# Patient Record
Sex: MALE | Race: WHITE | NOT HISPANIC OR LATINO | Employment: UNEMPLOYED | URBAN - METROPOLITAN AREA
[De-identification: names, ages, dates, MRNs, and addresses within clinical notes are randomized per-mention and may not be internally consistent; named-entity substitution may affect disease eponyms.]

---

## 2017-05-30 ENCOUNTER — HOSPITAL ENCOUNTER (INPATIENT)
Facility: HOSPITAL | Age: 47
LOS: 6 days | Discharge: HOME | DRG: 424 | End: 2017-06-05
Attending: EMERGENCY MEDICINE | Admitting: INTERNAL MEDICINE
Payer: MEDICAID

## 2017-05-30 DIAGNOSIS — E11.51 DM (DIABETES MELLITUS), TYPE 2 WITH PERIPHERAL VASCULAR COMPLICATIONS: ICD-10-CM

## 2017-05-30 DIAGNOSIS — B18.2 CHRONIC VIRAL HEPATITIS C: ICD-10-CM

## 2017-05-30 DIAGNOSIS — E87.70 FLUID OVERLOAD: ICD-10-CM

## 2017-05-30 DIAGNOSIS — L97.909 ATHEROSCLEROSIS OF ARTERY OF EXTREMITY WITH ULCERATION: ICD-10-CM

## 2017-05-30 DIAGNOSIS — E87.1 HYPONATREMIA: ICD-10-CM

## 2017-05-30 DIAGNOSIS — E87.5 HYPERKALEMIA: ICD-10-CM

## 2017-05-30 DIAGNOSIS — K72.90 HEPATIC FAILURE, UNSPECIFIED WITHOUT COMA: ICD-10-CM

## 2017-05-30 DIAGNOSIS — R00.1 BRADYCARDIA: ICD-10-CM

## 2017-05-30 DIAGNOSIS — R10.9 ABDOMINAL PAIN: ICD-10-CM

## 2017-05-30 DIAGNOSIS — E83.42 HYPOMAGNESEMIA: ICD-10-CM

## 2017-05-30 DIAGNOSIS — R18.8 ASCITES OF LIVER: ICD-10-CM

## 2017-05-30 DIAGNOSIS — K76.82 HEPATIC ENCEPHALOPATHY: ICD-10-CM

## 2017-05-30 DIAGNOSIS — L97.421 DIABETIC ULCER OF LEFT MIDFOOT ASSOCIATED WITH TYPE 2 DIABETES MELLITUS, LIMITED TO BREAKDOWN OF SKIN: ICD-10-CM

## 2017-05-30 DIAGNOSIS — R18.8 OTHER ASCITES: ICD-10-CM

## 2017-05-30 DIAGNOSIS — K72.10 CHRONIC HEPATIC FAILURE WITHOUT COMA: ICD-10-CM

## 2017-05-30 DIAGNOSIS — R74.8 ELEVATED LIPASE: ICD-10-CM

## 2017-05-30 DIAGNOSIS — K72.10 CHRONIC LIVER FAILURE WITHOUT HEPATIC COMA: Primary | ICD-10-CM

## 2017-05-30 DIAGNOSIS — E11.621 TYPE 2 DIABETES MELLITUS WITH FOOT ULCER: ICD-10-CM

## 2017-05-30 DIAGNOSIS — L02.612 ABSCESS OF LEFT FOOT: ICD-10-CM

## 2017-05-30 DIAGNOSIS — M86.271 SUBACUTE OSTEOMYELITIS OF RIGHT FOOT: Chronic | ICD-10-CM

## 2017-05-30 DIAGNOSIS — E11.51 TYPE 2 DIABETES MELLITUS WITH DIABETIC PERIPHERAL ANGIOPATHY WITHOUT GANGRENE: ICD-10-CM

## 2017-05-30 DIAGNOSIS — L97.512 NON-PRESSURE CHRONIC ULCER OF OTHER PART OF RIGHT FOOT WITH FAT LAYER EXPOSED: ICD-10-CM

## 2017-05-30 DIAGNOSIS — R10.84 GENERALIZED ABDOMINAL PAIN: Chronic | ICD-10-CM

## 2017-05-30 DIAGNOSIS — E11.621 DIABETIC ULCER OF LEFT MIDFOOT ASSOCIATED WITH TYPE 2 DIABETES MELLITUS, LIMITED TO BREAKDOWN OF SKIN: ICD-10-CM

## 2017-05-30 DIAGNOSIS — R74.8 ABNORMAL LEVELS OF OTHER SERUM ENZYMES: ICD-10-CM

## 2017-05-30 DIAGNOSIS — I70.299 ATHEROSCLEROSIS OF ARTERY OF EXTREMITY WITH ULCERATION: ICD-10-CM

## 2017-05-30 DIAGNOSIS — R10.84 GENERALIZED ABDOMINAL PAIN: ICD-10-CM

## 2017-05-30 DIAGNOSIS — E88.09 OTHER DISORDERS OF PLASMA-PROTEIN METABOLISM, NOT ELSEWHERE CLASSIFIED: ICD-10-CM

## 2017-05-30 DIAGNOSIS — E88.09 HYPOALBUMINEMIA: ICD-10-CM

## 2017-05-30 DIAGNOSIS — M86.071 ACUTE HEMATOGENOUS OSTEOMYELITIS OF RIGHT FOOT: ICD-10-CM

## 2017-05-30 DIAGNOSIS — E11.621 DIABETIC ULCER OF TOE OF RIGHT FOOT ASSOCIATED WITH TYPE 2 DIABETES MELLITUS, WITH FAT LAYER EXPOSED: ICD-10-CM

## 2017-05-30 DIAGNOSIS — R06.02 SHORTNESS OF BREATH: ICD-10-CM

## 2017-05-30 DIAGNOSIS — L97.512 DIABETIC ULCER OF TOE OF RIGHT FOOT ASSOCIATED WITH TYPE 2 DIABETES MELLITUS, WITH FAT LAYER EXPOSED: ICD-10-CM

## 2017-05-30 DIAGNOSIS — E87.1 HYPO-OSMOLALITY AND HYPONATREMIA: ICD-10-CM

## 2017-05-30 DIAGNOSIS — R09.89 DECREASED PEDAL PULSES: ICD-10-CM

## 2017-05-30 DIAGNOSIS — K65.2 SBP (SPONTANEOUS BACTERIAL PERITONITIS): ICD-10-CM

## 2017-05-30 DIAGNOSIS — B18.2 CHRONIC HEPATITIS C WITHOUT HEPATIC COMA: ICD-10-CM

## 2017-05-30 LAB
ALBUMIN SERPL BCP-MCNC: 1.8 G/DL
ALP SERPL-CCNC: 144 U/L
ALT SERPL W/O P-5'-P-CCNC: 14 U/L
AMMONIA PLAS-SCNC: 52 UMOL/L
ANION GAP SERPL CALC-SCNC: 10 MMOL/L
AST SERPL-CCNC: 42 U/L
BACTERIA #/AREA URNS AUTO: ABNORMAL /HPF
BASOPHILS # BLD AUTO: 0.02 K/UL
BASOPHILS NFR BLD: 0.3 %
BILIRUB SERPL-MCNC: 1.2 MG/DL
BILIRUB UR QL STRIP: NEGATIVE
BUN SERPL-MCNC: 21 MG/DL
CALCIUM SERPL-MCNC: 7.6 MG/DL
CHLORIDE SERPL-SCNC: 102 MMOL/L
CLARITY UR REFRACT.AUTO: CLEAR
CO2 SERPL-SCNC: 18 MMOL/L
COLOR UR AUTO: YELLOW
CREAT SERPL-MCNC: 1.4 MG/DL
DIFFERENTIAL METHOD: ABNORMAL
EOSINOPHIL # BLD AUTO: 0.1 K/UL
EOSINOPHIL NFR BLD: 1.3 %
ERYTHROCYTE [DISTWIDTH] IN BLOOD BY AUTOMATED COUNT: 17.7 %
EST. GFR  (AFRICAN AMERICAN): >60 ML/MIN/1.73 M^2
EST. GFR  (NON AFRICAN AMERICAN): >60 ML/MIN/1.73 M^2
ETHANOL SERPL-MCNC: <10 MG/DL
GLUCOSE SERPL-MCNC: 434 MG/DL
GLUCOSE UR QL STRIP: ABNORMAL
HCT VFR BLD AUTO: 28.5 %
HGB BLD-MCNC: 9.4 G/DL
HGB UR QL STRIP: ABNORMAL
HYALINE CASTS UR QL AUTO: 1 /LPF
INR PPP: 1.1
KETONES UR QL STRIP: NEGATIVE
LACTATE SERPL-SCNC: 2 MMOL/L
LEUKOCYTE ESTERASE UR QL STRIP: NEGATIVE
LIPASE SERPL-CCNC: 151 U/L
LYMPHOCYTES # BLD AUTO: 0.5 K/UL
LYMPHOCYTES NFR BLD: 6.5 %
MAGNESIUM SERPL-MCNC: 1.2 MG/DL
MCH RBC QN AUTO: 27.6 PG
MCHC RBC AUTO-ENTMCNC: 33 %
MCV RBC AUTO: 84 FL
MICROSCOPIC COMMENT: ABNORMAL
MONOCYTES # BLD AUTO: 0.2 K/UL
MONOCYTES NFR BLD: 3.5 %
NEUTROPHILS # BLD AUTO: 6.1 K/UL
NEUTROPHILS NFR BLD: 88.1 %
NITRITE UR QL STRIP: NEGATIVE
PH UR STRIP: 6 [PH] (ref 5–8)
PLATELET # BLD AUTO: 60 K/UL
PMV BLD AUTO: 10 FL
POCT GLUCOSE: 184 MG/DL (ref 70–110)
POCT GLUCOSE: 261 MG/DL (ref 70–110)
POCT GLUCOSE: 401 MG/DL (ref 70–110)
POTASSIUM SERPL-SCNC: 5.6 MMOL/L
PROT SERPL-MCNC: 6.9 G/DL
PROT UR QL STRIP: ABNORMAL
PROTHROMBIN TIME: 11.8 SEC
RBC # BLD AUTO: 3.41 M/UL
RBC #/AREA URNS AUTO: 4 /HPF (ref 0–4)
SODIUM SERPL-SCNC: 130 MMOL/L
SP GR UR STRIP: 1.02 (ref 1–1.03)
SQUAMOUS #/AREA URNS AUTO: 0 /HPF
URN SPEC COLLECT METH UR: ABNORMAL
UROBILINOGEN UR STRIP-ACNC: NEGATIVE EU/DL
WBC # BLD AUTO: 6.94 K/UL
WBC #/AREA URNS AUTO: 6 /HPF (ref 0–5)
YEAST UR QL AUTO: ABNORMAL

## 2017-05-30 PROCEDURE — 87522 HEPATITIS C REVRS TRNSCRPJ: CPT

## 2017-05-30 PROCEDURE — 96375 TX/PRO/DX INJ NEW DRUG ADDON: CPT

## 2017-05-30 PROCEDURE — 63600175 PHARM REV CODE 636 W HCPCS: Performed by: EMERGENCY MEDICINE

## 2017-05-30 PROCEDURE — 93005 ELECTROCARDIOGRAM TRACING: CPT

## 2017-05-30 PROCEDURE — 83690 ASSAY OF LIPASE: CPT

## 2017-05-30 PROCEDURE — 99285 EMERGENCY DEPT VISIT HI MDM: CPT | Mod: 25,,, | Performed by: EMERGENCY MEDICINE

## 2017-05-30 PROCEDURE — 87340 HEPATITIS B SURFACE AG IA: CPT

## 2017-05-30 PROCEDURE — 85610 PROTHROMBIN TIME: CPT

## 2017-05-30 PROCEDURE — 63600175 PHARM REV CODE 636 W HCPCS: Performed by: STUDENT IN AN ORGANIZED HEALTH CARE EDUCATION/TRAINING PROGRAM

## 2017-05-30 PROCEDURE — 83735 ASSAY OF MAGNESIUM: CPT

## 2017-05-30 PROCEDURE — 86704 HEP B CORE ANTIBODY TOTAL: CPT

## 2017-05-30 PROCEDURE — 85025 COMPLETE CBC W/AUTO DIFF WBC: CPT

## 2017-05-30 PROCEDURE — 86803 HEPATITIS C AB TEST: CPT

## 2017-05-30 PROCEDURE — 83605 ASSAY OF LACTIC ACID: CPT

## 2017-05-30 PROCEDURE — 86705 HEP B CORE ANTIBODY IGM: CPT

## 2017-05-30 PROCEDURE — 80320 DRUG SCREEN QUANTALCOHOLS: CPT

## 2017-05-30 PROCEDURE — 96367 TX/PROPH/DG ADDL SEQ IV INF: CPT

## 2017-05-30 PROCEDURE — 93010 ELECTROCARDIOGRAM REPORT: CPT | Mod: ,,, | Performed by: INTERNAL MEDICINE

## 2017-05-30 PROCEDURE — 49083 ABD PARACENTESIS W/IMAGING: CPT | Mod: 52,,, | Performed by: EMERGENCY MEDICINE

## 2017-05-30 PROCEDURE — 80053 COMPREHEN METABOLIC PANEL: CPT

## 2017-05-30 PROCEDURE — 81001 URINALYSIS AUTO W/SCOPE: CPT

## 2017-05-30 PROCEDURE — 96365 THER/PROPH/DIAG IV INF INIT: CPT

## 2017-05-30 PROCEDURE — 20600001 HC STEP DOWN PRIVATE ROOM

## 2017-05-30 PROCEDURE — 99285 EMERGENCY DEPT VISIT HI MDM: CPT | Mod: 25

## 2017-05-30 PROCEDURE — 80321 ALCOHOLS BIOMARKERS 1OR 2: CPT

## 2017-05-30 PROCEDURE — 82962 GLUCOSE BLOOD TEST: CPT

## 2017-05-30 PROCEDURE — 82140 ASSAY OF AMMONIA: CPT

## 2017-05-30 PROCEDURE — 25000003 PHARM REV CODE 250: Performed by: EMERGENCY MEDICINE

## 2017-05-30 PROCEDURE — 96366 THER/PROPH/DIAG IV INF ADDON: CPT

## 2017-05-30 RX ORDER — MORPHINE SULFATE 2 MG/ML
2 INJECTION, SOLUTION INTRAMUSCULAR; INTRAVENOUS EVERY 4 HOURS PRN
Status: DISCONTINUED | OUTPATIENT
Start: 2017-05-30 | End: 2017-06-03

## 2017-05-30 RX ORDER — HYDROMORPHONE HYDROCHLORIDE 1 MG/ML
0.5 INJECTION, SOLUTION INTRAMUSCULAR; INTRAVENOUS; SUBCUTANEOUS
Status: COMPLETED | OUTPATIENT
Start: 2017-05-30 | End: 2017-05-30

## 2017-05-30 RX ORDER — MAGNESIUM SULFATE HEPTAHYDRATE 40 MG/ML
2 INJECTION, SOLUTION INTRAVENOUS EVERY 6 HOURS
Status: COMPLETED | OUTPATIENT
Start: 2017-05-31 | End: 2017-05-31

## 2017-05-30 RX ORDER — LACTULOSE 10 G/15ML
20 SOLUTION ORAL 3 TIMES DAILY
Status: DISCONTINUED | OUTPATIENT
Start: 2017-05-30 | End: 2017-06-03

## 2017-05-30 RX ORDER — LIDOCAINE HYDROCHLORIDE 10 MG/ML
INJECTION INFILTRATION; PERINEURAL
Status: DISPENSED
Start: 2017-05-30 | End: 2017-05-31

## 2017-05-30 RX ORDER — ALBUMIN HUMAN 250 G/1000ML
150 SOLUTION INTRAVENOUS ONCE
Status: DISCONTINUED | OUTPATIENT
Start: 2017-05-30 | End: 2017-05-30

## 2017-05-30 RX ORDER — LIDOCAINE HYDROCHLORIDE 10 MG/ML
10 INJECTION INFILTRATION; PERINEURAL
Status: DISCONTINUED | OUTPATIENT
Start: 2017-05-30 | End: 2017-06-05 | Stop reason: HOSPADM

## 2017-05-30 RX ORDER — MAGNESIUM SULFATE HEPTAHYDRATE 40 MG/ML
2 INJECTION, SOLUTION INTRAVENOUS
Status: COMPLETED | OUTPATIENT
Start: 2017-05-30 | End: 2017-05-30

## 2017-05-30 RX ORDER — ONDANSETRON 8 MG/1
8 TABLET, ORALLY DISINTEGRATING ORAL EVERY 8 HOURS PRN
Status: DISCONTINUED | OUTPATIENT
Start: 2017-05-30 | End: 2017-06-05 | Stop reason: HOSPADM

## 2017-05-30 RX ORDER — INSULIN ASPART 100 [IU]/ML
0-5 INJECTION, SOLUTION INTRAVENOUS; SUBCUTANEOUS EVERY 6 HOURS PRN
Status: DISCONTINUED | OUTPATIENT
Start: 2017-05-30 | End: 2017-06-01

## 2017-05-30 RX ORDER — IBUPROFEN 200 MG
16 TABLET ORAL
Status: DISCONTINUED | OUTPATIENT
Start: 2017-05-30 | End: 2017-06-05 | Stop reason: HOSPADM

## 2017-05-30 RX ORDER — SODIUM CHLORIDE, SODIUM LACTATE, POTASSIUM CHLORIDE, CALCIUM CHLORIDE 600; 310; 30; 20 MG/100ML; MG/100ML; MG/100ML; MG/100ML
INJECTION, SOLUTION INTRAVENOUS CONTINUOUS
Status: DISCONTINUED | OUTPATIENT
Start: 2017-05-30 | End: 2017-05-30

## 2017-05-30 RX ORDER — GLUCAGON 1 MG
1 KIT INJECTION
Status: DISCONTINUED | OUTPATIENT
Start: 2017-05-30 | End: 2017-06-05 | Stop reason: HOSPADM

## 2017-05-30 RX ORDER — LIDOCAINE HYDROCHLORIDE AND EPINEPHRINE 15; 5 MG/ML; UG/ML
1 INJECTION, SOLUTION EPIDURAL ONCE
Status: COMPLETED | OUTPATIENT
Start: 2017-05-30 | End: 2017-05-30

## 2017-05-30 RX ORDER — IBUPROFEN 200 MG
24 TABLET ORAL
Status: DISCONTINUED | OUTPATIENT
Start: 2017-05-30 | End: 2017-06-05 | Stop reason: HOSPADM

## 2017-05-30 RX ORDER — ALBUMIN HUMAN 250 G/1000ML
12.5 SOLUTION INTRAVENOUS EVERY 6 HOURS
Status: DISCONTINUED | OUTPATIENT
Start: 2017-05-31 | End: 2017-06-01

## 2017-05-30 RX ADMIN — MAGNESIUM SULFATE HEPTAHYDRATE 2 G: 40 INJECTION, SOLUTION INTRAVENOUS at 04:05

## 2017-05-30 RX ADMIN — LIDOCAINE HYDROCHLORIDE,EPINEPHRINE BITARTRATE 1 ML: 15; .005 INJECTION, SOLUTION EPIDURAL; INFILTRATION; INTRACAUDAL; PERINEURAL at 06:05

## 2017-05-30 RX ADMIN — MORPHINE SULFATE 2 MG: 2 INJECTION, SOLUTION INTRAMUSCULAR; INTRAVENOUS at 09:05

## 2017-05-30 RX ADMIN — HYDROMORPHONE HYDROCHLORIDE 0.5 MG: 1 INJECTION, SOLUTION INTRAMUSCULAR; INTRAVENOUS; SUBCUTANEOUS at 04:05

## 2017-05-30 RX ADMIN — INSULIN DETEMIR 10 UNITS: 100 INJECTION, SOLUTION SUBCUTANEOUS at 10:05

## 2017-05-30 RX ADMIN — CEFTRIAXONE 2 G: 2 INJECTION, SOLUTION INTRAVENOUS at 07:05

## 2017-05-30 NOTE — ED TRIAGE NOTES
Patient states he is here for lower and upper abdominal pain that started last night.  Patient denies nausea/vomiting.  Patient c/o diarrhea with dark, maroon, looking blood in it.  Patient states he is freezing and can't stop shaking.  Patient states he has cirrhosis of the liver, pancreatitis, Hep C, gallstones, diabetic ulcers to the right foot, and hypertension.

## 2017-05-30 NOTE — ED PROVIDER NOTES
Encounter Date: 5/30/2017    SCRIBE #1 NOTE: I, Jennie Ramos, am scribing for, and in the presence of,  Dr. Siu . I have scribed the entire note.       History     Chief Complaint   Patient presents with    Shaking     foot ulcers,hep c, liver failure, shakes started today     Review of patient's allergies indicates:   Allergen Reactions    Shellfish containing products Hives     Shortness of breath     Time patient was seen by the provider: 3:20 PM    The patient is a 46 y.o. male with hx of: Hep C, cirrhosis of the liver, HTN, DM, pancreatitis, and gallstones that presents to the ED with a complaint of abdominal pain for 1 day. Pt also endorses chills, rectal bleeding, and pain in his right scrotum, but denies cough and diarrhea. Pt is normally followed closely by physicians at [facility] and he was last seen in the ED 10 days ago for abdominal pain. Of note: His last fluid tap was a couple of months ago.      The history is provided by the patient and a relative.     Past Medical History:   Diagnosis Date    Ascites of liver 5/30/2017    Chronic hepatitis C without hepatic coma     Chronic liver failure without hepatic coma 5/30/2017    Diabetic foot ulcer     right foot    DM (diabetes mellitus), type 2 with peripheral vascular complications     Essential hypertension     Gallstones     Hepatic encephalopathy 5/30/2017    Hepatitis C     Pancreatitis      Past Surgical History:   Procedure Laterality Date    BACK SURGERY       History reviewed. No pertinent family history.  Social History   Substance Use Topics    Smoking status: Current Every Day Smoker     Types: Cigarettes    Smokeless tobacco: Not on file    Alcohol use No     Review of Systems   Constitutional: Positive for chills. Negative for fever.   HENT: Negative for sore throat.    Respiratory: Negative for shortness of breath.    Cardiovascular: Negative for chest pain.   Gastrointestinal: Positive for abdominal pain. Negative for  diarrhea and nausea.   Genitourinary: Negative for dysuria.        Pain in his right scrotum and positive for rectal bleeding.    Musculoskeletal: Negative for back pain.   Skin: Negative for rash.   Neurological: Negative for weakness.   Hematological: Does not bruise/bleed easily.       Physical Exam     Initial Vitals [05/30/17 1351]   BP Pulse Resp Temp SpO2   (!) 191/90 93 (!) 24 99.1 °F (37.3 °C) 100 %     Physical Exam    Nursing note and vitals reviewed.  Constitutional: No distress.   Pt appears chronically ill and uncomfortable.    HENT:   Head: Normocephalic and atraumatic.   Mucous membranes dry    Eyes: No scleral icterus.   Neck: Neck supple.   Cardiovascular: Normal rate, regular rhythm, normal heart sounds and intact distal pulses.   Pulmonary/Chest: Breath sounds normal. No respiratory distress.   Abdominal: Soft. He exhibits distension. There is tenderness (diffuse ).   Pt is positive for ascites.     Genitourinary: Rectal exam shows guaiac positive stool. Guaiac positive stool. : Acceptable.  Genitourinary Comments: There is light brown stool.    Neurological: He is alert and oriented to person, place, and time.         ED Course   Paracentesis  Date/Time: 6/1/2017 2:34 PM  Location procedure was performed: [facility] EMERGENCY DEPARTMENT  Performed by: JASON BARKER  Authorized by: ERON FOLEY   Consent Done: Emergent Situation  Procedure purpose: diagnostic  Indications: suspected peritonitis  Anesthesia: local infiltration    Anesthesia:  Anesthesia: local infiltration  Local Anesthetic: lidocaine 1% with epinephrine   Preparation: Patient was prepped and draped in the usual sterile fashion.  Needle gauge: 18  Ultrasound guidance: yes  Puncture site: right lower quadrant  Comments: 2 attempts made, patient unable to tolerate        Labs Reviewed   COMPREHENSIVE METABOLIC PANEL - Abnormal; Notable for the following:        Result Value    Sodium 130 (*)     Potassium 5.6 (*)      CO2 18 (*)     Glucose 434 (*)     BUN, Bld 21 (*)     Calcium 7.6 (*)     Albumin 1.8 (*)     Total Bilirubin 1.2 (*)     Alkaline Phosphatase 144 (*)     AST 42 (*)     All other components within normal limits   MAGNESIUM - Abnormal; Notable for the following:     Magnesium 1.2 (*)     All other components within normal limits   LIPASE - Abnormal; Notable for the following:     Lipase 151 (*)     All other components within normal limits   URINALYSIS - Abnormal; Notable for the following:     Protein, UA 1+ (*)     Glucose, UA 3+ (*)     Occult Blood UA 3+ (*)     All other components within normal limits   AMMONIA - Abnormal; Notable for the following:     Ammonia 52 (*)     All other components within normal limits   URINALYSIS MICROSCOPIC - Abnormal; Notable for the following:     WBC, UA 6 (*)     All other components within normal limits   CBC W/ AUTO DIFFERENTIAL - Abnormal; Notable for the following:     RBC 3.41 (*)     Hemoglobin 9.4 (*)     Hematocrit 28.5 (*)     RDW 17.7 (*)     Platelets 60 (*)     Lymph # 0.5 (*)     Mono # 0.2 (*)     Gran% 88.1 (*)     Lymph% 6.5 (*)     Mono% 3.5 (*)     All other components within normal limits   POCT GLUCOSE - Abnormal; Notable for the following:     POCT Glucose 401 (*)     All other components within normal limits   POCT GLUCOSE - Abnormal; Notable for the following:     POCT Glucose 184 (*)     All other components within normal limits   POCT GLUCOSE - Abnormal; Notable for the following:     POCT Glucose 261 (*)     All other components within normal limits   LACTIC ACID, PLASMA   PROTIME-INR   ALCOHOL,MEDICAL (ETHANOL)   HEPATITIS B CORE ANTIBODY, IGM   HEPATITIS B CORE ANTIBODY, TOTAL   HEPATITIS B SURFACE ANTIGEN, CONFIRMATION   HEPATITIS C ANTIBODY   HEPATITIS C RNA, QUANTITATIVE, PCR   PHOSPHATIDYLETHANOL (PETH)   ALCOHOL,MEDICAL (ETHANOL)    Narrative:     ADDING ON HEPATITIS B CORE ANTIBODY TOTAL, ETHANOL, HEPATITIS B CORE   ANTIBODY IGM,  HEPATITIS B SURFACE ANTIGEN, HEPATITIS C ANTIBODY AND   PHOSPHATIDYLETHANOL PER ERON FOLEY MD 19:40  05/30/2017    PHOSPHATIDYLETHANOL (PETH)    Narrative:     ADDING ON HEPATITIS B CORE ANTIBODY TOTAL, ETHANOL, HEPATITIS B CORE   ANTIBODY IGM, HEPATITIS B SURFACE ANTIGEN, HEPATITIS C ANTIBODY AND   PHOSPHATIDYLETHANOL PER ERON FOLEY MD 19:40  05/30/2017              Medical Decision Making:   History:   Old Medical Records: I decided to obtain old medical records.  Initial Assessment:   47 yo m, end stage liver 2/2 HCV cirrhosis, all his care at [facility], now here for worsening abd pain, fever/chills, generalized weakness, rectal bleeding.  Recent admit at [facility] but no access to medical records.  Pt appears chronically ill, dehydrated, abd distended and tender diffusely.  Rectal w only trace heme positive.  No signs encephalopathy  Differential Diagnosis:   Decompensated liver failure, ? Superimposed SBP or alternative infection or issue  Clinical Tests:   Lab Tests: Ordered and Reviewed  Radiological Study: Ordered and Reviewed  ED Management:  -labs  -IVF  -dx paracentesis  -pain meds  -anticipate admit            Scribe Attestation:   Scribe #1: I performed the above scribed service and the documentation accurately describes the services I performed. I attest to the accuracy of the note.    Attending Attestation:           Physician Attestation for Scribe:  Physician Attestation Statement for Scribe #1: I, Dr. Siu, reviewed documentation, as scribed by Jennie Ramos  in my presence, and it is both accurate and complete.                 ED Course     5:12 PM  Diagnostic tap attempted via ultrasound guidance but pt unable to tolerate, attempted x 2 and then pt refused further attempts    Will tx empirically w ceftriaxone and admit for further management    Lipase 152 so sx could be 2/2 pancreatitis      Clinical Impression:   The primary encounter diagnosis was Chronic liver failure without hepatic coma.  Diagnoses of Shortness of breath, Fluid overload, DM (diabetes mellitus), type 2 with peripheral vascular complications, Chronic hepatitis C without hepatic coma, Hyperkalemia, Abdominal pain, Diabetic ulcer of toe of right foot associated with type 2 diabetes mellitus, with fat layer exposed, SBP (spontaneous bacterial peritonitis), Elevated lipase, Hypomagnesemia, Generalized abdominal pain, Hepatic encephalopathy, Ascites of liver, Hypoalbuminemia, Hyponatremia, and Decreased pedal pulses were also pertinent to this visit.          Marta Siu MD  06/01/17 5268

## 2017-05-30 NOTE — ED NOTES
PT IV came out. Attempted to start IV 3 unsuccessful attempts. Dr. guerra notified. Awaiting another nurse to attempt.

## 2017-05-31 PROBLEM — K65.2 SBP (SPONTANEOUS BACTERIAL PERITONITIS): Chronic | Status: ACTIVE | Noted: 2017-05-30

## 2017-05-31 LAB
ALBUMIN FLD-MCNC: 0.4 G/DL
ALBUMIN SERPL BCP-MCNC: 1.6 G/DL
ALP SERPL-CCNC: 117 U/L
ALT SERPL W/O P-5'-P-CCNC: 12 U/L
ANION GAP SERPL CALC-SCNC: 4 MMOL/L
APPEARANCE FLD: CLEAR
AST SERPL-CCNC: 31 U/L
BILIRUB SERPL-MCNC: 1.3 MG/DL
BODY FLD TYPE: NORMAL
BODY FLUID SOURCE, LDH: NORMAL
BUN SERPL-MCNC: 20 MG/DL
CALCIUM SERPL-MCNC: 7.6 MG/DL
CHLORIDE SERPL-SCNC: 102 MMOL/L
CO2 SERPL-SCNC: 22 MMOL/L
COLOR FLD: YELLOW
CREAT SERPL-MCNC: 1.1 MG/DL
EST. GFR  (AFRICAN AMERICAN): >60 ML/MIN/1.73 M^2
EST. GFR  (NON AFRICAN AMERICAN): >60 ML/MIN/1.73 M^2
ESTIMATED AVG GLUCOSE: 189 MG/DL
GLUCOSE SERPL-MCNC: 310 MG/DL
GRAM STN SPEC: NORMAL
GRAM STN SPEC: NORMAL
HBA1C MFR BLD HPLC: 8.2 %
HBV CORE AB SERPL QL IA: NEGATIVE
HBV CORE IGM SERPL QL IA: NEGATIVE
HBV SURFACE AG SERPL QL IA: NEGATIVE
HCV AB SERPL QL IA: POSITIVE
LDH FLD L TO P-CCNC: 62 U/L
LYMPHOCYTES NFR FLD MANUAL: 27 %
MAGNESIUM SERPL-MCNC: 1.5 MG/DL
MESOTHL CELL NFR FLD MANUAL: 7 %
MONOS+MACROS NFR FLD MANUAL: 54 %
NEUTROPHILS NFR FLD MANUAL: 12 %
PHOSPHATE SERPL-MCNC: 2.4 MG/DL
POCT GLUCOSE: 286 MG/DL (ref 70–110)
POCT GLUCOSE: 320 MG/DL (ref 70–110)
POCT GLUCOSE: 363 MG/DL (ref 70–110)
POCT GLUCOSE: 372 MG/DL (ref 70–110)
POCT GLUCOSE: 375 MG/DL (ref 70–110)
POTASSIUM SERPL-SCNC: 4.4 MMOL/L
PROT FLD-MCNC: 1 G/DL
PROT SERPL-MCNC: 5.8 G/DL
SODIUM SERPL-SCNC: 128 MMOL/L
SPECIMEN SOURCE: NORMAL
SPECIMEN SOURCE: NORMAL
WBC # FLD: 62 /CU MM

## 2017-05-31 PROCEDURE — 87205 SMEAR GRAM STAIN: CPT

## 2017-05-31 PROCEDURE — 82042 OTHER SOURCE ALBUMIN QUAN EA: CPT

## 2017-05-31 PROCEDURE — 89051 BODY FLUID CELL COUNT: CPT

## 2017-05-31 PROCEDURE — 63600175 PHARM REV CODE 636 W HCPCS: Performed by: INTERNAL MEDICINE

## 2017-05-31 PROCEDURE — 20600001 HC STEP DOWN PRIVATE ROOM

## 2017-05-31 PROCEDURE — P9047 ALBUMIN (HUMAN), 25%, 50ML: HCPCS | Performed by: INTERNAL MEDICINE

## 2017-05-31 PROCEDURE — 87075 CULTR BACTERIA EXCEPT BLOOD: CPT

## 2017-05-31 PROCEDURE — 84100 ASSAY OF PHOSPHORUS: CPT

## 2017-05-31 PROCEDURE — 0W9G3ZZ DRAINAGE OF PERITONEAL CAVITY, PERCUTANEOUS APPROACH: ICD-10-PCS | Performed by: RADIOLOGY

## 2017-05-31 PROCEDURE — 83735 ASSAY OF MAGNESIUM: CPT

## 2017-05-31 PROCEDURE — 83036 HEMOGLOBIN GLYCOSYLATED A1C: CPT

## 2017-05-31 PROCEDURE — 63600175 PHARM REV CODE 636 W HCPCS: Performed by: STUDENT IN AN ORGANIZED HEALTH CARE EDUCATION/TRAINING PROGRAM

## 2017-05-31 PROCEDURE — 80053 COMPREHEN METABOLIC PANEL: CPT

## 2017-05-31 PROCEDURE — 84157 ASSAY OF PROTEIN OTHER: CPT

## 2017-05-31 PROCEDURE — 83615 LACTATE (LD) (LDH) ENZYME: CPT

## 2017-05-31 PROCEDURE — 99223 1ST HOSP IP/OBS HIGH 75: CPT | Mod: ,,, | Performed by: INTERNAL MEDICINE

## 2017-05-31 PROCEDURE — 36415 COLL VENOUS BLD VENIPUNCTURE: CPT

## 2017-05-31 PROCEDURE — 87070 CULTURE OTHR SPECIMN AEROBIC: CPT

## 2017-05-31 RX ORDER — QUETIAPINE FUMARATE 100 MG/1
100 TABLET, FILM COATED ORAL NIGHTLY
COMMUNITY

## 2017-05-31 RX ORDER — CIPROFLOXACIN 500 MG/1
500 TABLET ORAL EVERY 12 HOURS
Status: ON HOLD | COMMUNITY
End: 2017-06-05 | Stop reason: ALTCHOICE

## 2017-05-31 RX ORDER — ATORVASTATIN CALCIUM 20 MG/1
20 TABLET, FILM COATED ORAL DAILY
Status: ON HOLD | COMMUNITY
End: 2017-06-05

## 2017-05-31 RX ORDER — CARVEDILOL 6.25 MG/1
6.25 TABLET ORAL 2 TIMES DAILY WITH MEALS
Status: ON HOLD | COMMUNITY
End: 2017-07-06 | Stop reason: HOSPADM

## 2017-05-31 RX ORDER — SPIRONOLACTONE 50 MG/1
50 TABLET, FILM COATED ORAL DAILY
Status: ON HOLD | COMMUNITY
End: 2017-06-05 | Stop reason: SINTOL

## 2017-05-31 RX ORDER — FUROSEMIDE 10 MG/ML
40 INJECTION INTRAMUSCULAR; INTRAVENOUS 2 TIMES DAILY
Status: DISCONTINUED | OUTPATIENT
Start: 2017-05-31 | End: 2017-06-01

## 2017-05-31 RX ORDER — INSULIN GLARGINE 100 [IU]/ML
25 INJECTION, SOLUTION SUBCUTANEOUS NIGHTLY
Status: ON HOLD | COMMUNITY
End: 2017-05-31 | Stop reason: CLARIF

## 2017-05-31 RX ORDER — MAGNESIUM SULFATE HEPTAHYDRATE 40 MG/ML
2 INJECTION, SOLUTION INTRAVENOUS EVERY 6 HOURS
Status: COMPLETED | OUTPATIENT
Start: 2017-05-31 | End: 2017-05-31

## 2017-05-31 RX ORDER — PROPRANOLOL HYDROCHLORIDE 40 MG/1
40 TABLET ORAL 2 TIMES DAILY
Status: ON HOLD | COMMUNITY
End: 2017-06-05 | Stop reason: SDUPTHER

## 2017-05-31 RX ORDER — DICYCLOMINE HYDROCHLORIDE 20 MG/1
20 TABLET ORAL 3 TIMES DAILY
COMMUNITY
End: 2018-04-03

## 2017-05-31 RX ORDER — FUROSEMIDE 20 MG/1
20 TABLET ORAL DAILY
Status: ON HOLD | COMMUNITY
End: 2017-06-05

## 2017-05-31 RX ADMIN — MAGNESIUM SULFATE IN WATER 2 G: 40 INJECTION, SOLUTION INTRAVENOUS at 05:05

## 2017-05-31 RX ADMIN — INSULIN ASPART 3 UNITS: 100 INJECTION, SOLUTION INTRAVENOUS; SUBCUTANEOUS at 12:05

## 2017-05-31 RX ADMIN — MAGNESIUM SULFATE IN WATER 2 G: 40 INJECTION, SOLUTION INTRAVENOUS at 06:05

## 2017-05-31 RX ADMIN — ALBUMIN (HUMAN) 12.5 G: 12.5 SOLUTION INTRAVENOUS at 11:05

## 2017-05-31 RX ADMIN — ALBUMIN (HUMAN) 12.5 G: 12.5 SOLUTION INTRAVENOUS at 05:05

## 2017-05-31 RX ADMIN — FUROSEMIDE 40 MG: 10 INJECTION, SOLUTION INTRAVENOUS at 11:05

## 2017-05-31 RX ADMIN — INSULIN ASPART 5 UNITS: 100 INJECTION, SOLUTION INTRAVENOUS; SUBCUTANEOUS at 05:05

## 2017-05-31 RX ADMIN — MORPHINE SULFATE 2 MG: 2 INJECTION, SOLUTION INTRAMUSCULAR; INTRAVENOUS at 11:05

## 2017-05-31 RX ADMIN — INSULIN ASPART 4 UNITS: 100 INJECTION, SOLUTION INTRAVENOUS; SUBCUTANEOUS at 12:05

## 2017-05-31 RX ADMIN — MORPHINE SULFATE 2 MG: 2 INJECTION, SOLUTION INTRAMUSCULAR; INTRAVENOUS at 07:05

## 2017-05-31 RX ADMIN — CEFTRIAXONE 2 G: 2 INJECTION, SOLUTION INTRAVENOUS at 08:05

## 2017-05-31 RX ADMIN — ALBUMIN (HUMAN) 12.5 G: 12.5 SOLUTION INTRAVENOUS at 12:05

## 2017-05-31 RX ADMIN — MORPHINE SULFATE 2 MG: 2 INJECTION, SOLUTION INTRAMUSCULAR; INTRAVENOUS at 01:05

## 2017-05-31 RX ADMIN — MORPHINE SULFATE 2 MG: 2 INJECTION, SOLUTION INTRAMUSCULAR; INTRAVENOUS at 03:05

## 2017-05-31 RX ADMIN — MAGNESIUM SULFATE IN WATER 2 G: 40 INJECTION, SOLUTION INTRAVENOUS at 03:05

## 2017-05-31 RX ADMIN — MAGNESIUM SULFATE IN WATER 2 G: 40 INJECTION, SOLUTION INTRAVENOUS at 12:05

## 2017-05-31 RX ADMIN — MORPHINE SULFATE 2 MG: 2 INJECTION, SOLUTION INTRAMUSCULAR; INTRAVENOUS at 08:05

## 2017-05-31 RX ADMIN — FUROSEMIDE 40 MG: 10 INJECTION, SOLUTION INTRAVENOUS at 05:05

## 2017-05-31 RX ADMIN — INSULIN ASPART 3 UNITS: 100 INJECTION, SOLUTION INTRAVENOUS; SUBCUTANEOUS at 05:05

## 2017-05-31 NOTE — HPI
The patient is a 46 y.o. male with hx of: Hep C, cirrhosis of the liver, HTN, HLD, DM, recurrent pancreatitis, and gallstones that presents to the ED with a complaint of abdominal pain for 1 day. He states the pain is worse after eating. Pt also endorses chills, nausea, leg pains, and rectal bleeding, but denies chest pain and diarrhea. Endorses SOB and dry cough x 2 months. No hx of hemorrhoids. He states he vomiting blood x 1 about 1.5 wk ago but never got an EGD at the time because he could not tolerate it. Pt is normally followed closely by physicians at [facility] and he was last seen in the ED 10 days ago for abdominal pain. He states he does not like the way he was treated at [facility], and therefore came here. Of note: His last fluid tap was a couple of months ago. He denies any EtOH use, drug use limited to marijuana and cocaine but denies IV drug use, and TOB use is 2-3 cigs/day.

## 2017-05-31 NOTE — PLAN OF CARE
Problem: Patient Care Overview  Goal: Plan of Care Review  Pt is AAOx4, vital signs have been stable. Pt is NPO. Pt complains of abdominal pain, PRN morphine given. Pt has refused Lactulose. Pt is free from falls/injury throughout shift. Plan of care reviewed with patient,all questions and concerns were addressed. Will continue to monitor.

## 2017-05-31 NOTE — PROGRESS NOTES
[facility] Hospital Medicine  Progress Note    Patient Name: Leif Nino  MRN: [id number]  Patient Class: IP- Inpatient   Admission Date: 5/30/2017  Length of Stay: 1 days  Attending Physician: Juliano Mo MD  Primary Care Provider: Primary Doctor [facility] Medicine Team: [facility]P MED 5 Estefany Mcghee MD    Subjective:     Principal Problem:SBP (spontaneous bacterial peritonitis)    HPI:  The patient is a 46 y.o. male with hx of: Hep C, cirrhosis of the liver, HTN, HLD, DM, recurrent pancreatitis, and gallstones that presents to the ED with a complaint of abdominal pain for 1 day. He states the pain is worse after eating. Pt also endorses chills, nausea, leg pains, and rectal bleeding, but denies chest pain and diarrhea. Endorses SOB and dry cough x 2 months. No hx of hemorrhoids. He states he vomiting blood x 1 about 1.5 wk ago but never got an EGD at the time because he could not tolerate it. Pt is normally followed closely by physicians at [facility] and he was last seen in the ED 10 days ago for abdominal pain. He states he does not like the way he was treated at [facility], and therefore came here. Of note: His last fluid tap was a couple of months ago. He denies any EtOH use, drug use limited to marijuana and cocaine but denies IV drug use, and TOB use is 2-3 cigs/day.        Hospital Course:  Admitted to [facility] 5/30. NAEON. Pt could not tolerate bedside paracentesis. 5/31 RUQ ultrasound consistent with ascites and cirrhosis. IR paracentesis ordered to r/o SBP. Cont medical management for SBP.     Interval History: NAEON. RUQ U/S and IR para done today. Cont SBP tx and f/u para labs.     Review of Systems   Constitutional: Positive for fatigue. Negative for chills, diaphoresis and fever.   HENT: Negative for congestion, ear discharge, ear pain and rhinorrhea.    Eyes: Negative for pain, discharge, redness and itching.   Respiratory: Positive for cough (dry, chronic). Negative for chest  tightness, shortness of breath and wheezing.    Cardiovascular: Negative for chest pain, palpitations and leg swelling.   Gastrointestinal: Positive for abdominal distention and abdominal pain. Negative for constipation, diarrhea, nausea and vomiting.   Genitourinary: Negative for difficulty urinating, dysuria, frequency and hematuria.   Musculoskeletal: Positive for myalgias. Negative for arthralgias, back pain and neck pain.   Skin: Positive for color change (b/l LE) and wound (right foot). Negative for pallor and rash.   Neurological: Negative for dizziness, light-headedness, numbness and headaches.   Psychiatric/Behavioral: Negative for agitation, confusion and sleep disturbance. The patient is not nervous/anxious.      Objective:     Vital Signs (Most Recent):  Temp: 98.2 °F (36.8 °C) (05/31/17 0727)  Pulse: 68 (05/31/17 1504)  Resp: 16 (05/31/17 1504)  BP: (!) 179/89 (05/31/17 1504)  SpO2: 100 % (05/31/17 1504) Vital Signs (24h Range):  Temp:  [97.4 °F (36.3 °C)-100.2 °F (37.9 °C)] 98.2 °F (36.8 °C)  Pulse:  [68-96] 68  Resp:  [16-22] 16  SpO2:  [97 %-100 %] 100 %  BP: (139-180)/(67-89) 179/89     Weight: 117 kg (257 lb 15 oz)  Body mass index is 33.12 kg/m².    Intake/Output Summary (Last 24 hours) at 05/31/17 1555  Last data filed at 05/31/17 1547   Gross per 24 hour   Intake              240 ml   Output             6200 ml   Net            -5960 ml      Physical Exam   Constitutional: He is oriented to person, place, and time. He appears well-developed and well-nourished. No distress.   HENT:   Head: Normocephalic and atraumatic.   Right Ear: External ear normal.   Left Ear: External ear normal.   Nose: Nose normal.   Mouth/Throat: Oropharynx is clear and moist. No oropharyngeal exudate.   Eyes: Conjunctivae and EOM are normal. Pupils are equal, round, and reactive to light. Right eye exhibits no discharge. Left eye exhibits no discharge. No scleral icterus.   Neck: Normal range of motion. Neck supple. No  JVD present. No tracheal deviation present. No thyromegaly present.   Cardiovascular: Normal rate, regular rhythm, normal heart sounds and intact distal pulses.  Exam reveals no gallop and no friction rub.    No murmur heard.  Pulmonary/Chest: Effort normal and breath sounds normal. No respiratory distress. He has no wheezes. He has no rales. He exhibits no tenderness.   Abdominal: Soft. Bowel sounds are normal. He exhibits distension. He exhibits no mass. There is tenderness. There is guarding. There is no rebound.   Musculoskeletal: Normal range of motion. He exhibits edema (b/l LE). He exhibits no tenderness or deformity.   Neurological: He is alert and oriented to person, place, and time.   Skin: Skin is warm. No rash noted. He is not diaphoretic. No erythema. No pallor.   B/l LE hard bumpy slightly erythematous skin; right foot discharge/dried blood noted, sock stuck to discharge and unable to pull away at this time    Psychiatric: He has a normal mood and affect. His behavior is normal. Judgment and thought content normal.   Vitals reviewed.      Significant Labs: All pertinent labs within the past 24 hours have been reviewed.    Significant Imaging: I have reviewed all pertinent imaging results/findings within the past 24 hours.    Assessment/Plan:      Hyperkalemia    - K 5.6 on admit, asymptomatic  - EKG nsr  - cont to monitor, improving, K wnl          Hyponatremia    - Na 130, asymptomatic, in setting of liver failure  - cont to monitor daily labs, fluid restriction          Chronic hepatitis C without hepatic coma    - no home meds for Hep C, denies lactulose use  - cont lactulose 20 mg TID          DM (diabetes mellitus), type 2 with peripheral vascular complications    - home med: Lantus 25 u qHS  - started insulin detemir 20 u nightly + LDSS, monitor POCT glc          Ascites of liver    - see abd pain  - IR para done 5/31, f/u ascitic fluid labs          Hypoalbuminemia    - in setting of liver  failure  - discuss nutritional status with pt           Elevated lipase    - lipase 151, concern for pancreatitis but less likely   - see abd pain          Hypomagnesemia    - Mg 1.2 on admit, replete as needed          Chronic liver failure without hepatic coma    - see above  - started IV lasix 40 mg BID          * SBP (spontaneous bacterial peritonitis)    - DDx: SBP, pancreatitis, gallstones  - unconfirmed SBP as pt not able to tolerate bedside paracentesis  - consulted IR for paracentesis  - treating for SBP: ceftriaxone 2 g daily, albumin 1.5 g/kg on day 1 followed by 1 g/kg on day 3, IV morphine 2 mg q4hrs for pain control  - monitor kidney fxn in the AM, if worsening concern for HRS and may need octreotide/midodrine  - 5/31 IR para done. F/u ascitic fluid labs  - prn pain control  - RUQ ultrasound: consistent for ascites and cirrhosis, labs: Hep B neg, Hep C +, ethanol neg, Peth in process            VTE Risk Mitigation         Ordered     Medium Risk of VTE  Once      05/31/17 0740     Place BASILIO hose  Until discontinued      05/30/17 1824     Place sequential compression device  Until discontinued      05/30/17 1824          Estefany Mcghee MD  Department of Hospital Medicine   [facility]

## 2017-05-31 NOTE — H&P
Inpatient Radiology Pre-procedure Note    History of Present Illness:  Leif Nino is a 46 y.o. male who presents for ultrasound guided paracentesis.  Admission H&P reviewed.  Past Medical History:   Diagnosis Date    Ascites of liver 5/30/2017    Chronic hepatitis C without hepatic coma     Chronic liver failure without hepatic coma 5/30/2017    Diabetic foot ulcer     right foot    DM (diabetes mellitus), type 2 with peripheral vascular complications     Essential hypertension     Gallstones     Hepatic encephalopathy 5/30/2017    Hepatitis C     Pancreatitis      Past Surgical History:   Procedure Laterality Date    BACK SURGERY         Review of Systems:   As documented in primary team H&P    Home Meds:   Prior to Admission medications    Medication Sig Start Date End Date Taking? Authorizing Provider   atorvastatin (LIPITOR) 20 MG tablet Take 20 mg by mouth once daily.   Yes Historical Provider, MD   carvedilol (COREG) 6.25 MG tablet Take 6.25 mg by mouth 2 (two) times daily with meals.   Yes Historical Provider, MD   ciprofloxacin HCl (CIPRO) 500 MG tablet Take 500 mg by mouth every 12 (twelve) hours.   Yes Historical Provider, MD   dicyclomine (BENTYL) 20 mg tablet Take 20 mg by mouth 3 (three) times daily.   Yes Historical Provider, MD   furosemide (LASIX) 20 MG tablet Take 20 mg by mouth once daily.   Yes Historical Provider, MD   INSULIN GLARGINE,HUM.REC.ANLOG (LANTUS SOLOSTAR SUBQ) Inject 25 Units into the skin every evening.   Yes Historical Provider, MD   propranolol (INDERAL) 40 MG tablet Take 40 mg by mouth 2 (two) times daily.   Yes Historical Provider, MD   quetiapine (SEROQUEL) 100 MG Tab Take 100 mg by mouth every evening.   Yes Historical Provider, MD   spironolactone (ALDACTONE) 50 MG tablet Take 50 mg by mouth once daily.   Yes Historical Provider, MD   insulin glargine (LANTUS) 100 unit/mL injection Inject 25 Units into the skin every evening.  5/31/17 Yes Historical Provider, MD      Scheduled Meds:    albumin human 25%  12.5 g Intravenous Q6H    cefTRIAXone (ROCEPHIN) IVPB  2 g Intravenous Q24H    furosemide  40 mg Intravenous BID    insulin detemir  20 Units Subcutaneous QHS    lactulose  20 g Oral TID    magnesium sulfate IVPB  2 g Intravenous Q6H     Continuous Infusions:    PRN Meds:dextrose 50%, dextrose 50%, glucagon (human recombinant), glucose, glucose, insulin aspart, lidocaine HCL 10 mg/ml (1%), morphine, ondansetron  Anticoagulants/Antiplatelets: no anticoagulation    Allergies:   Review of patient's allergies indicates:   Allergen Reactions    Shellfish containing products Hives     Shortness of breath     Sedation Hx: have not been any systemic reactions    Labs:    Recent Labs  Lab 05/30/17  1513   INR 1.1       Recent Labs  Lab 05/30/17  1515   WBC 6.94   HGB 9.4*   HCT 28.5*   MCV 84   PLT 60*      Recent Labs  Lab 05/31/17  0125   *   *   K 4.4      CO2 22*   BUN 20   CREATININE 1.1   CALCIUM 7.6*   MG 1.5*   ALT 12   AST 31   ALBUMIN 1.6*   BILITOT 1.3*         Vitals:  Temp: 98.2 °F (36.8 °C) (05/31/17 0727)  Pulse: 68 (05/31/17 1404)  Resp: 16 (05/31/17 1404)  BP: (!) 163/84 (05/31/17 1404)  SpO2: 100 % (05/31/17 1404)     Physical Exam:  ASA: 3  Mallampati: na    General: no acute distress  Mental Status: alert and oriented to person, place and time  HEENT: normocephalic, atraumatic  Chest: unlabored breathing  Heart: regular heart rate  Abdomen: distended  Extremity: moves all extremities    Plan: ultrasound guided paracentesis  Sedation Plan: local     ZACK Ambrocio, REBECAP  Interventional Radiology  [phone] [employer]

## 2017-05-31 NOTE — PLAN OF CARE
CM in to see pt just returning from U/S and states 2nd attempt for IR to do Paracentsis and unable to do unknown reason per pt.  Pt AAO in no distress with all over swelling noted.  CM will continue to follow.     05/31/17 0920   Discharge Assessment   Assessment Type Discharge Planning Assessment   Confirmed/corrected address and phone number on facesheet? Yes   Assessment information obtained from? Patient   Expected Length of Stay (days) 3   Communicated expected length of stay with patient/caregiver yes   Prior to hospitilization cognitive status: Alert/Oriented   Prior to hospitalization functional status: Independent   Current cognitive status: Alert/Oriented   Current Functional Status: Independent   Arrived From admitted as an inpatient;home or self-care   Lives With friend(s)   Able to Return to Prior Arrangements yes   Is patient able to care for self after discharge? Yes   How many people do you have in your home that can help with your care after discharge? 1   Who are your caregiver(s) and their phone number(s)? Isael Delgado, friend, [phone]   Patient's perception of discharge disposition home or selfcare   Readmission Within The Last 30 Days no previous admission in last 30 days   Patient currently being followed by outpatient case management? No   Patient currently receives home health services? No   Does the patient currently use HME? No   Patient currently receives private duty nursing? N/A   Patient currently receives any other outside agency services? No   Equipment Currently Used at Home none   Do you have any problems affording any of your prescribed medications? No   Is the patient taking medications as prescribed? yes   Do you have any financial concerns preventing you from receiving the healthcare you need? No   Does the patient have transportation to healthcare appointments? Yes   Transportation Available family or friend will provide   On Dialysis? No   Does the patient receive  services at the Coumadin Clinic? No   Are there any open cases? No   Discharge Plan A Home   Discharge Plan B Home;Home Health   Patient/Family In Agreement With Plan yes     Primary Doctor No    No Pharmacies Listed    Payor: MEDICAID / Plan: [facility] / Product Type: Managed Medicaid /

## 2017-05-31 NOTE — PROCEDURES
Radiology Post-Procedure Note    Pre Op Diagnosis: Ascites  Post Op Diagnosis: Same    Procedure: Ultrasound Guided Paracentesis    Procedure performed by: Landon Newman NP/Juliano Staley MD    Written Informed Consent Obtained: Yes  Specimen Removed: YES clear yellow fluid   Estimated Blood Loss: Minimal    Findings:   Successful paracentesis.  Albumin administered PRN per protocol.    Patient tolerated procedure well.    ZACK Ambrocio, FNP  Interventional Radiology  [phone] [employer]

## 2017-05-31 NOTE — HOSPITAL COURSE
Admitted to [facility] 5/30. NAEON. Pt could not tolerate bedside paracentesis. 5/31 RUQ ultrasound consistent with ascites and cirrhosis. IR paracentesis done 5/31, ruled out SBP. Podiatry and wound care consulted for diabetic foot ulcers, concern for osteomyelitis. MRI ordered. Vascular surgery consulted. MRI showed evidence of osteomyelitis, consulted ID who recommended empiric treatment with Vancomycin and Unasyn pending results of cultures. 6/3 ID rec no active infection and d/c abx. Cont to monitor glc, endocrine following. 6/5 stable for d/c with PCP f/u.

## 2017-05-31 NOTE — PROGRESS NOTES
Pt stable tolerated paracentesis well 4 L removed, labs sent. Dressing applied to right abdomen, CDI. Report called to HORTENSIA Navarro. Pt will transport via stretcher with transport staff.

## 2017-05-31 NOTE — PHARMACY MED REC
"Admission Medication Reconciliation - Pharmacy Consult Note    The home medication history was taken by Madeline Moore Pharmacy Tech.  Based on information gathered and subsequent review by the clinical pharmacist, the items below may need attention.     You may go to "Admission" then "Reconcile Home Medications" tabs to review and/or act upon these items. Based on information gathered and subsequent review by the clinical pharmacist, the items below may need attention.    Potentially problematic discrepancies with current MAR  o Patient IS taking the following which was not ordered upon admit  o Dicyclomine 20 mg PO TID  o Atorvastatin 20 mg PO qHS  o Seroquel 100 mg PO QHS    Potential issues to be addressed PRIOR TO DISCHARGE  o Therapeutic Duplication:  o Patient was filling both propranolol and carvedilol from 2 different physicians      Please address this information as you see fit.  Feel free to contact us if you have any questions or require assistance.    Marta Woo, PharmD  [id number]    Patient's prior to admission medication regimen was as follows:  Medication Sig    atorvastatin (LIPITOR) 20 MG tablet Take 20 mg by mouth once daily.    carvedilol (COREG) 6.25 MG tablet Take 6.25 mg by mouth 2 (two) times daily with meals.    ciprofloxacin HCl (CIPRO) 500 MG tablet Take 500 mg by mouth every 12 (twelve) hours.    dicyclomine (BENTYL) 20 mg tablet Take 20 mg by mouth 3 (three) times daily.    furosemide (LASIX) 20 MG tablet Take 20 mg by mouth once daily.    INSULIN GLARGINE,HUM.REC.ANLOG (LANTUS SOLOSTAR SUBQ) Inject 25 Units into the skin every evening.    propranolol (INDERAL) 40 MG tablet Take 40 mg by mouth 2 (two) times daily.    quetiapine (SEROQUEL) 100 MG Tab Take 100 mg by mouth every evening.    spironolactone (ALDACTONE) 50 MG tablet Take 50 mg by mouth once daily.         Please add appropriate    SmartPhrase below:        "

## 2017-05-31 NOTE — ASSESSMENT & PLAN NOTE
- DDx: SBP, pancreatitis, gallstones  - unconfirmed SBP as pt not able to tolerate bedside paracentesis  - consulted IR for paracentesis  - treating for SBP: ceftriaxone 2 g daily, albumin 1.5 g/kg on day 1 followed by 1 g/kg on day 3, IV morphine 2 mg q4hrs for pain control  - monitor kidney fxn in the AM, if worsening concern for HRS and may need octreotide/midodrine  - f/u IR para w/labs  - treating for pancreatitis: LR @ 250 cc/hr, NPO, pain control  - f/u RUQ ultrasound, f/u labs: Hep B, C, ethanol, Peth  - will likely need hepatology consult tomorrow

## 2017-05-31 NOTE — NURSING
Wound care orders placed.  Page team to notify them of documented shellfish allergy, as they ordered betadine for wound care.      Called pharmacy who could not confirm safe administration.      Awaiting call back from Med .  Will continue to monitor.

## 2017-05-31 NOTE — ASSESSMENT & PLAN NOTE
- Na 130, asymptomatic, in setting of liver failure  - cont to monitor daily labs, fluid restriction

## 2017-05-31 NOTE — H&P
[facility] Hospital Medicine  History & Physical    Patient Name: Leif Nino  MRN: [id number]  Admission Date: 5/30/2017  Attending Physician: Juliano Mo MD   Primary Care Provider: Primary Doctor [facility] Medicine Team: [facility] 5 Estefany Mcghee MD     Patient information was obtained from patient and ER records.     Subjective:     Principal Problem:Abdominal pain    Chief Complaint:   Chief Complaint   Patient presents with    Shaking     foot ulcers,hep c, liver failure, shakes started today        HPI: The patient is a 46 y.o. male with hx of: Hep C, cirrhosis of the liver, HTN, HLD, DM, recurrent pancreatitis, and gallstones that presents to the ED with a complaint of abdominal pain for 1 day. He states the pain is worse after eating. Pt also endorses chills, nausea, leg pains, and rectal bleeding, but denies chest pain and diarrhea. Endorses SOB and dry cough x 2 months. No hx of hemorrhoids. He states he vomiting blood x 1 about 1.5 wk ago but never got an EGD at the time because he could not tolerate it. Pt is normally followed closely by physicians at [facility] and he was last seen in the ED 10 days ago for abdominal pain. He states he does not like the way he was treated at [facility], and therefore came here. Of note: His last fluid tap was a couple of months ago. He denies any EtOH use, drug use limited to marijuana and cocaine but denies IV drug use, and TOB use is 2-3 cigs/day.        Past Medical History:   Diagnosis Date    Cirrhosis of liver     Diabetes mellitus     Diabetic foot ulcer     right foot    Gallstones     Hepatitis C     High cholesterol     Hypertension     Pancreatitis        Past Surgical History:   Procedure Laterality Date    BACK SURGERY         Review of patient's allergies indicates:   Allergen Reactions    Shellfish containing products Hives     Shortness of breath       No current facility-administered medications on file prior to  encounter.      No current outpatient prescriptions on file prior to encounter.     Family History     None        Social History Main Topics    Smoking status: Current Every Day Smoker     Types: Cigarettes    Smokeless tobacco: Not on file    Alcohol use No    Drug use: No    Sexual activity: Not on file     Review of Systems   Constitutional: Positive for appetite change, chills and fatigue. Negative for diaphoresis and fever.   HENT: Negative for congestion, ear discharge, ear pain, rhinorrhea and sore throat.    Eyes: Negative for pain, discharge, redness and itching.   Respiratory: Positive for cough and shortness of breath. Negative for chest tightness and wheezing.    Cardiovascular: Negative for chest pain, palpitations and leg swelling.   Gastrointestinal: Positive for abdominal distention, abdominal pain, blood in stool and nausea. Negative for constipation, diarrhea and vomiting.   Genitourinary: Negative for difficulty urinating, dysuria, frequency and hematuria.   Musculoskeletal: Negative for arthralgias, back pain, myalgias and neck pain.   Skin: Positive for color change and wound (right foot). Negative for pallor and rash.   Neurological: Negative for dizziness, light-headedness, numbness and headaches.   Hematological: Negative for adenopathy. Does not bruise/bleed easily.   Psychiatric/Behavioral: Negative for agitation, confusion and sleep disturbance. The patient is not nervous/anxious.      Objective:     Vital Signs (Most Recent):  Temp: 100.2 °F (37.9 °C) (05/30/17 1607)  Pulse: 83 (05/30/17 2002)  Resp: (!) 22 (05/30/17 2002)  BP: 139/69 (05/30/17 2002)  SpO2: 100 % (05/30/17 2002) Vital Signs (24h Range):  Temp:  [99.1 °F (37.3 °C)-100.2 °F (37.9 °C)] 100.2 °F (37.9 °C)  Pulse:  [83-96] 83  Resp:  [18-24] 22  SpO2:  [100 %] 100 %  BP: (139-191)/(69-90) 139/69     Weight: 108.9 kg (240 lb)  Body mass index is 30.81 kg/m².    Physical Exam   Constitutional: He is oriented to person,  place, and time. He appears well-developed and well-nourished. He appears distressed (mild).   HENT:   Head: Normocephalic and atraumatic.   Right Ear: External ear normal.   Left Ear: External ear normal.   Nose: Nose normal.   Mouth/Throat: Oropharynx is clear and moist. No oropharyngeal exudate.   Eyes: Conjunctivae and EOM are normal. Pupils are equal, round, and reactive to light. Right eye exhibits no discharge. Left eye exhibits no discharge. No scleral icterus.   Neck: Normal range of motion. Neck supple. No JVD present. No tracheal deviation present. No thyromegaly present.   Cardiovascular: Normal rate, regular rhythm, normal heart sounds and intact distal pulses.  Exam reveals no gallop and no friction rub.    No murmur heard.  Pulmonary/Chest: Effort normal and breath sounds normal. No respiratory distress. He has no wheezes. He has no rales. He exhibits no tenderness.   Abdominal: Soft. Bowel sounds are normal. He exhibits distension. He exhibits no mass. There is tenderness. There is guarding. There is no rebound.   Musculoskeletal: Normal range of motion. He exhibits tenderness (right foot). He exhibits no edema or deformity.   Neurological: He is alert and oriented to person, place, and time.   Skin: Skin is warm. No rash noted. He is not diaphoretic. There is erythema. No pallor.   B/l LE hard bumpy slightly erythematous skin; right foot discharge/dried blood noted, sock stuck to discharge and unable to pull away at this time   Psychiatric: He has a normal mood and affect. His behavior is normal. Judgment and thought content normal.   Vitals reviewed.       Significant Labs: All pertinent labs within the past 24 hours have been reviewed.    Significant Imaging: I have reviewed all pertinent imaging results/findings within the past 24 hours.    Assessment/Plan:     Hyperkalemia    - K 5.6 on admit, asymptomatic  - f/u EKG, shift if needed  - cont to monitor          Hyponatremia    - Na 130,  asymptomatic  - cont to monitor daily labs          Chronic hepatitis C without hepatic coma    - no home meds for Hep C, denies lactulose use          DM (diabetes mellitus), type 2 with peripheral vascular complications    - home med: Lantus 25 u qHS  - started insulin detemir 10 u nightly + LDSS, monitor POCT glc          Ascites of liver    - see abd pain  - plan for IR para tomorrow           Hypoalbuminemia    - in setting of liver failure  - will discuss nutritional status [place]          Elevated lipase    - lipase 151, concern for pancreatitis  - see abd pain          Hypomagnesemia    - Mg 1.2 on admit, replete as needed          * Abdominal pain    - DDx: SBP, pancreatitis, gallstones  - unconfirmed SBP as pt not able to tolerate bedside paracentesis  - consulted IR for paracentesis  - treating for SBP: ceftriaxone 2 g daily, albumin 1.5 g/kg on day 1 followed by 1 g/kg on day 3, IV morphine 2 mg q4hrs for pain control  - monitor kidney fxn in the AM, if worsening concern for HRS and may need octreotide/midodrine  - f/u IR para w/labs  - treating for pancreatitis: LR @ 250 cc/hr, NPO, pain control  - f/u RUQ ultrasound, f/u labs: Hep B, C, ethanol, Peth  - will likely need hepatology consult tomorrow            VTE Risk Mitigation         Ordered     Medium Risk of VTE  Once      05/30/17 1824     Place BASILIO hose  Until discontinued      05/30/17 1824     Place sequential compression device  Until discontinued      05/30/17 1824        Estefany Mcghee MD  Department of Hospital Medicine   [facility]

## 2017-05-31 NOTE — PLAN OF CARE
Problem: Patient Care Overview  Goal: Plan of Care Review  Outcome: Outcome(s) achieved Date Met: 05/31/17  No acute events.  Patient  AAOx4, resting comfortably in bed, calm and in no distress.  AVSS. C/O abdominal pain moderately relieved by PRN morphine Q4h. Patient refuses lactulose.  Patient completed abdominal ultrasound today and paracentesis.  4L fluid removed, which patient tolerated well. RLQ site dressed with gauze and transparent occlusive dressing.  Accuchecks changed to AC/HS.  Appropriate insulin administered.  Side rails up x2, call light in reach.  Will continue to monitor.

## 2017-05-31 NOTE — ASSESSMENT & PLAN NOTE
- DDx: SBP, pancreatitis, gallstones  - unconfirmed SBP as pt not able to tolerate bedside paracentesis  - consulted IR for paracentesis  - treating for SBP: ceftriaxone 2 g daily, albumin 1.5 g/kg on day 1 followed by 1 g/kg on day 3, IV morphine 2 mg q4hrs for pain control  - monitor kidney fxn in the AM, if worsening concern for HRS and may need octreotide/midodrine  - 5/31 IR para done. F/u ascitic fluid labs  - prn pain control  - RUQ ultrasound: consistent for ascites and cirrhosis, labs: Hep B neg, Hep C +, ethanol neg, Peth in process

## 2017-05-31 NOTE — PROGRESS NOTES
Consult for toe wounds  With pts permission, assessed and photographed foot wounds.  Right foot wounds are suspicious for osteomyelitis as chronic wound base is boggy and toes are swollen.  Right  to touch.  Unable to palpate pedal pulses.  Left plantar foot with blood filled blister with pinhole opening draining blood.  Weak dorsalis pedis pulse palpated.  Moist toe web spaces.    Spoke with Dr. Mcghee regarding findings and recommendations.    Recommendations:  1.  Podiatry consult for debridement and further assessment of hallux ulcer  2.  Diagnostics to rule out osteomyelitis right foot  3.  Betadine to left foot wound covered with plain alginate and threaded between toes  4.  Mepilex ag to right toe wounds until further assessment by podiatry                        05/31/17 1614       Wound 05/31/17 first toe   Date First Assessed: 05/31/17   Pre-existing: Yes  Side: Right  Location: first toe   Wound WDL ex   Dressing Appearance no dressing   Drainage Amount small   Drainage Characteristics/Odor serosanguineous   Wound Base slough;pink;other (see comments)  (boggy base)   Periwound Area dry;other (see comments)  (calloused)   Wound Edges callused   Wound Length (cm) 1.1   Wound Width (cm) 1.1   Depth (cm) 0.3   Tunneling (depth (cm)/location) (boggy base)   Cleansed W/ sterile normal saline   Dressing Ag dressings       Wound 05/31/17 fourth toe   Date First Assessed: 05/31/17   Pre-existing: Yes  Side: Right  Location: fourth toe   Wound WDL ex   Dressing Appearance no dressing   Drainage Amount small   Drainage Characteristics/Odor serosanguineous   Wound Base unable to visualize   Periwound Area dry;other (see comments)  (calloused)   Wound Edges callused   Wound Length (cm) 0.2   Wound Width (cm) 0.2   Depth (cm) .3   Cleansed W/ sterile normal saline   Dressing Ag dressings       Wound 05/31/17 plantar foot   Date First Assessed: 05/31/17   Pre-existing: Yes  Side: Left  Orientation: plantar   Location: foot   Wound WDL ex   Dressing Appearance no dressing   Drainage Amount small   Drainage Characteristics/Odor sanguineous   Wound Base unable to visualize;blistered;other (see comments)  (blood filled)   Periwound Area intact   Wound Edges intact   Wound Length (cm) 1   Wound Width (cm) 1   Depth (cm) 0

## 2017-05-31 NOTE — SUBJECTIVE & OBJECTIVE
Interval History: NAEON. RUQ U/S and IR para done today. Cont SBP tx and f/u para labs.     Review of Systems   Constitutional: Positive for fatigue. Negative for chills, diaphoresis and fever.   HENT: Negative for congestion, ear discharge, ear pain and rhinorrhea.    Eyes: Negative for pain, discharge, redness and itching.   Respiratory: Positive for cough (dry, chronic). Negative for chest tightness, shortness of breath and wheezing.    Cardiovascular: Negative for chest pain, palpitations and leg swelling.   Gastrointestinal: Positive for abdominal distention and abdominal pain. Negative for constipation, diarrhea, nausea and vomiting.   Genitourinary: Negative for difficulty urinating, dysuria, frequency and hematuria.   Musculoskeletal: Positive for myalgias. Negative for arthralgias, back pain and neck pain.   Skin: Positive for color change (b/l LE) and wound (right foot). Negative for pallor and rash.   Neurological: Negative for dizziness, light-headedness, numbness and headaches.   Psychiatric/Behavioral: Negative for agitation, confusion and sleep disturbance. The patient is not nervous/anxious.      Objective:     Vital Signs (Most Recent):  Temp: 98.2 °F (36.8 °C) (05/31/17 0727)  Pulse: 68 (05/31/17 1504)  Resp: 16 (05/31/17 1504)  BP: (!) 179/89 (05/31/17 1504)  SpO2: 100 % (05/31/17 1504) Vital Signs (24h Range):  Temp:  [97.4 °F (36.3 °C)-100.2 °F (37.9 °C)] 98.2 °F (36.8 °C)  Pulse:  [68-96] 68  Resp:  [16-22] 16  SpO2:  [97 %-100 %] 100 %  BP: (139-180)/(67-89) 179/89     Weight: 117 kg (257 lb 15 oz)  Body mass index is 33.12 kg/m².    Intake/Output Summary (Last 24 hours) at 05/31/17 1555  Last data filed at 05/31/17 1547   Gross per 24 hour   Intake              240 ml   Output             6200 ml   Net            -5960 ml      Physical Exam   Constitutional: He is oriented to person, place, and time. He appears well-developed and well-nourished. No distress.   HENT:   Head: Normocephalic and  atraumatic.   Right Ear: External ear normal.   Left Ear: External ear normal.   Nose: Nose normal.   Mouth/Throat: Oropharynx is clear and moist. No oropharyngeal exudate.   Eyes: Conjunctivae and EOM are normal. Pupils are equal, round, and reactive to light. Right eye exhibits no discharge. Left eye exhibits no discharge. No scleral icterus.   Neck: Normal range of motion. Neck supple. No JVD present. No tracheal deviation present. No thyromegaly present.   Cardiovascular: Normal rate, regular rhythm, normal heart sounds and intact distal pulses.  Exam reveals no gallop and no friction rub.    No murmur heard.  Pulmonary/Chest: Effort normal and breath sounds normal. No respiratory distress. He has no wheezes. He has no rales. He exhibits no tenderness.   Abdominal: Soft. Bowel sounds are normal. He exhibits distension. He exhibits no mass. There is tenderness. There is guarding. There is no rebound.   Musculoskeletal: Normal range of motion. He exhibits edema (b/l LE). He exhibits no tenderness or deformity.   Neurological: He is alert and oriented to person, place, and time.   Skin: Skin is warm. No rash noted. He is not diaphoretic. No erythema. No pallor.   B/l LE hard bumpy slightly erythematous skin; right foot discharge/dried blood noted, sock stuck to discharge and unable to pull away at this time    Psychiatric: He has a normal mood and affect. His behavior is normal. Judgment and thought content normal.   Vitals reviewed.      Significant Labs: All pertinent labs within the past 24 hours have been reviewed.    Significant Imaging: I have reviewed all pertinent imaging results/findings within the past 24 hours.

## 2017-05-31 NOTE — NURSING
Paged [phone] to notify that sliding scale is still ordered Q6h after changing Accuchecks to AC/HS.  MD ok-ed administration of 5 units aspart at this time for

## 2017-05-31 NOTE — SUBJECTIVE & OBJECTIVE
Past Medical History:   Diagnosis Date    Cirrhosis of liver     Diabetes mellitus     Diabetic foot ulcer     right foot    Gallstones     Hepatitis C     High cholesterol     Hypertension     Pancreatitis        Past Surgical History:   Procedure Laterality Date    BACK SURGERY         Review of patient's allergies indicates:   Allergen Reactions    Shellfish containing products Hives     Shortness of breath       No current facility-administered medications on file prior to encounter.      No current outpatient prescriptions on file prior to encounter.     Family History     None        Social History Main Topics    Smoking status: Current Every Day Smoker     Types: Cigarettes    Smokeless tobacco: Not on file    Alcohol use No    Drug use: No    Sexual activity: Not on file     Review of Systems   Constitutional: Positive for appetite change, chills and fatigue. Negative for diaphoresis and fever.   HENT: Negative for congestion, ear discharge, ear pain, rhinorrhea and sore throat.    Eyes: Negative for pain, discharge, redness and itching.   Respiratory: Positive for cough and shortness of breath. Negative for chest tightness and wheezing.    Cardiovascular: Negative for chest pain, palpitations and leg swelling.   Gastrointestinal: Positive for abdominal distention, abdominal pain, blood in stool and nausea. Negative for constipation, diarrhea and vomiting.   Genitourinary: Negative for difficulty urinating, dysuria, frequency and hematuria.   Musculoskeletal: Negative for arthralgias, back pain, myalgias and neck pain.   Skin: Positive for color change and wound (right foot). Negative for pallor and rash.   Neurological: Negative for dizziness, light-headedness, numbness and headaches.   Hematological: Negative for adenopathy. Does not bruise/bleed easily.   Psychiatric/Behavioral: Negative for agitation, confusion and sleep disturbance. The patient is not nervous/anxious.      Objective:      Vital Signs (Most Recent):  Temp: 100.2 °F (37.9 °C) (05/30/17 1607)  Pulse: 83 (05/30/17 2002)  Resp: (!) 22 (05/30/17 2002)  BP: 139/69 (05/30/17 2002)  SpO2: 100 % (05/30/17 2002) Vital Signs (24h Range):  Temp:  [99.1 °F (37.3 °C)-100.2 °F (37.9 °C)] 100.2 °F (37.9 °C)  Pulse:  [83-96] 83  Resp:  [18-24] 22  SpO2:  [100 %] 100 %  BP: (139-191)/(69-90) 139/69     Weight: 108.9 kg (240 lb)  Body mass index is 30.81 kg/m².    Physical Exam   Constitutional: He is oriented to person, place, and time. He appears well-developed and well-nourished. He appears distressed (mild).   HENT:   Head: Normocephalic and atraumatic.   Right Ear: External ear normal.   Left Ear: External ear normal.   Nose: Nose normal.   Mouth/Throat: Oropharynx is clear and moist. No oropharyngeal exudate.   Eyes: Conjunctivae and EOM are normal. Pupils are equal, round, and reactive to light. Right eye exhibits no discharge. Left eye exhibits no discharge. No scleral icterus.   Neck: Normal range of motion. Neck supple. No JVD present. No tracheal deviation present. No thyromegaly present.   Cardiovascular: Normal rate, regular rhythm, normal heart sounds and intact distal pulses.  Exam reveals no gallop and no friction rub.    No murmur heard.  Pulmonary/Chest: Effort normal and breath sounds normal. No respiratory distress. He has no wheezes. He has no rales. He exhibits no tenderness.   Abdominal: Soft. Bowel sounds are normal. He exhibits distension. He exhibits no mass. There is tenderness. There is guarding. There is no rebound.   Musculoskeletal: Normal range of motion. He exhibits tenderness (right foot). He exhibits no edema or deformity.   Neurological: He is alert and oriented to person, place, and time.   Skin: Skin is warm. No rash noted. He is not diaphoretic. There is erythema. No pallor.   B/l LE hard bumpy slightly erythematous skin; right foot discharge/dried blood noted, sock stuck to discharge and unable to pull away at  this time   Psychiatric: He has a normal mood and affect. His behavior is normal. Judgment and thought content normal.   Vitals reviewed.       Significant Labs: All pertinent labs within the past 24 hours have been reviewed.    Significant Imaging: I have reviewed all pertinent imaging results/findings within the past 24 hours.

## 2017-06-01 PROBLEM — L97.909 ATHEROSCLEROSIS OF ARTERY OF EXTREMITY WITH ULCERATION: Status: ACTIVE | Noted: 2017-06-01

## 2017-06-01 PROBLEM — M86.071 ACUTE HEMATOGENOUS OSTEOMYELITIS OF RIGHT FOOT: Status: ACTIVE | Noted: 2017-06-01

## 2017-06-01 PROBLEM — M86.271 SUBACUTE OSTEOMYELITIS OF RIGHT FOOT: Chronic | Status: ACTIVE | Noted: 2017-06-01

## 2017-06-01 PROBLEM — E11.621 DIABETIC ULCER OF TOE OF RIGHT FOOT ASSOCIATED WITH TYPE 2 DIABETES MELLITUS, WITH FAT LAYER EXPOSED: Status: ACTIVE | Noted: 2017-06-01

## 2017-06-01 PROBLEM — I70.299 ATHEROSCLEROSIS OF ARTERY OF EXTREMITY WITH ULCERATION: Status: ACTIVE | Noted: 2017-06-01

## 2017-06-01 PROBLEM — R10.84 GENERALIZED ABDOMINAL PAIN: Status: ACTIVE | Noted: 2017-05-30

## 2017-06-01 PROBLEM — R10.84 GENERALIZED ABDOMINAL PAIN: Chronic | Status: ACTIVE | Noted: 2017-05-30

## 2017-06-01 PROBLEM — M86.271 SUBACUTE OSTEOMYELITIS OF RIGHT FOOT: Status: ACTIVE | Noted: 2017-06-01

## 2017-06-01 PROBLEM — I10 HYPERTENSION: Status: ACTIVE | Noted: 2017-06-01

## 2017-06-01 PROBLEM — L02.612 ABSCESS OF LEFT FOOT: Status: ACTIVE | Noted: 2017-06-01

## 2017-06-01 PROBLEM — L97.512 DIABETIC ULCER OF TOE OF RIGHT FOOT ASSOCIATED WITH TYPE 2 DIABETES MELLITUS, WITH FAT LAYER EXPOSED: Status: ACTIVE | Noted: 2017-06-01

## 2017-06-01 LAB
ALBUMIN SERPL BCP-MCNC: 2 G/DL
ALP SERPL-CCNC: 112 U/L
ALT SERPL W/O P-5'-P-CCNC: 12 U/L
ANION GAP SERPL CALC-SCNC: 7 MMOL/L
AST SERPL-CCNC: 28 U/L
BASOPHILS # BLD AUTO: 0.03 K/UL
BASOPHILS NFR BLD: 0.7 %
BILIRUB SERPL-MCNC: 0.8 MG/DL
BUN SERPL-MCNC: 18 MG/DL
CALCIUM SERPL-MCNC: 7.6 MG/DL
CHLORIDE SERPL-SCNC: 102 MMOL/L
CO2 SERPL-SCNC: 20 MMOL/L
CREAT SERPL-MCNC: 1.2 MG/DL
CRP SERPL-MCNC: 0.7 MG/L
DIFFERENTIAL METHOD: ABNORMAL
EOSINOPHIL # BLD AUTO: 0.1 K/UL
EOSINOPHIL NFR BLD: 2.4 %
ERYTHROCYTE [DISTWIDTH] IN BLOOD BY AUTOMATED COUNT: 17.6 %
ERYTHROCYTE [SEDIMENTATION RATE] IN BLOOD BY WESTERGREN METHOD: 4 MM/HR
EST. GFR  (AFRICAN AMERICAN): >60 ML/MIN/1.73 M^2
EST. GFR  (NON AFRICAN AMERICAN): >60 ML/MIN/1.73 M^2
GLUCOSE SERPL-MCNC: 468 MG/DL
HCT VFR BLD AUTO: 22.8 %
HCV LOG: 4.34 LOG (10) IU/ML
HCV RNA QUANT PCR: ABNORMAL IU/ML
HCV, QUALITATIVE: DETECTED IU/ML
HGB BLD-MCNC: 7.7 G/DL
LYMPHOCYTES # BLD AUTO: 0.8 K/UL
LYMPHOCYTES NFR BLD: 19.3 %
MAGNESIUM SERPL-MCNC: 1.7 MG/DL
MCH RBC QN AUTO: 27.6 PG
MCHC RBC AUTO-ENTMCNC: 33.8 %
MCV RBC AUTO: 82 FL
MONOCYTES # BLD AUTO: 0.3 K/UL
MONOCYTES NFR BLD: 8.2 %
NEUTROPHILS # BLD AUTO: 2.9 K/UL
NEUTROPHILS NFR BLD: 69.2 %
PHOSPHATE SERPL-MCNC: 3.2 MG/DL
PLATELET # BLD AUTO: 62 K/UL
PMV BLD AUTO: 9.3 FL
POCT GLUCOSE: 153 MG/DL (ref 70–110)
POCT GLUCOSE: 208 MG/DL (ref 70–110)
POCT GLUCOSE: 211 MG/DL (ref 70–110)
POCT GLUCOSE: 219 MG/DL (ref 70–110)
POCT GLUCOSE: 240 MG/DL (ref 70–110)
POCT GLUCOSE: 248 MG/DL (ref 70–110)
POCT GLUCOSE: 260 MG/DL (ref 70–110)
POCT GLUCOSE: 264 MG/DL (ref 70–110)
POCT GLUCOSE: 269 MG/DL (ref 70–110)
POCT GLUCOSE: 302 MG/DL (ref 70–110)
POCT GLUCOSE: 403 MG/DL (ref 70–110)
POCT GLUCOSE: 443 MG/DL (ref 70–110)
POCT GLUCOSE: 443 MG/DL (ref 70–110)
POCT GLUCOSE: 444 MG/DL (ref 70–110)
POTASSIUM SERPL-SCNC: 4.7 MMOL/L
PROCALCITONIN SERPL IA-MCNC: <0.09 NG/ML
PROT SERPL-MCNC: 5.6 G/DL
RBC # BLD AUTO: 2.79 M/UL
SODIUM SERPL-SCNC: 129 MMOL/L
WBC # BLD AUTO: 4.14 K/UL

## 2017-06-01 PROCEDURE — 63600175 PHARM REV CODE 636 W HCPCS: Performed by: INTERNAL MEDICINE

## 2017-06-01 PROCEDURE — 87075 CULTR BACTERIA EXCEPT BLOOD: CPT

## 2017-06-01 PROCEDURE — 25000003 PHARM REV CODE 250: Performed by: INTERNAL MEDICINE

## 2017-06-01 PROCEDURE — 80053 COMPREHEN METABOLIC PANEL: CPT

## 2017-06-01 PROCEDURE — 99232 SBSQ HOSP IP/OBS MODERATE 35: CPT | Mod: 25,,, | Performed by: PODIATRIST

## 2017-06-01 PROCEDURE — 25000003 PHARM REV CODE 250: Performed by: STUDENT IN AN ORGANIZED HEALTH CARE EDUCATION/TRAINING PROGRAM

## 2017-06-01 PROCEDURE — 0JBQ0ZZ EXCISION OF RIGHT FOOT SUBCUTANEOUS TISSUE AND FASCIA, OPEN APPROACH: ICD-10-PCS | Performed by: PODIATRIST

## 2017-06-01 PROCEDURE — 25500020 PHARM REV CODE 255: Performed by: INTERNAL MEDICINE

## 2017-06-01 PROCEDURE — 87040 BLOOD CULTURE FOR BACTERIA: CPT

## 2017-06-01 PROCEDURE — 20600001 HC STEP DOWN PRIVATE ROOM

## 2017-06-01 PROCEDURE — 85651 RBC SED RATE NONAUTOMATED: CPT

## 2017-06-01 PROCEDURE — 99233 SBSQ HOSP IP/OBS HIGH 50: CPT | Mod: ,,, | Performed by: INTERNAL MEDICINE

## 2017-06-01 PROCEDURE — 11042 DBRDMT SUBQ TIS 1ST 20SQCM/<: CPT | Mod: 51,,, | Performed by: PODIATRIST

## 2017-06-01 PROCEDURE — A9585 GADOBUTROL INJECTION: HCPCS | Performed by: INTERNAL MEDICINE

## 2017-06-01 PROCEDURE — 10060 I&D ABSCESS SIMPLE/SINGLE: CPT | Mod: 59,,, | Performed by: PODIATRIST

## 2017-06-01 PROCEDURE — 93922 UPR/L XTREMITY ART 2 LEVELS: CPT | Performed by: SURGERY

## 2017-06-01 PROCEDURE — 84100 ASSAY OF PHOSPHORUS: CPT

## 2017-06-01 PROCEDURE — 83735 ASSAY OF MAGNESIUM: CPT

## 2017-06-01 PROCEDURE — 0HDMXZZ EXTRACTION OF RIGHT FOOT SKIN, EXTERNAL APPROACH: ICD-10-PCS | Performed by: PODIATRIST

## 2017-06-01 PROCEDURE — 11042 DBRDMT SUBQ TIS 1ST 20SQCM/<: CPT | Mod: PBBFAC | Performed by: PODIATRIST

## 2017-06-01 PROCEDURE — P9047 ALBUMIN (HUMAN), 25%, 50ML: HCPCS | Performed by: INTERNAL MEDICINE

## 2017-06-01 PROCEDURE — 87070 CULTURE OTHR SPECIMN AEROBIC: CPT

## 2017-06-01 PROCEDURE — 85025 COMPLETE CBC W/AUTO DIFF WBC: CPT

## 2017-06-01 PROCEDURE — 36415 COLL VENOUS BLD VENIPUNCTURE: CPT

## 2017-06-01 PROCEDURE — 86140 C-REACTIVE PROTEIN: CPT

## 2017-06-01 PROCEDURE — 87076 CULTURE ANAEROBE IDENT EACH: CPT | Mod: 59

## 2017-06-01 PROCEDURE — 84145 PROCALCITONIN (PCT): CPT

## 2017-06-01 PROCEDURE — 63600175 PHARM REV CODE 636 W HCPCS: Performed by: STUDENT IN AN ORGANIZED HEALTH CARE EDUCATION/TRAINING PROGRAM

## 2017-06-01 PROCEDURE — 0H9NXZZ DRAINAGE OF LEFT FOOT SKIN, EXTERNAL APPROACH: ICD-10-PCS | Performed by: PODIATRIST

## 2017-06-01 PROCEDURE — 97597 DBRDMT OPN WND 1ST 20 CM/<: CPT | Mod: 59,,, | Performed by: PODIATRIST

## 2017-06-01 RX ORDER — FUROSEMIDE 40 MG/1
40 TABLET ORAL 2 TIMES DAILY
Status: DISCONTINUED | OUTPATIENT
Start: 2017-06-01 | End: 2017-06-05 | Stop reason: HOSPADM

## 2017-06-01 RX ORDER — PROPRANOLOL HYDROCHLORIDE 10 MG/1
40 TABLET ORAL 2 TIMES DAILY
Status: DISCONTINUED | OUTPATIENT
Start: 2017-06-01 | End: 2017-06-03

## 2017-06-01 RX ORDER — PROPRANOLOL HYDROCHLORIDE 80 MG/1
80 CAPSULE, EXTENDED RELEASE ORAL DAILY
Status: DISCONTINUED | OUTPATIENT
Start: 2017-06-01 | End: 2017-06-01

## 2017-06-01 RX ORDER — MAGNESIUM SULFATE HEPTAHYDRATE 40 MG/ML
2 INJECTION, SOLUTION INTRAVENOUS EVERY 6 HOURS
Status: COMPLETED | OUTPATIENT
Start: 2017-06-01 | End: 2017-06-01

## 2017-06-01 RX ORDER — INSULIN ASPART 100 [IU]/ML
3 INJECTION, SOLUTION INTRAVENOUS; SUBCUTANEOUS
Status: DISCONTINUED | OUTPATIENT
Start: 2017-06-01 | End: 2017-06-01

## 2017-06-01 RX ORDER — GADOBUTROL 604.72 MG/ML
10 INJECTION INTRAVENOUS
Status: COMPLETED | OUTPATIENT
Start: 2017-06-01 | End: 2017-06-01

## 2017-06-01 RX ORDER — INSULIN ASPART 100 [IU]/ML
5 INJECTION, SOLUTION INTRAVENOUS; SUBCUTANEOUS
Status: DISCONTINUED | OUTPATIENT
Start: 2017-06-01 | End: 2017-06-01

## 2017-06-01 RX ADMIN — SODIUM CHLORIDE 3.5 UNITS/HR: 9 INJECTION, SOLUTION INTRAVENOUS at 12:06

## 2017-06-01 RX ADMIN — ALBUMIN (HUMAN) 12.5 G: 12.5 SOLUTION INTRAVENOUS at 12:06

## 2017-06-01 RX ADMIN — SODIUM CHLORIDE 5 UNITS/HR: 9 INJECTION, SOLUTION INTRAVENOUS at 09:06

## 2017-06-01 RX ADMIN — PROPRANOLOL HYDROCHLORIDE 40 MG: 10 TABLET ORAL at 08:06

## 2017-06-01 RX ADMIN — INSULIN ASPART 3 UNITS: 100 INJECTION, SOLUTION INTRAVENOUS; SUBCUTANEOUS at 12:06

## 2017-06-01 RX ADMIN — AMPICILLIN SODIUM AND SULBACTAM SODIUM 3 G: 2; 1 INJECTION, POWDER, FOR SOLUTION INTRAMUSCULAR; INTRAVENOUS at 09:06

## 2017-06-01 RX ADMIN — MORPHINE SULFATE 2 MG: 2 INJECTION, SOLUTION INTRAMUSCULAR; INTRAVENOUS at 08:06

## 2017-06-01 RX ADMIN — VANCOMYCIN HYDROCHLORIDE 1750 MG: 100 INJECTION, POWDER, LYOPHILIZED, FOR SOLUTION INTRAVENOUS at 06:06

## 2017-06-01 RX ADMIN — MAGNESIUM SULFATE IN WATER 2 G: 40 INJECTION, SOLUTION INTRAVENOUS at 09:06

## 2017-06-01 RX ADMIN — SODIUM CHLORIDE 4.5 UNITS/HR: 9 INJECTION, SOLUTION INTRAVENOUS at 01:06

## 2017-06-01 RX ADMIN — MORPHINE SULFATE 2 MG: 2 INJECTION, SOLUTION INTRAMUSCULAR; INTRAVENOUS at 12:06

## 2017-06-01 RX ADMIN — MORPHINE SULFATE 2 MG: 2 INJECTION, SOLUTION INTRAMUSCULAR; INTRAVENOUS at 09:06

## 2017-06-01 RX ADMIN — PROPRANOLOL HYDROCHLORIDE 40 MG: 10 TABLET ORAL at 10:06

## 2017-06-01 RX ADMIN — GADOBUTROL 10 ML: 604.72 INJECTION INTRAVENOUS at 03:06

## 2017-06-01 RX ADMIN — FUROSEMIDE 40 MG: 40 TABLET ORAL at 06:06

## 2017-06-01 RX ADMIN — MORPHINE SULFATE 2 MG: 2 INJECTION, SOLUTION INTRAMUSCULAR; INTRAVENOUS at 04:06

## 2017-06-01 RX ADMIN — SODIUM CHLORIDE 4 UNITS/HR: 9 INJECTION, SOLUTION INTRAVENOUS at 08:06

## 2017-06-01 RX ADMIN — ALBUMIN (HUMAN) 12.5 G: 12.5 SOLUTION INTRAVENOUS at 05:06

## 2017-06-01 RX ADMIN — MAGNESIUM SULFATE IN WATER 2 G: 40 INJECTION, SOLUTION INTRAVENOUS at 12:06

## 2017-06-01 RX ADMIN — MORPHINE SULFATE 2 MG: 2 INJECTION, SOLUTION INTRAMUSCULAR; INTRAVENOUS at 05:06

## 2017-06-01 NOTE — ASSESSMENT & PLAN NOTE
- home med: Lantus 25 u qHS  - d/c'ed insulin detemir, started insulin gtt, monitor insulin requirements and adjust as necessary  - monitor POCT glc  - diabetic diet

## 2017-06-01 NOTE — ASSESSMENT & PLAN NOTE
- see abd pain  - IR para done 5/31, ascitic fluid labs neg for SBP, d/c'ed ceftriaxone and albumin

## 2017-06-01 NOTE — NURSING
Insulin drip stopped for MRI and Vascular lab tests. Primary team notified. Will resume when pt returns to floor.

## 2017-06-01 NOTE — ASSESSMENT & PLAN NOTE
Possible SBP   - vascular intervention (if necessary) will be performed pending resolution of patient's acute abdominal illness

## 2017-06-01 NOTE — NURSING
Spoke with primar team re: restarting insulin gtts. MD ordered restart drip at last rate of 4.5 units/hr and resume hourly accuchecks. BG at 1610 was 211.

## 2017-06-01 NOTE — SUBJECTIVE & OBJECTIVE
Interval History: NAEON. RUQ U/S and IR para done today. Cont SBP tx and f/u para labs.     Review of Systems   Constitutional: Positive for fatigue. Negative for chills, diaphoresis and fever.   HENT: Negative for congestion and rhinorrhea.    Eyes: Negative for pain and discharge.   Respiratory: Positive for cough (dry, chronic). Negative for chest tightness and shortness of breath.    Cardiovascular: Negative for chest pain and palpitations.   Gastrointestinal: Positive for abdominal distention and abdominal pain. Negative for constipation, diarrhea, nausea and vomiting.   Genitourinary: Negative for dysuria and hematuria.   Musculoskeletal: Positive for myalgias. Negative for arthralgias, back pain and neck pain.   Skin: Positive for color change (b/l LE) and wound (right foot). Negative for pallor and rash.   Neurological: Negative for dizziness, light-headedness, numbness and headaches.   Psychiatric/Behavioral: Negative for agitation, confusion and sleep disturbance. The patient is not nervous/anxious.      Objective:     Vital Signs (Most Recent):  Temp: 98.8 °F (37.1 °C) (06/01/17 1210)  Pulse: 66 (06/01/17 1210)  Resp: 18 (06/01/17 1210)  BP: 135/65 (06/01/17 1210)  SpO2: 98 % (06/01/17 1210) Vital Signs (24h Range):  Temp:  [98.1 °F (36.7 °C)-98.8 °F (37.1 °C)] 98.8 °F (37.1 °C)  Pulse:  [66-87] 66  Resp:  [15-18] 18  SpO2:  [97 %-100 %] 98 %  BP: (130-180)/(60-89) 135/65     Weight: 117 kg (257 lb 15 oz)  Body mass index is 33.12 kg/m².    Intake/Output Summary (Last 24 hours) at 06/01/17 1442  Last data filed at 06/01/17 1246   Gross per 24 hour   Intake             1470 ml   Output             6600 ml   Net            -5130 ml      Physical Exam   Constitutional: He is oriented to person, place, and time. He appears well-developed and well-nourished. No distress.   HENT:   Head: Normocephalic and atraumatic.   Right Ear: External ear normal.   Left Ear: External ear normal.   Nose: Nose normal.    Mouth/Throat: Oropharynx is clear and moist. No oropharyngeal exudate.   Eyes: Conjunctivae and EOM are normal. Pupils are equal, round, and reactive to light. Right eye exhibits no discharge. Left eye exhibits no discharge. No scleral icterus.   Neck: Normal range of motion. Neck supple. No JVD present. No tracheal deviation present. No thyromegaly present.   Cardiovascular: Normal rate, regular rhythm, normal heart sounds and intact distal pulses.  Exam reveals no gallop and no friction rub.    No murmur heard.  Pulmonary/Chest: Effort normal and breath sounds normal. No respiratory distress. He has no wheezes. He has no rales. He exhibits no tenderness.   Abdominal: Soft. Bowel sounds are normal. He exhibits distension. He exhibits no mass. There is tenderness. There is guarding. There is no rebound.   Musculoskeletal: Normal range of motion. He exhibits edema (b/l LE). He exhibits no tenderness or deformity.   Neurological: He is alert and oriented to person, place, and time.   Skin: Skin is warm. No rash noted. He is not diaphoretic. No erythema. No pallor.   B/l LE hard bumpy slightly erythematous skin; right foot diabetic ulcers noted, left foot blood blister (see wound care note for images)   Psychiatric: He has a normal mood and affect. His behavior is normal. Judgment and thought content normal.   Vitals reviewed.      Significant Labs: All pertinent labs within the past 24 hours have been reviewed.    Significant Imaging: I have reviewed all pertinent imaging results/findings within the past 24 hours.

## 2017-06-01 NOTE — ASSESSMENT & PLAN NOTE
- DDx: SBP, pancreatitis, gallstones  - unconfirmed SBP as pt not able to tolerate bedside paracentesis  - consulted IR for paracentesis  - treating for SBP: ceftriaxone 2 g daily, albumin 1.5 g/kg on day 1 followed by 1 g/kg on day 3, IV morphine 2 mg q4hrs for pain control  - monitor kidney fxn in the AM, if worsening concern for HRS and may need octreotide/midodrine  - 5/31 IR para done. ascitic fluid labs neg for SBP  - prn pain control  - RUQ ultrasound: consistent for ascites and cirrhosis, labs: Hep B neg, Hep C +, ethanol neg, Peth in process

## 2017-06-01 NOTE — ASSESSMENT & PLAN NOTE
- consulted wound care and podiatry  - f/u recs  - strict glc control, currently on insulin gtt  - f/u MRI LE-concern for osteo/infection, f/u ESR, CRP, procal

## 2017-06-01 NOTE — SUBJECTIVE & OBJECTIVE
Prescriptions Prior to Admission   Medication Sig Dispense Refill Last Dose    atorvastatin (LIPITOR) 20 MG tablet Take 20 mg by mouth once daily.       carvedilol (COREG) 6.25 MG tablet Take 6.25 mg by mouth 2 (two) times daily with meals.       ciprofloxacin HCl (CIPRO) 500 MG tablet Take 500 mg by mouth every 12 (twelve) hours.       dicyclomine (BENTYL) 20 mg tablet Take 20 mg by mouth 3 (three) times daily.       furosemide (LASIX) 20 MG tablet Take 20 mg by mouth once daily.       INSULIN GLARGINE,HUM.REC.ANLOG (LANTUS SOLOSTAR SUBQ) Inject 25 Units into the skin every evening.       propranolol (INDERAL) 40 MG tablet Take 40 mg by mouth 2 (two) times daily.       quetiapine (SEROQUEL) 100 MG Tab Take 100 mg by mouth every evening.       spironolactone (ALDACTONE) 50 MG tablet Take 50 mg by mouth once daily.          Review of patient's allergies indicates:   Allergen Reactions    Shellfish containing products Hives     Shortness of breath       Past Medical History:   Diagnosis Date    Ascites of liver 5/30/2017    Chronic hepatitis C without hepatic coma     Chronic liver failure without hepatic coma 5/30/2017    Diabetic foot ulcer     right foot    DM (diabetes mellitus), type 2 with peripheral vascular complications     Essential hypertension     Gallstones     Hepatic encephalopathy 5/30/2017    Hepatitis C     Pancreatitis      Past Surgical History:   Procedure Laterality Date    BACK SURGERY       Family History     None        Social History Main Topics    Smoking status: Current Every Day Smoker     Types: Cigarettes    Smokeless tobacco: Not on file    Alcohol use No    Drug use: No    Sexual activity: Not on file     Review of Systems   Constitutional: Negative for chills and diaphoresis.   HENT: Negative for facial swelling, hearing loss and nosebleeds.    Eyes: Negative for photophobia, redness and visual disturbance.   Respiratory: Positive for cough. Negative for  "shortness of breath and wheezing.    Cardiovascular: Positive for leg swelling. Negative for chest pain and palpitations.   Gastrointestinal: Positive for abdominal pain, nausea and vomiting. Negative for diarrhea.   Endocrine: Negative for heat intolerance, polydipsia and polyphagia.   Genitourinary: Negative for difficulty urinating, dysuria, flank pain and hematuria.   Musculoskeletal: Positive for arthralgias and myalgias.        B/L Foot wounds x 3 months (unchanged to slightly improved over this time frame) with self-directed local wound care.   + B/L Lower extremity pain that radiates down the legs at 30 feet of ambulation  B/L groin pain. B/L foot pain (does not sound consistent with rest pain)    Skin:        B/L Foot wounds. Ulcers of the R 1st and 5th toes and bottom of L 2nd toe x 3 months.  B/L LE swelling with skin thickening and sloughing which patient states has been going on "for years"    Neurological: Negative for tremors, seizures, syncope and facial asymmetry.        + peripheral diabetic neuropathy    Psychiatric/Behavioral: Negative for agitation, behavioral problems, decreased concentration and hallucinations.     Objective:     Vital Signs (Most Recent):  Temp: 98.8 °F (37.1 °C) (06/01/17 1210)  Pulse: 66 (06/01/17 1210)  Resp: 18 (06/01/17 1210)  BP: 135/65 (06/01/17 1210)  SpO2: 98 % (06/01/17 1210) Vital Signs (24h Range):  Temp:  [98.1 °F (36.7 °C)-98.8 °F (37.1 °C)] 98.8 °F (37.1 °C)  Pulse:  [66-87] 66  Resp:  [15-18] 18  SpO2:  [97 %-100 %] 98 %  BP: (130-180)/(60-89) 135/65     Weight: 117 kg (257 lb 15 oz)  Body mass index is 33.12 kg/m².    Physical Exam   Constitutional: He is oriented to person, place, and time. He appears well-developed and well-nourished. No distress.   Morbidly Obese    HENT:   Head: Normocephalic and atraumatic.   Eyes: EOM are normal. Pupils are equal, round, and reactive to light. No scleral icterus.   Neck: Normal range of motion. Neck supple. No tracheal " deviation present.   Cardiovascular: Normal rate and regular rhythm.    Pulses:       Radial pulses are 2+ on the right side, and 2+ on the left side.        Femoral pulses are 2+ on the right side, and 2+ on the left side.       Popliteal pulses are 1+ on the right side, and 1+ on the left side.        Dorsalis pedis pulses are 0 on the right side, and 2+ on the left side.        Posterior tibial pulses are 0 on the right side, and 1+ on the left side.   Triphasic Left DP and PT  Triphasic Right DP and Biphasic but weak Right PT   Pulmonary/Chest: Effort normal and breath sounds normal. No respiratory distress.   Abdominal: Soft. Bowel sounds are normal. He exhibits distension. There is tenderness. There is no rebound and no guarding.   Morbidly obese abdomen. No evidence of AAA though this may be due to patient's body habitus    Musculoskeletal: Normal range of motion. He exhibits edema and tenderness.        Right ankle: He exhibits swelling and abnormal pulse. Tenderness.        Left ankle: He exhibits swelling.   Right foot:   - 1st toe medial plantar ulcer with hypertrophic skin edges, no abnormal drainage but foul odor   - 2nd toe distal phalanx with blackish eschar at the site of the nail, no purulent drainage  - 5th toe lateral plantar ulcer with hypertrophic skin edge, no abnormal drainage       Left Foot:   - Plantar ulcer at the base of the 2nd toe which was recently debrided. No purulence appreciated but + malodorous     + TTP of the bilateral groins (R > L), + TTP of b/l Feet (R>L)   Neurological: He is alert and oriented to person, place, and time. No cranial nerve deficit.   Skin: Skin is warm and dry.   B/L lower extremities with skin thickening and excessive skin flaking which appears consistent with skin changes from chronic venous insufficiency    Psychiatric:   + excessive response to pain with any gentle palpation  Thought content appears normal        Significant Labs:  CBC:   Recent Labs  Lab  06/01/17  0459   WBC 4.14   RBC 2.79*   HGB 7.7*   HCT 22.8*   PLT 62*   MCV 82   MCH 27.6   MCHC 33.8     CMP:   Recent Labs  Lab 06/01/17 0459   *   CALCIUM 7.6*   ALBUMIN 2.0*   PROT 5.6*   *   K 4.7   CO2 20*      BUN 18   CREATININE 1.2   ALKPHOS 112   ALT 12   AST 28   BILITOT 0.8     Coagulation:   Recent Labs  Lab 05/30/17  1513   INR 1.1     Microbiology Results (last 7 days)     Procedure Component Value Units Date/Time    Aerobic culture [[id number]] Collected:  06/01/17 1105    Order Status:  Sent Specimen:  Wound from Toe, Right Foot Updated:  06/01/17 1126    Culture, Anaerobe [[id number]] Collected:  06/01/17 1105    Order Status:  Sent Specimen:  Wound from Toe, Right Foot Updated:  06/01/17 1125    Culture, Anaerobic [[id number]] Collected:  05/31/17 1431    Order Status:  Completed Specimen:  Ascites from Ascites Updated:  06/01/17 0739     Anaerobic Culture Culture in progress    Aerobic culture [[id number]] Collected:  05/31/17 1431    Order Status:  Completed Specimen:  Ascites from Ascites Updated:  06/01/17 0732     Aerobic Bacterial Culture No growth    Gram stain [[id number]] Collected:  05/31/17 1431    Order Status:  Completed Specimen:  Ascites from Ascites Updated:  05/31/17 2303     Gram Stain Result Rare WBC's      No organisms seen        Specimen (12h ago through future)    None          Recent Labs  Lab 05/30/17 1436   COLORU Yellow   SPECGRAV 1.020   PHUR 6.0   PROTEINUA 1+*   BACTERIA None   NITRITE Negative   LEUKOCYTESUR Negative   UROBILINOGEN Negative   HYALINECASTS 1         Significant Diagnostics:  Indication:  -Decreased pedal pulses.  Results:  Lower Extremities Segmental Pressure [mmHg]:                    Right             Left  _______________________________________________________________  Brachial          170                 Posterior Tibial  162               179  Dorsalis Pedis    156               158  MEAGAN (Post. Tib.)  0.95               1.05  MEAGAN (Dors. Ped.)  0.92              0.93    Report Summary:  Impression:   Rt MEAGAN (0.95) Segment/Brachial Index and PVR waveforms appear to be within normal limits.    Lt MEAGAN (1.05) Segment/Brachial Index and PVR waveforms appear to be within normal limits.    Sonographer: Sai Schroeder RVT    Electronically Signed by:  Candelario Clement MD [[id number]]                        On: 06/01/2017 13:23

## 2017-06-01 NOTE — ASSESSMENT & PLAN NOTE
B/L foot wounds with palpable left pedal pulses but right pedal pulses are not easily palpated.   PVRs reviewed noting normal waveforms at the b/l ankles, however, given that the patient's right pedal pulses are not easily palpable we will obtain Toe PPGs for further evaluation   Plan for possible RLE angiogram will be based off of the results of the above imaging

## 2017-06-01 NOTE — HOSPITAL COURSE
5/30/17 Patient admitted with abdominal pain, n/v. Patient was admitted to hospital medicine for presumed SBP.   6/1/17 Patient underwent a paracentesis by IR on (cultures pending). Vascular consulted for eval of foot wounds

## 2017-06-01 NOTE — ASSESSMENT & PLAN NOTE
- see wound care note for images  - consulted podiatry and vascular surgeru  - vasc surg recs:   B/L foot wounds with palpable left pedal pulses but right pedal pulses are not easily palpated.   PVRs reviewed noting normal waveforms at the b/l ankles, however, given that the patient's right pedal pulses are not easily palpable we will obtain Toe PPGs for further evaluation   Plan for possible RLE angiogram will be based off of the results of the above imaging    - f/u MEAGAN

## 2017-06-01 NOTE — PROCEDURES
"Wound Debridement  Date/Time: 6/1/2017 4:14 PM  Performed by: SAQIB LORENZANA  Authorized by: DERRICK RODRIGUEZ     Time out: Immediately prior to procedure a "time out" was called to verify the correct patient, procedure, equipment, support staff and site/side marked as required.    Consent Done?:  Yes (Written)  Local anesthesia used?: No      Wound Details:    Location:  Right foot    Location:  Right 1st Toe    Type of Debridement:  Excisional       Length (cm):  1.2       Area (sq cm):  1.32       Width (cm):  1.1       Percent Debrided (%):  100       Depth (cm):  0.3       Total Area Debrided (sq cm):  1.32    Tissue debrided:  Subcutaneous    Devitalized tissue debrided:  Callus, Biofilm and Sough    Instruments:  Blade    Additional wounds:  2    2nd Wound Details:     Location:  Right foot    Location:  Right 2nd Toe    Location:  Right 2nd Toe    Type of Debridement:  Non-excisional       Length (cm):  0.6       Area (sq cm):  0.3       Width (cm):  0.5       Percent Debrided (%):  100       Depth (cm):  0.1       Total Area Debrided (sq cm):  0.3    Depth of debridement:  Epidermis/Dermis    Tissue debrided:  Epidermis    Devitalized tissue debrided:  Callus    Instruments:  Blade    3rd Wound Details:     Location:  Right foot    Location:  Right 4th Toe    Location:  Right 4th Toe    Type of Debridement:  Excisional       Length (cm):  0.5       Area (sq cm):  0.15       Width (cm):  0.3       Percent Debrided (%):  100       Depth (cm):  0.3       Total Area Debrided (sq cm):  0.15    Depth of debridement:  Subcutaneous tissue    Tissue debrided:  Subcutaneous    Devitalized tissue debrided:  Biofilm, Callus and Sough    Instruments:  Blade    Bleeding:  Minimal  Hemostasis Achieved: Yes    Method Used:  Pressure  Patient tolerance:  Patient tolerated the procedure well with no immediate complications     Right foot was dressed with ovi, gauze, kerlix and secured with tape.        "

## 2017-06-01 NOTE — PLAN OF CARE
CM called [facility] at [address] [facility] [phone] requesting an appointment for pt for hospital followup.  Appointment scheduled for Tuesday, 6/6/17 at 12:00noon with pt to arrive 30minutes prior to appt time for paperwork and to bring discharge papers, insurance information, identification, and any medications prescribed.  CM will provide information above to pt prior to discharge.

## 2017-06-01 NOTE — CONSULTS
[facility]  Vascular Surgery  Consult Note    Consults  Subjective:     Chief Complaint/Reason for Admission: Abdominal pain     History of Present Illness: Patient is a 47 yo M with h/o Hep C, cirrhosis (MELD 9, Tyler B),DM2 (Hgb a1c 8), HTN, HLd, recurrent pancreatitis initially presenting on 5/30 with c/p abdominal pain.  Had US done on 5/31 showing ascites; underwent IR drainage of ascites today; patient treated prophylactically for SBP.  Upon admission patient noted to have b/l foot wounds with diabetic ulcers of the right 1st, 2nd and 5th toe and left 2nd toe x 3 months. Patient states that he has been treating them himself over the last several months and state that they are only getting slightly better. He notes + pain in the bilateral lower extremities that starts in his groins and goes down both lower extremities that he states occurs at about 30 feet; pain does not sound consistent with claudication. He denies rest pain. But notes + pain at the sites of the bilateral foot wounds.      Vascular surgery consulted to evaluate for Atherosclerosis of the B/L LE in the setting of b/l foot wounds     Prescriptions Prior to Admission   Medication Sig Dispense Refill Last Dose    atorvastatin (LIPITOR) 20 MG tablet Take 20 mg by mouth once daily.       carvedilol (COREG) 6.25 MG tablet Take 6.25 mg by mouth 2 (two) times daily with meals.       ciprofloxacin HCl (CIPRO) 500 MG tablet Take 500 mg by mouth every 12 (twelve) hours.       dicyclomine (BENTYL) 20 mg tablet Take 20 mg by mouth 3 (three) times daily.       furosemide (LASIX) 20 MG tablet Take 20 mg by mouth once daily.       INSULIN GLARGINE,HUM.REC.ANLOG (LANTUS SOLOSTAR SUBQ) Inject 25 Units into the skin every evening.       propranolol (INDERAL) 40 MG tablet Take 40 mg by mouth 2 (two) times daily.       quetiapine (SEROQUEL) 100 MG Tab Take 100 mg by mouth every evening.       spironolactone (ALDACTONE) 50 MG tablet  Take 50 mg by mouth once daily.          Review of patient's allergies indicates:   Allergen Reactions    Shellfish containing products Hives     Shortness of breath       Past Medical History:   Diagnosis Date    Ascites of liver 5/30/2017    Chronic hepatitis C without hepatic coma     Chronic liver failure without hepatic coma 5/30/2017    Diabetic foot ulcer     right foot    DM (diabetes mellitus), type 2 with peripheral vascular complications     Essential hypertension     Gallstones     Hepatic encephalopathy 5/30/2017    Hepatitis C     Pancreatitis      Past Surgical History:   Procedure Laterality Date    BACK SURGERY       Family History     None        Social History Main Topics    Smoking status: Current Every Day Smoker     Types: Cigarettes    Smokeless tobacco: Not on file    Alcohol use No    Drug use: No    Sexual activity: Not on file     Review of Systems   Constitutional: Negative for chills and diaphoresis.   HENT: Negative for facial swelling, hearing loss and nosebleeds.    Eyes: Negative for photophobia, redness and visual disturbance.   Respiratory: Positive for cough. Negative for shortness of breath and wheezing.    Cardiovascular: Positive for leg swelling. Negative for chest pain and palpitations.   Gastrointestinal: Positive for abdominal pain, nausea and vomiting. Negative for diarrhea.   Endocrine: Negative for heat intolerance, polydipsia and polyphagia.   Genitourinary: Negative for difficulty urinating, dysuria, flank pain and hematuria.   Musculoskeletal: Positive for arthralgias and myalgias.        B/L Foot wounds x 3 months (unchanged to slightly improved over this time frame) with self-directed local wound care.   + B/L Lower extremity pain that radiates down the legs at 30 feet of ambulation  B/L groin pain. B/L foot pain (does not sound consistent with rest pain)    Skin:        B/L Foot wounds. Ulcers of the R 1st and 5th toes and bottom of L 2nd toe x 3  "months.  B/L LE swelling with skin thickening and sloughing which patient states has been going on "for years"    Neurological: Negative for tremors, seizures, syncope and facial asymmetry.        + peripheral diabetic neuropathy    Psychiatric/Behavioral: Negative for agitation, behavioral problems, decreased concentration and hallucinations.     Objective:     Vital Signs (Most Recent):  Temp: 98.8 °F (37.1 °C) (06/01/17 1210)  Pulse: 66 (06/01/17 1210)  Resp: 18 (06/01/17 1210)  BP: 135/65 (06/01/17 1210)  SpO2: 98 % (06/01/17 1210) Vital Signs (24h Range):  Temp:  [98.1 °F (36.7 °C)-98.8 °F (37.1 °C)] 98.8 °F (37.1 °C)  Pulse:  [66-87] 66  Resp:  [15-18] 18  SpO2:  [97 %-100 %] 98 %  BP: (130-180)/(60-89) 135/65     Weight: 117 kg (257 lb 15 oz)  Body mass index is 33.12 kg/m².    Physical Exam   Constitutional: He is oriented to person, place, and time. He appears well-developed and well-nourished. No distress.   Morbidly Obese    HENT:   Head: Normocephalic and atraumatic.   Eyes: EOM are normal. Pupils are equal, round, and reactive to light. No scleral icterus.   Neck: Normal range of motion. Neck supple. No tracheal deviation present.   Cardiovascular: Normal rate and regular rhythm.    Pulses:       Radial pulses are 2+ on the right side, and 2+ on the left side.        Femoral pulses are 2+ on the right side, and 2+ on the left side.       Popliteal pulses are 1+ on the right side, and 1+ on the left side.        Dorsalis pedis pulses are 0 on the right side, and 2+ on the left side.        Posterior tibial pulses are 0 on the right side, and 1+ on the left side.   Triphasic Left DP and PT  Triphasic Right DP and Biphasic but weak Right PT   Pulmonary/Chest: Effort normal and breath sounds normal. No respiratory distress.   Abdominal: Soft. Bowel sounds are normal. He exhibits distension. There is tenderness. There is no rebound and no guarding.   Morbidly obese abdomen. No evidence of AAA though this may " be due to patient's body habitus    Musculoskeletal: Normal range of motion. He exhibits edema and tenderness.        Right ankle: He exhibits swelling and abnormal pulse. Tenderness.        Left ankle: He exhibits swelling.   Right foot:   - 1st toe medial plantar ulcer with hypertrophic skin edges, no abnormal drainage but foul odor   - 2nd toe distal phalanx with blackish eschar at the site of the nail, no purulent drainage  - 5th toe lateral plantar ulcer with hypertrophic skin edge, no abnormal drainage       Left Foot:   - Plantar ulcer at the base of the 2nd toe which was recently debrided. No purulence appreciated but + malodorous     + TTP of the bilateral groins (R > L), + TTP of b/l Feet (R>L)   Neurological: He is alert and oriented to person, place, and time. No cranial nerve deficit.   Skin: Skin is warm and dry.   B/L lower extremities with skin thickening and excessive skin flaking which appears consistent with skin changes from chronic venous insufficiency    Psychiatric:   + excessive response to pain with any gentle palpation  Thought content appears normal        Significant Labs:  CBC:   Recent Labs  Lab 06/01/17  0459   WBC 4.14   RBC 2.79*   HGB 7.7*   HCT 22.8*   PLT 62*   MCV 82   MCH 27.6   MCHC 33.8     CMP:   Recent Labs  Lab 06/01/17  0459   *   CALCIUM 7.6*   ALBUMIN 2.0*   PROT 5.6*   *   K 4.7   CO2 20*      BUN 18   CREATININE 1.2   ALKPHOS 112   ALT 12   AST 28   BILITOT 0.8     Coagulation:   Recent Labs  Lab 05/30/17  1513   INR 1.1     Microbiology Results (last 7 days)     Procedure Component Value Units Date/Time    Aerobic culture [[id number]] Collected:  06/01/17 1105    Order Status:  Sent Specimen:  Wound from Toe, Right Foot Updated:  06/01/17 1126    Culture, Anaerobe [[id number]] Collected:  06/01/17 1105    Order Status:  Sent Specimen:  Wound from Toe, Right Foot Updated:  06/01/17 1125    Culture, Anaerobic [[id number]] Collected:  05/31/17 1431     Order Status:  Completed Specimen:  Ascites from Ascites Updated:  06/01/17 0739     Anaerobic Culture Culture in progress    Aerobic culture [[id number]] Collected:  05/31/17 1431    Order Status:  Completed Specimen:  Ascites from Ascites Updated:  06/01/17 0732     Aerobic Bacterial Culture No growth    Gram stain [[id number]] Collected:  05/31/17 1431    Order Status:  Completed Specimen:  Ascites from Ascites Updated:  05/31/17 2303     Gram Stain Result Rare WBC's      No organisms seen        Specimen (12h ago through future)    None          Recent Labs  Lab 05/30/17  1436   COLORU Yellow   SPECGRAV 1.020   PHUR 6.0   PROTEINUA 1+*   BACTERIA None   NITRITE Negative   LEUKOCYTESUR Negative   UROBILINOGEN Negative   HYALINECASTS 1         Significant Diagnostics:  Indication:  -Decreased pedal pulses.  Results:  Lower Extremities Segmental Pressure [mmHg]:                    Right             Left  _______________________________________________________________  Brachial          170                 Posterior Tibial  162               179  Dorsalis Pedis    156               158  MEAGAN (Post. Tib.)  0.95              1.05  MEAGAN (Dors. Ped.)  0.92              0.93    Report Summary:  Impression:   Rt MEAGAN (0.95) Segment/Brachial Index and PVR waveforms appear to be within normal limits.    Lt MEAGAN (1.05) Segment/Brachial Index and PVR waveforms appear to be within normal limits.    Sonographer: Sai Schroeder RVT    Electronically Signed by:  Candelario Clement MD [[id number]]                        On: 06/01/2017 13:23    Assessment/Plan:     Atherosclerosis of artery of extremity with ulceration    B/L foot wounds with palpable left pedal pulses but right pedal pulses are not easily palpated.   PVRs reviewed noting normal waveforms at the b/l ankles, however, given that the patient's right pedal pulses are not easily palpable we will obtain Toe PPGs for further evaluation   Plan for possible RLE angiogram will be  based off of the results of the above imaging           Decreased pedal pulses    See atherosclerosis with ulceration A/P         Diabetic ulcer of toe of right foot associated with type 2 diabetes mellitus, with fat layer exposed    Continue local wound care to the b/l foot wounds per podiatry recommendations         Chronic hepatitis C without hepatic coma    Patient s/p Paracentesis   Will follow up on peritoneal fluid cultures  Continue management per primary team         DM (diabetes mellitus), type 2 with peripheral vascular complications    Continue strict blood glucose control         * Generalized abdominal pain    Possible SBP   - vascular intervention (if necessary) will be performed pending resolution of patient's acute abdominal illness             Thank you for your consult.  Vascular surgery to continue to follow     Gillian Huertas MD  Vascular Surgery  [facility]

## 2017-06-01 NOTE — PROGRESS NOTES
[facility] Hospital Medicine  Progress Note    Patient Name: Leif Nino  MRN: [id number]  Patient Class: IP- Inpatient   Admission Date: 5/30/2017  Length of Stay: 2 days  Attending Physician: Juliano Mo MD  Primary Care Provider: [facility] Medicine Team: [facility] 5 Estefany Mcghee MD    Subjective:     Principal Problem:Generalized abdominal pain    HPI:  The patient is a 46 y.o. male with hx of: Hep C, cirrhosis of the liver, HTN, HLD, DM, recurrent pancreatitis, and gallstones that presents to the ED with a complaint of abdominal pain for 1 day. He states the pain is worse after eating. Pt also endorses chills, nausea, leg pains, and rectal bleeding, but denies chest pain and diarrhea. Endorses SOB and dry cough x 2 months. No hx of hemorrhoids. He states he vomiting blood x 1 about 1.5 wk ago but never got an EGD at the time because he could not tolerate it. Pt is normally followed closely by physicians at [facility] and he was last seen in the ED 10 days ago for abdominal pain. He states he does not like the way he was treated at [facility], and therefore came here. Of note: His last fluid tap was a couple of months ago. He denies any EtOH use, drug use limited to marijuana and cocaine but denies IV drug use, and TOB use is 2-3 cigs/day.        Hospital Course:  Admitted to [facility] 5/30. NAEON. Pt could not tolerate bedside paracentesis. 5/31 RUQ ultrasound consistent with ascites and cirrhosis. IR paracentesis done 5/31, ruled out SBP. Podiatry and wound care consulted for diabetic foot ulcers, concern for osteomyelitis. MRI ordered. Vascular surgery consulted.     Interval History: NAEON. RUQ U/S and IR para done 5/31. Neg for SBP, d/c SBP tx. Seen by podiatry today. MRI LE ordered. Vascular surg consulted. Insulin gtt started for uncontrolled glc w/diabetic foot ulcers.    Review of Systems   Constitutional: Positive for fatigue. Negative for chills, diaphoresis and  fever.   HENT: Negative for congestion and rhinorrhea.    Eyes: Negative for pain and discharge.   Respiratory: Positive for cough (dry, chronic). Negative for chest tightness and shortness of breath.    Cardiovascular: Negative for chest pain and palpitations.   Gastrointestinal: Positive for abdominal distention and abdominal pain. Negative for constipation, diarrhea, nausea and vomiting.   Genitourinary: Negative for dysuria and hematuria.   Musculoskeletal: Positive for myalgias. Negative for arthralgias, back pain and neck pain.   Skin: Positive for color change (b/l LE) and wound (right foot). Negative for pallor and rash.   Neurological: Negative for dizziness, light-headedness, numbness and headaches.   Psychiatric/Behavioral: Negative for agitation, confusion and sleep disturbance. The patient is not nervous/anxious.      Objective:     Vital Signs (Most Recent):  Temp: 98.8 °F (37.1 °C) (06/01/17 1210)  Pulse: 66 (06/01/17 1210)  Resp: 18 (06/01/17 1210)  BP: 135/65 (06/01/17 1210)  SpO2: 98 % (06/01/17 1210) Vital Signs (24h Range):  Temp:  [98.1 °F (36.7 °C)-98.8 °F (37.1 °C)] 98.8 °F (37.1 °C)  Pulse:  [66-87] 66  Resp:  [15-18] 18  SpO2:  [97 %-100 %] 98 %  BP: (130-180)/(60-89) 135/65     Weight: 117 kg (257 lb 15 oz)  Body mass index is 33.12 kg/m².    Intake/Output Summary (Last 24 hours) at 06/01/17 1442  Last data filed at 06/01/17 1246   Gross per 24 hour   Intake             1470 ml   Output             6600 ml   Net            -5130 ml      Physical Exam   Constitutional: He is oriented to person, place, and time. He appears well-developed and well-nourished. No distress.   HENT:   Head: Normocephalic and atraumatic.   Right Ear: External ear normal.   Left Ear: External ear normal.   Nose: Nose normal.   Mouth/Throat: Oropharynx is clear and moist. No oropharyngeal exudate.   Eyes: Conjunctivae and EOM are normal. Pupils are equal, round, and reactive to light. Right eye exhibits no discharge.  Left eye exhibits no discharge. No scleral icterus.   Neck: Normal range of motion. Neck supple. No JVD present. No tracheal deviation present. No thyromegaly present.   Cardiovascular: Normal rate, regular rhythm, normal heart sounds and intact distal pulses.  Exam reveals no gallop and no friction rub.    No murmur heard.  Pulmonary/Chest: Effort normal and breath sounds normal. No respiratory distress. He has no wheezes. He has no rales. He exhibits no tenderness.   Abdominal: Soft. Bowel sounds are normal. He exhibits distension. He exhibits no mass. There is tenderness. There is guarding. There is no rebound.   Musculoskeletal: Normal range of motion. He exhibits edema (b/l LE). He exhibits no tenderness or deformity.   Neurological: He is alert and oriented to person, place, and time.   Skin: Skin is warm. No rash noted. He is not diaphoretic. No erythema. No pallor.   B/l LE hard bumpy slightly erythematous skin; right foot diabetic ulcers noted, left foot blood blister (see wound care note for images)   Psychiatric: He has a normal mood and affect. His behavior is normal. Judgment and thought content normal.   Vitals reviewed.      Significant Labs: All pertinent labs within the past 24 hours have been reviewed.    Significant Imaging: I have reviewed all pertinent imaging results/findings within the past 24 hours.    Assessment/Plan:      Hypertension    - discrepancy of home meds, poor historian  - started propranolol 40 mg BID, cont to monitor          Atherosclerosis of artery of extremity with ulceration    - see wound care note for images  - consulted podiatry and vascular surgeru  - vasc surg recs:   B/L foot wounds with palpable left pedal pulses but right pedal pulses are not easily palpated.   PVRs reviewed noting normal waveforms at the b/l ankles, however, given that the patient's right pedal pulses are not easily palpable we will obtain Toe PPGs for further evaluation   Plan for possible RLE  angiogram will be based off of the results of the above imaging    - f/u MEAGAN            Decreased pedal pulses    - consulted vasc surg, recs above          Diabetic ulcer of toe of right foot associated with type 2 diabetes mellitus, with fat layer exposed    - consulted wound care and podiatry  - f/u recs  - strict glc control, currently on insulin gtt  - f/u MRI LE-concern for osteo/infection, f/u ESR, CRP, procal          Hyperkalemia    - K 5.6 on admit, asymptomatic  - EKG nsr  - cont to monitor, improving, K wnl          Hyponatremia    - asymptomatic, in setting of liver failure  - cont to monitor daily labs, fluid restriction          Chronic hepatitis C without hepatic coma    - no home meds for Hep C, denies lactulose use  - cont lactulose 20 mg TID          DM (diabetes mellitus), type 2 with peripheral vascular complications    - home med: Lantus 25 u qHS  - d/c'ed insulin detemir, started insulin gtt, monitor insulin requirements and adjust as necessary  - monitor POCT glc  - diabetic diet          Ascites of liver    - see abd pain  - IR para done 5/31, ascitic fluid labs neg for SBP, d/c'ed ceftriaxone and albumin          Hypoalbuminemia    - in setting of liver failure  - discuss nutritional status with pt           Elevated lipase    - lipase 151, but pancreatitis not likely   - see abd pain          Hypomagnesemia    - Mg 1.2 on admit, replete as needed          Chronic liver failure without hepatic coma    - see above  - switched IV lasix 40 mg BID to PO          * Generalized abdominal pain    - DDx: SBP, pancreatitis, gallstones  - unconfirmed SBP as pt not able to tolerate bedside paracentesis  - consulted IR for paracentesis  - treating for SBP: ceftriaxone 2 g daily, albumin 1.5 g/kg on day 1 followed by 1 g/kg on day 3, IV morphine 2 mg q4hrs for pain control  - monitor kidney fxn in the AM, if worsening concern for HRS and may need octreotide/midodrine  - 5/31 IR para done. ascitic fluid  labs neg for SBP  - prn pain control  - RUQ ultrasound: consistent for ascites and cirrhosis, labs: Hep B neg, Hep C +, ethanol neg, Peth in process            VTE Risk Mitigation         Ordered     Medium Risk of VTE  Once      05/31/17 0740     Place BASILIO hose  Until discontinued      05/30/17 1824     Place sequential compression device  Until discontinued      05/30/17 1824          Estefany Mcghee MD  Department of Hospital Medicine   [facility]

## 2017-06-01 NOTE — HPI
Patient is a 47 yo M with h/o Hep C, cirrhosis (MELD 9, Tyler B),DM2 (Hgb a1c 8), HTN, HLd, recurrent pancreatitis initially presenting on 5/30 with c/p abdominal pain.  Had US done on 5/31 showing ascites; underwent IR drainage of ascites today; patient treated prophylactically for SBP.  Upon admission patient noted to have b/l foot wounds with diabetic ulcers of the right 1st, 2nd and 5th toe and left 2nd toe x 3 months. Patient states that he has been treating them himself over the last several months and state that they are only getting slightly better. He notes + pain in the bilateral lower extremities that starts in his groins and goes down both lower extremities that he states occurs at about 30 feet; pain does not sound consistent with claudication. He denies rest pain. But notes + pain at the sites of the bilateral foot wounds.      Vascular surgery consulted to evaluate for Atherosclerosis of the B/L LE in the setting of b/l foot wounds

## 2017-06-01 NOTE — NURSING
Medicine team 5 notified that pt.'s blood glucose 468 this am..stated would take a look at ptl.'s insulin orders no new orders now.. Also team notifed that is allergic to shellfish and the current wound care order to paint pt. Foot is contraindicated. Pt. Reports hives and skin rash with expose to shellfish, team ordered to hold this order, podiatry will be in see pt. This am...

## 2017-06-01 NOTE — CONSULTS
Consult Note  Podiatry    Consult Requested By: Juliano Mo MD  Reason for Consult: right foot diabetic ulcer    SUBJECTIVE     History of Present Illness:  Leif Nino is a 46 y.o. male who  has a past medical history of Ascites of liver; Chronic hepatitis C without hepatic coma; Chronic liver failure without hepatic coma; Diabetic foot ulcer; DM (diabetes mellitus), type 2 with peripheral vascular complications; Essential hypertension; Gallstones; Hepatic encephalopathy; Hepatitis C; and Pancreatitis.     Patient is inpatient with abdominal pain now s/p paracentesis. He was consulted to podiatry with concern over bilateral foot wounds. He relates that he has had the ulcer on the right foot for about two months. He has been treating this himself with a silver dressing and bandages, he changes the dressings every 2-3 days. He reports he has never seen a podiatrist before, the only doctors to treat the wounds were in the ED. Denies pedal pain.     Scheduled Meds:   furosemide  40 mg Oral BID    lactulose  20 g Oral TID    magnesium sulfate IVPB  2 g Intravenous Q6H    propranolol  40 mg Oral BID     Continuous Infusions:   insulin (HUMAN R) infusion (adults) Stopped (06/01/17 1432)     PRN Meds:dextrose 50%, dextrose 50%, glucagon (human recombinant), glucose, glucose, lidocaine HCL 10 mg/ml (1%), morphine, ondansetron    Review of patient's allergies indicates:   Allergen Reactions    Shellfish containing products Hives     Shortness of breath        Past Medical History:   Diagnosis Date    Ascites of liver 5/30/2017    Chronic hepatitis C without hepatic coma     Chronic liver failure without hepatic coma 5/30/2017    Diabetic foot ulcer     right foot    DM (diabetes mellitus), type 2 with peripheral vascular complications     Essential hypertension     Gallstones     Hepatic encephalopathy 5/30/2017    Hepatitis C     Pancreatitis      Past Surgical History:   Procedure Laterality Date     BACK SURGERY       History reviewed. No pertinent family history.  Social History   Substance Use Topics    Smoking status: Current Every Day Smoker     Types: Cigarettes    Smokeless tobacco: Not on file    Alcohol use No       Review of Systems:  Constitutional: no fever or chills, pain well controlled  Respiratory: no cough or shortness of breath  Cardiovascular: no chest pain or palpitations  Gastrointestinal: no nausea or vomiting, tolerating diet  Musculoskeletal: no arthralgias or myalgias  Neurological: history of numbness or paresthesias in the feet    OBJECTIVE     Vital Signs (Most Recent):  Temp: 98.8 °F (37.1 °C) (06/01/17 1210)  Pulse: 66 (06/01/17 1210)  Resp: 18 (06/01/17 1210)  BP: 135/65 (06/01/17 1210)  SpO2: 98 % (06/01/17 1210)    Vital Signs Range (Last 24H):  Temp:  [98.1 °F (36.7 °C)-98.8 °F (37.1 °C)]   Pulse:  [66-87]   Resp:  [15-18]   BP: (130-170)/(60-82)   SpO2:  [97 %-100 %]     Physical Exam:  General: Oriented to person, place, time, and situation. No acute distress.  Vascular: DP and PT pulses are 1+ bilaterally. CRT<3 seconds to digits 1-5 bilaterally. Moderate non pitting edema with no varicosities noted bilaterally.  Musculoskeletal: Equinus noted b/l ankles with < 10 deg DF noted.   Neuro: Protective sensation diminished bilateral  Derm: Left foot plantar bulla with underlying ulcer present sub second metatarsal area. After I&D only scant serosanguinous drainage noted and the underlying 3 cm wound was red and granular.         Right foot:  Ulcer Location: Plantar right halluc  Measurements: Pre debridement: 1.1x1.0x0.2, post debridement 1.2x1.1x0.3  Periwound: Intact  Drainage: serosanguinous.  Malodor: None.  Base:  90% granular. 10% fibrin.  Signs of infection: None.          Ulcer Location: Distal right second toe  Measurements: Pre- no measurement as it was a preulcerative callus. Post debridment 0.6x0.5x0.1  Periwound: Intact  Drainage: serosanguinous.  Malodor:  None.  Base:  Partial thickness into dermis  Signs of infection: None.      Ulcer Location: Right dorsal fourth toe at the PIPJ  Measurements: Pre debridement 0.2x0.2x0.2 post debridement 0.5x0.3x0.3  Periwound: Intact, hyperkeratotic  Drainage: serosanguinous.  Malodor: None.  Base:  100% granular. 0% fibrin.  Signs of infection: None.                Laboratory:  CBC:   Recent Labs  Lab 06/01/17  0459   WBC 4.14   RBC 2.79*   HGB 7.7*   HCT 22.8*   PLT 62*   MCV 82   MCH 27.6   MCHC 33.8     CMP:   Recent Labs  Lab 06/01/17  0459   *   CALCIUM 7.6*   ALBUMIN 2.0*   PROT 5.6*   *   K 4.7   CO2 20*      BUN 18   CREATININE 1.2   ALKPHOS 112   ALT 12   AST 28   BILITOT 0.8     ESR:   Recent Labs  Lab 06/01/17  1025   SEDRATE 4     CRP:   Recent Labs  Lab 06/01/17  1025   CRP 0.7     Microbiology Results (last 7 days)     Procedure Component Value Units Date/Time    Aerobic culture [[id number]] Collected:  06/01/17 1105    Order Status:  Sent Specimen:  Wound from Toe, Right Foot Updated:  06/01/17 1126    Culture, Anaerobe [[id number]] Collected:  06/01/17 1105    Order Status:  Sent Specimen:  Wound from Toe, Right Foot Updated:  06/01/17 1125    Culture, Anaerobic [[id number]] Collected:  05/31/17 1431    Order Status:  Completed Specimen:  Ascites from Ascites Updated:  06/01/17 0739     Anaerobic Culture Culture in progress    Aerobic culture [[id number]] Collected:  05/31/17 1431    Order Status:  Completed Specimen:  Ascites from Ascites Updated:  06/01/17 0732     Aerobic Bacterial Culture No growth    Gram stain [[id number]] Collected:  05/31/17 1431    Order Status:  Completed Specimen:  Ascites from Ascites Updated:  05/31/17 2303     Gram Stain Result Rare WBC's      No organisms seen          Diagnostic Results:  X-Ray: pending  MRI: pending      ASSESSMENT/PLAN     Assessment:  -DM II with peripheral neuropathy  -Diabetic ulcers of bilateral feet, appear stable.   -PAD    Plan:  -Patient was  seen bedside and wounds were evaluated  -Right foot debridement see accompanied procedure note    -Bedside incision and drainage left foot as follows:    Surgeon: Dr Fredrick DPM  Assistant: Denilson Dawson DPM PGY 3  Preop Diagnosis: Abscess left foot   Post op diagnosis: Same  Pathology: None  Procedure: After written consent reviewed and signed and placed into chart, a sterile #15 blade was used to incise and drain the abscess noted on the plantar aspect of the left foot down to the level of dermis. There was tracking more proximally to a partial thickness wound at the plantar midfoot. The area was about 3 cm in diameter. The area was flushed with sterile saline, covered with mepilex ag and foam, wrapped in kerlix and secured with ace.  Anesthesia: none needed due to neuropathic state.   Hemostasis: direct pressure  EBL: Minimal <0.5cc.   Condition: patient tolerated well and was stable after procedure.     -Ordered x-rays, vascular surgery was consulted for vascular workup. Appreciate their recommendations as well.  -Weight bearing in Darco shoe only bilateral  -Cultures taken of right great toe wound, will follow.  -Appreciate the consult, podiatry will follow please call with questions or concerns    Denilson Dawson DPM PGY-3  Pager [phone]  I have personally taken the history and examined the patient and agree with the resident's note as stated above.

## 2017-06-01 NOTE — ASSESSMENT & PLAN NOTE
Patient s/p Paracentesis   Will follow up on peritoneal fluid cultures  Continue management per primary team

## 2017-06-01 NOTE — PLAN OF CARE
CM in to see pt alone in room AAO pleasant in no distress stating he had paracentesis done yesterday with 4liters removed.  CM noted pt on Insulin drip now at 4u/hr, pt states his sugar went really high yesterday.  Pt wishes to discharge home today with CM discussing importance of getting his blood sugar stabilized first with pt verbalizing understanding.  CM will continue to follow.

## 2017-06-02 PROBLEM — E87.5 HYPERKALEMIA: Status: RESOLVED | Noted: 2017-05-30 | Resolved: 2017-06-02

## 2017-06-02 LAB
ALBUMIN SERPL BCP-MCNC: 1.7 G/DL
ALP SERPL-CCNC: 112 U/L
ALT SERPL W/O P-5'-P-CCNC: 13 U/L
ANION GAP SERPL CALC-SCNC: 5 MMOL/L
AST SERPL-CCNC: 36 U/L
BILIRUB SERPL-MCNC: 1.1 MG/DL
BUN SERPL-MCNC: 19 MG/DL
CALCIUM SERPL-MCNC: 7.6 MG/DL
CHLORIDE SERPL-SCNC: 104 MMOL/L
CO2 SERPL-SCNC: 23 MMOL/L
CREAT SERPL-MCNC: 1.1 MG/DL
EST. GFR  (AFRICAN AMERICAN): >60 ML/MIN/1.73 M^2
EST. GFR  (NON AFRICAN AMERICAN): >60 ML/MIN/1.73 M^2
GLUCOSE SERPL-MCNC: 289 MG/DL
MAGNESIUM SERPL-MCNC: 1.5 MG/DL
MAGNESIUM SERPL-MCNC: 1.6 MG/DL
POCT GLUCOSE: 105 MG/DL (ref 70–110)
POCT GLUCOSE: 107 MG/DL (ref 70–110)
POCT GLUCOSE: 109 MG/DL (ref 70–110)
POCT GLUCOSE: 163 MG/DL (ref 70–110)
POCT GLUCOSE: 180 MG/DL (ref 70–110)
POCT GLUCOSE: 182 MG/DL (ref 70–110)
POCT GLUCOSE: 195 MG/DL (ref 70–110)
POCT GLUCOSE: 197 MG/DL (ref 70–110)
POCT GLUCOSE: 204 MG/DL (ref 70–110)
POCT GLUCOSE: 206 MG/DL (ref 70–110)
POCT GLUCOSE: 207 MG/DL (ref 70–110)
POCT GLUCOSE: 214 MG/DL (ref 70–110)
POCT GLUCOSE: 215 MG/DL (ref 70–110)
POCT GLUCOSE: 230 MG/DL (ref 70–110)
POCT GLUCOSE: 235 MG/DL (ref 70–110)
POCT GLUCOSE: 251 MG/DL (ref 70–110)
POCT GLUCOSE: 298 MG/DL (ref 70–110)
POCT GLUCOSE: 345 MG/DL (ref 70–110)
POCT GLUCOSE: 351 MG/DL (ref 70–110)
POCT GLUCOSE: 46 MG/DL (ref 70–110)
POCT GLUCOSE: 75 MG/DL (ref 70–110)
POCT GLUCOSE: 86 MG/DL (ref 70–110)
POCT GLUCOSE: 98 MG/DL (ref 70–110)
POTASSIUM SERPL-SCNC: 4.7 MMOL/L
PROT SERPL-MCNC: 5.5 G/DL
SODIUM SERPL-SCNC: 132 MMOL/L

## 2017-06-02 PROCEDURE — 80053 COMPREHEN METABOLIC PANEL: CPT

## 2017-06-02 PROCEDURE — 99233 SBSQ HOSP IP/OBS HIGH 50: CPT | Mod: ,,, | Performed by: INTERNAL MEDICINE

## 2017-06-02 PROCEDURE — 20600001 HC STEP DOWN PRIVATE ROOM

## 2017-06-02 PROCEDURE — 36415 COLL VENOUS BLD VENIPUNCTURE: CPT

## 2017-06-02 PROCEDURE — 63600175 PHARM REV CODE 636 W HCPCS: Performed by: STUDENT IN AN ORGANIZED HEALTH CARE EDUCATION/TRAINING PROGRAM

## 2017-06-02 PROCEDURE — 99231 SBSQ HOSP IP/OBS SF/LOW 25: CPT | Mod: ,,, | Performed by: SURGERY

## 2017-06-02 PROCEDURE — 63600175 PHARM REV CODE 636 W HCPCS: Performed by: INTERNAL MEDICINE

## 2017-06-02 PROCEDURE — 83735 ASSAY OF MAGNESIUM: CPT | Mod: 91

## 2017-06-02 PROCEDURE — 25000003 PHARM REV CODE 250: Performed by: INTERNAL MEDICINE

## 2017-06-02 PROCEDURE — 25000003 PHARM REV CODE 250: Performed by: STUDENT IN AN ORGANIZED HEALTH CARE EDUCATION/TRAINING PROGRAM

## 2017-06-02 PROCEDURE — 99232 SBSQ HOSP IP/OBS MODERATE 35: CPT | Mod: ,,, | Performed by: INTERNAL MEDICINE

## 2017-06-02 PROCEDURE — 83735 ASSAY OF MAGNESIUM: CPT

## 2017-06-02 RX ORDER — MAGNESIUM SULFATE HEPTAHYDRATE 40 MG/ML
2 INJECTION, SOLUTION INTRAVENOUS EVERY 6 HOURS
Status: COMPLETED | OUTPATIENT
Start: 2017-06-02 | End: 2017-06-03

## 2017-06-02 RX ADMIN — SODIUM CHLORIDE 1.25 UNITS/HR: 9 INJECTION, SOLUTION INTRAVENOUS at 03:06

## 2017-06-02 RX ADMIN — FUROSEMIDE 40 MG: 40 TABLET ORAL at 08:06

## 2017-06-02 RX ADMIN — AMPICILLIN SODIUM AND SULBACTAM SODIUM 3 G: 2; 1 INJECTION, POWDER, FOR SOLUTION INTRAMUSCULAR; INTRAVENOUS at 11:06

## 2017-06-02 RX ADMIN — MORPHINE SULFATE 2 MG: 2 INJECTION, SOLUTION INTRAMUSCULAR; INTRAVENOUS at 05:06

## 2017-06-02 RX ADMIN — MORPHINE SULFATE 2 MG: 2 INJECTION, SOLUTION INTRAMUSCULAR; INTRAVENOUS at 10:06

## 2017-06-02 RX ADMIN — Medication 1 CAPSULE: at 10:06

## 2017-06-02 RX ADMIN — SODIUM CHLORIDE 7.5 UNITS/HR: 9 INJECTION, SOLUTION INTRAVENOUS at 10:06

## 2017-06-02 RX ADMIN — PROPRANOLOL HYDROCHLORIDE 40 MG: 10 TABLET ORAL at 09:06

## 2017-06-02 RX ADMIN — Medication 1 CAPSULE: at 09:06

## 2017-06-02 RX ADMIN — MORPHINE SULFATE 2 MG: 2 INJECTION, SOLUTION INTRAMUSCULAR; INTRAVENOUS at 01:06

## 2017-06-02 RX ADMIN — SODIUM CHLORIDE 12.5 UNITS/HR: 9 INJECTION, SOLUTION INTRAVENOUS at 02:06

## 2017-06-02 RX ADMIN — SODIUM CHLORIDE 4 UNITS/HR: 9 INJECTION, SOLUTION INTRAVENOUS at 11:06

## 2017-06-02 RX ADMIN — MORPHINE SULFATE 2 MG: 2 INJECTION, SOLUTION INTRAMUSCULAR; INTRAVENOUS at 08:06

## 2017-06-02 RX ADMIN — PROPRANOLOL HYDROCHLORIDE 40 MG: 10 TABLET ORAL at 10:06

## 2017-06-02 RX ADMIN — FUROSEMIDE 40 MG: 40 TABLET ORAL at 10:06

## 2017-06-02 RX ADMIN — MORPHINE SULFATE 2 MG: 2 INJECTION, SOLUTION INTRAMUSCULAR; INTRAVENOUS at 03:06

## 2017-06-02 RX ADMIN — SODIUM CHLORIDE 17.5 UNITS/HR: 9 INJECTION, SOLUTION INTRAVENOUS at 07:06

## 2017-06-02 RX ADMIN — AMPICILLIN SODIUM AND SULBACTAM SODIUM 3 G: 2; 1 INJECTION, POWDER, FOR SOLUTION INTRAMUSCULAR; INTRAVENOUS at 03:06

## 2017-06-02 RX ADMIN — DEXTROSE MONOHYDRATE 25 G: 25 INJECTION, SOLUTION INTRAVENOUS at 10:06

## 2017-06-02 RX ADMIN — SODIUM CHLORIDE 17.5 UNITS/HR: 9 INJECTION, SOLUTION INTRAVENOUS at 04:06

## 2017-06-02 RX ADMIN — VANCOMYCIN HYDROCHLORIDE 1750 MG: 100 INJECTION, POWDER, LYOPHILIZED, FOR SOLUTION INTRAVENOUS at 05:06

## 2017-06-02 RX ADMIN — MAGNESIUM SULFATE IN WATER 2 G: 40 INJECTION, SOLUTION INTRAVENOUS at 08:06

## 2017-06-02 RX ADMIN — SODIUM CHLORIDE 14.5 UNITS/HR: 9 INJECTION, SOLUTION INTRAVENOUS at 03:06

## 2017-06-02 NOTE — NURSING
Spoke with Dr. Dawson from podiatry re: pt's wound care. Dressings to be changed daily (see orders). MD clarified with pt that he has no prior reactions to use of iodine.

## 2017-06-02 NOTE — NURSING
Primary team notified of pt's departure from floor without notification of nursing staff. Attempted to contact pt on personal phone but unsucessful. Pt's hourly insulin checks and antibiotics overdue. MD recommends sitter for overnight shift. Will try to locate patient.

## 2017-06-02 NOTE — PLAN OF CARE
CM in to see pt lying in bed in room alone AAO pleasant stating he is feeling much better today.  CM informed pt of PCP appt scheduled through [facility] on [address] 6/6/17 at noon with items to bring to appt, pt verbalizes understanding.  CM will continue to follow.

## 2017-06-02 NOTE — PROGRESS NOTES
[facility] Hospital Medicine  Progress Note    Patient Name: Leif Nino  MRN: [id number]  Patient Class: IP- Inpatient   Admission Date: 5/30/2017  Length of Stay: 3 days  Attending Physician: Juliano Mo MD  Primary Care Provider: Primary Doctor [facility] Medicine Team: [facility] MED 5 Estuardo Moses MD    Subjective:     Principal Problem:Subacute osteomyelitis of right foot    HPI:  The patient is a 46 y.o. male with hx of: Hep C, cirrhosis of the liver, HTN, HLD, DM, recurrent pancreatitis, and gallstones that presents to the ED with a complaint of abdominal pain for 1 day. He states the pain is worse after eating. Pt also endorses chills, nausea, leg pains, and rectal bleeding, but denies chest pain and diarrhea. Endorses SOB and dry cough x 2 months. No hx of hemorrhoids. He states he vomiting blood x 1 about 1.5 wk ago but never got an EGD at the time because he could not tolerate it. Pt is normally followed closely by physicians at [facility] and he was last seen in the ED 10 days ago for abdominal pain. He states he does not like the way he was treated at [facility], and therefore came here. Of note: His last fluid tap was a couple of months ago. He denies any EtOH use, drug use limited to marijuana and cocaine but denies IV drug use, and TOB use is 2-3 cigs/day.        Hospital Course:  Admitted to [facility] 5/30. NAEON. Pt could not tolerate bedside paracentesis. 5/31 RUQ ultrasound consistent with ascites and cirrhosis. IR paracentesis done 5/31, ruled out SBP. Podiatry and wound care consulted for diabetic foot ulcers, concern for osteomyelitis. MRI ordered. Vascular surgery consulted. MRI showed evidence of osteomyelitis, consulted ID who recommended empiric treatment with Vancomycin and Unasyn pending results of cultures.    Interval History: NAEON. MRI showed evidence of Osteo, consulted ID, started Vancomycin and Unsayn, cx's pending     Review of Systems   Constitutional: Positive  for fatigue. Negative for chills, diaphoresis and fever.   HENT: Negative for congestion and rhinorrhea.    Eyes: Negative for pain and discharge.   Respiratory: Positive for cough (dry, chronic). Negative for chest tightness and shortness of breath.    Cardiovascular: Negative for chest pain and palpitations.   Gastrointestinal: Positive for abdominal distention and abdominal pain. Negative for constipation, diarrhea, nausea and vomiting.   Genitourinary: Negative for dysuria and hematuria.   Musculoskeletal: Positive for myalgias. Negative for arthralgias, back pain and neck pain.   Skin: Positive for color change (b/l LE) and wound (right foot). Negative for pallor and rash.   Neurological: Negative for dizziness, light-headedness, numbness and headaches.   Psychiatric/Behavioral: Negative for agitation, confusion and sleep disturbance. The patient is not nervous/anxious.      Objective:     Vital Signs (Most Recent):  Temp: 98.3 °F (36.8 °C) (06/02/17 0400)  Pulse: 65 (06/02/17 0400)  Resp: 18 (06/02/17 0400)  BP: (!) 150/73 (06/02/17 0400)  SpO2: 98 % (06/02/17 0400) Vital Signs (24h Range):  Temp:  [97.7 °F (36.5 °C)-99 °F (37.2 °C)] 98.3 °F (36.8 °C)  Pulse:  [60-82] 65  Resp:  [15-20] 18  SpO2:  [97 %-100 %] 98 %  BP: (107-170)/(55-82) 150/73     Weight: 117 kg (257 lb 15 oz)  Body mass index is 33.12 kg/m².    Intake/Output Summary (Last 24 hours) at 06/02/17 0730  Last data filed at 06/02/17 0600   Gross per 24 hour   Intake           2117.9 ml   Output             3025 ml   Net           -907.1 ml      Physical Exam   Constitutional: He is oriented to person, place, and time. He appears well-developed and well-nourished. No distress.   HENT:   Head: Normocephalic and atraumatic.   Right Ear: External ear normal.   Left Ear: External ear normal.   Nose: Nose normal.   Mouth/Throat: Oropharynx is clear and moist. No oropharyngeal exudate.   Eyes: Conjunctivae and EOM are normal. Pupils are equal, round, and  reactive to light. Right eye exhibits no discharge. Left eye exhibits no discharge. No scleral icterus.   Neck: Normal range of motion. Neck supple. No JVD present. No tracheal deviation present. No thyromegaly present.   Cardiovascular: Normal rate, regular rhythm, normal heart sounds and intact distal pulses.  Exam reveals no gallop and no friction rub.    No murmur heard.  Pulmonary/Chest: Effort normal and breath sounds normal. No respiratory distress. He has no wheezes. He has no rales. He exhibits no tenderness.   Abdominal: Soft. Bowel sounds are normal. He exhibits distension. He exhibits no mass. There is tenderness. There is guarding. There is no rebound.   Musculoskeletal: Normal range of motion. He exhibits edema (b/l LE). He exhibits no tenderness or deformity.   Neurological: He is alert and oriented to person, place, and time.   Skin: Skin is warm. No rash noted. He is not diaphoretic. No erythema. No pallor.   B/l LE hard bumpy slightly erythematous skin; right foot diabetic ulcers noted, left foot blood blister (see wound care note for images)   Psychiatric: He has a normal mood and affect. His behavior is normal. Judgment and thought content normal.   Vitals reviewed.      Significant Labs: All pertinent labs within the past 24 hours have been reviewed.    Significant Imaging: I have reviewed all pertinent imaging results/findings within the past 24 hours.    Assessment/Plan:      Essential hypertension    - discrepancy of home meds, poor historian  - started propranolol 40 mg BID, cont to monitor          Atherosclerosis of artery of extremity with ulceration    - see wound care note for images  - consulted podiatry and vascular surgeru  - vasc surg recs:   B/L foot wounds with palpable left pedal pulses but right pedal pulses are not easily palpated.   PVRs reviewed noting normal waveforms at the b/l ankles, however, given that the patient's right pedal pulses are not easily palpable we will  obtain Toe PPGs for further evaluation   Plan for possible RLE angiogram will be based off of the results of the above imaging    - f/u Vascular recs following evaluation            Decreased pedal pulses    - MEAGAN's ordered  - consulted vasc surg          Diabetic ulcer of toe of right foot associated with type 2 diabetes mellitus, with fat layer exposed    - consulted wound care and podiatry  - f/u recs  - strict glc control, currently on insulin gtt  - MRI shows evidence of Osteo, started Vanc and Unasyn, pending cultures, f/u ID and Podiatry recs          Hyponatremia    - asymptomatic, in setting of liver failure  - cont to monitor daily labs, fluid restriction          Chronic hepatitis C without hepatic coma    - no home meds for Hep C, denies lactulose use  - cont lactulose 20 mg TID          Uncontrolled type 2 diabetes mellitus with diabetic polyneuropathy, with long-term current use of insulin    - home med: Lantus 25 u qHS  - d/c'ed insulin detemir, started insulin gtt, monitor insulin requirements and adjust as necessary  - monitor POCT glc  - diabetic diet          Ascites of liver    - see abd pain  - IR para done 5/31, ascitic fluid labs neg for SBP, d/c'ed ceftriaxone and albumin          Hypoalbuminemia    - in setting of liver failure  - discuss nutritional status with pt           Elevated lipase    - lipase 151, but pancreatitis not likely   - see abd pain          Generalized abdominal pain    - DDx: SBP, pancreatitis, gallstones  - unconfirmed SBP as pt not able to tolerate bedside paracentesis  - consulted IR for paracentesis  - treating for SBP: ceftriaxone 2 g daily, albumin 1.5 g/kg on day 1 followed by 1 g/kg on day 3, IV morphine 2 mg q4hrs for pain control  - monitor kidney fxn in the AM, if worsening concern for HRS and may need octreotide/midodrine  - 5/31 IR para done. ascitic fluid labs neg for SBP  - prn pain control  - RUQ ultrasound: consistent for ascites and cirrhosis, labs: Hep  B neg, Hep C +, ethanol neg, Peth in process          Hypomagnesemia    - Mg 1.2 on admit, replete as needed          Chronic liver failure without hepatic coma    - see above  - switched IV lasix 40 mg BID to PO          * Subacute osteomyelitis of right foot    - MRI showed evidence of Osteomyelitis  - Consulted ID who recommended empiric tx with Vanc and Unsayn  - Podiatry following, cultures pending  - ID consult for consideration of PO abx            VTE Risk Mitigation         Ordered     Medium Risk of VTE  Once      05/31/17 0740     Place BASILIO hose  Until discontinued      05/30/17 1824     Place sequential compression device  Until discontinued      05/30/17 1824          Estuardo Moses MD  Department of Hospital Medicine   [facility]

## 2017-06-02 NOTE — PROGRESS NOTES
Dept of Vascular Surgery      Toe PPGs reviewed noting excellent PVRs in the R 1st, 4th and 5th digits & Left 1,2 4th and 5th digits consistent with minimal PAOD  NO vascular surgery intervention indicated   Patient can follow up with vascular surgery in 3 months as an outpatient with Yong Huertas DO   Vascular Surgery Fellow  06/02/2017

## 2017-06-02 NOTE — PLAN OF CARE
Problem: Patient Care Overview  Goal: Plan of Care Review  Outcome: Ongoing (interventions implemented as appropriate)  Insulin gtt continued, currently infusing at 12.5 units/hr. Pt made NPO for sustained hyperglycemia. Wound care and dressing changes to lower extremities to be done daily by nurses. PPG completed at bedside by vascular lab showed poor perfusion to toes. Surgical intervention under consideration. Pt compliant with wearing boots prescribed by podiatry OOB. Antibiotics continued. VSS. AOx4. Pt complains of pain to legs and abdomen. No acute changes.

## 2017-06-02 NOTE — HPI
Mr. Leif Nino is a 47 yo male with a PMH significant for Hep C, liver cirrhosis, HTN, T2DM (HbA1c 8.2%), pancreatitis, gallstones, tobacco, marijuana, and cocaine use.  He is normally followed at [facility] for liver disease.  He presented to [facility] ED on 5/30 with a 1 day hx of abdominal pain, chills, nausea and rectal bleeding.  He was treated empirically for SBP with ceftriaxone from 5/30-5/31 while awaiting paracentesis which was performed on 5/31 by IR.  Ascitic fluid was negative for SBP and ceftriaxone was discontinued.      Pt was also noted to have diabetic foot ulcers to feet bilaterally which he stated had been present for 2-3 months and he was providing self-care only.  Vascular was consulted due to nonpalpable pedal pulses, yet pt found to have normal ABIs bilat.  An MRI was performed of the right foot with concern for osteomyelitis of the right 4th digit.  Podiatry was consulted and performed debridement on 6/1.  Per their note, there was no sign of acute infection and the wound did not probe to the bone.  He was started on unasyn and vanc for treatment of subacute osteomyelitis on 6/1.  ID has been consulted for further recommendations.

## 2017-06-02 NOTE — ASSESSMENT & PLAN NOTE
bg goal 140-180, important for optimal glycemic control with underlying infection  D/c insulin gtt and begin detemir 18 units daily, novolog 6 units ac, low dose SSI  poct glucose ac/hs

## 2017-06-02 NOTE — ASSESSMENT & PLAN NOTE
- MRI showed evidence of Osteomyelitis  - Consulted ID who recommended empiric tx with Vanc and Unsayn  - Podiatry following, cultures pending  - ID consult for consideration of PO abx

## 2017-06-02 NOTE — SUBJECTIVE & OBJECTIVE
Interval History: NAEON. MRI showed evidence of Osteo, consulted ID, started Vancomycin and Unsayn, cx's pending     Review of Systems   Constitutional: Positive for fatigue. Negative for chills, diaphoresis and fever.   HENT: Negative for congestion and rhinorrhea.    Eyes: Negative for pain and discharge.   Respiratory: Positive for cough (dry, chronic). Negative for chest tightness and shortness of breath.    Cardiovascular: Negative for chest pain and palpitations.   Gastrointestinal: Positive for abdominal distention and abdominal pain. Negative for constipation, diarrhea, nausea and vomiting.   Genitourinary: Negative for dysuria and hematuria.   Musculoskeletal: Positive for myalgias. Negative for arthralgias, back pain and neck pain.   Skin: Positive for color change (b/l LE) and wound (right foot). Negative for pallor and rash.   Neurological: Negative for dizziness, light-headedness, numbness and headaches.   Psychiatric/Behavioral: Negative for agitation, confusion and sleep disturbance. The patient is not nervous/anxious.      Objective:     Vital Signs (Most Recent):  Temp: 98.3 °F (36.8 °C) (06/02/17 0400)  Pulse: 65 (06/02/17 0400)  Resp: 18 (06/02/17 0400)  BP: (!) 150/73 (06/02/17 0400)  SpO2: 98 % (06/02/17 0400) Vital Signs (24h Range):  Temp:  [97.7 °F (36.5 °C)-99 °F (37.2 °C)] 98.3 °F (36.8 °C)  Pulse:  [60-82] 65  Resp:  [15-20] 18  SpO2:  [97 %-100 %] 98 %  BP: (107-170)/(55-82) 150/73     Weight: 117 kg (257 lb 15 oz)  Body mass index is 33.12 kg/m².    Intake/Output Summary (Last 24 hours) at 06/02/17 0730  Last data filed at 06/02/17 0600   Gross per 24 hour   Intake           2117.9 ml   Output             3025 ml   Net           -907.1 ml      Physical Exam   Constitutional: He is oriented to person, place, and time. He appears well-developed and well-nourished. No distress.   HENT:   Head: Normocephalic and atraumatic.   Right Ear: External ear normal.   Left Ear: External ear normal.    Nose: Nose normal.   Mouth/Throat: Oropharynx is clear and moist. No oropharyngeal exudate.   Eyes: Conjunctivae and EOM are normal. Pupils are equal, round, and reactive to light. Right eye exhibits no discharge. Left eye exhibits no discharge. No scleral icterus.   Neck: Normal range of motion. Neck supple. No JVD present. No tracheal deviation present. No thyromegaly present.   Cardiovascular: Normal rate, regular rhythm, normal heart sounds and intact distal pulses.  Exam reveals no gallop and no friction rub.    No murmur heard.  Pulmonary/Chest: Effort normal and breath sounds normal. No respiratory distress. He has no wheezes. He has no rales. He exhibits no tenderness.   Abdominal: Soft. Bowel sounds are normal. He exhibits distension. He exhibits no mass. There is tenderness. There is guarding. There is no rebound.   Musculoskeletal: Normal range of motion. He exhibits edema (b/l LE). He exhibits no tenderness or deformity.   Neurological: He is alert and oriented to person, place, and time.   Skin: Skin is warm. No rash noted. He is not diaphoretic. No erythema. No pallor.   B/l LE hard bumpy slightly erythematous skin; right foot diabetic ulcers noted, left foot blood blister (see wound care note for images)   Psychiatric: He has a normal mood and affect. His behavior is normal. Judgment and thought content normal.   Vitals reviewed.      Significant Labs: All pertinent labs within the past 24 hours have been reviewed.    Significant Imaging: I have reviewed all pertinent imaging results/findings within the past 24 hours.

## 2017-06-02 NOTE — ASSESSMENT & PLAN NOTE
bg goal 140-180, important for optimal glycemic control with underlying infection  Agree with intensive insulin gtt with poct glucose q2hr  When insulin requirements stabilize plan for transition insulin gtt  Pt needs to be NPO while on titrating insulin gtt

## 2017-06-02 NOTE — PROGRESS NOTES
Progress Note  Podiatry      SUBJECTIVE     History of Present Illness:  Leif Nino is a 46 y.o. male who  has a past medical history of Ascites of liver; Chronic hepatitis C without hepatic coma; Chronic liver failure without hepatic coma; Diabetic foot ulcer; DM (diabetes mellitus), type 2 with peripheral vascular complications; Essential hypertension; Gallstones; Hepatic encephalopathy; Hepatitis C; and Pancreatitis.     Patient is inpatient with abdominal pain now s/p paracentesis. He was consulted to podiatry with concern over bilateral foot wounds. He relates that he has had the ulcer on the right foot for about two months. He has been treating this himself with a silver dressing and bandages, he changes the dressings every 2-3 days. He reports he has never seen a podiatrist before, the only doctors to treat the wounds were in the ED. Denies pedal pain.     Interval History:  6/2/17: Patient seen bedside in [facility], complains of numbness and paraesthesias to the bilateral feet but no sharp pains. Dressings taken down and replaced by vascular surgery, otherwise left intact. No acute changes.    Scheduled Meds:   ampicillin-sulbactim (UNASYN) IVPB  3 g Intravenous Q6H    furosemide  40 mg Oral BID    lactulose  20 g Oral TID    propranolol  40 mg Oral BID    vancomycin (VANCOCIN) IVPB  15 mg/kg Intravenous Q12H     Continuous Infusions:   insulin (HUMAN R) infusion (adults) 2.55 Units/hr (06/02/17 0631)     PRN Meds:dextrose 50%, dextrose 50%, glucagon (human recombinant), glucose, glucose, lidocaine HCL 10 mg/ml (1%), morphine, ondansetron    Review of patient's allergies indicates:   Allergen Reactions    Shellfish containing products Hives     Shortness of breath        Past Medical History:   Diagnosis Date    Ascites of liver 5/30/2017    Chronic hepatitis C without hepatic coma     Chronic liver failure without hepatic coma 5/30/2017    Diabetic foot ulcer     right foot    DM (diabetes  mellitus), type 2 with peripheral vascular complications     Essential hypertension     Gallstones     Hepatic encephalopathy 5/30/2017    Hepatitis C     Pancreatitis      Past Surgical History:   Procedure Laterality Date    BACK SURGERY       History reviewed. No pertinent family history.  Social History   Substance Use Topics    Smoking status: Current Every Day Smoker     Types: Cigarettes    Smokeless tobacco: Not on file    Alcohol use No       Review of Systems:  Constitutional: no fever or chills, pain well controlled  Respiratory: no cough or shortness of breath  Cardiovascular: no chest pain or palpitations  Gastrointestinal: no nausea or vomiting, tolerating diet  Musculoskeletal: no arthralgias or myalgias  Neurological: history of numbness or paresthesias in the feet    OBJECTIVE     Vital Signs (Most Recent):  Temp: 98.3 °F (36.8 °C) (06/02/17 0400)  Pulse: 65 (06/02/17 0400)  Resp: 18 (06/02/17 0400)  BP: (!) 150/73 (06/02/17 0400)  SpO2: 98 % (06/02/17 0400)    Vital Signs Range (Last 24H):  Temp:  [97.7 °F (36.5 °C)-99 °F (37.2 °C)]   Pulse:  [60-82]   Resp:  [15-20]   BP: (107-170)/(55-82)   SpO2:  [97 %-100 %]     Physical Exam:  General: Oriented to person, place, time, and situation. No acute distress.  Vascular: DP and PT pulses are 1+ bilaterally. CRT<3 seconds to digits 1-5 bilaterally. Moderate non pitting edema with no varicosities noted bilaterally.  Musculoskeletal: Equinus noted b/l ankles with < 10 deg DF noted.   Neuro: Protective sensation diminished bilateral  Derm: Left foot plantar sub second metatarsal area 3 cm superficial ulcer present no drainage, erythema or other signs of acute infection               Right foot:  Ulcer Location: Plantar right hallux  Measurements: Pre debridement: 1.1x1.0x0.2, post debridement 1.2x1.1x0.3  Periwound: Intact  Drainage: serosanguinous.  Malodor: None.  Base:  90% granular. 10% fibrin.  Signs of infection: None.        Ulcer Location:  Distal right second toe  Measurements: Pre- no measurement as it was a preulcerative callus. Post debridment 0.6x0.5x0.1  Periwound: Intact  Drainage: serosanguinous.  Malodor: None.  Base:  Partial thickness into dermis  Signs of infection: None.    Ulcer Location: Right dorsal fourth toe at the PIPJ  Measurements: Pre debridement 0.2x0.2x0.2 post debridement 0.5x0.3x0.3  Periwound: Intact, hyperkeratotic  Drainage: serosanguinous.  Malodor: None.  Base:  100% granular. 0% fibrin.  Signs of infection: None. Does not probe to bone                Laboratory:  CBC:     Recent Labs  Lab 06/01/17  0459   WBC 4.14   RBC 2.79*   HGB 7.7*   HCT 22.8*   PLT 62*   MCV 82   MCH 27.6   MCHC 33.8     CMP:     Recent Labs  Lab 06/01/17  0459   *   CALCIUM 7.6*   ALBUMIN 2.0*   PROT 5.6*   *   K 4.7   CO2 20*      BUN 18   CREATININE 1.2   ALKPHOS 112   ALT 12   AST 28   BILITOT 0.8     ESR:     Recent Labs  Lab 06/01/17  1025   SEDRATE 4     CRP:     Recent Labs  Lab 06/01/17  1025   CRP 0.7     Microbiology Results (last 7 days)     Procedure Component Value Units Date/Time    Blood culture [[id number]] Collected:  06/01/17 1735    Order Status:  Completed Specimen:  Blood Updated:  06/02/17 0315     Blood Culture, Routine No Growth to date    Blood culture [[id number]] Collected:  06/01/17 1734    Order Status:  Completed Specimen:  Blood Updated:  06/02/17 0315     Blood Culture, Routine No Growth to date    Aerobic culture [[id number]] Collected:  06/01/17 1105    Order Status:  Sent Specimen:  Wound from Toe, Right Foot Updated:  06/01/17 1126    Culture, Anaerobe [[id number]] Collected:  06/01/17 1105    Order Status:  Sent Specimen:  Wound from Toe, Right Foot Updated:  06/01/17 1125    Culture, Anaerobic [[id number]] Collected:  05/31/17 1431    Order Status:  Completed Specimen:  Ascites from Ascites Updated:  06/01/17 0739     Anaerobic Culture Culture in progress    Aerobic culture [[id number]] Collected:   05/31/17 1431    Order Status:  Completed Specimen:  Ascites from Ascites Updated:  06/01/17 0732     Aerobic Bacterial Culture No growth    Gram stain [[id number]] Collected:  05/31/17 1431    Order Status:  Completed Specimen:  Ascites from Ascites Updated:  05/31/17 2303     Gram Stain Result Rare WBC's      No organisms seen          Diagnostic Results:  X-Ray: reviewed  MRI: reviewed     X-ray foot bilateral  Right: There is DJD, a hallux valgus deformity, and spurs on the calcaneus. There is a foreign body in the plantar tissues below the calcaneus. There are destructive changes of the third MTP joint.   Impression      Possible septic arthritis of the second MTP joint with cellulitis.    Small metallic foreign body beneath the calcaneus.        Left: There is DJD and spurs on the calcaneus. There is a hallux valgus deformity. No definite fracture, dislocation, or bone destruction seen.     MRI Right Foot  Results:    Extensive subcutaneous soft tissue edema is noted along the dorsal aspect of the midfoot and forefoot.  A soft tissue defect is noted along the dorsal aspect of the subcutaneous soft tissues at the base of the first digit nail bed with associated marrow edema within the first distal phalanx and distal aspect of the first proximal phalanx.  There is associated postcontrast enhancement without significant low T1 marrow signal. An additional soft tissue defect is identified along the dorsal aspect of the fourth digit at the level of the distal phalanx with associated marrow edema within the distal phalanx and middle phalanx with mild low T1 signal within the middle phalanx and associated postcontrast enhancement consistent with osteomyelitis.  There is cortical irregularity of the fourth distal phalanx.  Soft tissue irregularity overlying the second distal phalanx is also noted with associated marrow edema without evidence of enhancement.  No focal drainable fluid collections are  identified.    Diffuse edema of the visualized intrinsic muscles without focal fluid collection.  Extensive degenerative changes with joint space narrowing and osteophyte formation at the second through fifth tarsometatarsal joints.  Osteophyte formation is also noted along the dorsal aspect of the midfoot.  An old third metatarsal head fracture is noted.  No additional fractures are identified.    Diffuse thickening and increased signal within the distal aspect of the first and fourth digit flexor tendons.  Remaining visualized flexor and extensor tendons are intact.  The Lisfranc ligament is intact.   Impression       1.  Findings consistent with ulceration and osteomyelitis of the fourth digit as above.  2.  Diffuse edema throughout the intrinsic muscles of the foot which may represent denervation related edema seen in diabetes.  3.  Ulceration with reactive marrow edema within the first and second digits.  4.  Tendinosis of the distal first and fourth digit flexor tendons.  5.  Extensive subcutaneous edema along the dorsal midfoot and forefoot without focal drainable fluid collection.         ASSESSMENT/PLAN     Assessment:  -DM II with peripheral neuropathy  -Diabetic ulcers of bilateral feet, appear stable.   -PAD    Plan:  -Patient was seen bedside and wounds were evaluated, dressed with ovi and bilateral football dressing  -Reviewed x-rays and MRI reviewed. Signs of possible osteomyelitis to the right fourth toe on MRI, however clinically there is no sign of acute infection and the wound does not probe to bone. ESR 4 and CRP 0.7. Doubtful there is acute osteomyelitis to the fourth toe, recommend aggressive wound care and close follow up with podiatry at this time. If acute signs of osteo develop or the wound does not heal as expected with proper offloading and care then will consider a bone biopsy at that time.   -Weight bearing in Darco shoe only bilateral. Football dressing can stay on bilaterally for  up to a week, podiatry will follow next week with dressing change. Patient should follow up with podiatry within one week of discharge.  -Cultures taken of right great toe wound no growth to date, will follow.  -Appreciate the consult, podiatry will follow please call with questions or concerns    Denilson Dawson DPM PGY-3  Pager [phone]      Resident Supervision:  I have personally taken the history and examined this patient and agree with the resident's note as stated above.   Rossy Cole DPM

## 2017-06-02 NOTE — CONSULTS
[facility]  Endocrinology  Diabetes Consult Note    Consult Requested by: Juliano Mo MD   Reason for admit: Subacute osteomyelitis of right foot    HISTORY OF PRESENT ILLNESS:  Reason for Consult: Management of T2DM, Hyperglycemia     Diabetes diagnosis year:     Home Diabetes Medications:  lantus 25 units    How often checking glucose at home? 1-3 x day   BG readings on regimen: 's  Hypoglycemia on the regimen?  No  Missed doses on regimen?  No    Diabetes Complications include:     Hyperglycemia, Diabetic peripheral neuropathy  and Foot ulcer      Complicating diabetes co morbidities:   cirrhosis      HPI:   Patient is a 46 y.o. male with a diagnosis of DM type 2 uncontrolled with peripheral neuropathy, Hep C with cirrhosis, dyslipidemia. Admitted with concern for SBP, however paracentesis did not suggest infection. Currently being evaluated for osteomyelitis with podiatry and infectious disease following. Endocrine consulted for bg management and hyperglycemia. Upon admission primary team had attempted basal insulin however bg remain uncontrolled. Started on insulin gtt on . Rece[facility] medical [facility] at [facility].        Interval HPI:   Overnight events: bg remain elevated on insulin gtt  Eatin%  Nausea: No  Hypoglycemia and intervention: No  Fever: No  TPN and/or TF: No  If yes, type of TF/TPN and rate: n/a    PMH, PSH, FH, SH updated and reviewed       Review of Systems   Constitutional: Negative for weight changes.  Eyes: Negative for visual disturbance.  Respiratory: Negative for cough.   Cardiovascular: Negative for chest pain.  Gastrointestinal: Negative for nausea.  Endocrine: Negative for polyuria, polydipsia.  Musculoskeletal: Negative for back pain.  Skin: Negative for rash.  Neurological: Negative for syncope.  Psychiatric/Behavioral: Negative for depression.      PHYSICAL EXAMINATION:  Vitals:    17 0749   BP: (!) 129/59   Pulse: 64   Resp:  20   Temp: 97.9 °F (36.6 °C)     Body mass index is 33.12 kg/m².    Physical Exam   Constitutional: obese, Well developed, well nourished  ENT: External ears no masses with nose patent; normal hearing.   Neck:  Supple; trachea midline; no thyromegaly.   Cardiovascular: Normal heart sounds, no LE edema.     Lungs:  Normal effort; lungs anterior bilaterally clear to auscultation.  Abdomen:  Soft, no masses,  no hernias.  MS: No clubbing or cyanosis of nails noted; unable to assess gait.  Skin: right foot ulcer  Psychiatric: Good judgement and insight; normal mood and affect.  Neurological: Cranial nerves are grossly intact. decreased vibration sense in the bilateral lower extremities.      Labs Reviewed and Include   No results for input(s): GLU, CALCIUM, ALBUMIN, PROT, NA, K, CO2, CL, BUN, CREATININE, ALKPHOS, ALT, AST, BILITOT in the last 24 hours.  Lab Results   Component Value Date    WBC 4.14 06/01/2017    HGB 7.7 (L) 06/01/2017    HCT 22.8 (L) 06/01/2017    MCV 82 06/01/2017    PLT 62 (L) 06/01/2017     No results for input(s): TSH, FREET4 in the last 168 hours.  Lab Results   Component Value Date    HGBA1C 8.2 (H) 05/31/2017       Nutritional status:   Body mass index is 33.12 kg/m².  Lab Results   Component Value Date    ALBUMIN 2.0 (L) 06/01/2017    ALBUMIN 1.6 (L) 05/31/2017    ALBUMIN 1.8 (L) 05/30/2017     No results found for: PREALBUMIN    Estimated Creatinine Clearance: 106.8 mL/min (based on Cr of 1.2).    Accu-Checks  Recent Labs      06/01/17   2343  06/02/17   0039  06/02/17   0134  06/02/17   0200  06/02/17   0313  06/02/17   0411  06/02/17   0536  06/02/17   0629  06/02/17   0741  06/02/17   0904   POCTGLUCOSE  260*  195*  98  107  180*  207*  197*  204*  214*  235*        ASSESSMENT and PLAN    Uncontrolled type 2 diabetes mellitus with diabetic polyneuropathy, with long-term current use of insulin    bg goal 140-180, important for optimal glycemic control with underlying infection  Agree with  intensive insulin gtt with poct glucose q1hr  When insulin requirements stabilize plan for transition insulin gtt  Pt needs to be NPO while on titrating insulin gtt          Hyponatremia    May consider diagnostic labs including serum osm, urine sodium and osm          * Subacute osteomyelitis of right foot    Management per podiatry and infectious disease                Lowell Byrd MD  Endocrinology  [facility]

## 2017-06-02 NOTE — ASSESSMENT & PLAN NOTE
- see wound care note for images  - consulted podiatry and vascular surgeru  - vasc surg recs:   B/L foot wounds with palpable left pedal pulses but right pedal pulses are not easily palpated.   PVRs reviewed noting normal waveforms at the b/l ankles, however, given that the patient's right pedal pulses are not easily palpable we will obtain Toe PPGs for further evaluation   Plan for possible RLE angiogram will be based off of the results of the above imaging    - f/u Vascular recs following evaluation

## 2017-06-02 NOTE — SUBJECTIVE & OBJECTIVE
Past Medical History:   Diagnosis Date    Ascites of liver 5/30/2017    Chronic hepatitis C without hepatic coma     Chronic liver failure without hepatic coma 5/30/2017    Diabetic foot ulcer     right foot    DM (diabetes mellitus), type 2 with peripheral vascular complications     Essential hypertension     Gallstones     Hepatic encephalopathy 5/30/2017    Hepatitis C     Pancreatitis        Past Surgical History:   Procedure Laterality Date    BACK SURGERY         Review of patient's allergies indicates:   Allergen Reactions    Shellfish containing products Hives     Shortness of breath       Medications:  Prescriptions Prior to Admission   Medication Sig    atorvastatin (LIPITOR) 20 MG tablet Take 20 mg by mouth once daily.    carvedilol (COREG) 6.25 MG tablet Take 6.25 mg by mouth 2 (two) times daily with meals.    ciprofloxacin HCl (CIPRO) 500 MG tablet Take 500 mg by mouth every 12 (twelve) hours.    dicyclomine (BENTYL) 20 mg tablet Take 20 mg by mouth 3 (three) times daily.    furosemide (LASIX) 20 MG tablet Take 20 mg by mouth once daily.    INSULIN GLARGINE,HUM.REC.ANLOG (LANTUS SOLOSTAR SUBQ) Inject 25 Units into the skin every evening.    propranolol (INDERAL) 40 MG tablet Take 40 mg by mouth 2 (two) times daily.    quetiapine (SEROQUEL) 100 MG Tab Take 100 mg by mouth every evening.    spironolactone (ALDACTONE) 50 MG tablet Take 50 mg by mouth once daily.     Antibiotics     Start     Stop Route Frequency Ordered    06/01/17 1700  vancomycin (VANCOCIN) 1,750 mg in dextrose 5 % 500 mL IVPB  (Vancomycin IVPB with levels panel)      -- IV Every 12 hours (non-standard times) 06/01/17 1645    06/01/17 1700  ampicillin-sulbactam 3 g in sodium chloride 0.9 % 100 mL IVPB (ready to mix system)      -- IV Every 6 hours (non-standard times) 06/01/17 1645        Antifungals     None        Antivirals     None             There is no immunization history on file for this patient.    Family  History     None        Social History     Social History    Marital status: Single     Spouse name: N/A    Number of children: N/A    Years of education: N/A     Social History Main Topics    Smoking status: Current Every Day Smoker     Types: Cigarettes    Smokeless tobacco: None    Alcohol use No    Drug use: No    Sexual activity: Not Asked     Other Topics Concern    None     Social History Narrative    None     Review of Systems   Constitutional: Negative for chills and fever.   HENT: Negative for sore throat and trouble swallowing.    Eyes: Negative for photophobia and visual disturbance.   Respiratory: Negative for cough and shortness of breath.    Cardiovascular: Negative for chest pain and palpitations.   Gastrointestinal: Negative for abdominal pain, constipation, diarrhea, nausea and vomiting.   Endocrine: Negative for polyphagia and polyuria.   Genitourinary: Negative for dysuria and hematuria.   Musculoskeletal: Negative for arthralgias and myalgias.   Skin: Positive for rash (chronic) and wound.   Neurological: Negative for numbness and headaches.   Psychiatric/Behavioral: Negative for agitation and confusion.     Objective:     Vital Signs (Most Recent):  Temp: 98.3 °F (36.8 °C) (06/02/17 1153)  Pulse: 67 (06/02/17 1153)  Resp: 18 (06/02/17 1153)  BP: (!) 130/57 (06/02/17 1153)  SpO2: 97 % (06/02/17 1153) Vital Signs (24h Range):  Temp:  [97.7 °F (36.5 °C)-99 °F (37.2 °C)] 98.3 °F (36.8 °C)  Pulse:  [60-73] 67  Resp:  [18-20] 18  SpO2:  [97 %-100 %] 97 %  BP: (107-155)/(55-74) 130/57     Weight: 117 kg (257 lb 15 oz)  Body mass index is 33.12 kg/m².    Estimated Creatinine Clearance: 116.5 mL/min (based on Cr of 1.1).    Physical Exam   Constitutional: He is oriented to person, place, and time. He appears well-developed and well-nourished.   HENT:   Head: Normocephalic and atraumatic.   Eyes: Conjunctivae and EOM are normal.   Neck: Normal range of motion. Neck supple.   Cardiovascular:  Normal rate, regular rhythm, normal heart sounds and intact distal pulses.    Pulmonary/Chest: Effort normal and breath sounds normal. No respiratory distress.   Abdominal: Soft. Bowel sounds are normal. He exhibits distension. There is no tenderness.   Musculoskeletal: Normal range of motion.   Feet not examined as in football dressings placed by podiatry, though pictures viewed with improvement s/p debridement.     Neurological: He is alert and oriented to person, place, and time.   Skin: Skin is warm and dry. Rash (scaling nodular chronic skin changes to lower extremities bilaterally) noted.   Psychiatric: He has a normal mood and affect. His behavior is normal.   Nursing note and vitals reviewed.      Significant Labs:   Blood Culture:   Recent Labs  Lab 17  1734 17  1735   LABBLOO No Growth to date No Growth to date     CBC:   Recent Labs  Lab 17  0459   WBC 4.14   HGB 7.7*   HCT 22.8*   PLT 62*     Wound Culture:   Recent Labs  Lab 17  1431 17  1105   LABAERO No growth No significant isolate to date       Significant Imagin/2 Vascular US Arterial bilat lower extremities  Right foot: The 1st, 3rd and 5th digits are within normal limits.  The 2nd and 3rd digits are severely dampened.    Left foot:  The 1st, 2nd and 5th digits are within normal limits.  The 3rd and 4th digits appears to be mild to moderately dampened.     MRI lower extremity  1.  Findings consistent with ulceration and osteomyelitis of the fourth digit as above.  2.  Diffuse edema throughout the intrinsic muscles of the foot which may represent denervation related edema seen in diabetes.  3.  Ulceration with reactive marrow edema within the first and second digits.  4.  Tendinosis of the distal first and fourth digit flexor tendons.  5.  Extensive subcutaneous edema along the dorsal midfoot and forefoot without focal drainable fluid collection.

## 2017-06-02 NOTE — NURSING
Pt returned to floor. Pt educated to dangers of leaving floor. Sitter to stay at bedside beginning at 1900

## 2017-06-02 NOTE — HPI
Reason for Consult: Management of T2DM, Hyperglycemia     Diabetes diagnosis year: 2010    Home Diabetes Medications:  lantus 25 units    How often checking glucose at home? 1-3 x day   BG readings on regimen: 's  Hypoglycemia on the regimen?  No  Missed doses on regimen?  No    Diabetes Complications include:     Hyperglycemia, Diabetic peripheral neuropathy  and Foot ulcer      Complicating diabetes co morbidities:   cirrhosis      HPI:   Patient is a 46 y.o. male with a diagnosis of DM type 2 uncontrolled with peripheral neuropathy, Hep C with cirrhosis, dyslipidemia. Admitted with concern for SBP, however paracentesis did not suggest infection. Currently being evaluated for osteomyelitis with podiatry and infectious disease following. Endocrine consulted for bg management and hyperglycemia. Upon admission primary team had attempted basal insulin however bg remain uncontrolled. Started on insulin gtt on June 1st. Receives his medical care at [facility].

## 2017-06-02 NOTE — NURSING
Insulin gtts currently infusing @ 3.2. IV vancomycin initiated. MRI and X-ray of lower extremities completed. Podiatry consult dressed/debrieded wounds. Vascular Lab to see pt tomorrow. Pt refusing lactulose. 2 BMs reported. AOx4. No acute changes.

## 2017-06-02 NOTE — ASSESSMENT & PLAN NOTE
-MRI on 6/1 with findings suspicious for osteomyelitis to R foot 4th digit  -s/p debridement per podiatry on 6/1, unable to probe to bone, no biopsy obtained  -Photos don't appear acutely infected  -Clinically pt afebrile without tachycardia or hypotension   -No leukocytosis WBC 4.14, ESR 4, CRP 0.7, procal <0.09  -Would discontinue abx   -Treat wounds locally per podiatry recs with close follow up in their clinic in one week

## 2017-06-02 NOTE — NURSING
"RN called by pt stating "I want off this insulin. This is annoying, I can not sleep with it and I do not need it." Requested the MD report to bedside. Pt states "I only want my pain medication, so give it to me."     MD made aware. Awaiting further orders.     UPDATE: 0133 - BCG 98, Per order set - Suspend infusion. MD notified. Awaiting further orders for next hour. BCG rechecked 15 min post suspension.   "

## 2017-06-02 NOTE — NURSING
Pt continues to be non-compliant. Pt leaves unit (to smoke) without alerting staff. Currently on a continuous Insulin infusion. Pt educated on alerting staff upon leaving the unit so that his blood glucose can be checked. Also educated on smoking cessation. Dressings to BLEs removed by pt. RN to reapply. Pt continues to pause insulin infusion when pump begins to beep rather than calling for RN to check pump. Frequent checks maintained more frequently. Pt educated on care regimen as well as medication administration.     Second PIV started d/t incompatibility of medications. May need IV site change due to pt continuously accidentally dislodging other PIV access. Will continue to monitor and assess.

## 2017-06-02 NOTE — CONSULTS
[facility]  Infectious Disease  Consult Note    Patient Name: Leif Nino  MRN: [id number]  Admission Date: 5/30/2017  Hospital Length of Stay: 3 days  Attending Physician: Eron Mo MD  Primary Care Provider: Primary Doctor No     Isolation Status: No active isolations    Patient information was obtained from patient, past medical records and ER records.      Inpatient consult to Infectious Diseases  Consult performed by: LESLIE REICH  Consult ordered by: ERON MO  Reason for consult: Suspected osteomyelitis        Assessment/Plan:     Diabetic ulcer of toe of right foot associated with type 2 diabetes mellitus, with fat layer exposed    -MRI on 6/1 with findings suspicious for osteomyelitis to R foot 4th digit  -s/p debridement per podiatry on 6/1, unable to probe to bone, no biopsy obtained  -Photos don't appear acutely infected  -Clinically pt afebrile without tachycardia or hypotension   -No leukocytosis WBC 4.14, ESR 4, CRP 0.7, procal <0.09  -Would discontinue abx   -Treat wounds locally per podiatry recs with close follow up in their clinic in one week        Chronic hepatitis C without hepatic coma    -Pt reports new diagnosis of Hep C, uncertain how/when acquired and denies ever having been treated  -HCV RNA 21,896 and HCV log 4.34  -Discussed importance of follow up and treatment  -Pt needs to establish care with hepatology, preferably at [facility] where pt has been seen primarily in past              Thank you for your consult. I will sign off. Please contact us if you have any additional questions.    Leslie Reich MD  Infectious Disease  [facility]    Subjective:     Principal Problem: Subacute osteomyelitis of right foot    HPI: Mr. Leif Nino is a 47 yo male with a PMH significant for Hep C, liver cirrhosis, HTN, T2DM (HbA1c 8.2%), pancreatitis, gallstones, tobacco, marijuana, and cocaine use.  He is normally followed at [facility] for liver  disease.  He presented to [facility] ED on 5/30 with a 1 day hx of abdominal pain, chills, nausea and rectal bleeding.  He was treated empirically for SBP with ceftriaxone from 5/30-5/31 while awaiting paracentesis which was performed on 5/31 by IR.  Ascitic fluid was negative for SBP and ceftriaxone was discontinued.      Pt was also noted to have diabetic foot ulcers to feet bilaterally which he stated had been present for 2-3 months and he was providing self-care only.  Vascular was consulted due to nonpalpable pedal pulses, yet pt found to have normal ABIs bilat.  An MRI was performed of the right foot with concern for osteomyelitis of the right 4th digit.  Podiatry was consulted and performed debridement on 6/1.  Per their note, there was no sign of acute infection and the wound did not probe to the bone.  He was started on unasyn and vanc for treatment of subacute osteomyelitis on 6/1.  ID has been consulted for further recommendations.      Past Medical History:   Diagnosis Date    Ascites of liver 5/30/2017    Chronic hepatitis C without hepatic coma     Chronic liver failure without hepatic coma 5/30/2017    Diabetic foot ulcer     right foot    DM (diabetes mellitus), type 2 with peripheral vascular complications     Essential hypertension     Gallstones     Hepatic encephalopathy 5/30/2017    Hepatitis C     Pancreatitis        Past Surgical History:   Procedure Laterality Date    BACK SURGERY         Review of patient's allergies indicates:   Allergen Reactions    Shellfish containing products Hives     Shortness of breath       Medications:  Prescriptions Prior to Admission   Medication Sig    atorvastatin (LIPITOR) 20 MG tablet Take 20 mg by mouth once daily.    carvedilol (COREG) 6.25 MG tablet Take 6.25 mg by mouth 2 (two) times daily with meals.    ciprofloxacin HCl (CIPRO) 500 MG tablet Take 500 mg by mouth every 12 (twelve) hours.    dicyclomine (BENTYL) 20 mg tablet Take 20 mg by mouth 3  (three) times daily.    furosemide (LASIX) 20 MG tablet Take 20 mg by mouth once daily.    INSULIN GLARGINE,HUM.REC.ANLOG (LANTUS SOLOSTAR SUBQ) Inject 25 Units into the skin every evening.    propranolol (INDERAL) 40 MG tablet Take 40 mg by mouth 2 (two) times daily.    quetiapine (SEROQUEL) 100 MG Tab Take 100 mg by mouth every evening.    spironolactone (ALDACTONE) 50 MG tablet Take 50 mg by mouth once daily.     Antibiotics     Start     Stop Route Frequency Ordered    06/01/17 1700  vancomycin (VANCOCIN) 1,750 mg in dextrose 5 % 500 mL IVPB  (Vancomycin IVPB with levels panel)      -- IV Every 12 hours (non-standard times) 06/01/17 1645    06/01/17 1700  ampicillin-sulbactam 3 g in sodium chloride 0.9 % 100 mL IVPB (ready to mix system)      -- IV Every 6 hours (non-standard times) 06/01/17 1645        Antifungals     None        Antivirals     None             There is no immunization history on file for this patient.    Family History     None        Social History     Social History    Marital status: Single     Spouse name: N/A    Number of children: N/A    Years of education: N/A     Social History Main Topics    Smoking status: Current Every Day Smoker     Types: Cigarettes    Smokeless tobacco: None    Alcohol use No    Drug use: No    Sexual activity: Not Asked     Other Topics Concern    None     Social History Narrative    None     Review of Systems   Constitutional: Negative for chills and fever.   HENT: Negative for sore throat and trouble swallowing.    Eyes: Negative for photophobia and visual disturbance.   Respiratory: Negative for cough and shortness of breath.    Cardiovascular: Negative for chest pain and palpitations.   Gastrointestinal: Negative for abdominal pain, constipation, diarrhea, nausea and vomiting.   Endocrine: Negative for polyphagia and polyuria.   Genitourinary: Negative for dysuria and hematuria.   Musculoskeletal: Negative for arthralgias and myalgias.   Skin:  Positive for rash (chronic) and wound.   Neurological: Negative for numbness and headaches.   Psychiatric/Behavioral: Negative for agitation and confusion.     Objective:     Vital Signs (Most Recent):  Temp: 98.3 °F (36.8 °C) (06/02/17 1153)  Pulse: 67 (06/02/17 1153)  Resp: 18 (06/02/17 1153)  BP: (!) 130/57 (06/02/17 1153)  SpO2: 97 % (06/02/17 1153) Vital Signs (24h Range):  Temp:  [97.7 °F (36.5 °C)-99 °F (37.2 °C)] 98.3 °F (36.8 °C)  Pulse:  [60-73] 67  Resp:  [18-20] 18  SpO2:  [97 %-100 %] 97 %  BP: (107-155)/(55-74) 130/57     Weight: 117 kg (257 lb 15 oz)  Body mass index is 33.12 kg/m².    Estimated Creatinine Clearance: 116.5 mL/min (based on Cr of 1.1).    Physical Exam   Constitutional: He is oriented to person, place, and time. He appears well-developed and well-nourished.   HENT:   Head: Normocephalic and atraumatic.   Eyes: Conjunctivae and EOM are normal.   Neck: Normal range of motion. Neck supple.   Cardiovascular: Normal rate, regular rhythm, normal heart sounds and intact distal pulses.    Pulmonary/Chest: Effort normal and breath sounds normal. No respiratory distress.   Abdominal: Soft. Bowel sounds are normal. He exhibits distension. There is no tenderness.   Musculoskeletal: Normal range of motion.   Feet not examined as in football dressings placed by podiatry, though pictures viewed with improvement s/p debridement.     Neurological: He is alert and oriented to person, place, and time.   Skin: Skin is warm and dry. Rash (scaling nodular chronic skin changes to lower extremities bilaterally) noted.   Psychiatric: He has a normal mood and affect. His behavior is normal.   Nursing note and vitals reviewed.      Significant Labs:   Blood Culture:   Recent Labs  Lab 06/01/17  1734 06/01/17  1735   LABBLOO No Growth to date No Growth to date     CBC:   Recent Labs  Lab 06/01/17  0459   WBC 4.14   HGB 7.7*   HCT 22.8*   PLT 62*     Wound Culture:   Recent Labs  Lab 05/31/17  1431 06/01/17  1100    LABAERO No growth No significant isolate to date       Significant Imagin/2 Vascular US Arterial bilat lower extremities  Right foot: The 1st, 3rd and 5th digits are within normal limits.  The 2nd and 3rd digits are severely dampened.    Left foot:  The 1st, 2nd and 5th digits are within normal limits.  The 3rd and 4th digits appears to be mild to moderately dampened.     MRI lower extremity  1.  Findings consistent with ulceration and osteomyelitis of the fourth digit as above.  2.  Diffuse edema throughout the intrinsic muscles of the foot which may represent denervation related edema seen in diabetes.  3.  Ulceration with reactive marrow edema within the first and second digits.  4.  Tendinosis of the distal first and fourth digit flexor tendons.  5.  Extensive subcutaneous edema along the dorsal midfoot and forefoot without focal drainable fluid collection.

## 2017-06-02 NOTE — SUBJECTIVE & OBJECTIVE
Interval HPI:   Overnight events: bg remain elevated on insulin gtt  Eatin%  Nausea: No  Hypoglycemia and intervention: No  Fever: No  TPN and/or TF: No  If yes, type of TF/TPN and rate: n/a    PMH, PSH, FH, SH updated and reviewed       Review of Systems   Constitutional: Negative for weight changes.  Eyes: Negative for visual disturbance.  Respiratory: Negative for cough.   Cardiovascular: Negative for chest pain.  Gastrointestinal: Negative for nausea.  Endocrine: Negative for polyuria, polydipsia.  Musculoskeletal: Negative for back pain.  Skin: Negative for rash.  Neurological: Negative for syncope.  Psychiatric/Behavioral: Negative for depression.      PHYSICAL EXAMINATION:  Vitals:    17 0749   BP: (!) 129/59   Pulse: 64   Resp: 20   Temp: 97.9 °F (36.6 °C)     Body mass index is 33.12 kg/m².    Physical Exam   Constitutional: obese, Well developed, well nourished  ENT: External ears no masses with nose patent; normal hearing.   Neck:  Supple; trachea midline; no thyromegaly.   Cardiovascular: Normal heart sounds, no LE edema.     Lungs:  Normal effort; lungs anterior bilaterally clear to auscultation.  Abdomen:  Soft, no masses,  no hernias.  MS: No clubbing or cyanosis of nails noted; unable to assess gait.  Skin: right foot ulcer  Psychiatric: Good judgement and insight; normal mood and affect.  Neurological: Cranial nerves are grossly intact. decreased vibration sense in the bilateral lower extremities.

## 2017-06-02 NOTE — ASSESSMENT & PLAN NOTE
- consulted wound care and podiatry  - f/u recs  - strict glc control, currently on insulin gtt  - MRI shows evidence of Osteo, started Vanc and Unasyn, pending cultures, f/u ID and Podiatry recs

## 2017-06-02 NOTE — ASSESSMENT & PLAN NOTE
-Pt reports new diagnosis of Hep C, uncertain how/when acquired and denies ever having been treated  -HCV RNA 21,896 and HCV log 4.34  -Discussed importance of follow up and treatment  -Pt needs to establish care with hepatology, preferably at [facility] where pt has been seen primarily in past

## 2017-06-02 NOTE — PLAN OF CARE
Problem: Patient Care Overview  Goal: Plan of Care Review  Outcome: Ongoing (interventions implemented as appropriate)  Pt remains AOx4, VSS, non-compliant with diet. Insulin infusion continued, titrating. Pt wants to be discontinued from insulin infusion. MD notified multiple times. Pt educated repeatedly. 2 new PIV established this shift. Dressings to BLE reinforced. Adequate urine output. No BMs this shift. PRN pain medication given per request. No acute events.

## 2017-06-03 LAB
ALBUMIN SERPL BCP-MCNC: 2 G/DL
ALP SERPL-CCNC: 128 U/L
ALT SERPL W/O P-5'-P-CCNC: 17 U/L
ANION GAP SERPL CALC-SCNC: 8 MMOL/L
AST SERPL-CCNC: 49 U/L
BACTERIA SPEC AEROBE CULT: NO GROWTH
BACTERIA SPEC AEROBE CULT: NORMAL
BILIRUB SERPL-MCNC: 1.7 MG/DL
BUN SERPL-MCNC: 21 MG/DL
CALCIUM SERPL-MCNC: 8 MG/DL
CHLORIDE SERPL-SCNC: 103 MMOL/L
CO2 SERPL-SCNC: 18 MMOL/L
CREAT SERPL-MCNC: 1 MG/DL
ERYTHROCYTE [DISTWIDTH] IN BLOOD BY AUTOMATED COUNT: 18 %
EST. GFR  (AFRICAN AMERICAN): >60 ML/MIN/1.73 M^2
EST. GFR  (NON AFRICAN AMERICAN): >60 ML/MIN/1.73 M^2
GLUCOSE SERPL-MCNC: 149 MG/DL
HCT VFR BLD AUTO: 29 %
HGB BLD-MCNC: 9.9 G/DL
MAGNESIUM SERPL-MCNC: 2.1 MG/DL
MCH RBC QN AUTO: 28 PG
MCHC RBC AUTO-ENTMCNC: 34.1 %
MCV RBC AUTO: 82 FL
PLATELET # BLD AUTO: 82 K/UL
PMV BLD AUTO: 10.2 FL
POCT GLUCOSE: 105 MG/DL (ref 70–110)
POCT GLUCOSE: 108 MG/DL (ref 70–110)
POCT GLUCOSE: 117 MG/DL (ref 70–110)
POCT GLUCOSE: 124 MG/DL (ref 70–110)
POCT GLUCOSE: 160 MG/DL (ref 70–110)
POCT GLUCOSE: 164 MG/DL (ref 70–110)
POCT GLUCOSE: 174 MG/DL (ref 70–110)
POCT GLUCOSE: 208 MG/DL (ref 70–110)
POCT GLUCOSE: 213 MG/DL (ref 70–110)
POCT GLUCOSE: 246 MG/DL (ref 70–110)
POCT GLUCOSE: 341 MG/DL (ref 70–110)
POCT GLUCOSE: 346 MG/DL (ref 70–110)
POCT GLUCOSE: 70 MG/DL (ref 70–110)
POCT GLUCOSE: 72 MG/DL (ref 70–110)
POCT GLUCOSE: 76 MG/DL (ref 70–110)
POCT GLUCOSE: 92 MG/DL (ref 70–110)
POTASSIUM SERPL-SCNC: 4.5 MMOL/L
PROT SERPL-MCNC: 6.4 G/DL
RBC # BLD AUTO: 3.54 M/UL
SODIUM SERPL-SCNC: 129 MMOL/L
WBC # BLD AUTO: 7.57 K/UL

## 2017-06-03 PROCEDURE — 99232 SBSQ HOSP IP/OBS MODERATE 35: CPT | Mod: ,,, | Performed by: INTERNAL MEDICINE

## 2017-06-03 PROCEDURE — 25000003 PHARM REV CODE 250: Performed by: STUDENT IN AN ORGANIZED HEALTH CARE EDUCATION/TRAINING PROGRAM

## 2017-06-03 PROCEDURE — 93005 ELECTROCARDIOGRAM TRACING: CPT

## 2017-06-03 PROCEDURE — 63600175 PHARM REV CODE 636 W HCPCS: Performed by: INTERNAL MEDICINE

## 2017-06-03 PROCEDURE — 80053 COMPREHEN METABOLIC PANEL: CPT

## 2017-06-03 PROCEDURE — 83735 ASSAY OF MAGNESIUM: CPT

## 2017-06-03 PROCEDURE — 20600001 HC STEP DOWN PRIVATE ROOM

## 2017-06-03 PROCEDURE — 63600175 PHARM REV CODE 636 W HCPCS: Performed by: STUDENT IN AN ORGANIZED HEALTH CARE EDUCATION/TRAINING PROGRAM

## 2017-06-03 PROCEDURE — 25000003 PHARM REV CODE 250: Performed by: INTERNAL MEDICINE

## 2017-06-03 PROCEDURE — 36415 COLL VENOUS BLD VENIPUNCTURE: CPT

## 2017-06-03 PROCEDURE — 93010 ELECTROCARDIOGRAM REPORT: CPT | Mod: ,,, | Performed by: INTERNAL MEDICINE

## 2017-06-03 PROCEDURE — 85027 COMPLETE CBC AUTOMATED: CPT

## 2017-06-03 RX ORDER — IBUPROFEN 200 MG
1 TABLET ORAL DAILY
Status: DISCONTINUED | OUTPATIENT
Start: 2017-06-03 | End: 2017-06-05 | Stop reason: HOSPADM

## 2017-06-03 RX ORDER — ATORVASTATIN CALCIUM 20 MG/1
40 TABLET, FILM COATED ORAL DAILY
Status: DISCONTINUED | OUTPATIENT
Start: 2017-06-03 | End: 2017-06-05 | Stop reason: HOSPADM

## 2017-06-03 RX ORDER — RAMELTEON 8 MG/1
8 TABLET ORAL NIGHTLY PRN
Status: DISCONTINUED | OUTPATIENT
Start: 2017-06-03 | End: 2017-06-05 | Stop reason: HOSPADM

## 2017-06-03 RX ORDER — DEXTROSE MONOHYDRATE AND SODIUM CHLORIDE 5; .45 G/100ML; G/100ML
INJECTION, SOLUTION INTRAVENOUS CONTINUOUS
Status: ACTIVE | OUTPATIENT
Start: 2017-06-03 | End: 2017-06-03

## 2017-06-03 RX ORDER — PROPRANOLOL HYDROCHLORIDE 10 MG/1
20 TABLET ORAL DAILY
Status: DISCONTINUED | OUTPATIENT
Start: 2017-06-03 | End: 2017-06-03

## 2017-06-03 RX ORDER — LACTULOSE 10 G/15ML
20 SOLUTION ORAL 2 TIMES DAILY
Status: DISCONTINUED | OUTPATIENT
Start: 2017-06-03 | End: 2017-06-03

## 2017-06-03 RX ORDER — LACTULOSE 10 G/15ML
20 SOLUTION ORAL 2 TIMES DAILY WITH MEALS
Status: DISCONTINUED | OUTPATIENT
Start: 2017-06-03 | End: 2017-06-05 | Stop reason: HOSPADM

## 2017-06-03 RX ORDER — INSULIN ASPART 100 [IU]/ML
0-5 INJECTION, SOLUTION INTRAVENOUS; SUBCUTANEOUS
Status: DISCONTINUED | OUTPATIENT
Start: 2017-06-03 | End: 2017-06-05 | Stop reason: HOSPADM

## 2017-06-03 RX ORDER — DEXTROSE MONOHYDRATE AND SODIUM CHLORIDE 5; .45 G/100ML; G/100ML
INJECTION, SOLUTION INTRAVENOUS CONTINUOUS
Status: DISCONTINUED | OUTPATIENT
Start: 2017-06-03 | End: 2017-06-03

## 2017-06-03 RX ORDER — INSULIN ASPART 100 [IU]/ML
6 INJECTION, SOLUTION INTRAVENOUS; SUBCUTANEOUS
Status: DISCONTINUED | OUTPATIENT
Start: 2017-06-03 | End: 2017-06-04

## 2017-06-03 RX ORDER — CARVEDILOL 3.12 MG/1
3.12 TABLET ORAL 2 TIMES DAILY
Status: DISCONTINUED | OUTPATIENT
Start: 2017-06-03 | End: 2017-06-05 | Stop reason: HOSPADM

## 2017-06-03 RX ORDER — HYDROMORPHONE HYDROCHLORIDE 2 MG/1
2 TABLET ORAL
Status: DISCONTINUED | OUTPATIENT
Start: 2017-06-03 | End: 2017-06-05 | Stop reason: HOSPADM

## 2017-06-03 RX ORDER — ASPIRIN 81 MG/1
81 TABLET ORAL DAILY
Status: DISCONTINUED | OUTPATIENT
Start: 2017-06-03 | End: 2017-06-05 | Stop reason: HOSPADM

## 2017-06-03 RX ADMIN — DEXTROSE AND SODIUM CHLORIDE: 5; .45 INJECTION, SOLUTION INTRAVENOUS at 01:06

## 2017-06-03 RX ADMIN — HYDROMORPHONE HYDROCHLORIDE 2 MG: 2 TABLET ORAL at 04:06

## 2017-06-03 RX ADMIN — ATORVASTATIN CALCIUM 40 MG: 20 TABLET, FILM COATED ORAL at 12:06

## 2017-06-03 RX ADMIN — MAGNESIUM SULFATE IN WATER 2 G: 40 INJECTION, SOLUTION INTRAVENOUS at 01:06

## 2017-06-03 RX ADMIN — VANCOMYCIN HYDROCHLORIDE 1750 MG: 100 INJECTION, POWDER, LYOPHILIZED, FOR SOLUTION INTRAVENOUS at 05:06

## 2017-06-03 RX ADMIN — RAMELTEON 8 MG: 8 TABLET, FILM COATED ORAL at 11:06

## 2017-06-03 RX ADMIN — INSULIN DETEMIR 18 UNITS: 100 INJECTION, SOLUTION SUBCUTANEOUS at 10:06

## 2017-06-03 RX ADMIN — DEXTROSE MONOHYDRATE 12.5 G: 25 INJECTION, SOLUTION INTRAVENOUS at 12:06

## 2017-06-03 RX ADMIN — INSULIN ASPART 6 UNITS: 100 INJECTION, SOLUTION INTRAVENOUS; SUBCUTANEOUS at 12:06

## 2017-06-03 RX ADMIN — FUROSEMIDE 40 MG: 40 TABLET ORAL at 09:06

## 2017-06-03 RX ADMIN — HYDROMORPHONE HYDROCHLORIDE 2 MG: 2 TABLET ORAL at 12:06

## 2017-06-03 RX ADMIN — MAGNESIUM SULFATE IN WATER 2 G: 40 INJECTION, SOLUTION INTRAVENOUS at 05:06

## 2017-06-03 RX ADMIN — CARVEDILOL 3.12 MG: 3.12 TABLET, FILM COATED ORAL at 12:06

## 2017-06-03 RX ADMIN — INSULIN ASPART 2 UNITS: 100 INJECTION, SOLUTION INTRAVENOUS; SUBCUTANEOUS at 10:06

## 2017-06-03 RX ADMIN — INSULIN ASPART 4 UNITS: 100 INJECTION, SOLUTION INTRAVENOUS; SUBCUTANEOUS at 06:06

## 2017-06-03 RX ADMIN — HYDROMORPHONE HYDROCHLORIDE 2 MG: 2 TABLET ORAL at 08:06

## 2017-06-03 RX ADMIN — NICOTINE 1 PATCH: 14 PATCH, EXTENDED RELEASE TRANSDERMAL at 09:06

## 2017-06-03 RX ADMIN — INSULIN ASPART 6 UNITS: 100 INJECTION, SOLUTION INTRAVENOUS; SUBCUTANEOUS at 10:06

## 2017-06-03 RX ADMIN — Medication 1 CAPSULE: at 09:06

## 2017-06-03 RX ADMIN — INSULIN ASPART 2 UNITS: 100 INJECTION, SOLUTION INTRAVENOUS; SUBCUTANEOUS at 12:06

## 2017-06-03 RX ADMIN — HYDROMORPHONE HYDROCHLORIDE 2 MG: 2 TABLET ORAL at 11:06

## 2017-06-03 RX ADMIN — INSULIN ASPART 6 UNITS: 100 INJECTION, SOLUTION INTRAVENOUS; SUBCUTANEOUS at 06:06

## 2017-06-03 RX ADMIN — AMPICILLIN SODIUM AND SULBACTAM SODIUM 3 G: 2; 1 INJECTION, POWDER, FOR SOLUTION INTRAMUSCULAR; INTRAVENOUS at 04:06

## 2017-06-03 RX ADMIN — FUROSEMIDE 40 MG: 40 TABLET ORAL at 06:06

## 2017-06-03 RX ADMIN — ASPIRIN 81 MG: 81 TABLET, COATED ORAL at 12:06

## 2017-06-03 NOTE — SUBJECTIVE & OBJECTIVE
Interval History: NAEON. MRI showed evidence of Osteo, consulted ID, started Vancomycin and Unsayn, cx's pending     Review of Systems   Constitutional: Positive for fatigue. Negative for chills, diaphoresis and fever.   HENT: Negative for congestion and rhinorrhea.    Eyes: Negative for pain and discharge.   Respiratory: Positive for cough (dry, chronic). Negative for chest tightness and shortness of breath.    Cardiovascular: Negative for chest pain and palpitations.   Gastrointestinal: Positive for abdominal distention and abdominal pain. Negative for constipation, diarrhea, nausea and vomiting.   Genitourinary: Negative for dysuria and hematuria.   Musculoskeletal: Positive for myalgias. Negative for arthralgias, back pain and neck pain.   Skin: Positive for color change (b/l LE) and wound (right foot). Negative for pallor and rash.   Neurological: Negative for dizziness, light-headedness, numbness and headaches.   Psychiatric/Behavioral: Negative for agitation, confusion and sleep disturbance. The patient is not nervous/anxious.      Objective:     Vital Signs (Most Recent):  Temp: 97.3 °F (36.3 °C) (06/03/17 0759)  Pulse: 67 (06/03/17 1100)  Resp: 16 (06/03/17 0759)  BP: (!) 149/65 (06/03/17 0759)  SpO2: 100 % (06/03/17 0759) Vital Signs (24h Range):  Temp:  [97.3 °F (36.3 °C)-98.7 °F (37.1 °C)] 97.3 °F (36.3 °C)  Pulse:  [43-70] 67  Resp:  [16-20] 16  SpO2:  [96 %-100 %] 100 %  BP: (118-161)/(58-76) 149/65     Weight:  (Non compliant)  Body mass index is 33.12 kg/m².    Intake/Output Summary (Last 24 hours) at 06/03/17 1238  Last data filed at 06/03/17 0700   Gross per 24 hour   Intake             1270 ml   Output             3225 ml   Net            -1955 ml      Physical Exam   Constitutional: He is oriented to person, place, and time. He appears well-developed and well-nourished. No distress.   HENT:   Head: Normocephalic and atraumatic.   Right Ear: External ear normal.   Left Ear: External ear normal.    Nose: Nose normal.   Mouth/Throat: Oropharynx is clear and moist. No oropharyngeal exudate.   Eyes: Conjunctivae and EOM are normal. Pupils are equal, round, and reactive to light. Right eye exhibits no discharge. Left eye exhibits no discharge. No scleral icterus.   Neck: Normal range of motion. Neck supple. No JVD present. No tracheal deviation present. No thyromegaly present.   Cardiovascular: Normal rate, regular rhythm, normal heart sounds and intact distal pulses.  Exam reveals no gallop and no friction rub.    No murmur heard.  Pulmonary/Chest: Effort normal and breath sounds normal. No respiratory distress. He has no wheezes. He has no rales. He exhibits no tenderness.   Abdominal: Soft. Bowel sounds are normal. He exhibits distension. He exhibits no mass. There is tenderness. There is guarding. There is no rebound.   Musculoskeletal: Normal range of motion. He exhibits edema (b/l LE). He exhibits no tenderness or deformity.   Neurological: He is alert and oriented to person, place, and time.   Skin: Skin is warm. No rash noted. He is not diaphoretic. No erythema. No pallor.   B/l LE hard bumpy slightly erythematous skin; right foot diabetic ulcers noted, left foot blood blister (see wound care note for images)   Psychiatric: He has a normal mood and affect. His behavior is normal. Judgment and thought content normal.   Vitals reviewed.      Significant Labs: All pertinent labs within the past 24 hours have been reviewed.    Significant Imaging: I have reviewed all pertinent imaging results/findings within the past 24 hours.

## 2017-06-03 NOTE — NURSING
1900 - Pt off insulin gtt for sometime d/t not being on unit - line ran dry. Insulin bag not available per AM RN. Pharm notified and insulin in route. MD aware. Restart insulin gtt @17.5units once bag arrives per MD. Continue Q1 hour monitoring.     2000 - PIV access unsuccessful. RNx3 attempt. MD notified. Anesthesia and ICU called for assistance. Will try at later time. MD aware that medications will be behind (ABX).     2100 - Pt threatening to leave AMA d/t unable to leave unit d/t insulin gtt as well as MD order. MD notified. Pt agitated and restless at this time. Sitter remains present at bedside.

## 2017-06-03 NOTE — ASSESSMENT & PLAN NOTE
- discrepancy of home meds, poor historian  - 6/3 d/c'ed propranolol 40 mg BID given bradycardia, started coreg 3.125 BID

## 2017-06-03 NOTE — PROGRESS NOTES
[facility] Hospital Medicine  Progress Note    Patient Name: Leif Nino  MRN: [id number]  Patient Class: IP- Inpatient   Admission Date: 5/30/2017  Length of Stay: 4 days  Attending Physician: Juliano Mo MD  Primary Care Provider: [facility] Medicine Team: [facility] 5 Estefany Mcghee MD    Subjective:     Principal Problem:Subacute osteomyelitis of right foot    HPI:  The patient is a 46 y.o. male with hx of: Hep C, cirrhosis of the liver, HTN, HLD, DM, recurrent pancreatitis, and gallstones that presents to the ED with a complaint of abdominal pain for 1 day. He states the pain is worse after eating. Pt also endorses chills, nausea, leg pains, and rectal bleeding, but denies chest pain and diarrhea. Endorses SOB and dry cough x 2 months. No hx of hemorrhoids. He states he vomiting blood x 1 about 1.5 wk ago but never got an EGD at the time because he could not tolerate it. Pt is normally followed closely by physicians at [facility] and he was last seen in the ED 10 days ago for abdominal pain. He states he does not like the way he was treated at [facility], and therefore came here. Of note: His last fluid tap was a couple of months ago. He denies any EtOH use, drug use limited to marijuana and cocaine but denies IV drug use, and TOB use is 2-3 cigs/day.        Hospital Course:  Admitted to [facility] 5/30. NAEON. Pt could not tolerate bedside paracentesis. 5/31 RUQ ultrasound consistent with ascites and cirrhosis. IR paracentesis done 5/31, ruled out SBP. Podiatry and wound care consulted for diabetic foot ulcers, concern for osteomyelitis. MRI ordered. Vascular surgery consulted. MRI showed evidence of osteomyelitis, consulted ID who recommended empiric treatment with Vancomycin and Unasyn pending results of cultures. 6/3 ID rec no active infection and d/c abx. Cont to monitor glc, endocrine following.    Interval History: Pt off the floor with insulin gtt still going overnight.  When pt came back he was hypoglycemic to 40s and required D5 gtt. Pt also threatening to leave AMA demanding diet and pain medications. Pain medications held overnight given bradycardia to 40s. MRI showed evidence of Osteo, but ID consulted and rec no active infection and to d/c abx with local wound care per podiatry. Cont to monitor glc, f/u endocrine recs.     Review of Systems   Constitutional: Positive for fatigue. Negative for chills, diaphoresis and fever.   HENT: Negative for congestion and rhinorrhea.    Eyes: Negative for pain and discharge.   Respiratory: Positive for cough (dry, chronic). Negative for chest tightness and shortness of breath.    Cardiovascular: Negative for chest pain and palpitations.   Gastrointestinal: Positive for abdominal distention and abdominal pain. Negative for constipation, diarrhea, nausea and vomiting.   Genitourinary: Negative for dysuria and hematuria.   Musculoskeletal: Positive for myalgias. Negative for arthralgias, back pain and neck pain.   Skin: Positive for color change (b/l LE) and wound (right foot). Negative for pallor and rash.   Neurological: Negative for dizziness, light-headedness, numbness and headaches.   Psychiatric/Behavioral: Negative for agitation, confusion and sleep disturbance. The patient is not nervous/anxious.      Objective:     Vital Signs (Most Recent):  Temp: 97.3 °F (36.3 °C) (06/03/17 0759)  Pulse: 67 (06/03/17 1100)  Resp: 16 (06/03/17 0759)  BP: (!) 149/65 (06/03/17 0759)  SpO2: 100 % (06/03/17 0759) Vital Signs (24h Range):  Temp:  [97.3 °F (36.3 °C)-98.7 °F (37.1 °C)] 97.3 °F (36.3 °C)  Pulse:  [43-70] 67  Resp:  [16-20] 16  SpO2:  [96 %-100 %] 100 %  BP: (118-161)/(58-76) 149/65     Weight:  (Non compliant)  Body mass index is 33.12 kg/m².    Intake/Output Summary (Last 24 hours) at 06/03/17 1238  Last data filed at 06/03/17 0700   Gross per 24 hour   Intake             1270 ml   Output             3225 ml   Net            -1955 ml       Physical Exam   Constitutional: He is oriented to person, place, and time. He appears well-developed and well-nourished. No distress.   HENT:   Head: Normocephalic and atraumatic.   Right Ear: External ear normal.   Left Ear: External ear normal.   Nose: Nose normal.   Mouth/Throat: Oropharynx is clear and moist. No oropharyngeal exudate.   Eyes: Conjunctivae and EOM are normal. Pupils are equal, round, and reactive to light. Right eye exhibits no discharge. Left eye exhibits no discharge. No scleral icterus.   Neck: Normal range of motion. Neck supple. No JVD present. No tracheal deviation present. No thyromegaly present.   Cardiovascular: Normal rate, regular rhythm, normal heart sounds and intact distal pulses.  Exam reveals no gallop and no friction rub.    No murmur heard.  Pulmonary/Chest: Effort normal and breath sounds normal. No respiratory distress. He has no wheezes. He has no rales. He exhibits no tenderness.   Abdominal: Soft. Bowel sounds are normal. He exhibits distension. He exhibits no mass. There is tenderness. There is guarding. There is no rebound.   Musculoskeletal: Normal range of motion. He exhibits edema (b/l LE). He exhibits no tenderness or deformity.   Neurological: He is alert and oriented to person, place, and time.   Skin: Skin is warm. No rash noted. He is not diaphoretic. No erythema. No pallor.   B/l LE hard bumpy slightly erythematous skin; right foot diabetic ulcers noted, left foot blood blister (see wound care note for images)   Psychiatric: He has a normal mood and affect. His behavior is normal. Judgment and thought content normal.   Vitals reviewed.      Significant Labs: All pertinent labs within the past 24 hours have been reviewed.    Significant Imaging: I have reviewed all pertinent imaging results/findings within the past 24 hours.    Assessment/Plan:      Abscess of left foot    - podiatry and wound care following, appreciate recs          Essential hypertension    -  discrepancy of home meds, poor historian  - 6/3 d/c'ed propranolol 40 mg BID given bradycardia, started coreg 3.125 BID          Atherosclerosis of artery of extremity with ulceration    - see wound care note for images  - consulted podiatry and vascular surgery      - Vascular recs: Toe PPGs reviewed noting excellent PVRs in the R 1st, 4th and 5th digits & Left 1,2 4th and 5th digits consistent with minimal PAOD  NO vascular surgery intervention indicated   Patient can follow up with vascular surgery in 3 months as an outpatient with ABIs   - 6/3 started asa 81 and atorvastatin 40 mg daily            Decreased pedal pulses    - MEAGAN's ordered  - consulted vasc surg, see recs above          Diabetic ulcer of toe of right foot associated with type 2 diabetes mellitus, with fat layer exposed    - consulted wound care and podiatry  - strict glc control, currently on insulin gtt  - MRI shows evidence of Osteo, started Vanc and Unasyn empirically  - ID recs 6/2: d/c abx, no active infection, local wound care by Podiatry           Hyponatremia    - asymptomatic, in setting of liver failure  - cont to monitor daily labs, fluid restriction          Chronic hepatitis C without hepatic coma    - no home meds for Hep C, denies lactulose use  - cont lactulose 20 mg BID          Uncontrolled type 2 diabetes mellitus with diabetic polyneuropathy, with long-term current use of insulin    - home med: Lantus 25 u qHS  - consulted endocrine, recs: insulin detemir 18 U daily, Novolog 6 U AC + LDSS  - monitor POCT glc  - diabetic diet/fluid restriction          Ascites of liver    - see abd pain  - IR para done 5/31, ascitic fluid labs neg for SBP, d/c'ed ceftriaxone and albumin          Hypoalbuminemia    - in setting of liver failure  - discuss nutritional status with pt           Elevated lipase    - lipase 151, but pancreatitis not likely   - see abd pain          Generalized abdominal pain    - DDx: SBP, pancreatitis, gallstones  -  unconfirmed SBP as pt not able to tolerate bedside paracentesis  - consulted IR for paracentesis  - treating for SBP: ceftriaxone 2 g daily, albumin 1.5 g/kg on day 1 followed by 1 g/kg on day 3, IV morphine 2 mg q4hrs for pain control  - monitor kidney fxn in the AM, if worsening concern for HRS and may need octreotide/midodrine  - 5/31 IR para done. ascitic fluid labs neg for SBP  - prn pain control  - RUQ ultrasound: consistent for ascites and cirrhosis, labs: Hep B neg, Hep C +, ethanol neg, Peth in process          Hypomagnesemia    - Mg 1.2 on admit, replete as needed, improving          Chronic liver failure without hepatic coma    - see above  - switched IV lasix 40 mg BID to PO          * Subacute osteomyelitis of right foot    - MRI showed evidence of Osteomyelitis  - Consulted ID who recommended empiric tx with Vanc and Unsayn  - Podiatry following, cultures pending  - 6/2 ID recs: no abx, see above            VTE Risk Mitigation         Ordered     Medium Risk of VTE  Once      05/31/17 0740     Place BASILIO hose  Until discontinued      05/30/17 1824     Place sequential compression device  Until discontinued      05/30/17 1824          Estefany Mcghee MD  Department of Hospital Medicine   [facility]

## 2017-06-03 NOTE — PROGRESS NOTES
"[facility]  Endocrinology  Progress Note    Admit Date: 5/30/2017     Reason for Consult: Management of T2DM, Hyperglycemia     Diabetes diagnosis year: 2010    Home Diabetes Medications:  lantus 25 units    How often checking glucose at home? 1-3 x day   BG readings on regimen: 's  Hypoglycemia on the regimen?  No  Missed doses on regimen?  No    Diabetes Complications include:     Hyperglycemia, Diabetic peripheral neuropathy  and Foot ulcer      Complicating diabetes co morbidities:   cirrhosis      HPI:   Patient is a 46 y.o. male with a diagnosis of DM type 2 uncontrolled with peripheral neuropathy, Hep C with cirrhosis, dyslipidemia. Admitted with concern for SBP, however paracentesis did not suggest infection. Currently being evaluated for osteomyelitis with podiatry and infectious disease following. Endocrine consulted for bg management and hyperglycemia. Upon admission primary team had attempted basal insulin however bg remain uncontrolled. Started on insulin gtt on June 1st. Receives his medical care at [facility].        Interval HPI:   Overnight events: hypoglycemia overnight after patient left floor with insulin gtt  Eating:   NPO  Nausea: No  Hypoglycemia and intervention: Yes  Fever: No  TPN and/or TF: No  If yes, type of TF/TPN and rate: n/a    BP (!) 149/65 (BP Location: Left arm, Patient Position: Lying, BP Method: Automatic)   Pulse (!) 43   Temp 97.3 °F (36.3 °C) (Axillary)   Resp 16   Ht 6' 2" (1.88 m)   Wt 117 kg (257 lb 15 oz)   SpO2 100%   BMI 33.12 kg/m²       Labs Reviewed and Include      Recent Labs  Lab 06/03/17  0428   *   CALCIUM 8.0*   ALBUMIN 2.0*   PROT 6.4   *   K 4.5   CO2 18*      BUN 21*   CREATININE 1.0   ALKPHOS 128   ALT 17   AST 49*   BILITOT 1.7*     Lab Results   Component Value Date    WBC 7.57 06/03/2017    HGB 9.9 (L) 06/03/2017    HCT 29.0 (L) 06/03/2017    MCV 82 06/03/2017    PLT 82 (L) 06/03/2017     No " results for input(s): TSH, FREET4 in the last 168 hours.  Lab Results   Component Value Date    HGBA1C 8.2 (H) 05/31/2017       Nutritional status:   Body mass index is 33.12 kg/m².  Lab Results   Component Value Date    ALBUMIN 2.0 (L) 06/03/2017    ALBUMIN 1.7 (L) 06/02/2017    ALBUMIN 2.0 (L) 06/01/2017     No results found for: PREALBUMIN    Estimated Creatinine Clearance: 128.1 mL/min (based on Cr of 1).    Accu-Checks  Recent Labs      06/03/17   0103  06/03/17   0124  06/03/17   0141  06/03/17   0216  06/03/17   0329  06/03/17   0421  06/03/17   0519  06/03/17   0621  06/03/17   0755  06/03/17   0849   POCTGLUCOSE  76  92  108  117*  124*  164*  174*  160*  208*  213*       Current Medications and/or Treatments Impacting Glycemic Control  Immunotherapy:  Immunosuppressants     None        Steroids:   Hormones     None        Pressors:    Autonomic Drugs     None        Hyperglycemia/Diabetes Medications: Antihyperglycemics     Start     Stop Route Frequency Ordered    06/03/17 0959  insulin aspart pen 0-5 Units      -- SubQ Before meals & nightly PRN 06/03/17 0900    06/03/17 0915  insulin detemir pen 18 Units      -- SubQ Daily 06/03/17 0900    06/03/17 0915  insulin aspart pen 6 Units      -- SubQ 3 times daily with meals 06/03/17 0900          ASSESSMENT and PLAN    Uncontrolled type 2 diabetes mellitus with diabetic polyneuropathy, with long-term current use of insulin    bg goal 140-180, important for optimal glycemic control with underlying infection  D/c insulin gtt and begin detemir 18 units daily, novolog 6 units ac, low dose SSI  poct glucose ac/hs          Hyponatremia    May consider diagnostic labs including serum osm, urine sodium and osm          * Subacute osteomyelitis of right foot    Management per podiatry and infectious disease              Lowell Byrd MD  Endocrinology  [facility]

## 2017-06-03 NOTE — ASSESSMENT & PLAN NOTE
- MRI showed evidence of Osteomyelitis  - Consulted ID who recommended empiric tx with Vanc and Unsayn  - Podiatry following, cultures pending  - 6/2 ID recs: no abx, see above

## 2017-06-03 NOTE — ASSESSMENT & PLAN NOTE
- consulted wound care and podiatry  - strict glc control, currently on insulin gtt  - MRI shows evidence of Osteo, started Vanc and Unasyn empirically  - ID recs 6/2: d/c abx, no active infection, local wound care by Podiatry

## 2017-06-03 NOTE — PLAN OF CARE
Problem: Patient Care Overview  Goal: Plan of Care Review  Pt ABC's clear intact VSS afebrile. Pt wanted to leave this am because we not feeding him.  Ordered diabetic diet. Next complaint accuchecks every hour,  Changed to AC/HS. Next complaint wouldn't give him Morphine due to low HR the  Changed to a different pain med. Pt now wants to go smoke with sitter and Dr ok with that. Pt is hungry and asking for snacks. Pt has neuro check every 4hrs, monitoring pt on telemetry and dressing change done as ordered.    Problem: Fluid Volume Excess (Adult,Obstetrics,Pediatric)  Intervention: Monitor/Manage Hypervolemia  Will monitor V/S as ordered.Encpourage pt to ambulate on call stated ok to go smoke with sitter.     Goal: Balanced Intake/Output  Patient will demonstrate the desired outcomes by discharge/transition of care.  Outcome: Ongoing (interventions implemented as appropriate)  Pt instructed on his fluid restriction and to please collect his urine in urinal for nurse or PCT to record.

## 2017-06-03 NOTE — NURSING
Spoke with Dr. Allison about the pt wanting to leave if we don't feed him and doesn't want to de stuck every hour. Said they would write orders.

## 2017-06-03 NOTE — SUBJECTIVE & OBJECTIVE
"Interval HPI:   Overnight events: hypoglycemia overnight after patient left floor with insulin gtt  Eating:   NPO  Nausea: No  Hypoglycemia and intervention: Yes  Fever: No  TPN and/or TF: No  If yes, type of TF/TPN and rate: n/a    BP (!) 149/65 (BP Location: Left arm, Patient Position: Lying, BP Method: Automatic)   Pulse (!) 43   Temp 97.3 °F (36.3 °C) (Axillary)   Resp 16   Ht 6' 2" (1.88 m)   Wt 117 kg (257 lb 15 oz)   SpO2 100%   BMI 33.12 kg/m²     Labs Reviewed and Include      Recent Labs  Lab 06/03/17  0428   *   CALCIUM 8.0*   ALBUMIN 2.0*   PROT 6.4   *   K 4.5   CO2 18*      BUN 21*   CREATININE 1.0   ALKPHOS 128   ALT 17   AST 49*   BILITOT 1.7*     Lab Results   Component Value Date    WBC 7.57 06/03/2017    HGB 9.9 (L) 06/03/2017    HCT 29.0 (L) 06/03/2017    MCV 82 06/03/2017    PLT 82 (L) 06/03/2017     No results for input(s): TSH, FREET4 in the last 168 hours.  Lab Results   Component Value Date    HGBA1C 8.2 (H) 05/31/2017       Nutritional status:   Body mass index is 33.12 kg/m².  Lab Results   Component Value Date    ALBUMIN 2.0 (L) 06/03/2017    ALBUMIN 1.7 (L) 06/02/2017    ALBUMIN 2.0 (L) 06/01/2017     No results found for: PREALBUMIN    Estimated Creatinine Clearance: 128.1 mL/min (based on Cr of 1).    Accu-Checks  Recent Labs      06/03/17   0103  06/03/17   0124  06/03/17   0141  06/03/17   0216  06/03/17   0329  06/03/17   0421  06/03/17   0519  06/03/17   0621  06/03/17   0755  06/03/17   0849   POCTGLUCOSE  76  92  108  117*  124*  164*  174*  160*  208*  213*       Current Medications and/or Treatments Impacting Glycemic Control  Immunotherapy:  Immunosuppressants     None        Steroids:   Hormones     None        Pressors:    Autonomic Drugs     None        Hyperglycemia/Diabetes Medications: Antihyperglycemics     Start     Stop Route Frequency Ordered    06/03/17 0959  insulin aspart pen 0-5 Units      -- SubQ Before meals & nightly PRN 06/03/17 0900 "    06/03/17 0915  insulin detemir pen 18 Units      -- SubQ Daily 06/03/17 0900    06/03/17 0915  insulin aspart pen 6 Units      -- SubQ 3 times daily with meals 06/03/17 0900

## 2017-06-03 NOTE — ASSESSMENT & PLAN NOTE
- see wound care note for images  - consulted podiatry and vascular surgery      - Vascular recs: Toe PPGs reviewed noting excellent PVRs in the R 1st, 4th and 5th digits & Left 1,2 4th and 5th digits consistent with minimal PAOD  NO vascular surgery intervention indicated   Patient can follow up with vascular surgery in 3 months as an outpatient with ABIs   - 6/3 started asa 81 and atorvastatin 40 mg daily

## 2017-06-03 NOTE — NURSING
"Security called to bedside d/t pt non compliance, attempting to leave unit. Pt stated to RN and sitter at bedside "I feel like I am about to pass out. I need to go walk." Pt educated on the safety risk associated with ambulating. Pt is a HIGH FALL RISK. BLE wounds present, causing difficulty ambulating. Pt BCG noted to be 75. Insulin infusion held at this time. Recheck sugar in 15 min.    15 min check back - BCG 46. Dextrose 25g IVP given. Pt educated on remaining in bed at this time d/t fall risk and his safety being out #1 priority. MD notified and reported to bedside. Diet cranberry x8 given per request of pt and MD approval. Pt continues to remain non-compliant at this time. Sitter present and within arms reach of pt. Will continue to closely monitor and assess.   "

## 2017-06-03 NOTE — PLAN OF CARE
Problem: Patient Care Overview  Goal: Plan of Care Review  Outcome: Ongoing (interventions implemented as appropriate)  Pt remains non-compliant this shift. Sitter present at bedside d/t pt leaving unit multiple times during day shift, while on a titrating insulin gtt. Insulin gtt ran dry, no insulin was infusion, and BCG not WDL. Upon return to unit, pt found to have a low BCG, initially requiring the administration of oral glucose. Insulin infusion restarted at dose that pt was last titrated to. Multiple episodes of hypoglycemia this shift, requiring frequent dextrose 5% IVP. Insulin gtt suspended per MD order, D5 1/2NS @ 50mL/hr started. Pt became bradycardiac (42-45bpm - sustained). MD made aware, Tele orders placed and pt remains on tele - SB (42-44).     Security called for pt non-compliance and threatening to leave AMA, and attempting to remove PIV. PIV access #2 lost this shift. Pt refuse for the PIV access to be reestablished. ABX therapy and Electrolyte therapy held/not given/given late, d/t loss of PIV. MD made aware.     Pain medication withheld per MD order d/t bradycardia. Polyuria continues. Remains free from falls. Will continue to monitor.

## 2017-06-03 NOTE — ASSESSMENT & PLAN NOTE
- home med: Lantus 25 u qHS  - consulted endocrine, recs: insulin detemir 18 U daily, Novolog 6 U AC + LDSS  - monitor POCT glc  - diabetic diet/fluid restriction

## 2017-06-03 NOTE — NURSING
Called Dr. gibbons again about pain meds he is complaining of pain 10/10.She said they woul be rounding shortly and wire order.

## 2017-06-04 PROBLEM — M86.271 SUBACUTE OSTEOMYELITIS OF RIGHT FOOT: Chronic | Status: RESOLVED | Noted: 2017-06-01 | Resolved: 2017-06-04

## 2017-06-04 LAB
ALBUMIN SERPL BCP-MCNC: 1.6 G/DL
ALP SERPL-CCNC: 112 U/L
ALT SERPL W/O P-5'-P-CCNC: 15 U/L
ANION GAP SERPL CALC-SCNC: 6 MMOL/L
AST SERPL-CCNC: 38 U/L
BILIRUB SERPL-MCNC: 0.6 MG/DL
BUN SERPL-MCNC: 32 MG/DL
CALCIUM SERPL-MCNC: 7.2 MG/DL
CHLORIDE SERPL-SCNC: 103 MMOL/L
CO2 SERPL-SCNC: 21 MMOL/L
CREAT SERPL-MCNC: 1.2 MG/DL
EST. GFR  (AFRICAN AMERICAN): >60 ML/MIN/1.73 M^2
EST. GFR  (NON AFRICAN AMERICAN): >60 ML/MIN/1.73 M^2
GLUCOSE SERPL-MCNC: 265 MG/DL
POCT GLUCOSE: 191 MG/DL (ref 70–110)
POCT GLUCOSE: 197 MG/DL (ref 70–110)
POCT GLUCOSE: 275 MG/DL (ref 70–110)
POCT GLUCOSE: 286 MG/DL (ref 70–110)
POTASSIUM SERPL-SCNC: 4.6 MMOL/L
PROT SERPL-MCNC: 5.1 G/DL
SODIUM SERPL-SCNC: 130 MMOL/L

## 2017-06-04 PROCEDURE — 25000003 PHARM REV CODE 250: Performed by: PODIATRIST

## 2017-06-04 PROCEDURE — 99232 SBSQ HOSP IP/OBS MODERATE 35: CPT | Mod: ,,, | Performed by: INTERNAL MEDICINE

## 2017-06-04 PROCEDURE — 25000003 PHARM REV CODE 250: Performed by: STUDENT IN AN ORGANIZED HEALTH CARE EDUCATION/TRAINING PROGRAM

## 2017-06-04 PROCEDURE — 80053 COMPREHEN METABOLIC PANEL: CPT

## 2017-06-04 PROCEDURE — 20600001 HC STEP DOWN PRIVATE ROOM

## 2017-06-04 PROCEDURE — 36415 COLL VENOUS BLD VENIPUNCTURE: CPT

## 2017-06-04 PROCEDURE — 99231 SBSQ HOSP IP/OBS SF/LOW 25: CPT | Mod: ,,, | Performed by: INTERNAL MEDICINE

## 2017-06-04 PROCEDURE — 25000003 PHARM REV CODE 250: Performed by: INTERNAL MEDICINE

## 2017-06-04 RX ORDER — INSULIN ASPART 100 [IU]/ML
10 INJECTION, SOLUTION INTRAVENOUS; SUBCUTANEOUS
Status: DISCONTINUED | OUTPATIENT
Start: 2017-06-04 | End: 2017-06-04

## 2017-06-04 RX ORDER — INSULIN ASPART 100 [IU]/ML
15 INJECTION, SOLUTION INTRAVENOUS; SUBCUTANEOUS
Status: DISCONTINUED | OUTPATIENT
Start: 2017-06-05 | End: 2017-06-05 | Stop reason: HOSPADM

## 2017-06-04 RX ADMIN — RAMELTEON 8 MG: 8 TABLET, FILM COATED ORAL at 10:06

## 2017-06-04 RX ADMIN — ATORVASTATIN CALCIUM 40 MG: 20 TABLET, FILM COATED ORAL at 09:06

## 2017-06-04 RX ADMIN — INSULIN DETEMIR 18 UNITS: 100 INJECTION, SOLUTION SUBCUTANEOUS at 09:06

## 2017-06-04 RX ADMIN — NICOTINE 1 PATCH: 14 PATCH, EXTENDED RELEASE TRANSDERMAL at 09:06

## 2017-06-04 RX ADMIN — INSULIN ASPART 10 UNITS: 100 INJECTION, SOLUTION INTRAVENOUS; SUBCUTANEOUS at 06:06

## 2017-06-04 RX ADMIN — HYDROMORPHONE HYDROCHLORIDE 2 MG: 2 TABLET ORAL at 03:06

## 2017-06-04 RX ADMIN — HYDROMORPHONE HYDROCHLORIDE 2 MG: 2 TABLET ORAL at 06:06

## 2017-06-04 RX ADMIN — HYDROMORPHONE HYDROCHLORIDE 2 MG: 2 TABLET ORAL at 05:06

## 2017-06-04 RX ADMIN — INSULIN ASPART 3 UNITS: 100 INJECTION, SOLUTION INTRAVENOUS; SUBCUTANEOUS at 12:06

## 2017-06-04 RX ADMIN — INSULIN ASPART 10 UNITS: 100 INJECTION, SOLUTION INTRAVENOUS; SUBCUTANEOUS at 12:06

## 2017-06-04 RX ADMIN — FUROSEMIDE 40 MG: 40 TABLET ORAL at 06:06

## 2017-06-04 RX ADMIN — Medication 40 G: at 06:06

## 2017-06-04 RX ADMIN — ASPIRIN 81 MG: 81 TABLET, COATED ORAL at 09:06

## 2017-06-04 RX ADMIN — HYDROMORPHONE HYDROCHLORIDE 2 MG: 2 TABLET ORAL at 02:06

## 2017-06-04 RX ADMIN — INSULIN ASPART 6 UNITS: 100 INJECTION, SOLUTION INTRAVENOUS; SUBCUTANEOUS at 09:06

## 2017-06-04 RX ADMIN — HYDROMORPHONE HYDROCHLORIDE 2 MG: 2 TABLET ORAL at 10:06

## 2017-06-04 RX ADMIN — INSULIN ASPART 3 UNITS: 100 INJECTION, SOLUTION INTRAVENOUS; SUBCUTANEOUS at 06:06

## 2017-06-04 RX ADMIN — FUROSEMIDE 40 MG: 40 TABLET ORAL at 09:06

## 2017-06-04 RX ADMIN — CARVEDILOL 3.12 MG: 3.12 TABLET, FILM COATED ORAL at 09:06

## 2017-06-04 NOTE — ASSESSMENT & PLAN NOTE
bg goal 140-180,   increase detemir 22 units daily, novolog 10 units ac, SSI  poct glucose ac/hs

## 2017-06-04 NOTE — PLAN OF CARE
10:35 AM. On call BETSY received page from IM[facility] concerning pts cost on novolog with pts insurance. BETSY informed by peer CM majority of prescriptions for medicaid are no more that $10. Medicaid however will not cover the pen. BETSY also delivered [facility] brochure which provides assistance with diabetic medications to pts room.       FANG Saini LMSW  [id number]

## 2017-06-04 NOTE — ASSESSMENT & PLAN NOTE
- discrepancy of home meds, poor historian  - 6/3 d/c'ed propranolol 40 mg BID given bradycardia  - cont coreg 3.125 BID

## 2017-06-04 NOTE — PROGRESS NOTES
Spoke with Dr. Basilio patient requesting to leave floor to smoke ordered nicotine patch and applied to right arm.  Instructed patient not to leave his room due to bilateral foot wounds and heart monitoring in place.  Patient agreed then requesting to take seroquel 100mg he takes nightly at home Dr Basilio does not wish to continue this at this time due to his bradycardia.  Currently his hr is 59-60 and 3.125mg of carvedelol was held.  Glucose tonight is 346 administered novolog 2units sq per sliding scale however he is has been snacking throughout the evening he ate cereal milk and a sandwich.  Education provided on dietary modifications and reinforcement is needed.

## 2017-06-04 NOTE — ASSESSMENT & PLAN NOTE
- DDx: SBP, pancreatitis, gallstones  - unconfirmed SBP as pt not able to tolerate bedside paracentesis  - consulted IR for paracentesis  - treating for SBP: ceftriaxone 2 g daily, albumin 1.5 g/kg on day 1 followed by 1 g/kg on day 3, IV morphine 2 mg q4hrs for pain control  - monitor kidney fxn in the AM, if worsening concern for HRS and may need octreotide/midodrine  - 5/31 IR para done. ascitic fluid labs neg for SBP  - prn pain control  - RUQ ultrasound: consistent for ascites and cirrhosis, labs: Hep B neg, Hep C +, ethanol neg

## 2017-06-04 NOTE — ASSESSMENT & PLAN NOTE
- no home meds for Hep C, denies lactulose use  - cont lactulose 20 mg BID w/meals (as lactulose can increase blood glc per endo recs)

## 2017-06-04 NOTE — PLAN OF CARE
Problem: Patient Care Overview  Goal: Plan of Care Review  Outcome: Ongoing (interventions implemented as appropriate)  ABC's clear intact VSS afebrile. Pt. Complains of pain to both feet controlled with pain meds. Pt wanted to go home but team feels if we don't get his blood glucose below 200 he will be right back. Explained to pt the situation and encouraged him to make better choices. Instead of pancakes with syrup eat oatmeal etc. Pt on telemetry running SR, Accu checks AC/HS, Neuro check as ordered, I& O being monitored as ordered. No other issues. Will continue to monitor pt    Problem: Diabetes, Type 2 (Adult)  Intervention: Optimize Glycemic Control  Discussed with pt his choices of food. Try to eat more fruits, Veggies and Protein I believe he could benefit from a consult with dietician.     Goal: Signs and Symptoms of Listed Potential Problems Will be Absent, Minimized or Managed (Diabetes, Type 2)  Signs and symptoms of listed potential problems will be absent, minimized or managed by discharge/transition of care (reference Diabetes, Type 2 (Adult) CPG).   Pt is non compliant with diet. Chooses to eat sweets and carbs instead of healthier choices.

## 2017-06-04 NOTE — SUBJECTIVE & OBJECTIVE
"Interval HPI:   Overnight events: none  Eatin%  Nausea: No  Hypoglycemia and intervention: No  Fever: No  TPN and/or TF: No  If yes, type of TF/TPN and rate: n/a    BP (!) 116/56 (BP Location: Right arm, Patient Position: Lying, BP Method: Automatic)   Pulse 65   Temp 97.8 °F (36.6 °C) (Oral)   Resp 16   Ht 6' 2" (1.88 m)   Wt 114.2 kg (251 lb 12.3 oz)   SpO2 98%   BMI 32.32 kg/m²     Labs Reviewed and Include      Recent Labs  Lab 17  0336   *   CALCIUM 7.2*   ALBUMIN 1.6*   PROT 5.1*   *   K 4.6   CO2 21*      BUN 32*   CREATININE 1.2   ALKPHOS 112   ALT 15   AST 38   BILITOT 0.6     Lab Results   Component Value Date    WBC 7.57 2017    HGB 9.9 (L) 2017    HCT 29.0 (L) 2017    MCV 82 2017    PLT 82 (L) 2017     No results for input(s): TSH, FREET4 in the last 168 hours.  Lab Results   Component Value Date    HGBA1C 8.2 (H) 2017       Nutritional status:   Body mass index is 32.32 kg/m².  Lab Results   Component Value Date    ALBUMIN 1.6 (L) 2017    ALBUMIN 2.0 (L) 2017    ALBUMIN 1.7 (L) 2017     No results found for: PREALBUMIN    Estimated Creatinine Clearance: 105.6 mL/min (based on Cr of 1.2).    Accu-Checks  Recent Labs      17   0329  17   0421  17   0519  17   0621  17   0755  17   0849  17   1236  17   1811  17   2106  17   0803   POCTGLUCOSE  124*  164*  174*  160*  208*  213*  246*  341*  346*  191*       Current Medications and/or Treatments Impacting Glycemic Control  Immunotherapy:  Immunosuppressants     None        Steroids:   Hormones     None        Pressors:    Autonomic Drugs     None        Hyperglycemia/Diabetes Medications: Antihyperglycemics     Start     Stop Route Frequency Ordered    17 0900  insulin detemir pen 22 Units      -- SubQ Daily 17 0911    17 1130  insulin aspart pen 10 Units      -- SubQ 3 times daily with " meals 06/04/17 0911    06/03/17 0959  insulin aspart pen 0-5 Units      -- SubQ Before meals & nightly PRN 06/03/17 0900

## 2017-06-04 NOTE — PROGRESS NOTES
"[facility]  Endocrinology  Progress Note    Admit Date: 2017     Reason for Consult: Management of T2DM, Hyperglycemia     Diabetes diagnosis year:     Home Diabetes Medications:  lantus 25 units    How often checking glucose at home? 1-3 x day   BG readings on regimen: 's  Hypoglycemia on the regimen?  No  Missed doses on regimen?  No    Diabetes Complications include:     Hyperglycemia, Diabetic peripheral neuropathy  and Foot ulcer      Complicating diabetes co morbidities:   cirrhosis      HPI:   Patient is a 46 y.o. male with a diagnosis of DM type 2 uncontrolled with peripheral neuropathy, Hep C with cirrhosis, dyslipidemia. Admitted with concern for SBP, however paracentesis did not suggest infection. Currently being evaluated for osteomyelitis with podiatry and infectious disease following. Endocrine consulted for bg management and hyperglycemia. Upon admission primary team had attempted basal insulin however bg remain uncontrolled. Started on insulin gtt on . Receives his medical care at [facility].        Interval HPI:   Overnight events: none  Eatin%  Nausea: No  Hypoglycemia and intervention: No  Fever: No  TPN and/or TF: No  If yes, type of TF/TPN and rate: n/a    BP (!) 116/56 (BP Location: Right arm, Patient Position: Lying, BP Method: Automatic)   Pulse 65   Temp 97.8 °F (36.6 °C) (Oral)   Resp 16   Ht 6' 2" (1.88 m)   Wt 114.2 kg (251 lb 12.3 oz)   SpO2 98%   BMI 32.32 kg/m²       Labs Reviewed and Include      Recent Labs  Lab 17  0336   *   CALCIUM 7.2*   ALBUMIN 1.6*   PROT 5.1*   *   K 4.6   CO2 21*      BUN 32*   CREATININE 1.2   ALKPHOS 112   ALT 15   AST 38   BILITOT 0.6     Lab Results   Component Value Date    WBC 7.57 2017    HGB 9.9 (L) 2017    HCT 29.0 (L) 2017    MCV 82 2017    PLT 82 (L) 2017     No results for input(s): TSH, FREET4 in the last 168 hours.  Lab Results "   Component Value Date    HGBA1C 8.2 (H) 05/31/2017       Nutritional status:   Body mass index is 32.32 kg/m².  Lab Results   Component Value Date    ALBUMIN 1.6 (L) 06/04/2017    ALBUMIN 2.0 (L) 06/03/2017    ALBUMIN 1.7 (L) 06/02/2017     No results found for: PREALBUMIN    Estimated Creatinine Clearance: 105.6 mL/min (based on Cr of 1.2).    Accu-Checks  Recent Labs      06/03/17   0329  06/03/17   0421  06/03/17   0519  06/03/17   0621  06/03/17   0755  06/03/17   0849  06/03/17   1236  06/03/17   1811  06/03/17   2106  06/04/17   0803   POCTGLUCOSE  124*  164*  174*  160*  208*  213*  246*  341*  346*  191*       Current Medications and/or Treatments Impacting Glycemic Control  Immunotherapy:  Immunosuppressants     None        Steroids:   Hormones     None        Pressors:    Autonomic Drugs     None        Hyperglycemia/Diabetes Medications: Antihyperglycemics     Start     Stop Route Frequency Ordered    06/05/17 0900  insulin detemir pen 22 Units      -- SubQ Daily 06/04/17 0911    06/04/17 1130  insulin aspart pen 10 Units      -- SubQ 3 times daily with meals 06/04/17 0911    06/03/17 0959  insulin aspart pen 0-5 Units      -- SubQ Before meals & nightly PRN 06/03/17 0900          ASSESSMENT and PLAN    Uncontrolled type 2 diabetes mellitus with diabetic polyneuropathy, with long-term current use of insulin    bg goal 140-180,   increase detemir 22 units daily, novolog 10 units ac, SSI  poct glucose ac/hs          Hyponatremia    May consider diagnostic labs including serum osm, urine sodium and osm          * Subacute osteomyelitis of right foot-resolved as of 6/4/2017    Management per podiatry and infectious disease            Will sign off, please call with further questions.    Lowell Byrd MD  Endocrinology  [facility]

## 2017-06-04 NOTE — SUBJECTIVE & OBJECTIVE
Interval History: NAEON. MRI showed evidence of Osteo, consulted ID, started Vancomycin and Unsayn, cx's pending     Review of Systems   Constitutional: Negative for chills, diaphoresis, fatigue and fever.   HENT: Negative for congestion and rhinorrhea.    Eyes: Negative for pain and discharge.   Respiratory: Positive for cough (dry, chronic). Negative for chest tightness and shortness of breath.    Cardiovascular: Negative for chest pain and palpitations.   Gastrointestinal: Positive for abdominal distention and abdominal pain. Negative for constipation, diarrhea, nausea and vomiting.   Genitourinary: Negative for dysuria and hematuria.   Musculoskeletal: Positive for myalgias. Negative for arthralgias, back pain and neck pain.   Skin: Positive for color change (b/l LE) and wound (right foot). Negative for pallor and rash.   Neurological: Negative for dizziness, light-headedness, numbness and headaches.   Psychiatric/Behavioral: Negative for agitation, confusion and sleep disturbance. The patient is not nervous/anxious.      Objective:     Vital Signs (Most Recent):  Temp: 97.8 °F (36.6 °C) (06/04/17 0806)  Pulse: 65 (06/04/17 1100)  Resp: 16 (06/04/17 0806)  BP: (!) 116/56 (06/04/17 0806)  SpO2: 98 % (06/04/17 0806) Vital Signs (24h Range):  Temp:  [95.8 °F (35.4 °C)-97.9 °F (36.6 °C)] 97.8 °F (36.6 °C)  Pulse:  [56-75] 65  Resp:  [16-20] 16  SpO2:  [94 %-100 %] 98 %  BP: (113-133)/(55-64) 116/56     Weight: 114.2 kg (251 lb 12.3 oz)  Body mass index is 32.32 kg/m².    Intake/Output Summary (Last 24 hours) at 06/04/17 1125  Last data filed at 06/04/17 0730   Gross per 24 hour   Intake             1830 ml   Output              425 ml   Net             1405 ml      Physical Exam   Constitutional: He is oriented to person, place, and time. He appears well-developed and well-nourished. No distress.   HENT:   Head: Normocephalic and atraumatic.   Right Ear: External ear normal.   Left Ear: External ear normal.   Nose:  Nose normal.   Mouth/Throat: Oropharynx is clear and moist. No oropharyngeal exudate.   Eyes: Conjunctivae and EOM are normal. Pupils are equal, round, and reactive to light. Right eye exhibits no discharge. Left eye exhibits no discharge. No scleral icterus.   Neck: Normal range of motion. Neck supple. No JVD present. No tracheal deviation present. No thyromegaly present.   Cardiovascular: Normal rate, regular rhythm, normal heart sounds and intact distal pulses.  Exam reveals no gallop and no friction rub.    No murmur heard.  Pulmonary/Chest: Effort normal and breath sounds normal. No respiratory distress. He has no wheezes. He has no rales. He exhibits no tenderness.   Abdominal: Soft. Bowel sounds are normal. He exhibits distension. He exhibits no mass. There is tenderness. There is no rebound and no guarding.   Musculoskeletal: Normal range of motion. He exhibits edema (b/l LE). He exhibits no tenderness or deformity.   Neurological: He is alert and oriented to person, place, and time.   Skin: Skin is warm. No rash noted. He is not diaphoretic. No erythema. No pallor.   B/l LE hard bumpy slightly erythematous skin; right foot diabetic ulcers noted, left foot blood blister (see wound care note for images)   Psychiatric: He has a normal mood and affect. His behavior is normal. Judgment and thought content normal.   Vitals reviewed.      Significant Labs: All pertinent labs within the past 24 hours have been reviewed.    Significant Imaging: I have reviewed all pertinent imaging results/findings within the past 24 hours.

## 2017-06-04 NOTE — ASSESSMENT & PLAN NOTE
- home med: Lantus 25 u qHS  - consulted endocrine, recs: insulin detemir 22 U daily, Novolog 10 U AC + LDSS  - monitor POCT glc  - diabetic diet/fluid restriction  - in Medicaid insurance covers and glc stable in 100s throughout today, may be d/c'ed in PM; if insurance does not cover Novolog, may need to switch to 70/30 26 U BID

## 2017-06-04 NOTE — PROGRESS NOTES
[facility] Hospital Medicine  Progress Note    Patient Name: Leif Nino  MRN: [id number]  Patient Class: IP- Inpatient   Admission Date: 5/30/2017  Length of Stay: 5 days  Attending Physician: Juliano Mo MD  Primary Care Provider: Primary Doctor [facility] Medicine Team: [facility] 5 Estefany Mcghee MD    Subjective:     Principal Problem:Subacute osteomyelitis of right foot    HPI:  The patient is a 46 y.o. male with hx of: Hep C, cirrhosis of the liver, HTN, HLD, DM, recurrent pancreatitis, and gallstones that presents to the ED with a complaint of abdominal pain for 1 day. He states the pain is worse after eating. Pt also endorses chills, nausea, leg pains, and rectal bleeding, but denies chest pain and diarrhea. Endorses SOB and dry cough x 2 months. No hx of hemorrhoids. He states he vomiting blood x 1 about 1.5 wk ago but never got an EGD at the time because he could not tolerate it. Pt is normally followed closely by physicians at [facility] and he was last seen in the ED 10 days ago for abdominal pain. He states he does not like the way he was treated at [facility], and therefore came here. Of note: His last fluid tap was a couple of months ago. He denies any EtOH use, drug use limited to marijuana and cocaine but denies IV drug use, and TOB use is 2-3 cigs/day.        Hospital Course:  Admitted to [facility] 5/30. NAEON. Pt could not tolerate bedside paracentesis. 5/31 RUQ ultrasound consistent with ascites and cirrhosis. IR paracentesis done 5/31, ruled out SBP. Podiatry and wound care consulted for diabetic foot ulcers, concern for osteomyelitis. MRI ordered. Vascular surgery consulted. MRI showed evidence of osteomyelitis, consulted ID who recommended empiric treatment with Vancomycin and Unasyn pending results of cultures. 6/3 ID rec no active infection and d/c abx. Cont to monitor glc, endocrine following.    Interval History: NAEON. Pt states he is feeling well, ready to go home  today. Endocrine recs below, possible d/c in PM if glc remains in the 100s.      Review of Systems   Constitutional: Negative for chills, diaphoresis, fatigue and fever.   HENT: Negative for congestion and rhinorrhea.    Eyes: Negative for pain and discharge.   Respiratory: Positive for cough (dry, chronic). Negative for chest tightness and shortness of breath.    Cardiovascular: Negative for chest pain and palpitations.   Gastrointestinal: Positive for abdominal distention and abdominal pain. Negative for constipation, diarrhea, nausea and vomiting.   Genitourinary: Negative for dysuria and hematuria.   Musculoskeletal: Positive for myalgias. Negative for arthralgias, back pain and neck pain.   Skin: Positive for color change (b/l LE) and wound (right foot). Negative for pallor and rash.   Neurological: Negative for dizziness, light-headedness, numbness and headaches.   Psychiatric/Behavioral: Negative for agitation, confusion and sleep disturbance. The patient is not nervous/anxious.      Objective:     Vital Signs (Most Recent):  Temp: 97.8 °F (36.6 °C) (06/04/17 0806)  Pulse: 65 (06/04/17 1100)  Resp: 16 (06/04/17 0806)  BP: (!) 116/56 (06/04/17 0806)  SpO2: 98 % (06/04/17 0806) Vital Signs (24h Range):  Temp:  [95.8 °F (35.4 °C)-97.9 °F (36.6 °C)] 97.8 °F (36.6 °C)  Pulse:  [56-75] 65  Resp:  [16-20] 16  SpO2:  [94 %-100 %] 98 %  BP: (113-133)/(55-64) 116/56     Weight: 114.2 kg (251 lb 12.3 oz)  Body mass index is 32.32 kg/m².    Intake/Output Summary (Last 24 hours) at 06/04/17 1125  Last data filed at 06/04/17 0730   Gross per 24 hour   Intake             1830 ml   Output              425 ml   Net             1405 ml      Physical Exam   Constitutional: He is oriented to person, place, and time. He appears well-developed and well-nourished. No distress.   HENT:   Head: Normocephalic and atraumatic.   Right Ear: External ear normal.   Left Ear: External ear normal.   Nose: Nose normal.   Mouth/Throat:  Oropharynx is clear and moist. No oropharyngeal exudate.   Eyes: Conjunctivae and EOM are normal. Pupils are equal, round, and reactive to light. Right eye exhibits no discharge. Left eye exhibits no discharge. No scleral icterus.   Neck: Normal range of motion. Neck supple. No JVD present. No tracheal deviation present. No thyromegaly present.   Cardiovascular: Normal rate, regular rhythm, normal heart sounds and intact distal pulses.  Exam reveals no gallop and no friction rub.    No murmur heard.  Pulmonary/Chest: Effort normal and breath sounds normal. No respiratory distress. He has no wheezes. He has no rales. He exhibits no tenderness.   Abdominal: Soft. Bowel sounds are normal. He exhibits distension. He exhibits no mass. There is tenderness. There is no rebound and no guarding.   Musculoskeletal: Normal range of motion. He exhibits edema (b/l LE). He exhibits no tenderness or deformity.   Neurological: He is alert and oriented to person, place, and time.   Skin: Skin is warm. No rash noted. He is not diaphoretic. No erythema. No pallor.   B/l LE hard bumpy slightly erythematous skin; right foot diabetic ulcers noted, left foot blood blister (see wound care note for images)   Psychiatric: He has a normal mood and affect. His behavior is normal. Judgment and thought content normal.   Vitals reviewed.      Significant Labs: All pertinent labs within the past 24 hours have been reviewed.    Significant Imaging: I have reviewed all pertinent imaging results/findings within the past 24 hours.    Assessment/Plan:      DM (diabetes mellitus), type 2 with peripheral vascular complications    - see uncontrolled DM2 section and vascular recs above          Abscess of left foot    - podiatry and wound care following          Essential hypertension    - discrepancy of home meds, poor historian  - 6/3 d/c'ed propranolol 40 mg BID given bradycardia  - cont coreg 3.125 BID          Atherosclerosis of artery of extremity  with ulceration    - see wound care note for images  - consulted podiatry and vascular surgery      - Vascular recs: Toe PPGs reviewed noting excellent PVRs in the R 1st, 4th and 5th digits & Left 1,2 4th and 5th digits consistent with minimal PAOD  NO vascular surgery intervention indicated   Patient can follow up with vascular surgery in 3 months as an outpatient with ABIs   - 6/3 started asa 81 and atorvastatin 40 mg daily            Decreased pedal pulses    - MEAGAN's ordered  - consulted vasc surg, see recs above          Diabetic ulcer of toe of right foot associated with type 2 diabetes mellitus, with fat layer exposed    - consulted wound care and podiatry  - strict glc control, currently on insulin gtt  - MRI shows evidence of Osteo, started Vanc and Unasyn empirically  - ID recs 6/2: d/c abx, no active infection, local wound care by Podiatry           Hyponatremia    - asymptomatic, in setting of liver failure  - cont to monitor daily labs, fluid restriction          Chronic hepatitis C without hepatic coma    - no home meds for Hep C, denies lactulose use  - cont lactulose 20 mg BID w/meals (as lactulose can increase blood glc per endo recs)          Uncontrolled type 2 diabetes mellitus with diabetic polyneuropathy, with long-term current use of insulin    - home med: Lantus 25 u qHS  - consulted endocrine, recs: insulin detemir 22 U daily, Novolog 10 U AC + LDSS  - monitor POCT glc  - diabetic diet/fluid restriction  - in Medicaid insurance covers and glc stable in 100s throughout today, may be d/c'ed in PM; if insurance does not cover Novolog, may need to switch to 70/30 26 U BID          Ascites of liver    - see abd pain  - IR para done 5/31, ascitic fluid labs neg for SBP, d/c'ed ceftriaxone and albumin          Hypoalbuminemia    - in setting of liver failure  - discuss nutritional status with pt           Elevated lipase    - lipase 151, but pancreatitis not likely   - see abd pain           Generalized abdominal pain    - DDx: SBP, pancreatitis, gallstones  - unconfirmed SBP as pt not able to tolerate bedside paracentesis  - consulted IR for paracentesis  - treating for SBP: ceftriaxone 2 g daily, albumin 1.5 g/kg on day 1 followed by 1 g/kg on day 3, IV morphine 2 mg q4hrs for pain control  - monitor kidney fxn in the AM, if worsening concern for HRS and may need octreotide/midodrine  - 5/31 IR para done. ascitic fluid labs neg for SBP  - prn pain control  - RUQ ultrasound: consistent for ascites and cirrhosis, labs: Hep B neg, Hep C +, ethanol neg          Hypomagnesemia    - Mg 1.2 on admit, replete as needed, improving          Chronic liver failure without hepatic coma    - see above  - cont PO lasix 40 mg BID            VTE Risk Mitigation         Ordered     Medium Risk of VTE  Once      05/31/17 0740     Place BASILIO hose  Until discontinued      05/30/17 1824     Place sequential compression device  Until discontinued      05/30/17 1824          Estefany Mcghee MD  Department of Hospital Medicine   [facility]

## 2017-06-05 VITALS
BODY MASS INDEX: 30.54 KG/M2 | HEIGHT: 74 IN | OXYGEN SATURATION: 100 % | RESPIRATION RATE: 16 BRPM | WEIGHT: 238 LBS | HEART RATE: 72 BPM | SYSTOLIC BLOOD PRESSURE: 123 MMHG | DIASTOLIC BLOOD PRESSURE: 74 MMHG | TEMPERATURE: 98 F

## 2017-06-05 LAB
ALBUMIN SERPL BCP-MCNC: 1.7 G/DL
ALP SERPL-CCNC: 110 U/L
ALT SERPL W/O P-5'-P-CCNC: 18 U/L
ANION GAP SERPL CALC-SCNC: 7 MMOL/L
AST SERPL-CCNC: 47 U/L
BACTERIA SPEC ANAEROBE CULT: NORMAL
BILIRUB SERPL-MCNC: 0.7 MG/DL
BUN SERPL-MCNC: 35 MG/DL
CALCIUM SERPL-MCNC: 7.4 MG/DL
CHLORIDE SERPL-SCNC: 103 MMOL/L
CO2 SERPL-SCNC: 20 MMOL/L
CREAT SERPL-MCNC: 1.2 MG/DL
EST. GFR  (AFRICAN AMERICAN): >60 ML/MIN/1.73 M^2
EST. GFR  (NON AFRICAN AMERICAN): >60 ML/MIN/1.73 M^2
GLUCOSE SERPL-MCNC: 121 MG/DL
POCT GLUCOSE: 92 MG/DL (ref 70–110)
POTASSIUM SERPL-SCNC: 4.4 MMOL/L
PROT SERPL-MCNC: 5.5 G/DL
SODIUM SERPL-SCNC: 130 MMOL/L

## 2017-06-05 PROCEDURE — 99239 HOSP IP/OBS DSCHRG MGMT >30: CPT | Mod: ,,, | Performed by: INTERNAL MEDICINE

## 2017-06-05 PROCEDURE — 25000003 PHARM REV CODE 250: Performed by: STUDENT IN AN ORGANIZED HEALTH CARE EDUCATION/TRAINING PROGRAM

## 2017-06-05 PROCEDURE — 36415 COLL VENOUS BLD VENIPUNCTURE: CPT

## 2017-06-05 PROCEDURE — 80053 COMPREHEN METABOLIC PANEL: CPT

## 2017-06-05 PROCEDURE — 25000003 PHARM REV CODE 250: Performed by: INTERNAL MEDICINE

## 2017-06-05 RX ORDER — FUROSEMIDE 40 MG/1
40 TABLET ORAL 2 TIMES DAILY
Qty: 60 TABLET | Refills: 1 | Status: ON HOLD | OUTPATIENT
Start: 2017-06-05 | End: 2017-07-07 | Stop reason: HOSPADM

## 2017-06-05 RX ORDER — ATORVASTATIN CALCIUM 40 MG/1
40 TABLET, FILM COATED ORAL DAILY
Qty: 30 TABLET | Refills: 3 | Status: SHIPPED | OUTPATIENT
Start: 2017-06-05

## 2017-06-05 RX ORDER — FUROSEMIDE 40 MG/1
40 TABLET ORAL 2 TIMES DAILY
Qty: 60 TABLET | Refills: 1 | Status: SHIPPED | OUTPATIENT
Start: 2017-06-05 | End: 2017-06-05 | Stop reason: SDUPTHER

## 2017-06-05 RX ORDER — PEN NEEDLE, DIABETIC 30 GX3/16"
NEEDLE, DISPOSABLE MISCELLANEOUS
Qty: 100 EACH | Refills: 3 | Status: SHIPPED | OUTPATIENT
Start: 2017-06-05 | End: 2017-06-05

## 2017-06-05 RX ORDER — PEN NEEDLE, DIABETIC 30 GX3/16"
NEEDLE, DISPOSABLE MISCELLANEOUS
Qty: 100 EACH | Refills: 3 | Status: SHIPPED | OUTPATIENT
Start: 2017-06-05

## 2017-06-05 RX ORDER — ASPIRIN 81 MG/1
81 TABLET ORAL DAILY
Refills: 0 | COMMUNITY
Start: 2017-06-05 | End: 2018-06-05

## 2017-06-05 RX ORDER — SPIRONOLACTONE 50 MG/1
50 TABLET, FILM COATED ORAL DAILY
Status: CANCELLED
Start: 2017-06-05

## 2017-06-05 RX ORDER — ATORVASTATIN CALCIUM 40 MG/1
40 TABLET, FILM COATED ORAL DAILY
Qty: 30 TABLET | Refills: 3 | Status: SHIPPED | OUTPATIENT
Start: 2017-06-05 | End: 2017-06-05 | Stop reason: SDUPTHER

## 2017-06-05 RX ADMIN — ASPIRIN 81 MG: 81 TABLET, COATED ORAL at 08:06

## 2017-06-05 RX ADMIN — CARVEDILOL 3.12 MG: 3.12 TABLET, FILM COATED ORAL at 08:06

## 2017-06-05 RX ADMIN — HYDROMORPHONE HYDROCHLORIDE 2 MG: 2 TABLET ORAL at 08:06

## 2017-06-05 RX ADMIN — INSULIN ASPART 15 UNITS: 100 INJECTION, SOLUTION INTRAVENOUS; SUBCUTANEOUS at 08:06

## 2017-06-05 RX ADMIN — FUROSEMIDE 40 MG: 40 TABLET ORAL at 08:06

## 2017-06-05 RX ADMIN — ATORVASTATIN CALCIUM 40 MG: 20 TABLET, FILM COATED ORAL at 08:06

## 2017-06-05 RX ADMIN — HYDROMORPHONE HYDROCHLORIDE 2 MG: 2 TABLET ORAL at 03:06

## 2017-06-05 RX ADMIN — NICOTINE 1 PATCH: 14 PATCH, EXTENDED RELEASE TRANSDERMAL at 08:06

## 2017-06-05 NOTE — DISCHARGE SUMMARY
DISCHARGE SUMMARY  Hospital Medicine    Team: [facility] HOSP MED 5    Patient Name: Leif Nino  YOB: 1970    Admit Date: 5/30/2017    Discharge Date: 06/05/2017    Discharge Attending Physician: Juliano Mo MD     Resident on Service: Estuardo Moses MD    Chief Complaint: Subacute osteomyelitis of right foot    Princilpal Diagnoses:  Active Hospital Problems    Diagnosis  POA    DM (diabetes mellitus), type 2 with peripheral vascular complications [E11.51]  Yes    Diabetic ulcer of toe of right foot associated with type 2 diabetes mellitus, with fat layer exposed [E11.621, L97.512]  Yes    Atherosclerosis of artery of extremity with ulceration [I70.299, L97.909]  Yes    Essential hypertension [I10]  Yes    Abscess of left foot [L02.612]  Yes    Decreased pedal pulses [R09.89]  Yes    Chronic liver failure without hepatic coma [K72.10]  Yes    Hypomagnesemia [E83.42]  Yes    Generalized abdominal pain [R10.84]  Yes     Chronic    Elevated lipase [R74.8]  Yes    Hypoalbuminemia [E88.09]  Yes    Ascites of liver [R18.8]  Yes    Hyponatremia [E87.1]  Yes    Uncontrolled type 2 diabetes mellitus with diabetic polyneuropathy, with long-term current use of insulin [E11.42, Z79.4, E11.65]  Not Applicable    Chronic hepatitis C without hepatic coma [B18.2]  Yes     Chronic      Resolved Hospital Problems    Diagnosis Date Resolved POA    *Subacute osteomyelitis of right foot [M86.271] 06/04/2017 Yes     Chronic    Hepatic encephalopathy [K72.90] 05/30/2017 Yes    Hyperkalemia [E87.5] 06/02/2017 Yes    Abdominal pain [R10.9] 05/30/2017 Yes       Discharged Condition: Admit problems have resolved      HOSPITAL COURSE:      Initial Presentation:    The patient is a 46 y.o. male with hx of: Hep C, cirrhosis of the liver, HTN, HLD, DM, recurrent pancreatitis, and gallstones that presents to the ED with a complaint of abdominal pain for 1 day. He states the pain is worse after eating. Pt also  endorses chills, nausea, leg pains, and rectal bleeding, but denies chest pain and diarrhea. Endorses SOB and dry cough x 2 months. No hx of hemorrhoids. He states he vomiting blood x 1 about 1.5 wk ago but never got an EGD at the time because he could not tolerate it. Pt is normally followed closely by physicians at [facility] and he was last seen in the ED 10 days ago for abdominal pain. He states he does not like the way he was treated at [facility], and therefore came here. Of note: His last fluid tap was a couple of months ago. He denies any EtOH use, drug use limited to marijuana and cocaine but denies IV drug use, and TOB use is 2-3 cigs/day.     Course of Principle Problem for Admission:    Admitted to [facility] 5/30. NAEON. Pt could not tolerate bedside paracentesis. 5/31 RUQ ultrasound consistent with ascites and cirrhosis. IR paracentesis done 5/31, ruled out SBP. Podiatry and wound care consulted for diabetic foot ulcers, concern for osteomyelitis. MRI ordered. Vascular surgery consulted. MRI showed evidence of osteomyelitis, consulted ID who recommended empiric treatment with Vancomycin and Unasyn pending results of cultures. 6/3 ID rec no active infection and d/c abx. Cont to monitor glc, endocrine following. 6/5 stable for d/c with PCP f/u.    Medical Problems Addressed in the Hospital:    DM (diabetes mellitus), type 2 with peripheral vascular complications     - see uncontrolled DM2 section and vascular recs above          Abscess of left foot     - podiatry and wound care following          Essential hypertension     - discrepancy of home meds, poor historian  - 6/3 d/c'ed propranolol 40 mg BID given bradycardia  - coreg 3.125 BID          Atherosclerosis of artery of extremity with ulceration     - see wound care note for images  - consulted podiatry and vascular surgery        - Vascular recs: Toe PPGs reviewed noting excellent PVRs in the R 1st, 4th and 5th digits & Left 1,2 4th and 5th digits consistent with  minimal PAOD  NO vascular surgery intervention indicated   Patient can follow up with vascular surgery in 3 months as an outpatient with ABIs   - 6/3 started asa 81 and atorvastatin 40 mg daily             Decreased pedal pulses     - MEAGAN's ordered  - consulted vasc surg, see recs above          Diabetic ulcer of toe of right foot associated with type 2 diabetes mellitus, with fat layer exposed     - consulted wound care and podiatry  - strict glc control, currently on insulin gtt  - MRI shows evidence of Osteo, started Vanc and Unasyn empirically  - ID recs 6/2: d/c abx, no active infection, local wound care by Podiatry           Hyponatremia     - asymptomatic, in setting of liver failure  - monitor daily labs, fluid restriction          Chronic hepatitis C without hepatic coma     - no home meds for Hep C, denies lactulose use  - lactulose 20 mg BID w/meals (as lactulose can increase blood glc per endo recs)          Uncontrolled type 2 diabetes mellitus with diabetic polyneuropathy, with long-term current use of insulin     - home med: Lantus 25 u qHS  - consulted endocrine, recs: insulin detemir 25 U daily, Novolog 15 U AC + LDSS  - monitor POCT glc  - diabetic diet/fluid restriction          Ascites of liver     - see abd pain  - IR para done 5/31, ascitic fluid labs neg for SBP, d/c'ed ceftriaxone and albumin          Hypoalbuminemia     - in setting of liver failure  - nutrition consult, educated pt on diabetic diet           Elevated lipase     - lipase 151, but pancreatitis not likely   - see abd pain          Generalized abdominal pain     - DDx: SBP, pancreatitis, gallstones  - unconfirmed SBP as pt not able to tolerate bedside paracentesis  - consulted IR for paracentesis  - treating for SBP: ceftriaxone 2 g daily, albumin 1.5 g/kg on day 1 followed by 1 g/kg on day 3, IV morphine 2 mg q4hrs for pain control  - monitor kidney fxn in the AM, if worsening concern for HRS and may need  octreotide/midodrine  - 5/31 IR para done. ascitic fluid labs neg for SBP  - prn pain control  - RUQ ultrasound: consistent for ascites and cirrhosis, labs: Hep B neg, Hep C +, ethanol neg          Hypomagnesemia     - Mg 1.2 on admit, replete as needed, improved          Chronic liver failure without hepatic coma     - see above  - cont PO lasix 40 mg BID         CONSULTS: vascular surgery, podiatry, wound care, endocrinology, ID, nutrition    Last CBC/BMP/HgbA1c (if applicable):  Recent Results (from the past 336 hour(s))   CBC with Automated Differential    Collection Time: 06/01/17  4:59 AM   Result Value Ref Range    WBC 4.14 3.90 - 12.70 K/uL    Hemoglobin 7.7 (L) 14.0 - 18.0 g/dL    Hematocrit 22.8 (L) 40.0 - 54.0 %    Platelets 62 (L) 150 - 350 K/uL   CBC auto differential    Collection Time: 05/30/17  3:15 PM   Result Value Ref Range    WBC 6.94 3.90 - 12.70 K/uL    Hemoglobin 9.4 (L) 14.0 - 18.0 g/dL    Hematocrit 28.5 (L) 40.0 - 54.0 %    Platelets 60 (L) 150 - 350 K/uL     No results found for this or any previous visit (from the past 336 hour(s)).  Lab Results   Component Value Date    HGBA1C 8.2 (H) 05/31/2017       Pertinent/Significant Diagnostic Studies:    6/1 MRI LE w wo contrast right:   1.  Findings consistent with ulceration and osteomyelitis of the fourth digit as above.  2.  Diffuse edema throughout the intrinsic muscles of the foot which may represent denervation related edema seen in diabetes.  3.  Ulceration with reactive marrow edema within the first and second digits.  4.  Tendinosis of the distal first and fourth digit flexor tendons.  5.  Extensive subcutaneous edema along the dorsal midfoot and forefoot without focal drainable fluid collection.    Disposition:  Home      Discharge Medication List:     Leif Nino   Home Medication Instructions REBECCA:[id number]    Printed on:06/05/17 1747   Medication Information                      aspirin (ECOTRIN) 81 MG EC tablet  Take 1 tablet  "(81 mg total) by mouth once daily.             atorvastatin (LIPITOR) 40 MG tablet  Take 1 tablet (40 mg total) by mouth once daily.             cadexomer iodine (IODOSORB) 0.9 % gel  Apply topically daily as needed for Wound Care (to foot wounds).             carvedilol (COREG) 6.25 MG tablet  Take 6.25 mg by mouth 2 (two) times daily with meals.             dicyclomine (BENTYL) 20 mg tablet  Take 20 mg by mouth 3 (three) times daily.             furosemide (LASIX) 40 MG tablet  Take 1 tablet (40 mg total) by mouth 2 (two) times daily.             INSULIN GLARGINE,HUM.REC.ANLOG (LANTUS SOLOSTAR SUBQ)  Inject 25 Units into the skin every evening.             insulin glulisine (APIDRA) 100 unit/mL InPn pen  Inject 0.15 mLs (15 Units total) into the skin 3 (three) times daily with meals.             pen needle, diabetic 32 gauge x 5/32" Ndle  To use with Apidra.             quetiapine (SEROQUEL) 100 MG Tab  Take 100 mg by mouth every evening.                 Patient Instructions:    Discharge Procedure Orders  Diet Diabetic 1800 Calories     Activity as tolerated     Call MD for:  persistent nausea and vomiting or diarrhea     Call MD for:  temperature >100.4     Call MD for:  severe uncontrolled pain     Call MD for:  difficulty breathing or increased cough     Call MD for:  severe persistent headache     Call MD for:  persistent dizziness, light-headedness, or visual disturbances     Call MD for:  increased confusion or weakness         Signing Physician:  Estefany Mcghee MD  "

## 2017-06-05 NOTE — PROGRESS NOTES
[facility] Hospital Medicine  Progress Note    Patient Name: Leif Nino  MRN: [id number]  Patient Class: IP- Inpatient   Admission Date: 5/30/2017  Length of Stay: 6 days  Attending Physician: No att. providers found  Primary Care Provider: Primary Doctor No    [facility] Medicine Team: [facility]OSP MED 5 Estefany Mcghee MD    Subjective:     Principal Problem:Subacute osteomyelitis of right foot    HPI:  The patient is a 46 y.o. male with hx of: Hep C, cirrhosis of the liver, HTN, HLD, DM, recurrent pancreatitis, and gallstones that presents to the ED with a complaint of abdominal pain for 1 day. He states the pain is worse after eating. Pt also endorses chills, nausea, leg pains, and rectal bleeding, but denies chest pain and diarrhea. Endorses SOB and dry cough x 2 months. No hx of hemorrhoids. He states he vomiting blood x 1 about 1.5 wk ago but never got an EGD at the time because he could not tolerate it. Pt is normally followed closely by physicians at [facility] and he was last seen in the ED 10 days ago for abdominal pain. He states he does not like the way he was treated at [facility], and therefore came here. Of note: His last fluid tap was a couple of months ago. He denies any EtOH use, drug use limited to marijuana and cocaine but denies IV drug use, and TOB use is 2-3 cigs/day.        Hospital Course:  Admitted to [facility] 5/30. NAEON. Pt could not tolerate bedside paracentesis. 5/31 RUQ ultrasound consistent with ascites and cirrhosis. IR paracentesis done 5/31, ruled out SBP. Podiatry and wound care consulted for diabetic foot ulcers, concern for osteomyelitis. MRI ordered. Vascular surgery consulted. MRI showed evidence of osteomyelitis, consulted ID who recommended empiric treatment with Vancomycin and Unasyn pending results of cultures. 6/3 ID rec no active infection and d/c abx. Cont to monitor glc, endocrine following. 6/5 stable for d/c with PCP f/u.    Interval History: NAEON. Pt feeling  well today and ready to go home. Glc better controlled today.    Review of Systems   Constitutional: Negative for chills, diaphoresis, fatigue and fever.   HENT: Negative for congestion and rhinorrhea.    Eyes: Negative for pain and discharge.   Respiratory: Negative for cough, chest tightness and shortness of breath.    Cardiovascular: Negative for chest pain and palpitations.   Gastrointestinal: Negative for abdominal pain, constipation, diarrhea, nausea and vomiting.   Genitourinary: Negative for dysuria and hematuria.   Musculoskeletal: Negative for arthralgias, back pain, myalgias and neck pain.   Skin: Positive for color change (b/l LE) and wound (right foot). Negative for pallor and rash.   Neurological: Negative for dizziness, light-headedness, numbness and headaches.   Psychiatric/Behavioral: Negative for agitation, confusion and sleep disturbance. The patient is not nervous/anxious.      Objective:     Vital Signs (Most Recent):  Temp: 98.3 °F (36.8 °C) (06/05/17 1100)  Pulse: 72 (06/05/17 1100)  Resp: 16 (06/05/17 1100)  BP: 123/74 (06/05/17 1100)  SpO2: 100 % (06/05/17 1100) Vital Signs (24h Range):  Temp:  [97.6 °F (36.4 °C)-98.3 °F (36.8 °C)] 98.3 °F (36.8 °C)  Pulse:  [65-73] 72  Resp:  [16-18] 16  SpO2:  [98 %-100 %] 100 %  BP: ()/(49-74) 123/74     Weight: 108 kg (238 lb)  Body mass index is 30.56 kg/m².    Intake/Output Summary (Last 24 hours) at 06/05/17 0936  Last data filed at 06/05/17 0436   Gross per 24 hour   Intake              560 ml   Output             1900 ml   Net            -1340 ml      Physical Exam   Constitutional: He is oriented to person, place, and time. He appears well-developed and well-nourished. No distress.   HENT:   Head: Normocephalic and atraumatic.   Right Ear: External ear normal.   Left Ear: External ear normal.   Nose: Nose normal.   Mouth/Throat: Oropharynx is clear and moist. No oropharyngeal exudate.   Eyes: Conjunctivae and EOM are normal. Pupils are equal,  round, and reactive to light. Right eye exhibits no discharge. Left eye exhibits no discharge. No scleral icterus.   Neck: Normal range of motion. Neck supple. No JVD present. No tracheal deviation present. No thyromegaly present.   Cardiovascular: Normal rate, regular rhythm, normal heart sounds and intact distal pulses.  Exam reveals no gallop and no friction rub.    No murmur heard.  Pulmonary/Chest: Effort normal and breath sounds normal. No respiratory distress. He has no wheezes. He has no rales. He exhibits no tenderness.   Abdominal: Soft. Bowel sounds are normal. He exhibits distension. He exhibits no mass. There is no tenderness. There is no rebound and no guarding.   Musculoskeletal: Normal range of motion. He exhibits edema (b/l LE). He exhibits no tenderness or deformity.   Neurological: He is alert and oriented to person, place, and time.   Skin: Skin is warm. No rash noted. He is not diaphoretic. No erythema. No pallor.   B/l LE hard bumpy slightly erythematous skin; right foot diabetic ulcers noted, left foot blood blister (see wound care note for images). Now bandaged.   Psychiatric: He has a normal mood and affect. His behavior is normal. Judgment and thought content normal.   Vitals reviewed.      Significant Labs: All pertinent labs within the past 24 hours have been reviewed.    Significant Imaging: I have reviewed all pertinent imaging results/findings within the past 24 hours.    Assessment/Plan:      DM (diabetes mellitus), type 2 with peripheral vascular complications    - see uncontrolled DM2 section and vascular recs above          Abscess of left foot    - podiatry and wound care following          Essential hypertension    - discrepancy of home meds, poor historian  - 6/3 d/c'ed propranolol 40 mg BID given bradycardia  - cont coreg 3.125 BID          Atherosclerosis of artery of extremity with ulceration    - see wound care note for images  - consulted podiatry and vascular surgery       - Vascular recs: Toe PPGs reviewed noting excellent PVRs in the R 1st, 4th and 5th digits & Left 1,2 4th and 5th digits consistent with minimal PAOD  NO vascular surgery intervention indicated   Patient can follow up with vascular surgery in 3 months as an outpatient with ABIs   - 6/3 started asa 81 and atorvastatin 40 mg daily            Decreased pedal pulses    - MEAGAN's ordered  - consulted vasc surg, see recs above          Diabetic ulcer of toe of right foot associated with type 2 diabetes mellitus, with fat layer exposed    - consulted wound care and podiatry  - strict glc control, currently on insulin gtt  - MRI shows evidence of Osteo, started Vanc and Unasyn empirically  - ID recs 6/2: d/c abx, no active infection, local wound care by Podiatry           Hyponatremia    - asymptomatic, in setting of liver failure  - cont to monitor daily labs, fluid restriction          Chronic hepatitis C without hepatic coma    - no home meds for Hep C, denies lactulose use  - cont lactulose 20 mg BID w/meals (as lactulose can increase blood glc per endo recs)          Uncontrolled type 2 diabetes mellitus with diabetic polyneuropathy, with long-term current use of insulin    - home med: Lantus 25 u qHS  - consulted endocrine, recs: insulin detemir 25 U daily, Novolog 15 U AC + LDSS  - monitor POCT glc  - diabetic diet/fluid restriction          Ascites of liver    - see abd pain  - IR para done 5/31, ascitic fluid labs neg for SBP, d/c'ed ceftriaxone and albumin          Hypoalbuminemia    - in setting of liver failure  - discuss nutritional status with pt           Elevated lipase    - lipase 151, but pancreatitis not likely   - see abd pain          Generalized abdominal pain    - DDx: SBP, pancreatitis, gallstones  - unconfirmed SBP as pt not able to tolerate bedside paracentesis  - consulted IR for paracentesis  - treating for SBP: ceftriaxone 2 g daily, albumin 1.5 g/kg on day 1 followed by 1 g/kg on day 3, IV  morphine 2 mg q4hrs for pain control  - monitor kidney fxn in the AM, if worsening concern for HRS and may need octreotide/midodrine  - 5/31 IR para done. ascitic fluid labs neg for SBP  - prn pain control  - RUQ ultrasound: consistent for ascites and cirrhosis, labs: Hep B neg, Hep C +, ethanol neg          Hypomagnesemia    - Mg 1.2 on admit, replete as needed, improving          Chronic liver failure without hepatic coma    - see above  - cont PO lasix 40 mg BID            VTE Risk Mitigation         Ordered     Medium Risk of VTE  Once      05/31/17 0740          Estefany Mcghee MD  Department of Hospital Medicine   [facility]

## 2017-06-05 NOTE — CONSULTS
Nutrition consult regarding diabetic diet education.    Attempted to educate pt on how to follow a diabetic diet, pt seemed very uninterested in education. Paperwork provided.    A1C 8.2.     RD to follow-up as needed.

## 2017-06-05 NOTE — ASSESSMENT & PLAN NOTE
- home med: Lantus 25 u qHS  - consulted endocrine, recs: insulin detemir 25 U daily, Novolog 15 U AC + LDSS  - monitor POCT glc  - diabetic diet/fluid restriction

## 2017-06-05 NOTE — SUBJECTIVE & OBJECTIVE
Interval History: NAEON. MRI showed evidence of Osteo, consulted ID, started Vancomycin and Unsayn, cx's pending     Review of Systems   Constitutional: Negative for chills, diaphoresis, fatigue and fever.   HENT: Negative for congestion and rhinorrhea.    Eyes: Negative for pain and discharge.   Respiratory: Negative for cough, chest tightness and shortness of breath.    Cardiovascular: Negative for chest pain and palpitations.   Gastrointestinal: Negative for abdominal pain, constipation, diarrhea, nausea and vomiting.   Genitourinary: Negative for dysuria and hematuria.   Musculoskeletal: Negative for arthralgias, back pain, myalgias and neck pain.   Skin: Positive for color change (b/l LE) and wound (right foot). Negative for pallor and rash.   Neurological: Negative for dizziness, light-headedness, numbness and headaches.   Psychiatric/Behavioral: Negative for agitation, confusion and sleep disturbance. The patient is not nervous/anxious.      Objective:     Vital Signs (Most Recent):  Temp: 98.3 °F (36.8 °C) (06/05/17 1100)  Pulse: 72 (06/05/17 1100)  Resp: 16 (06/05/17 1100)  BP: 123/74 (06/05/17 1100)  SpO2: 100 % (06/05/17 1100) Vital Signs (24h Range):  Temp:  [97.6 °F (36.4 °C)-98.3 °F (36.8 °C)] 98.3 °F (36.8 °C)  Pulse:  [65-73] 72  Resp:  [16-18] 16  SpO2:  [98 %-100 %] 100 %  BP: ()/(49-74) 123/74     Weight: 108 kg (238 lb)  Body mass index is 30.56 kg/m².    Intake/Output Summary (Last 24 hours) at 06/05/17 6335  Last data filed at 06/05/17 0436   Gross per 24 hour   Intake              560 ml   Output             1900 ml   Net            -1340 ml      Physical Exam   Constitutional: He is oriented to person, place, and time. He appears well-developed and well-nourished. No distress.   HENT:   Head: Normocephalic and atraumatic.   Right Ear: External ear normal.   Left Ear: External ear normal.   Nose: Nose normal.   Mouth/Throat: Oropharynx is clear and moist. No oropharyngeal exudate.    Eyes: Conjunctivae and EOM are normal. Pupils are equal, round, and reactive to light. Right eye exhibits no discharge. Left eye exhibits no discharge. No scleral icterus.   Neck: Normal range of motion. Neck supple. No JVD present. No tracheal deviation present. No thyromegaly present.   Cardiovascular: Normal rate, regular rhythm, normal heart sounds and intact distal pulses.  Exam reveals no gallop and no friction rub.    No murmur heard.  Pulmonary/Chest: Effort normal and breath sounds normal. No respiratory distress. He has no wheezes. He has no rales. He exhibits no tenderness.   Abdominal: Soft. Bowel sounds are normal. He exhibits distension. He exhibits no mass. There is no tenderness. There is no rebound and no guarding.   Musculoskeletal: Normal range of motion. He exhibits edema (b/l LE). He exhibits no tenderness or deformity.   Neurological: He is alert and oriented to person, place, and time.   Skin: Skin is warm. No rash noted. He is not diaphoretic. No erythema. No pallor.   B/l LE hard bumpy slightly erythematous skin; right foot diabetic ulcers noted, left foot blood blister (see wound care note for images). Now bandaged.   Psychiatric: He has a normal mood and affect. His behavior is normal. Judgment and thought content normal.   Vitals reviewed.      Significant Labs: All pertinent labs within the past 24 hours have been reviewed.    Significant Imaging: I have reviewed all pertinent imaging results/findings within the past 24 hours.

## 2017-06-05 NOTE — PLAN OF CARE
06/05/17 1129   Final Note   Assessment Type Final Discharge Note   Discharge Disposition Home   Discharge planning education complete? Yes   Hospital Follow Up  Appt(s) scheduled? Yes   Discharge plans and expectations educations in teach back method with documentation complete? Yes   Offered [facility] -- Bedside Delivery? Yes  (Insulin)   Discharge/Hospital Encounter Summary to (non[facility]) PCP n/a   Referral to Outpatient Case Management complete? n/a   Referral to / orders for Home Health Complete? n/a   30 day supply of medicines given at discharge, if documented non-compliance / non-adherence? Yes   Any social issues identified prior to discharge? No   Did you assess the readiness or willingness of the family or caregiver to support self management of care? Yes     No future appointments.

## 2017-06-05 NOTE — PLAN OF CARE
CM called [facility] [address] and re-scheduled appointment for tomorrow, 6/6/17 noon to Thursday, 6/15/17 at 12pm with pt notified.

## 2017-06-05 NOTE — PLAN OF CARE
Problem: Fall Risk (Adult)  Intervention: Safety Promotion/Fall Prevention  AAOx4. NAD however patient c/o pain throughout the night sometime his right foot and sometimes his abdomen. Patient gets Dilaudid PO. Will check times for him. RA with no resp distress noted. Patient calm and stable. Assessment completed per flow sheet. Answers quest appropriately and converses well. Educated patient on fall risk with wearing his medical shoes (to be more mindful when he walks to prevent falls). Safety maintained. Patients needs met. Will continue to monitor.

## 2017-06-05 NOTE — PLAN OF CARE
06/05/17 1040   Right Care Assessment   Can the patient answer the patient profile reliably? Yes, cognitively intact   How often would a person be available to care for the patient? Often   Describe the patient's ability to walk at the present time. No restrictions   How does the patient rate their overall health at the present time? Fair   Number of comorbid conditions (as recorded on the chart) Four   During the past month, has the patient often been bothered by feeling down, depressed or hopeless? No   Have you felt down, depressed, or hopeless? 0   During the past month, has the patient often been bothered by little interest or pleasure in doing things? No     Plan A: Home with friend

## 2017-06-05 NOTE — PLAN OF CARE
CM in to see pt alone in room AAO stating he feels well to be discharged.  CM informed pt of discharge today per IM5 team and CM informed pt of moving his appt scheduled tomorrow at [facility] [address] to next Thursday, 6/15/17 at noon with pt verbalizing understanding.  CM called and spoke to floor RN, Kay, notifying of pt appt next week and to highlight on pt discharge papers prior to discharge.  Pt states he has a ride home at discharge.

## 2017-06-06 LAB
BACTERIA BLD CULT: NORMAL
BACTERIA BLD CULT: NORMAL
PHOSPHATIDYLETHANOL (PETH): NEGATIVE NG/ML

## 2017-06-07 ENCOUNTER — PATIENT OUTREACH (OUTPATIENT)
Dept: ADMINISTRATIVE | Facility: CLINIC | Age: 47
End: 2017-06-07
Payer: MEDICAID

## 2017-06-07 NOTE — PROGRESS NOTES
Attempted to contact Leif Nino for a [facility] call.  Number listed is number to the patient's friend, and the patient is not at this number at this time.  No PCP on file.

## 2017-06-08 LAB — BACTERIA SPEC ANAEROBE CULT: NORMAL

## 2017-06-16 ENCOUNTER — HOSPITAL ENCOUNTER (INPATIENT)
Facility: HOSPITAL | Age: 47
LOS: 21 days | Discharge: NURSING, REHAB OR LONG-TERM CARE | DRG: 580 | End: 2017-07-07
Attending: EMERGENCY MEDICINE | Admitting: HOSPITALIST
Payer: MEDICAID

## 2017-06-16 DIAGNOSIS — K76.81 HEPATOPULMONARY SYNDROME: ICD-10-CM

## 2017-06-16 DIAGNOSIS — K74.69 DECOMPENSATED LIVER DISEASE: ICD-10-CM

## 2017-06-16 DIAGNOSIS — M86.9 OSTEOMYELITIS OF TOE OF RIGHT FOOT: ICD-10-CM

## 2017-06-16 DIAGNOSIS — M86.9 OSTEOMYELITIS DUE TO STAPHYLOCOCCUS AUREUS: ICD-10-CM

## 2017-06-16 DIAGNOSIS — L03.116 CELLULITIS OF LEFT LOWER EXTREMITY: ICD-10-CM

## 2017-06-16 DIAGNOSIS — L03.115 BILATERAL CELLULITIS OF LOWER LEG: ICD-10-CM

## 2017-06-16 DIAGNOSIS — L97.512 DIABETIC ULCER OF TOE OF RIGHT FOOT ASSOCIATED WITH TYPE 2 DIABETES MELLITUS, WITH FAT LAYER EXPOSED: Primary | ICD-10-CM

## 2017-06-16 DIAGNOSIS — L97.511 DIABETIC ULCER OF TOE OF RIGHT FOOT ASSOCIATED WITH TYPE 2 DIABETES MELLITUS, LIMITED TO BREAKDOWN OF SKIN: ICD-10-CM

## 2017-06-16 DIAGNOSIS — A49.01 OSTEOMYELITIS DUE TO STAPHYLOCOCCUS AUREUS: ICD-10-CM

## 2017-06-16 DIAGNOSIS — M86.171 OTHER ACUTE OSTEOMYELITIS OF RIGHT FOOT: ICD-10-CM

## 2017-06-16 DIAGNOSIS — L03.116 BILATERAL CELLULITIS OF LOWER LEG: ICD-10-CM

## 2017-06-16 DIAGNOSIS — E11.621 DIABETIC ULCER OF TOE OF RIGHT FOOT ASSOCIATED WITH TYPE 2 DIABETES MELLITUS, LIMITED TO BREAKDOWN OF SKIN: ICD-10-CM

## 2017-06-16 DIAGNOSIS — K80.70 CHOLELITHIASIS WITH CHOLEDOCHOLITHIASIS: ICD-10-CM

## 2017-06-16 DIAGNOSIS — L03.115 CELLULITIS OF RIGHT LOWER EXTREMITY: ICD-10-CM

## 2017-06-16 DIAGNOSIS — E11.621 DIABETIC ULCER OF TOE OF RIGHT FOOT ASSOCIATED WITH TYPE 2 DIABETES MELLITUS, WITH FAT LAYER EXPOSED: Primary | ICD-10-CM

## 2017-06-16 LAB
ALBUMIN SERPL BCP-MCNC: 1.8 G/DL
ALP SERPL-CCNC: 141 U/L
ALT SERPL W/O P-5'-P-CCNC: 15 U/L
AMMONIA PLAS-SCNC: 49 UMOL/L
ANION GAP SERPL CALC-SCNC: 6 MMOL/L
AST SERPL-CCNC: 43 U/L
BACTERIA #/AREA URNS AUTO: ABNORMAL /HPF
BASOPHILS # BLD AUTO: 0.03 K/UL
BASOPHILS NFR BLD: 0.5 %
BILIRUB SERPL-MCNC: 1.4 MG/DL
BILIRUB UR QL STRIP: NEGATIVE
BNP SERPL-MCNC: 884 PG/ML
BUN SERPL-MCNC: 22 MG/DL
CALCIUM SERPL-MCNC: 7.8 MG/DL
CHLORIDE SERPL-SCNC: 105 MMOL/L
CLARITY UR REFRACT.AUTO: CLEAR
CO2 SERPL-SCNC: 22 MMOL/L
COLOR UR AUTO: YELLOW
CREAT SERPL-MCNC: 1.5 MG/DL
DIFFERENTIAL METHOD: ABNORMAL
EOSINOPHIL # BLD AUTO: 0.2 K/UL
EOSINOPHIL NFR BLD: 4.2 %
ERYTHROCYTE [DISTWIDTH] IN BLOOD BY AUTOMATED COUNT: 17.3 %
EST. GFR  (AFRICAN AMERICAN): >60 ML/MIN/1.73 M^2
EST. GFR  (NON AFRICAN AMERICAN): 55.8 ML/MIN/1.73 M^2
GLUCOSE SERPL-MCNC: 305 MG/DL
GLUCOSE UR QL STRIP: ABNORMAL
HCT VFR BLD AUTO: 27.3 %
HGB BLD-MCNC: 8.9 G/DL
HGB UR QL STRIP: ABNORMAL
HYALINE CASTS UR QL AUTO: 0 /LPF
INR PPP: 1.2
KETONES UR QL STRIP: NEGATIVE
LEUKOCYTE ESTERASE UR QL STRIP: NEGATIVE
LIPASE SERPL-CCNC: 97 U/L
LYMPHOCYTES # BLD AUTO: 0.7 K/UL
LYMPHOCYTES NFR BLD: 12 %
MAGNESIUM SERPL-MCNC: 1.5 MG/DL
MCH RBC QN AUTO: 27.6 PG
MCHC RBC AUTO-ENTMCNC: 32.6 %
MCV RBC AUTO: 85 FL
MICROSCOPIC COMMENT: ABNORMAL
MONOCYTES # BLD AUTO: 0.4 K/UL
MONOCYTES NFR BLD: 6.8 %
NEUTROPHILS # BLD AUTO: 4.4 K/UL
NEUTROPHILS NFR BLD: 76.3 %
NITRITE UR QL STRIP: NEGATIVE
PH UR STRIP: 5 [PH] (ref 5–8)
PLATELET # BLD AUTO: 94 K/UL
PMV BLD AUTO: 9.4 FL
POTASSIUM SERPL-SCNC: 4.3 MMOL/L
PROT SERPL-MCNC: 6.5 G/DL
PROT UR QL STRIP: ABNORMAL
PROTHROMBIN TIME: 12.9 SEC
RBC # BLD AUTO: 3.22 M/UL
RBC #/AREA URNS AUTO: 40 /HPF (ref 0–4)
SODIUM SERPL-SCNC: 133 MMOL/L
SP GR UR STRIP: 1.02 (ref 1–1.03)
SQUAMOUS #/AREA URNS AUTO: 1 /HPF
URN SPEC COLLECT METH UR: ABNORMAL
UROBILINOGEN UR STRIP-ACNC: NEGATIVE EU/DL
WBC # BLD AUTO: 5.77 K/UL
WBC #/AREA URNS AUTO: 6 /HPF (ref 0–5)

## 2017-06-16 PROCEDURE — 96374 THER/PROPH/DIAG INJ IV PUSH: CPT

## 2017-06-16 PROCEDURE — 83735 ASSAY OF MAGNESIUM: CPT

## 2017-06-16 PROCEDURE — 80053 COMPREHEN METABOLIC PANEL: CPT

## 2017-06-16 PROCEDURE — 63600175 PHARM REV CODE 636 W HCPCS: Performed by: INTERNAL MEDICINE

## 2017-06-16 PROCEDURE — 81003 URINALYSIS AUTO W/O SCOPE: CPT

## 2017-06-16 PROCEDURE — 25000003 PHARM REV CODE 250: Performed by: INTERNAL MEDICINE

## 2017-06-16 PROCEDURE — 12000002 HC ACUTE/MED SURGE SEMI-PRIVATE ROOM

## 2017-06-16 PROCEDURE — 99285 EMERGENCY DEPT VISIT HI MDM: CPT | Mod: ,,, | Performed by: EMERGENCY MEDICINE

## 2017-06-16 PROCEDURE — 83690 ASSAY OF LIPASE: CPT

## 2017-06-16 PROCEDURE — 93010 ELECTROCARDIOGRAM REPORT: CPT | Mod: ,,, | Performed by: INTERNAL MEDICINE

## 2017-06-16 PROCEDURE — 83880 ASSAY OF NATRIURETIC PEPTIDE: CPT

## 2017-06-16 PROCEDURE — 93005 ELECTROCARDIOGRAM TRACING: CPT

## 2017-06-16 PROCEDURE — 94761 N-INVAS EAR/PLS OXIMETRY MLT: CPT

## 2017-06-16 PROCEDURE — 99285 EMERGENCY DEPT VISIT HI MDM: CPT | Mod: 25

## 2017-06-16 PROCEDURE — 81001 URINALYSIS AUTO W/SCOPE: CPT

## 2017-06-16 PROCEDURE — 96375 TX/PRO/DX INJ NEW DRUG ADDON: CPT

## 2017-06-16 PROCEDURE — 99223 1ST HOSP IP/OBS HIGH 75: CPT | Mod: ,,, | Performed by: HOSPITALIST

## 2017-06-16 PROCEDURE — 85610 PROTHROMBIN TIME: CPT

## 2017-06-16 PROCEDURE — 85025 COMPLETE CBC W/AUTO DIFF WBC: CPT

## 2017-06-16 PROCEDURE — 82140 ASSAY OF AMMONIA: CPT

## 2017-06-16 RX ORDER — MAGNESIUM SULFATE HEPTAHYDRATE 40 MG/ML
2 INJECTION, SOLUTION INTRAVENOUS ONCE
Status: COMPLETED | OUTPATIENT
Start: 2017-06-16 | End: 2017-06-17

## 2017-06-16 RX ORDER — HYDROMORPHONE HYDROCHLORIDE 1 MG/ML
0.5 INJECTION, SOLUTION INTRAMUSCULAR; INTRAVENOUS; SUBCUTANEOUS
Status: COMPLETED | OUTPATIENT
Start: 2017-06-16 | End: 2017-06-16

## 2017-06-16 RX ORDER — FUROSEMIDE 10 MG/ML
40 INJECTION INTRAMUSCULAR; INTRAVENOUS 3 TIMES DAILY
Status: DISCONTINUED | OUTPATIENT
Start: 2017-06-17 | End: 2017-06-19

## 2017-06-16 RX ORDER — QUETIAPINE FUMARATE 25 MG/1
100 TABLET, FILM COATED ORAL NIGHTLY
Status: DISCONTINUED | OUTPATIENT
Start: 2017-06-16 | End: 2017-07-07 | Stop reason: HOSPADM

## 2017-06-16 RX ORDER — INSULIN ASPART 100 [IU]/ML
1-10 INJECTION, SOLUTION INTRAVENOUS; SUBCUTANEOUS
Status: DISCONTINUED | OUTPATIENT
Start: 2017-06-16 | End: 2017-07-07 | Stop reason: HOSPADM

## 2017-06-16 RX ORDER — IBUPROFEN 200 MG
16 TABLET ORAL
Status: DISCONTINUED | OUTPATIENT
Start: 2017-06-16 | End: 2017-07-07 | Stop reason: HOSPADM

## 2017-06-16 RX ORDER — ONDANSETRON 4 MG/1
4 TABLET, ORALLY DISINTEGRATING ORAL
Status: COMPLETED | OUTPATIENT
Start: 2017-06-16 | End: 2017-06-16

## 2017-06-16 RX ORDER — INSULIN ASPART 100 [IU]/ML
8 INJECTION, SOLUTION INTRAVENOUS; SUBCUTANEOUS
Status: DISCONTINUED | OUTPATIENT
Start: 2017-06-17 | End: 2017-07-07 | Stop reason: HOSPADM

## 2017-06-16 RX ORDER — GLUCAGON 1 MG
1 KIT INJECTION
Status: DISCONTINUED | OUTPATIENT
Start: 2017-06-16 | End: 2017-07-07 | Stop reason: HOSPADM

## 2017-06-16 RX ORDER — FUROSEMIDE 10 MG/ML
40 INJECTION INTRAMUSCULAR; INTRAVENOUS
Status: COMPLETED | OUTPATIENT
Start: 2017-06-16 | End: 2017-06-16

## 2017-06-16 RX ORDER — ASPIRIN 81 MG/1
81 TABLET ORAL DAILY
Status: DISCONTINUED | OUTPATIENT
Start: 2017-06-17 | End: 2017-07-07 | Stop reason: HOSPADM

## 2017-06-16 RX ORDER — DICYCLOMINE HYDROCHLORIDE 20 MG/1
20 TABLET ORAL 3 TIMES DAILY
Status: DISCONTINUED | OUTPATIENT
Start: 2017-06-17 | End: 2017-07-07 | Stop reason: HOSPADM

## 2017-06-16 RX ORDER — CEFAZOLIN SODIUM 1 G/3ML
1 INJECTION, POWDER, FOR SOLUTION INTRAMUSCULAR; INTRAVENOUS
Status: DISCONTINUED | OUTPATIENT
Start: 2017-06-16 | End: 2017-06-16

## 2017-06-16 RX ORDER — ATORVASTATIN CALCIUM 20 MG/1
40 TABLET, FILM COATED ORAL DAILY
Status: DISCONTINUED | OUTPATIENT
Start: 2017-06-17 | End: 2017-07-07 | Stop reason: HOSPADM

## 2017-06-16 RX ORDER — ACETAMINOPHEN 325 MG/1
650 TABLET ORAL EVERY 4 HOURS PRN
Status: DISCONTINUED | OUTPATIENT
Start: 2017-06-16 | End: 2017-07-07 | Stop reason: HOSPADM

## 2017-06-16 RX ORDER — ALBUMIN HUMAN 250 G/1000ML
25 SOLUTION INTRAVENOUS EVERY 6 HOURS
Status: DISCONTINUED | OUTPATIENT
Start: 2017-06-16 | End: 2017-06-30

## 2017-06-16 RX ORDER — CARVEDILOL 6.25 MG/1
6.25 TABLET ORAL 2 TIMES DAILY WITH MEALS
Status: DISCONTINUED | OUTPATIENT
Start: 2017-06-17 | End: 2017-06-25

## 2017-06-16 RX ORDER — ONDANSETRON 8 MG/1
8 TABLET, ORALLY DISINTEGRATING ORAL EVERY 8 HOURS PRN
Status: DISCONTINUED | OUTPATIENT
Start: 2017-06-16 | End: 2017-07-07 | Stop reason: HOSPADM

## 2017-06-16 RX ORDER — IBUPROFEN 200 MG
24 TABLET ORAL
Status: DISCONTINUED | OUTPATIENT
Start: 2017-06-16 | End: 2017-07-07 | Stop reason: HOSPADM

## 2017-06-16 RX ADMIN — HYDROMORPHONE HYDROCHLORIDE 0.5 MG: 1 INJECTION, SOLUTION INTRAMUSCULAR; INTRAVENOUS; SUBCUTANEOUS at 08:06

## 2017-06-16 RX ADMIN — ONDANSETRON 4 MG: 4 TABLET, ORALLY DISINTEGRATING ORAL at 08:06

## 2017-06-16 RX ADMIN — FUROSEMIDE 40 MG: 10 INJECTION, SOLUTION INTRAVENOUS at 09:06

## 2017-06-17 PROBLEM — N18.30 CHRONIC KIDNEY DISEASE, STAGE 3: Status: ACTIVE | Noted: 2017-06-17

## 2017-06-17 PROBLEM — L97.412 DIABETIC ULCER OF RIGHT MIDFOOT ASSOCIATED WITH TYPE 2 DIABETES MELLITUS, WITH FAT LAYER EXPOSED: Status: ACTIVE | Noted: 2017-06-17

## 2017-06-17 PROBLEM — D69.6 THROMBOCYTOPENIA: Status: ACTIVE | Noted: 2017-06-17

## 2017-06-17 PROBLEM — E11.621 DIABETIC ULCER OF RIGHT MIDFOOT ASSOCIATED WITH TYPE 2 DIABETES MELLITUS, WITH FAT LAYER EXPOSED: Status: ACTIVE | Noted: 2017-06-17

## 2017-06-17 PROBLEM — N18.30 ANEMIA OF CHRONIC RENAL FAILURE, STAGE 3 (MODERATE): Status: ACTIVE | Noted: 2017-06-17

## 2017-06-17 PROBLEM — D63.1 ANEMIA OF CHRONIC RENAL FAILURE, STAGE 3 (MODERATE): Status: ACTIVE | Noted: 2017-06-17

## 2017-06-17 LAB
ALBUMIN SERPL BCP-MCNC: 1.6 G/DL
ALP SERPL-CCNC: 124 U/L
ALT SERPL W/O P-5'-P-CCNC: 15 U/L
ANION GAP SERPL CALC-SCNC: 5 MMOL/L
AST SERPL-CCNC: 38 U/L
BASOPHILS # BLD AUTO: 0.02 K/UL
BASOPHILS NFR BLD: 0.5 %
BILIRUB SERPL-MCNC: 1.1 MG/DL
BUN SERPL-MCNC: 22 MG/DL
CALCIUM SERPL-MCNC: 7.7 MG/DL
CHLORIDE SERPL-SCNC: 105 MMOL/L
CO2 SERPL-SCNC: 23 MMOL/L
CREAT SERPL-MCNC: 1.5 MG/DL
CRP SERPL-MCNC: 30.1 MG/L
DIFFERENTIAL METHOD: ABNORMAL
EOSINOPHIL # BLD AUTO: 0.2 K/UL
EOSINOPHIL NFR BLD: 5.1 %
ERYTHROCYTE [DISTWIDTH] IN BLOOD BY AUTOMATED COUNT: 17.3 %
ERYTHROCYTE [SEDIMENTATION RATE] IN BLOOD BY WESTERGREN METHOD: 35 MM/HR
EST. GFR  (AFRICAN AMERICAN): >60 ML/MIN/1.73 M^2
EST. GFR  (NON AFRICAN AMERICAN): 55.8 ML/MIN/1.73 M^2
ESTIMATED AVG GLUCOSE: 194 MG/DL
GLUCOSE SERPL-MCNC: 340 MG/DL
HBA1C MFR BLD HPLC: 8.4 %
HCT VFR BLD AUTO: 24.9 %
HGB BLD-MCNC: 8 G/DL
INR PPP: 1.3
LYMPHOCYTES # BLD AUTO: 0.8 K/UL
LYMPHOCYTES NFR BLD: 20.4 %
MAGNESIUM SERPL-MCNC: 1.4 MG/DL
MCH RBC QN AUTO: 27.3 PG
MCHC RBC AUTO-ENTMCNC: 32.1 %
MCV RBC AUTO: 85 FL
MONOCYTES # BLD AUTO: 0.2 K/UL
MONOCYTES NFR BLD: 6.1 %
NEUTROPHILS # BLD AUTO: 2.7 K/UL
NEUTROPHILS NFR BLD: 67.6 %
PHOSPHATE SERPL-MCNC: 2.8 MG/DL
PLATELET # BLD AUTO: 70 K/UL
PMV BLD AUTO: 9.2 FL
POCT GLUCOSE: 282 MG/DL (ref 70–110)
POCT GLUCOSE: 355 MG/DL (ref 70–110)
POCT GLUCOSE: 361 MG/DL (ref 70–110)
POCT GLUCOSE: 370 MG/DL (ref 70–110)
POTASSIUM SERPL-SCNC: 4.2 MMOL/L
PREALB SERPL-MCNC: 3 MG/DL
PROT SERPL-MCNC: 5.9 G/DL
PROTHROMBIN TIME: 13.2 SEC
RBC # BLD AUTO: 2.93 M/UL
SODIUM SERPL-SCNC: 133 MMOL/L
VANCOMYCIN SERPL-MCNC: 8.5 UG/ML
WBC # BLD AUTO: 3.93 K/UL

## 2017-06-17 PROCEDURE — 80053 COMPREHEN METABOLIC PANEL: CPT

## 2017-06-17 PROCEDURE — 85610 PROTHROMBIN TIME: CPT

## 2017-06-17 PROCEDURE — 25000003 PHARM REV CODE 250: Performed by: HOSPITALIST

## 2017-06-17 PROCEDURE — 87902 NFCT AGT GNTYP ALYS HEP C: CPT

## 2017-06-17 PROCEDURE — 87522 HEPATITIS C REVRS TRNSCRPJ: CPT

## 2017-06-17 PROCEDURE — 63600175 PHARM REV CODE 636 W HCPCS: Performed by: NURSE PRACTITIONER

## 2017-06-17 PROCEDURE — 86141 C-REACTIVE PROTEIN HS: CPT

## 2017-06-17 PROCEDURE — 83036 HEMOGLOBIN GLYCOSYLATED A1C: CPT

## 2017-06-17 PROCEDURE — 80202 ASSAY OF VANCOMYCIN: CPT

## 2017-06-17 PROCEDURE — 99233 SBSQ HOSP IP/OBS HIGH 50: CPT | Mod: ,,, | Performed by: HOSPITALIST

## 2017-06-17 PROCEDURE — 36415 COLL VENOUS BLD VENIPUNCTURE: CPT

## 2017-06-17 PROCEDURE — 99233 SBSQ HOSP IP/OBS HIGH 50: CPT | Mod: ,,, | Performed by: INTERNAL MEDICINE

## 2017-06-17 PROCEDURE — 85025 COMPLETE CBC W/AUTO DIFF WBC: CPT

## 2017-06-17 PROCEDURE — 63600175 PHARM REV CODE 636 W HCPCS: Performed by: INTERNAL MEDICINE

## 2017-06-17 PROCEDURE — 99233 SBSQ HOSP IP/OBS HIGH 50: CPT | Mod: ,,, | Performed by: PODIATRIST

## 2017-06-17 PROCEDURE — 84134 ASSAY OF PREALBUMIN: CPT

## 2017-06-17 PROCEDURE — 63600175 PHARM REV CODE 636 W HCPCS: Performed by: HOSPITALIST

## 2017-06-17 PROCEDURE — 25000003 PHARM REV CODE 250: Performed by: INTERNAL MEDICINE

## 2017-06-17 PROCEDURE — 84100 ASSAY OF PHOSPHORUS: CPT

## 2017-06-17 PROCEDURE — 85651 RBC SED RATE NONAUTOMATED: CPT

## 2017-06-17 PROCEDURE — 20600001 HC STEP DOWN PRIVATE ROOM

## 2017-06-17 PROCEDURE — 83735 ASSAY OF MAGNESIUM: CPT

## 2017-06-17 PROCEDURE — 97803 MED NUTRITION INDIV SUBSEQ: CPT

## 2017-06-17 PROCEDURE — P9047 ALBUMIN (HUMAN), 25%, 50ML: HCPCS | Performed by: HOSPITALIST

## 2017-06-17 RX ORDER — HYDROMORPHONE HYDROCHLORIDE 1 MG/ML
1 INJECTION, SOLUTION INTRAMUSCULAR; INTRAVENOUS; SUBCUTANEOUS ONCE
Status: COMPLETED | OUTPATIENT
Start: 2017-06-17 | End: 2017-06-17

## 2017-06-17 RX ORDER — GUAIFENESIN 100 MG/5ML
200 SOLUTION ORAL EVERY 4 HOURS PRN
Status: DISCONTINUED | OUTPATIENT
Start: 2017-06-17 | End: 2017-07-07 | Stop reason: HOSPADM

## 2017-06-17 RX ORDER — OXYCODONE HYDROCHLORIDE 5 MG/1
10 TABLET ORAL EVERY 6 HOURS PRN
Status: DISCONTINUED | OUTPATIENT
Start: 2017-06-17 | End: 2017-06-19

## 2017-06-17 RX ORDER — HYDROMORPHONE HYDROCHLORIDE 1 MG/ML
0.5 INJECTION, SOLUTION INTRAMUSCULAR; INTRAVENOUS; SUBCUTANEOUS EVERY 4 HOURS PRN
Status: DISCONTINUED | OUTPATIENT
Start: 2017-06-17 | End: 2017-06-18

## 2017-06-17 RX ADMIN — HYDROMORPHONE HYDROCHLORIDE 0.5 MG: 1 INJECTION, SOLUTION INTRAMUSCULAR; INTRAVENOUS; SUBCUTANEOUS at 10:06

## 2017-06-17 RX ADMIN — ASPIRIN 81 MG: 81 TABLET, COATED ORAL at 09:06

## 2017-06-17 RX ADMIN — OXYCODONE HYDROCHLORIDE 10 MG: 5 TABLET ORAL at 09:06

## 2017-06-17 RX ADMIN — QUETIAPINE FUMARATE 100 MG: 25 TABLET ORAL at 12:06

## 2017-06-17 RX ADMIN — INSULIN ASPART 3 UNITS: 100 INJECTION, SOLUTION INTRAVENOUS; SUBCUTANEOUS at 08:06

## 2017-06-17 RX ADMIN — INSULIN DETEMIR 10 UNITS: 100 INJECTION, SOLUTION SUBCUTANEOUS at 08:06

## 2017-06-17 RX ADMIN — HYDROMORPHONE HYDROCHLORIDE 1 MG: 1 INJECTION, SOLUTION INTRAMUSCULAR; INTRAVENOUS; SUBCUTANEOUS at 03:06

## 2017-06-17 RX ADMIN — FUROSEMIDE 40 MG: 10 INJECTION, SOLUTION INTRAVENOUS at 05:06

## 2017-06-17 RX ADMIN — QUETIAPINE FUMARATE 100 MG: 25 TABLET ORAL at 08:06

## 2017-06-17 RX ADMIN — MAGNESIUM SULFATE IN WATER 2 G: 40 INJECTION, SOLUTION INTRAVENOUS at 03:06

## 2017-06-17 RX ADMIN — FUROSEMIDE 40 MG: 10 INJECTION, SOLUTION INTRAVENOUS at 06:06

## 2017-06-17 RX ADMIN — DICYCLOMINE HYDROCHLORIDE 20 MG: 20 TABLET ORAL at 06:06

## 2017-06-17 RX ADMIN — ALBUMIN (HUMAN) 25 G: 12.5 SOLUTION INTRAVENOUS at 07:06

## 2017-06-17 RX ADMIN — ALBUMIN (HUMAN) 25 G: 12.5 SOLUTION INTRAVENOUS at 06:06

## 2017-06-17 RX ADMIN — ALBUMIN (HUMAN) 25 G: 12.5 SOLUTION INTRAVENOUS at 11:06

## 2017-06-17 RX ADMIN — ATORVASTATIN CALCIUM 40 MG: 20 TABLET, FILM COATED ORAL at 09:06

## 2017-06-17 RX ADMIN — VANCOMYCIN HYDROCHLORIDE 1750 MG: 100 INJECTION, POWDER, LYOPHILIZED, FOR SOLUTION INTRAVENOUS at 04:06

## 2017-06-17 RX ADMIN — CARVEDILOL 6.25 MG: 6.25 TABLET, FILM COATED ORAL at 05:06

## 2017-06-17 RX ADMIN — INSULIN ASPART 8 UNITS: 100 INJECTION, SOLUTION INTRAVENOUS; SUBCUTANEOUS at 05:06

## 2017-06-17 RX ADMIN — OXYCODONE HYDROCHLORIDE 10 MG: 5 TABLET ORAL at 12:06

## 2017-06-17 RX ADMIN — HYDROMORPHONE HYDROCHLORIDE 1 MG: 1 INJECTION, SOLUTION INTRAMUSCULAR; INTRAVENOUS; SUBCUTANEOUS at 05:06

## 2017-06-17 RX ADMIN — AMPICILLIN SODIUM AND SULBACTAM SODIUM 3 G: 2; 1 INJECTION, POWDER, FOR SOLUTION INTRAMUSCULAR; INTRAVENOUS at 07:06

## 2017-06-17 RX ADMIN — CARVEDILOL 6.25 MG: 6.25 TABLET, FILM COATED ORAL at 09:06

## 2017-06-17 RX ADMIN — INSULIN DETEMIR 10 UNITS: 100 INJECTION, SOLUTION SUBCUTANEOUS at 12:06

## 2017-06-17 RX ADMIN — INSULIN ASPART 5 UNITS: 100 INJECTION, SOLUTION INTRAVENOUS; SUBCUTANEOUS at 12:06

## 2017-06-17 RX ADMIN — VANCOMYCIN HYDROCHLORIDE 1750 MG: 100 INJECTION, POWDER, LYOPHILIZED, FOR SOLUTION INTRAVENOUS at 03:06

## 2017-06-17 RX ADMIN — AMPICILLIN SODIUM AND SULBACTAM SODIUM 3 G: 2; 1 INJECTION, POWDER, FOR SOLUTION INTRAMUSCULAR; INTRAVENOUS at 11:06

## 2017-06-17 RX ADMIN — INSULIN ASPART 10 UNITS: 100 INJECTION, SOLUTION INTRAVENOUS; SUBCUTANEOUS at 02:06

## 2017-06-17 RX ADMIN — DICYCLOMINE HYDROCHLORIDE 20 MG: 20 TABLET ORAL at 02:06

## 2017-06-17 RX ADMIN — DICYCLOMINE HYDROCHLORIDE 20 MG: 20 TABLET ORAL at 08:06

## 2017-06-17 RX ADMIN — ONDANSETRON 8 MG: 8 TABLET, ORALLY DISINTEGRATING ORAL at 12:06

## 2017-06-17 RX ADMIN — INSULIN ASPART 10 UNITS: 100 INJECTION, SOLUTION INTRAVENOUS; SUBCUTANEOUS at 05:06

## 2017-06-17 RX ADMIN — AMPICILLIN SODIUM AND SULBACTAM SODIUM 3 G: 2; 1 INJECTION, POWDER, FOR SOLUTION INTRAMUSCULAR; INTRAVENOUS at 06:06

## 2017-06-17 NOTE — NURSING
"Arrived from ED calm and cooperative, however became agitated and non-compliant shortly after (unknown reason). Upon assessment, PIV in L hand not patent. Pt began screaming (cursing) at RN that he needed pain medication and began coughing uncontrollably. Emesis bag provided, as pt was coughing up mucus. MD notified and cough syrup ordered but pt refuse.     Pt c/o of uncontrolled pain unrelieved by current regimen. RN asked "What medications do you take at home?" Pt stated "I get it off the street, none of these damn doctors give me a damn thing." MD notified of this. One time dose ordered, but new PIV access must be established.     Pt educated on fall risk precautions. Continues to get out of bed without assistance, despite bed alarm. Multiple education attempts. Pt pushed RN out of way as she tried to assist him to the bathroom. Security called. Pt agitated, refusing to have assistance/get back in bed, and refusing bed alarm. Security and RN educated pt on use of bed alarm and high risk of falls, bed alarm remains activated at this time.     PIV stick unsuccessful x4. ICU nurse to bedside to attempt - all meds currently late d/t loss of PIV access. PIV 20 G to R FA established. Pt stated "I will probably just pull this out." MD notified of this as well.   "

## 2017-06-17 NOTE — CONSULTS
"Hepatology   Consult note      SUBJECTIVE:     Reason for Consult:  decompensated hep c cirrhosis    History of Present Illness:  Patient is a 46 y.o. male w/ h/o HCV cirrhosis & poorly controlled DM complicated by BL diabetic ulceration s/p 6/1 R 4th toe debridement who presents c/o generalized weakness w/ associated worsening BL LE edema.  He reports that 5d ago he began having dysuria.  Two day afterwards he woke up noticing his chronic BL LE edema was significantly worse with an associated erythematous, painful rash.  Later that day he began developing dyspnea on exertion that progressively worsened to the point where he is now dyspneic at rest.  Patient denies any vomiting but has mild, persistant nausea that has worsened acutely over the last 3 days.  He reports no fevers, chills, abdominal pain, worsening abdominal distention, diarrhea.  Patient states he has had no productive cough or wheezes.    PTA Medications   Medication Sig    aspirin (ECOTRIN) 81 MG EC tablet Take 1 tablet (81 mg total) by mouth once daily.    atorvastatin (LIPITOR) 40 MG tablet Take 1 tablet (40 mg total) by mouth once daily.    cadexomer iodine (IODOSORB) 0.9 % gel Apply topically daily as needed for Wound Care (to foot wounds).    carvedilol (COREG) 6.25 MG tablet Take 6.25 mg by mouth 2 (two) times daily with meals.    dicyclomine (BENTYL) 20 mg tablet Take 20 mg by mouth 3 (three) times daily.    furosemide (LASIX) 40 MG tablet Take 1 tablet (40 mg total) by mouth 2 (two) times daily.    INSULIN GLARGINE,HUM.REC.ANLOG (LANTUS SOLOSTAR SUBQ) Inject 25 Units into the skin every evening.    insulin glulisine (APIDRA) 100 unit/mL InPn pen Inject 0.15 mLs (15 Units total) into the skin 3 (three) times daily with meals.    pen needle, diabetic 32 gauge x 5/32" Ndle To use with Apidra.    quetiapine (SEROQUEL) 100 MG Tab Take 100 mg by mouth every evening.       Review of patient's allergies indicates:   Allergen Reactions    " Shellfish containing products Hives     Shortness of breath        Past Medical History:   Diagnosis Date    Ascites of liver 5/30/2017    Chronic hepatitis C without hepatic coma     Chronic liver failure without hepatic coma 5/30/2017    Diabetic foot ulcer     right foot    DM (diabetes mellitus), type 2 with peripheral vascular complications     Essential hypertension     Gallstones     Hepatic encephalopathy 5/30/2017    Hepatitis C     Pancreatitis      Past Surgical History:   Procedure Laterality Date    BACK SURGERY       History reviewed. No pertinent family history.  Social History   Substance Use Topics    Smoking status: Current Every Day Smoker     Types: Cigarettes    Smokeless tobacco: Not on file    Alcohol use No       Review of Systems:  Constitutional: Negative for activity change and appetite change.   HENT: Negative for drooling and facial swelling.    Eyes: Negative for discharge and itching.   Respiratory: Negative for cough, shortness of breath and wheezing.    Cardiovascular: Negative for chest pain and palpitations.   Gastrointestinal: Negative for abdominal pain and nausea.   Endocrine: Negative for cold intolerance and heat intolerance.   Genitourinary: Negative for difficulty urinating and dysuria.   Musculoskeletal: Negative for arthralgias and back pain. B/l leg pain and swelling   Skin: Positive for rash.   Neurological: Negative for dizziness and numbness.   Hematological: Negative for adenopathy.   Psychiatric/Behavioral: Negative for behavioral problems and confusion.     OBJECTIVE:     Vital Signs (Most Recent)  Temp: 97.7 °F (36.5 °C) (06/17/17 0353)  Pulse: 61 (06/17/17 0700)  Resp: 18 (06/17/17 0353)  BP: (!) 140/75 (06/17/17 0353)  SpO2: 100 % (06/17/17 0353)    Physical Exam:  Constitutional:  He appears well-developed and well-nourished.   Head: Normocephalic and atraumatic.   Eyes: Conjunctivae are normal.   Neck: Normal range of motion. No thyromegaly  present.   Cardiovascular: Normal rate, regular rhythm and normal heart sounds.    Pulmonary/Chest: Effort normal and breath sounds normal.   Abdominal: Soft. He exhibits no distension. There is no tenderness.   Musculoskeletal: Normal range of motion. He exhibits edema (2 plus edema). He exhibits no tenderness.   B/L LE erythema and venous stasis changes and bilateral foot ulcers   Neurological: He is alert and oriented to person, place, and time.   Skin:   Psychiatric: He has a normal mood and affect. His behavior is normal.        Laboratory    CBC:   Recent Labs  Lab 06/16/17 1958 06/17/17  0420   WBC 5.77 3.93   RBC 3.22* 2.93*   HGB 8.9* 8.0*   HCT 27.3* 24.9*   PLT 94* 70*   MCV 85 85   MCH 27.6 27.3   MCHC 32.6 32.1     BMP:   Recent Labs  Lab 06/16/17 1958 06/17/17  0421   * 340*   * 133*   K 4.3 4.2    105   CO2 22* 23   BUN 22* 22*   CREATININE 1.5* 1.5*   CALCIUM 7.8* 7.7*   MG 1.5* 1.4*     Coagulation:   Recent Labs  Lab 06/17/17  0421   INR 1.3*     Microbiology Results (last 7 days)     ** No results found for the last 168 hours. **          Diagnostic Results:  Reviewed     ASSESSMENT/PLAN:     Active Hospital Problems    Diagnosis  POA    *Bilateral cellulitis of lower leg [L03.116, L03.115]  Yes    Diabetic ulcer of right midfoot associated with type 2 diabetes mellitus, with fat layer exposed [E11.621, L97.412]  Yes    Anemia of chronic renal failure, stage 3 (moderate) [N18.3, D63.1]  Yes    Thrombocytopenia [D69.6]  Yes    Chronic kidney disease, stage 3 [N18.3]  Yes    Decompensated liver disease [K74.69]  Yes    Diabetic ulcer of toe of right foot associated with type 2 diabetes mellitus, with fat layer exposed [E11.621, L97.512]  Yes    Essential hypertension [I10]  Yes    Ascites of liver [R18.8]  Yes    Hypoalbuminemia [E88.09]  Yes    Hypomagnesemia [E83.42]  Yes      Resolved Hospital Problems    Diagnosis Date Resolved POA   No resolved problems to  display.     46 y.o. male w/ h/o HCV cirrhosis & poorly controlled DM complicated by BL diabetic ulceration s/p 6/1 R 4th toe debridement who presents c/o generalized weakness w/ associated worsening BL LE edema.     Patient here with likely lower ext cellulitis also with some concerning food ulcers that may represent osteomyelitis with relatively stable MELD score from baseline with untreated hepatitis C, and he has never been seen as an outpatient for discussion of hepatitis C treatment options.    MELD-Na score: 17 at 6/17/2017  4:21 AM  MELD score: 14 at 6/17/2017  4:21 AM  Calculated from:  Serum Creatinine: 1.5 mg/dL at 6/17/2017  4:21 AM  Serum Sodium: 133 mmol/L at 6/17/2017  4:21 AM  Total Bilirubin: 1.1 mg/dL at 6/17/2017  4:21 AM  INR(ratio): 1.3 at 6/17/2017  4:21 AM  Age: 46 years      Recommendations:   - infection workup and source control per primary team, consider further imaging of patients foot ulcers to evaluate for osteomyelitis  - patient can be seen in hepatology clinic as an outpatient and depending on his MELD and foot/leg infection status we can discuss future options in regards to hepatitis C treatment and his liver disease in general, particularly about liver transplantation.  He is currently not a candidate to consider for liver transplantation given his relatively low meld with active infection.  - workup of RAVEN per primary team   - limit sedating medications     - please call with any questions or concerns     Paolo Rosales  Gastroenterology Fellow (PGY V)

## 2017-06-17 NOTE — SUBJECTIVE & OBJECTIVE
"Past Medical History:   Diagnosis Date    Ascites of liver 5/30/2017    Chronic hepatitis C without hepatic coma     Chronic liver failure without hepatic coma 5/30/2017    Diabetic foot ulcer     right foot    DM (diabetes mellitus), type 2 with peripheral vascular complications     Essential hypertension     Gallstones     Hepatic encephalopathy 5/30/2017    Hepatitis C     Pancreatitis        Past Surgical History:   Procedure Laterality Date    BACK SURGERY         Review of patient's allergies indicates:   Allergen Reactions    Shellfish containing products Hives     Shortness of breath       No current facility-administered medications on file prior to encounter.      Current Outpatient Prescriptions on File Prior to Encounter   Medication Sig    aspirin (ECOTRIN) 81 MG EC tablet Take 1 tablet (81 mg total) by mouth once daily.    atorvastatin (LIPITOR) 40 MG tablet Take 1 tablet (40 mg total) by mouth once daily.    cadexomer iodine (IODOSORB) 0.9 % gel Apply topically daily as needed for Wound Care (to foot wounds).    carvedilol (COREG) 6.25 MG tablet Take 6.25 mg by mouth 2 (two) times daily with meals.    dicyclomine (BENTYL) 20 mg tablet Take 20 mg by mouth 3 (three) times daily.    furosemide (LASIX) 40 MG tablet Take 1 tablet (40 mg total) by mouth 2 (two) times daily.    INSULIN GLARGINE,HUM.REC.ANLOG (LANTUS SOLOSTAR SUBQ) Inject 25 Units into the skin every evening.    insulin glulisine (APIDRA) 100 unit/mL InPn pen Inject 0.15 mLs (15 Units total) into the skin 3 (three) times daily with meals.    pen needle, diabetic 32 gauge x 5/32" Ndle To use with Apidra.    quetiapine (SEROQUEL) 100 MG Tab Take 100 mg by mouth every evening.     Family History     None        Social History Main Topics    Smoking status: Current Every Day Smoker     Types: Cigarettes    Smokeless tobacco: Not on file    Alcohol use No    Drug use: No    Sexual activity: Not on file     Review of " Systems   Constitutional: Negative for activity change and appetite change.   HENT: Negative for drooling and facial swelling.    Eyes: Negative for discharge and itching.   Respiratory: Negative for cough, shortness of breath and wheezing.    Cardiovascular: Negative for chest pain and palpitations.   Gastrointestinal: Negative for abdominal pain and nausea.   Endocrine: Negative for cold intolerance and heat intolerance.   Genitourinary: Negative for difficulty urinating and dysuria.   Musculoskeletal: Negative for arthralgias and back pain.        B/l leg pain and swelling   Skin: Positive for rash. Negative for color change and pallor.   Neurological: Negative for dizziness and numbness.   Hematological: Negative for adenopathy. Does not bruise/bleed easily.   Psychiatric/Behavioral: Negative for behavioral problems and confusion.     Objective:     Vital Signs (Most Recent):  Temp: 98 °F (36.7 °C) (06/17/17 0053)  Pulse: 70 (06/17/17 0053)  Resp: 18 (06/17/17 0053)  BP: (!) 172/89 (06/17/17 0053)  SpO2: 100 % (06/17/17 0053) Vital Signs (24h Range):  Temp:  [98 °F (36.7 °C)-98.2 °F (36.8 °C)] 98 °F (36.7 °C)  Pulse:  [64-70] 70  Resp:  [18-31] 18  SpO2:  [98 %-100 %] 100 %  BP: (132-172)/(58-89) 172/89     Weight: 108.9 kg (240 lb)  Body mass index is 30.81 kg/m².    Physical Exam   Constitutional: He is oriented to person, place, and time. He appears well-developed and well-nourished.   HENT:   Head: Normocephalic and atraumatic.   Eyes: Conjunctivae are normal. Pupils are equal, round, and reactive to light.   Neck: Normal range of motion. No thyromegaly present.   Cardiovascular: Normal rate, regular rhythm and normal heart sounds.    Pulmonary/Chest: Effort normal and breath sounds normal.   Abdominal: Soft. He exhibits no distension. There is no tenderness.   Musculoskeletal: Normal range of motion. He exhibits edema (2 plus edema). He exhibits no tenderness.   B/L LE cellulitis and venous stasis and b/l  feet ulcers   Neurological: He is alert and oriented to person, place, and time. Coordination normal.   Skin: No rash noted. No erythema.   Psychiatric: He has a normal mood and affect. His behavior is normal.        Significant Labs:   CBC:   Recent Labs  Lab 06/16/17 1958   WBC 5.77   HGB 8.9*   HCT 27.3*   PLT 94*     CMP:   Recent Labs  Lab 06/16/17 1958   *   K 4.3      CO2 22*   *   BUN 22*   CREATININE 1.5*   CALCIUM 7.8*   PROT 6.5   ALBUMIN 1.8*   BILITOT 1.4*   ALKPHOS 141*   AST 43*   ALT 15   ANIONGAP 6*   EGFRNONAA 55.8*     Coagulation:   Recent Labs  Lab 06/16/17 1958   INR 1.2       Significant Imaging: I have reviewed all pertinent imaging results/findings within the past 24 hours.

## 2017-06-17 NOTE — PLAN OF CARE
Problem: Patient Care Overview  Goal: Plan of Care Review  Outcome: Ongoing (interventions implemented as appropriate)  AOx4, VSS, c/o pain 9-10/10 (unrelieved by current regimen). ABX therapy began. Pt removed dressings to BLE and refuse for them to be redressed. PIV site re-established. Missed scheduled meds d/t loss of PIV. Remains on tele - NSR. Consistently attempts to get out of bed without assistance, despite bed alarm. Pt raises bed height to get out of bed, RN educated on importance of keeping bed in lowest position to reduce fall risk. RN placed bed height in lock mode. Bed alarm remains activated, although pt continues to remain non-compliant. One time dose dilaudid given for pain. Accuchecks maintained MD MASOOD notified of BCG of 355. Remains agitated and anxious. Will continue to monitor and assess.

## 2017-06-17 NOTE — ED TRIAGE NOTES
Pt presents to ED c/o bilateral leg pain and weakness that started today. Pt was recently diagnosed with cellulitis, placed on antibiotics for which he is finished at this time. Severe swelling noted to lower extremities bilaterally. Pain rating of 10/10 at this time. No fever, chills, SOB or CP noted.     LOC: Patient name and date of birth verified.  The patient is awake, alert and aware of environment with an appropriate affect, the patient is oriented x 3 and speaking appropriately.  Pt in NAD.    APPEARANCE: Patient resting comfortably and in no acute distress, patient is clean and well groomed, patient's clothing is properly fastened.  SKIN: bilateral swelling noted to lower extremities with cellulitis.   MUSCULOSKELETAL: Patient moving all extremities well, no obvious swelling or deformities noted.  RESPIRATORY: Airway is open and patent, respirations are spontaneous, patient has a normal effort and rate, no accessory muscle use noted.  CARDIAC: Patient has a normal rate and rhythm, no periphreal edema noted, capillary refill < 3 seconds.  ABDOMEN: Soft and non tender to palpation, no distention noted. Bowel sounds present in all four quadrants.  NEUROLOGIC: Eyes open spontaneously, behavior appropriate to situation, follows commands, facial expression symmetrical, bilateral hand grasp equal and even, purposeful motor response noted, normal sensation in all extremities when touched with a finger.

## 2017-06-17 NOTE — H&P
[facility] Hospital Medicine  History & Physical    Patient Name: Leif Nino  MRN: [id number]  Admission Date: 6/16/2017  Attending Physician: Bandar Blunt  Primary Care Provider: Primary Doctor [facility] Medicine Team: Networked reference to record PCT  Bandar Blunt MD     Patient information was obtained from patient and ER records.     Subjective:     Principal Problem:Bilateral cellulitis of lower leg    Chief Complaint:   Chief Complaint   Patient presents with    Weakness     Generalized weakness. Hep C. Cellulitis x 3 weeks in both legs with diabetic foot ulcers. cbg 408 with hx of DM.         HPI: Mr. Nino is a 47 yo WM w/ h/o HCV cirrhosis & poorly controlled DM complicated by BL diabetic ulceration s/p 6/1 R 4th toe debridement who presents c/o generalized weakness w/ associated worsening BL LE edema.  He reports that 5d ago he began having dysuria.  Two day afterwards he woke up noticing his chronic BL LE edema was significantly worse with an associated erythematous, painful rash.  Later that day he began developing dyspnea on exertion that progressively worsened to the point where he is now dyspneic at rest.  Patient denies any vomiting but has mild, persistant nausea that has worsened acutely over the last 3 days.  He reports no fevers, chills, abdominal pain, worsening abdominal distention, diarrhea.  Patient states he has had no productive cough or wheezes.    Past Medical History:   Diagnosis Date    Ascites of liver 5/30/2017    Chronic hepatitis C without hepatic coma     Chronic liver failure without hepatic coma 5/30/2017    Diabetic foot ulcer     right foot    DM (diabetes mellitus), type 2 with peripheral vascular complications     Essential hypertension     Gallstones     Hepatic encephalopathy 5/30/2017    Hepatitis C     Pancreatitis        Past Surgical History:   Procedure Laterality Date    BACK SURGERY         Review of patient's allergies  "indicates:   Allergen Reactions    Shellfish containing products Hives     Shortness of breath       No current facility-administered medications on file prior to encounter.      Current Outpatient Prescriptions on File Prior to Encounter   Medication Sig    aspirin (ECOTRIN) 81 MG EC tablet Take 1 tablet (81 mg total) by mouth once daily.    atorvastatin (LIPITOR) 40 MG tablet Take 1 tablet (40 mg total) by mouth once daily.    cadexomer iodine (IODOSORB) 0.9 % gel Apply topically daily as needed for Wound Care (to foot wounds).    carvedilol (COREG) 6.25 MG tablet Take 6.25 mg by mouth 2 (two) times daily with meals.    dicyclomine (BENTYL) 20 mg tablet Take 20 mg by mouth 3 (three) times daily.    furosemide (LASIX) 40 MG tablet Take 1 tablet (40 mg total) by mouth 2 (two) times daily.    INSULIN GLARGINE,HUM.REC.ANLOG (LANTUS SOLOSTAR SUBQ) Inject 25 Units into the skin every evening.    insulin glulisine (APIDRA) 100 unit/mL InPn pen Inject 0.15 mLs (15 Units total) into the skin 3 (three) times daily with meals.    pen needle, diabetic 32 gauge x 5/32" Ndle To use with Apidra.    quetiapine (SEROQUEL) 100 MG Tab Take 100 mg by mouth every evening.     Family History     None        Social History Main Topics    Smoking status: Current Every Day Smoker     Types: Cigarettes    Smokeless tobacco: Not on file    Alcohol use No    Drug use: No    Sexual activity: Not on file     Review of Systems   Constitutional: Negative for activity change and appetite change.   HENT: Negative for drooling and facial swelling.    Eyes: Negative for discharge and itching.   Respiratory: Negative for cough, shortness of breath and wheezing.    Cardiovascular: Negative for chest pain and palpitations.   Gastrointestinal: Negative for abdominal pain and nausea.   Endocrine: Negative for cold intolerance and heat intolerance.   Genitourinary: Negative for difficulty urinating and dysuria.   Musculoskeletal: Negative " for arthralgias and back pain.        B/l leg pain and swelling   Skin: Positive for rash. Negative for color change and pallor.   Neurological: Negative for dizziness and numbness.   Hematological: Negative for adenopathy. Does not bruise/bleed easily.   Psychiatric/Behavioral: Negative for behavioral problems and confusion.     Objective:     Vital Signs (Most Recent):  Temp: 98 °F (36.7 °C) (06/17/17 0053)  Pulse: 70 (06/17/17 0053)  Resp: 18 (06/17/17 0053)  BP: (!) 172/89 (06/17/17 0053)  SpO2: 100 % (06/17/17 0053) Vital Signs (24h Range):  Temp:  [98 °F (36.7 °C)-98.2 °F (36.8 °C)] 98 °F (36.7 °C)  Pulse:  [64-70] 70  Resp:  [18-31] 18  SpO2:  [98 %-100 %] 100 %  BP: (132-172)/(58-89) 172/89     Weight: 108.9 kg (240 lb)  Body mass index is 30.81 kg/m².    Physical Exam   Constitutional: He is oriented to person, place, and time. He appears well-developed and well-nourished.   HENT:   Head: Normocephalic and atraumatic.   Eyes: Conjunctivae are normal. Pupils are equal, round, and reactive to light.   Neck: Normal range of motion. No thyromegaly present.   Cardiovascular: Normal rate, regular rhythm and normal heart sounds.    Pulmonary/Chest: Effort normal and breath sounds normal.   Abdominal: Soft. He exhibits no distension. There is no tenderness.   Musculoskeletal: Normal range of motion. He exhibits edema (2 plus edema). He exhibits no tenderness.   B/L LE cellulitis and venous stasis and b/l feet ulcers   Neurological: He is alert and oriented to person, place, and time. Coordination normal.   Skin: No rash noted. No erythema.   Psychiatric: He has a normal mood and affect. His behavior is normal.        Significant Labs:   CBC:   Recent Labs  Lab 06/16/17 1958   WBC 5.77   HGB 8.9*   HCT 27.3*   PLT 94*     CMP:   Recent Labs  Lab 06/16/17 1958   *   K 4.3      CO2 22*   *   BUN 22*   CREATININE 1.5*   CALCIUM 7.8*   PROT 6.5   ALBUMIN 1.8*   BILITOT 1.4*   ALKPHOS 141*   AST 43*    ALT 15   ANIONGAP 6*   EGFRNONAA 55.8*     Coagulation:   Recent Labs  Lab 06/16/17 1958   INR 1.2       Significant Imaging: I have reviewed all pertinent imaging results/findings within the past 24 hours.    Assessment/Plan:     # Bilateral lower extremity cellulitis with venous stasis due to streptococcus   - will give a dose of Vanc in ED and check vanc random in AM  - start on unasyn     # Diabetic type 2 foot ulcers B/L  - podiatry consult; ESR and CRP  - X rays B/ L feet    # Decompensated Hep C cirrhosis with ascites  MELD-Na score: 17 at 6/16/2017  7:58 PM  MELD score: 14 at 6/16/2017  7:58 PM  Calculated from:  Serum Creatinine: 1.5 mg/dL at 6/16/2017  7:58 PM  Serum Sodium: 133 mmol/L at 6/16/2017  7:58 PM  Total Bilirubin: 1.4 mg/dL at 6/16/2017  7:58 PM  INR(ratio): 1.2 at 6/16/2017  7:58 PM  Age: 46 years  - hepatology consult  - last viral load 21,000 and has never been treated  - abdomen U/S with liver dopplers    # CKD stage 3 due to uncontrolled DM 2  - seems to be new baseline at 1.5    # DM2 with nephropathy  - Hba1c of 8.2 recently  - levemir 20 units at night and 8 of novolog with meals    # Anemia of CKD stage 3  # Thrombocytopenia  - monitor     # Hypoalbuminemia   - PAB in AM    # Hypomagnesemia   - replace    Discharge Disposition- monday      VTE Risk Mitigation         Ordered     Low Risk of VTE  Once      06/16/17 0538        Bandar Blunt MD  Department of Hospital Medicine   [facility]

## 2017-06-17 NOTE — ED PROVIDER NOTES
Encounter Date: 6/16/2017    SCRIBE #1 NOTE: I, Nona Smith, am scribing for, and in the presence of,  Dr. Sommers. I have scribed the following portions of the note - the Resident attestation.       History     Chief Complaint   Patient presents with    Weakness     Generalized weakness. Hep C. Cellulitis x 3 weeks in both legs with diabetic foot ulcers. cbg 408 with hx of DM.      Review of patient's allergies indicates:   Allergen Reactions    Shellfish containing products Hives     Shortness of breath     Mr. Nino is a 47 yo WM w/ h/o HCV cirrhosis & poorly controlled DM complicated by BL diabetic ulceration s/p 6/1 R 4th toe debridement who presents c/o generalized weakness w/ associated worsening BL LE edema.  He reports that 5d ago he began having dysuria.  Two day afterwards he woke up noticing his chronic BL LE edema was significantly worse with an associated erythematous, painful rash.  Later that day he began developing dyspnea on exertion that progressively worsened to the point where he is now dyspneic at rest.  Patient denies any vomiting but has mild, persistant nausea that has worsened acutely over the last 3 days.  He reports no fevers, chills, abdominal pain, worsening abdominal distention, diarrhea.  Patient states he has had no productive cough or wheezes.               Past Medical History:   Diagnosis Date    Ascites of liver 5/30/2017    Chronic hepatitis C without hepatic coma     Chronic liver failure without hepatic coma 5/30/2017    Diabetic foot ulcer     right foot    DM (diabetes mellitus), type 2 with peripheral vascular complications     Essential hypertension     Gallstones     Hepatic encephalopathy 5/30/2017    Hepatitis C     Pancreatitis      Past Surgical History:   Procedure Laterality Date    BACK SURGERY       History reviewed. No pertinent family history.  Social History   Substance Use Topics    Smoking status: Current Every Day Smoker     Types:  Cigarettes    Smokeless tobacco: Not on file    Alcohol use No     Review of Systems   Constitutional: Negative for chills and fever.   HENT: Negative for ear pain, hearing loss, mouth sores and sore throat.    Eyes: Negative for photophobia, pain and visual disturbance.   Respiratory: Positive for shortness of breath. Negative for chest tightness.    Cardiovascular: Positive for leg swelling. Negative for chest pain and palpitations.   Gastrointestinal: Negative for abdominal distention, abdominal pain, anal bleeding, blood in stool, constipation, diarrhea, nausea, rectal pain and vomiting.   Endocrine: Negative for polydipsia and polyuria.   Genitourinary: Positive for dysuria. Negative for hematuria.   Musculoskeletal: Positive for arthralgias, gait problem, joint swelling and myalgias.   Skin: Negative for rash and wound.   Neurological: Positive for weakness. Negative for dizziness, syncope and light-headedness.       Physical Exam     Initial Vitals [06/16/17 1839]   BP Pulse Resp Temp SpO2   (!) 161/89 65 18 98.2 °F (36.8 °C) 100 %     Physical Exam    Constitutional: He appears well-nourished. He is not diaphoretic. He appears ill. He appears distressed.   HENT:   Head: Normocephalic and atraumatic.   Eyes: EOM are normal. Pupils are equal, round, and reactive to light. No scleral icterus.   Neck: Normal range of motion. Neck supple.   Cardiovascular: Normal rate and regular rhythm. Exam reveals no gallop and no friction rub.    No murmur heard.  Pulmonary/Chest: No tachypnea. No respiratory distress. He has no wheezes. He has no rhonchi. He has rales in the right lower field and the left lower field.   Abdominal: Soft. He exhibits distension and ascites. Bowel sounds are increased. There is no tenderness.   Musculoskeletal: He exhibits edema (BL LE edema w/ ulceration noted on forefoot bilaterally). He exhibits no tenderness.   BL LE swelling w/ tender erythematous rash extending up both LE up to  immediately inferior to the knees w/ severe TTP noted on both rashes   Neurological: He is alert and oriented to person, place, and time.   Skin: Skin is dry. Rash noted.        Please see images below for foot ulceration                                             ED Course   Procedures  Labs Reviewed   CBC W/ AUTO DIFFERENTIAL - Abnormal; Notable for the following:        Result Value    RBC 3.22 (*)     Hemoglobin 8.9 (*)     Hematocrit 27.3 (*)     RDW 17.3 (*)     Platelets 94 (*)     Lymph # 0.7 (*)     Gran% 76.3 (*)     Lymph% 12.0 (*)     All other components within normal limits    Narrative:     one lavender tube shared   COMPREHENSIVE METABOLIC PANEL - Abnormal; Notable for the following:     Sodium 133 (*)     CO2 22 (*)     Glucose 305 (*)     BUN, Bld 22 (*)     Creatinine 1.5 (*)     Calcium 7.8 (*)     Albumin 1.8 (*)     Total Bilirubin 1.4 (*)     Alkaline Phosphatase 141 (*)     AST 43 (*)     Anion Gap 6 (*)     eGFR if non  55.8 (*)     All other components within normal limits    Narrative:     one lavender tube shared   URINALYSIS, REFLEX TO URINE CULTURE - Abnormal; Notable for the following:     Protein, UA 1+ (*)     Glucose, UA 2+ (*)     Occult Blood UA 3+ (*)     All other components within normal limits    Narrative:     Preferred Collection Type->Urine, Clean Catch   PROTIME-INR - Abnormal; Notable for the following:     Prothrombin Time 12.9 (*)     All other components within normal limits    Narrative:     one lavender tube shared   B-TYPE NATRIURETIC PEPTIDE - Abnormal; Notable for the following:      (*)     All other components within normal limits    Narrative:     one lavender tube shared   LIPASE - Abnormal; Notable for the following:     Lipase 97 (*)     All other components within normal limits    Narrative:     one lavender tube shared   MAGNESIUM - Abnormal; Notable for the following:     Magnesium 1.5 (*)     All other components within normal  limits    Narrative:     one lavender tube shared   URINALYSIS MICROSCOPIC - Abnormal; Notable for the following:     RBC, UA 40 (*)     WBC, UA 6 (*)     All other components within normal limits    Narrative:     Preferred Collection Type->Urine, Clean Catch   AMMONIA    Narrative:     one lavender tube shared   URINALYSIS, REFLEX TO URINE CULTURE   VANCOMYCIN, TROUGH          X-Rays:   Independently Interpreted Readings:   Chest X-Ray: Bilateral basilar subsegmental atelectasis versus dependent edema     Medical Decision Making:   History:   Old Medical Records: I decided to obtain old medical records.  Old Records Summarized: records from clinic visits and records from previous admission(s).  Initial Assessment:   47 yo WM w/ h/o HCV cirrhosis & poorly controlled DM who p/w BL celluitis and possible diabetic foot ulceration in setting of decompenasted liver failure w/ MELD-Na = 18.  Differential Diagnosis:   Decompensated cirrhosis 2/2 to acute cystitis, decompensated cirrhosis 2/2 to cellulitis, sepsis (MELD at 18 now)  Clinical Tests:   Lab Tests: Ordered and Reviewed  Radiological Study: Ordered and Reviewed  Medical Tests: Ordered and Reviewed  ED Management:  10:05 PM  Patient started on Vanc & Unasyn to cover for both cellulitis & diabetic foot ulcer infection.  UA negative for evidence of cystitis.  Also giving patient IV lasix 40 x 1, but will probably need additional fluid off.  Other:   I have discussed this case with another health care provider.       <> Summary of the Discussion: Hospital medicine            Scribe Attestation:   Scribe #1: I performed the above scribed service and the documentation accurately describes the services I performed. I attest to the accuracy of the note.    Attending Attestation:   Physician Attestation Statement for Resident:  As the supervising MD   Physician Attestation Statement: I have personally seen and examined this patient.   I agree with the above history. -:  46 y.o. male with hx of hepatitis C presenting with bilateral leg cellulitis for the past 3 days associated with generalized weakness. Labs show no leukocytosis, mildly elevated creatinine, worsening total bili and elevated BNP. Abdomen is soft. I do not have concern for SBP.  I'll treat with antibiotics.  Patient will be admitted for cellulitis and decompensated liver failure   As the supervising MD I agree with the above PE.    As the supervising MD I agree with the above treatment, course, plan, and disposition.  I have reviewed and agree with the residents interpretation of the following: x-rays, lab data and EKG.          Physician Attestation for Scribe:  Physician Attestation Statement for Scribe #1: I, Dr. Sommers, reviewed documentation, as scribed by Nona Smith in my presence, and it is both accurate and complete.                 ED Course     Clinical Impression:   The primary encounter diagnosis was Decompensated liver disease. Diagnoses of Cellulitis of left lower extremity, Cellulitis of right lower extremity, and Diabetic ulcer of toe of right foot associated with type 2 diabetes mellitus, limited to breakdown of skin were also pertinent to this visit.    Disposition:   Disposition: Admitted  Condition: Serious       Sommer Sommers MD  06/17/17 9625

## 2017-06-17 NOTE — HPI
Mr. Nino is a 47 yo WM w/ h/o HCV cirrhosis & poorly controlled DM complicated by BL diabetic ulceration s/p 6/1 R 4th toe debridement who presents c/o generalized weakness w/ associated worsening BL LE edema.  He reports that 5d ago he began having dysuria.  Two day afterwards he woke up noticing his chronic BL LE edema was significantly worse with an associated erythematous, painful rash.  Later that day he began developing dyspnea on exertion that progressively worsened to the point where he is now dyspneic at rest.  Patient denies any vomiting but has mild, persistant nausea that has worsened acutely over the last 3 days.  He reports no fevers, chills, abdominal pain, worsening abdominal distention, diarrhea.  Patient states he has had no productive cough or wheezes.

## 2017-06-17 NOTE — CONSULTS
Received consult, thank you.      Attempted to review DM MNT with pt however pt not appropriate at this time. He reported he's starving d/t NPO status and closed his eyes to continue resting.    A1c 8.4. .     Rosy Trevizo RD, LDN

## 2017-06-17 NOTE — CONSULTS
Consult Note  Podiatry    Consult Requested By: Bandar Blunt MD  Reason for Consult: B/l LE cellulitis     SUBJECTIVE     History of Present Illness:  Leif Nino is a 46 y.o. male  has a past medical history of Ascites of liver; Chronic hepatitis C without hepatic coma; Chronic liver failure without hepatic coma; Diabetic foot ulcer; DM (diabetes mellitus), type 2 with peripheral vascular complications; Essential hypertension; Gallstones; Hepatic encephalopathy; Hepatitis C; and Pancreatitis.  Admitted for BLE swellin, cellulitis. Consulted for B/L LE wounds. Pt. Recently discharged from [facility] on 6/5/17.  Reports wounds to B/L feet started two months ago reports applying silver to wounds daily. Reports never seeing a podiatrist before.  During recent admission from 5/30/17-6/5/17 MRI ordered for RLE revealing early osteo, ID consulted recommended discontinuing abx as wound not actuley infected. Vascular surgery consulted for diminished pulse B/L LE recommended no intervention at this time with f/u in clinic. Since discharge pt. Has not followed up in podiatry clinic and friend at home continues to change his dressings daily applying silver. Reports increased swelling and erythema in the past couple of days. Today patient lethargic with pain upon palpation B/L LE.     Scheduled Meds:   albumin human 25%  25 g Intravenous Q6H    ampicillin-sulbactim (UNASYN) IVPB  3 g Intravenous Q6H    aspirin  81 mg Oral Daily    atorvastatin  40 mg Oral Daily    carvedilol  6.25 mg Oral BID WM    dicyclomine  20 mg Oral TID    furosemide  40 mg Intravenous TID    insulin aspart  8 Units Subcutaneous TIDWM    insulin detemir  10 Units Subcutaneous QHS    quetiapine  100 mg Oral QHS    vancomycin (VANCOCIN) IVPB  15 mg/kg Intravenous Q12H     Continuous Infusions:   PRN Meds:acetaminophen, dextrose 50%, dextrose 50%, glucagon (human recombinant), glucose, glucose, guaifenesin 100 mg/5 ml, insulin aspart, ondansetron,  oxycodone    Review of patient's allergies indicates:   Allergen Reactions    Shellfish containing products Hives     Shortness of breath        Past Medical History:   Diagnosis Date    Ascites of liver 5/30/2017    Chronic hepatitis C without hepatic coma     Chronic liver failure without hepatic coma 5/30/2017    Diabetic foot ulcer     right foot    DM (diabetes mellitus), type 2 with peripheral vascular complications     Essential hypertension     Gallstones     Hepatic encephalopathy 5/30/2017    Hepatitis C     Pancreatitis      Past Surgical History:   Procedure Laterality Date    BACK SURGERY       History reviewed. No pertinent family history.  Social History   Substance Use Topics    Smoking status: Current Every Day Smoker     Types: Cigarettes    Smokeless tobacco: Not on file    Alcohol use No       Review of Systems:  Constitutional: no fever or chills  Respiratory: no cough or shortness of breath  Cardiovascular: no chest pain or palpitations  Gastrointestinal: no nausea or vomiting, tolerating diet  Integument/Breast: history of prior ulcerations  Musculoskeletal: positive for arthralgias  Neurological: history of numbness or paresthesias in the feet    OBJECTIVE     Vital Signs (Most Recent):  Temp: 97.8 °F (36.6 °C) (06/17/17 0838)  Pulse: 60 (06/17/17 0838)  Resp: 18 (06/17/17 0838)  BP: 132/68 (06/17/17 0838)  SpO2: 100 % (06/17/17 0838)  Body mass index is 35.89 kg/m².    Vital Signs Range (Last 24H):  Temp:  [97.7 °F (36.5 °C)-98.2 °F (36.8 °C)]   Pulse:  [60-74]   Resp:  [18-31]   BP: (132-172)/(58-89)   SpO2:  [98 %-100 %]     Physical Exam:    General: Pt. is well-developed, obese, appears stated age, in no acute distress, alert and oriented x 3.  Somnolent    Vascular: Distal DP/PT pulses diminished  b/l. CRT < 3 sec to tips of toes. Edema noted to b/l LE. vericosities noted to b/l LEs.     Dermatologic:     Right foot:  Ulcer Location: Plantar right hallux    Measurements:Post debridement 1.2x1.1x0.3  Periwound: Intact  Drainage: serosanguinous.  Malodor: None.  Base:  90% granular. 10% fibrin.  Signs of infection: None.     Wound 3 : Left plantar foot ulceration   Measurement: 1.5cmx1.5cmx0.1cm  Base: granular base   Periwound skin: hyperkeratosis  Drainage:none  Erythema: mild  Probe: none, no bone exposed    Musculoskeletal: Pain upon ROM DF/PF B/L . Pain to bilateral calf with compression.  Neurological: Light touch, proprioception, and sharp/dull sensation are all decreased bilaterally. Protective threshold with the [place]ienstein monofilament is decreased bilaterally.         Right foot       Left foot         Laboratory:  CBC:   Recent Labs  Lab 06/17/17  0420   WBC 3.93   RBC 2.93*   HGB 8.0*   HCT 24.9*   PLT 70*   MCV 85   MCH 27.3   MCHC 32.1     BMP:   Recent Labs  Lab 06/17/17  0421   *   *   K 4.2      CO2 23   BUN 22*   CREATININE 1.5*   CALCIUM 7.7*   MG 1.4*       Diagnostic Results:  Xray:  No definite osteomyelitis identified. Abnormality at the right third metatarsal head is likely from remote trauma . Small metal foreign body in right plantar soft tissues. Diffuse soft tissue swelling in the feet with more prominent swelling of the right first and fourth toes.    Clinical Findings:  Ulcerations stable no active signs of infection.     ASSESSMENT/PLAN     Assessment:  -DM II with peripheral neuropathy  -Diabetic ulcers of bilateral feet, appear stable.   - Venous Stasis  -PAD      Plan:  Ulcerations evaluate stable no active signs of infection  Iodosorb applied to wounds wrapped with cast padding, ACE up leg.   Nursing orders placed for daily dressing changes iodosorb, kerlix,ACE up leg.   Venous duplex ordered to r/o DVT   Podiatry will follow     Weightbearing Status: WBAT B/L   Offloading Device: Darco shoe.     Ana Luisa Ochoa DPM PGY-2  Pager: [phone]    I have reviewed and concur with the resident's history, physical,  assessment, and plan.  I have personally interviewed and examined the patient at bedside.

## 2017-06-18 LAB
ALBUMIN SERPL BCP-MCNC: 2.3 G/DL
ALP SERPL-CCNC: 103 U/L
ALT SERPL W/O P-5'-P-CCNC: 14 U/L
ANION GAP SERPL CALC-SCNC: 7 MMOL/L
AST SERPL-CCNC: 40 U/L
BASOPHILS # BLD AUTO: 0.04 K/UL
BASOPHILS NFR BLD: 1.2 %
BILIRUB SERPL-MCNC: 1 MG/DL
BUN SERPL-MCNC: 24 MG/DL
CALCIUM SERPL-MCNC: 7.8 MG/DL
CHLORIDE SERPL-SCNC: 106 MMOL/L
CO2 SERPL-SCNC: 20 MMOL/L
CREAT SERPL-MCNC: 1.5 MG/DL
DIFFERENTIAL METHOD: ABNORMAL
EOSINOPHIL # BLD AUTO: 0.2 K/UL
EOSINOPHIL NFR BLD: 5.8 %
ERYTHROCYTE [DISTWIDTH] IN BLOOD BY AUTOMATED COUNT: 17.2 %
EST. GFR  (AFRICAN AMERICAN): >60 ML/MIN/1.73 M^2
EST. GFR  (NON AFRICAN AMERICAN): 55.8 ML/MIN/1.73 M^2
GLUCOSE SERPL-MCNC: 135 MG/DL
HCT VFR BLD AUTO: 22.2 %
HGB BLD-MCNC: 7.4 G/DL
INR PPP: 1.3
LYMPHOCYTES # BLD AUTO: 0.7 K/UL
LYMPHOCYTES NFR BLD: 18.9 %
MAGNESIUM SERPL-MCNC: 1.5 MG/DL
MCH RBC QN AUTO: 27.8 PG
MCHC RBC AUTO-ENTMCNC: 33.3 %
MCV RBC AUTO: 84 FL
MONOCYTES # BLD AUTO: 0.3 K/UL
MONOCYTES NFR BLD: 7.6 %
NEUTROPHILS # BLD AUTO: 2.3 K/UL
NEUTROPHILS NFR BLD: 66.5 %
PHOSPHATE SERPL-MCNC: 2.9 MG/DL
PLATELET # BLD AUTO: 61 K/UL
PMV BLD AUTO: 9.4 FL
POCT GLUCOSE: 105 MG/DL (ref 70–110)
POCT GLUCOSE: 137 MG/DL (ref 70–110)
POCT GLUCOSE: 154 MG/DL (ref 70–110)
POCT GLUCOSE: 194 MG/DL (ref 70–110)
POTASSIUM SERPL-SCNC: 4.3 MMOL/L
PROT SERPL-MCNC: 5.7 G/DL
PROTHROMBIN TIME: 13.6 SEC
RBC # BLD AUTO: 2.66 M/UL
SODIUM SERPL-SCNC: 133 MMOL/L
VANCOMYCIN TROUGH SERPL-MCNC: 22.6 UG/ML
WBC # BLD AUTO: 3.44 K/UL

## 2017-06-18 PROCEDURE — 85025 COMPLETE CBC W/AUTO DIFF WBC: CPT

## 2017-06-18 PROCEDURE — 63600175 PHARM REV CODE 636 W HCPCS: Performed by: HOSPITALIST

## 2017-06-18 PROCEDURE — 99233 SBSQ HOSP IP/OBS HIGH 50: CPT | Mod: ,,, | Performed by: HOSPITALIST

## 2017-06-18 PROCEDURE — 83735 ASSAY OF MAGNESIUM: CPT

## 2017-06-18 PROCEDURE — 80202 ASSAY OF VANCOMYCIN: CPT

## 2017-06-18 PROCEDURE — 25000003 PHARM REV CODE 250: Performed by: HOSPITALIST

## 2017-06-18 PROCEDURE — 84100 ASSAY OF PHOSPHORUS: CPT

## 2017-06-18 PROCEDURE — 80053 COMPREHEN METABOLIC PANEL: CPT

## 2017-06-18 PROCEDURE — 63600175 PHARM REV CODE 636 W HCPCS: Performed by: INTERNAL MEDICINE

## 2017-06-18 PROCEDURE — 94760 N-INVAS EAR/PLS OXIMETRY 1: CPT

## 2017-06-18 PROCEDURE — 85610 PROTHROMBIN TIME: CPT

## 2017-06-18 PROCEDURE — 20600001 HC STEP DOWN PRIVATE ROOM

## 2017-06-18 PROCEDURE — 99406 BEHAV CHNG SMOKING 3-10 MIN: CPT

## 2017-06-18 PROCEDURE — P9047 ALBUMIN (HUMAN), 25%, 50ML: HCPCS | Performed by: HOSPITALIST

## 2017-06-18 RX ORDER — HYDROMORPHONE HYDROCHLORIDE 1 MG/ML
1 INJECTION, SOLUTION INTRAMUSCULAR; INTRAVENOUS; SUBCUTANEOUS EVERY 4 HOURS PRN
Status: DISCONTINUED | OUTPATIENT
Start: 2017-06-18 | End: 2017-07-07 | Stop reason: HOSPADM

## 2017-06-18 RX ADMIN — HYDROMORPHONE HYDROCHLORIDE 1 MG: 1 INJECTION, SOLUTION INTRAMUSCULAR; INTRAVENOUS; SUBCUTANEOUS at 11:06

## 2017-06-18 RX ADMIN — FUROSEMIDE 40 MG: 10 INJECTION, SOLUTION INTRAVENOUS at 05:06

## 2017-06-18 RX ADMIN — ALBUMIN (HUMAN) 25 G: 12.5 SOLUTION INTRAVENOUS at 05:06

## 2017-06-18 RX ADMIN — ATORVASTATIN CALCIUM 40 MG: 20 TABLET, FILM COATED ORAL at 10:06

## 2017-06-18 RX ADMIN — OXYCODONE HYDROCHLORIDE 10 MG: 5 TABLET ORAL at 03:06

## 2017-06-18 RX ADMIN — CARVEDILOL 6.25 MG: 6.25 TABLET, FILM COATED ORAL at 06:06

## 2017-06-18 RX ADMIN — FUROSEMIDE 40 MG: 10 INJECTION, SOLUTION INTRAVENOUS at 01:06

## 2017-06-18 RX ADMIN — AMPICILLIN SODIUM AND SULBACTAM SODIUM 3 G: 2; 1 INJECTION, POWDER, FOR SOLUTION INTRAMUSCULAR; INTRAVENOUS at 11:06

## 2017-06-18 RX ADMIN — ALBUMIN (HUMAN) 25 G: 12.5 SOLUTION INTRAVENOUS at 06:06

## 2017-06-18 RX ADMIN — INSULIN ASPART 8 UNITS: 100 INJECTION, SOLUTION INTRAVENOUS; SUBCUTANEOUS at 07:06

## 2017-06-18 RX ADMIN — AMPICILLIN SODIUM AND SULBACTAM SODIUM 3 G: 2; 1 INJECTION, POWDER, FOR SOLUTION INTRAMUSCULAR; INTRAVENOUS at 06:06

## 2017-06-18 RX ADMIN — FUROSEMIDE 40 MG: 10 INJECTION, SOLUTION INTRAVENOUS at 09:06

## 2017-06-18 RX ADMIN — AMPICILLIN SODIUM AND SULBACTAM SODIUM 3 G: 2; 1 INJECTION, POWDER, FOR SOLUTION INTRAMUSCULAR; INTRAVENOUS at 12:06

## 2017-06-18 RX ADMIN — CARVEDILOL 6.25 MG: 6.25 TABLET, FILM COATED ORAL at 07:06

## 2017-06-18 RX ADMIN — ASPIRIN 81 MG: 81 TABLET, COATED ORAL at 10:06

## 2017-06-18 RX ADMIN — INSULIN DETEMIR 10 UNITS: 100 INJECTION, SOLUTION SUBCUTANEOUS at 09:06

## 2017-06-18 RX ADMIN — ALBUMIN (HUMAN) 25 G: 12.5 SOLUTION INTRAVENOUS at 12:06

## 2017-06-18 RX ADMIN — DICYCLOMINE HYDROCHLORIDE 20 MG: 20 TABLET ORAL at 05:06

## 2017-06-18 RX ADMIN — MAGNESIUM SULFATE HEPTAHYDRATE 3 G: 500 INJECTION, SOLUTION INTRAMUSCULAR; INTRAVENOUS at 01:06

## 2017-06-18 RX ADMIN — HYDROMORPHONE HYDROCHLORIDE 0.5 MG: 1 INJECTION, SOLUTION INTRAMUSCULAR; INTRAVENOUS; SUBCUTANEOUS at 05:06

## 2017-06-18 RX ADMIN — QUETIAPINE FUMARATE 100 MG: 25 TABLET ORAL at 09:06

## 2017-06-18 RX ADMIN — INSULIN ASPART 8 UNITS: 100 INJECTION, SOLUTION INTRAVENOUS; SUBCUTANEOUS at 01:06

## 2017-06-18 RX ADMIN — ALBUMIN (HUMAN) 25 G: 12.5 SOLUTION INTRAVENOUS at 11:06

## 2017-06-18 RX ADMIN — DICYCLOMINE HYDROCHLORIDE 20 MG: 20 TABLET ORAL at 01:06

## 2017-06-18 RX ADMIN — INSULIN ASPART 8 UNITS: 100 INJECTION, SOLUTION INTRAVENOUS; SUBCUTANEOUS at 06:06

## 2017-06-18 RX ADMIN — AMPICILLIN SODIUM AND SULBACTAM SODIUM 3 G: 2; 1 INJECTION, POWDER, FOR SOLUTION INTRAMUSCULAR; INTRAVENOUS at 05:06

## 2017-06-18 RX ADMIN — DICYCLOMINE HYDROCHLORIDE 20 MG: 20 TABLET ORAL at 09:06

## 2017-06-18 RX ADMIN — HYDROMORPHONE HYDROCHLORIDE 1 MG: 1 INJECTION, SOLUTION INTRAMUSCULAR; INTRAVENOUS; SUBCUTANEOUS at 02:06

## 2017-06-18 RX ADMIN — INSULIN ASPART 1 UNITS: 100 INJECTION, SOLUTION INTRAVENOUS; SUBCUTANEOUS at 09:06

## 2017-06-18 RX ADMIN — HYDROMORPHONE HYDROCHLORIDE 0.5 MG: 1 INJECTION, SOLUTION INTRAMUSCULAR; INTRAVENOUS; SUBCUTANEOUS at 10:06

## 2017-06-18 RX ADMIN — HYDROMORPHONE HYDROCHLORIDE 1 MG: 1 INJECTION, SOLUTION INTRAMUSCULAR; INTRAVENOUS; SUBCUTANEOUS at 06:06

## 2017-06-18 NOTE — PROGRESS NOTES
[facility] Hospital Medicine  Progress Note    Patient Name: Leif Nino  MRN: [id number]  Patient Class: IP- Inpatient   Admission Date: 6/16/2017  Length of Stay: 1 days  Attending Physician: Bandar Blunt MD  Primary Care Provider: Primary Doctor [facility] Medicine Team: [facility] [place] [facility] Bandar Blunt MD    Subjective:     Principal Problem:Bilateral cellulitis of lower leg    HPI:  Mr. Nino is a 45 yo WM w/ h/o HCV cirrhosis & poorly controlled DM complicated by BL diabetic ulceration s/p 6/1 R 4th toe debridement who presents c/o generalized weakness w/ associated worsening BL LE edema.  He reports that 5d ago he began having dysuria.  Two day afterwards he woke up noticing his chronic BL LE edema was significantly worse with an associated erythematous, painful rash.  Later that day he began developing dyspnea on exertion that progressively worsened to the point where he is now dyspneic at rest.  Patient denies any vomiting but has mild, persistant nausea that has worsened acutely over the last 3 days.  He reports no fevers, chills, abdominal pain, worsening abdominal distention, diarrhea.  Patient states he has had no productive cough or wheezes.    Hospital Course:  No notes on file    Interval History: patient states he feels hungry, pain under better control;     Review of Systems   Constitutional: Negative for activity change and appetite change.   HENT: Negative for drooling and facial swelling.    Eyes: Negative for discharge and itching.   Respiratory: Negative for cough, shortness of breath and wheezing.    Cardiovascular: Negative for chest pain and palpitations.   Gastrointestinal: Negative for abdominal pain and nausea.   Genitourinary: Negative for difficulty urinating and dysuria.   Skin: Negative for color change and pallor.   Neurological: Negative for dizziness and numbness.   Psychiatric/Behavioral: Negative for behavioral problems and confusion.     Objective:      Vital Signs (Most Recent):  Temp: 98.3 °F (36.8 °C) (06/17/17 1700)  Pulse: 66 (06/17/17 1700)  Resp: 18 (06/17/17 1700)  BP: 138/70 (06/17/17 1700)  SpO2: 100 % (06/17/17 1700) Vital Signs (24h Range):  Temp:  [97.7 °F (36.5 °C)-98.3 °F (36.8 °C)] 98.3 °F (36.8 °C)  Pulse:  [60-74] 66  Resp:  [18-31] 18  SpO2:  [98 %-100 %] 100 %  BP: (132-172)/(58-89) 138/70     Weight: 126.8 kg (279 lb 8 oz)  Body mass index is 35.89 kg/m².    Intake/Output Summary (Last 24 hours) at 06/17/17 1900  Last data filed at 06/17/17 1800   Gross per 24 hour   Intake             2610 ml   Output             1825 ml   Net              785 ml      Physical Exam   Constitutional: He is oriented to person, place, and time. He appears well-developed and well-nourished.   HENT:   Head: Normocephalic and atraumatic.   Eyes: Conjunctivae are normal. Pupils are equal, round, and reactive to light.   Neck: Normal range of motion. No thyromegaly present.   Cardiovascular: Normal rate, regular rhythm and normal heart sounds.    Pulmonary/Chest: Effort normal and breath sounds normal.   Abdominal: Soft. He exhibits no distension. There is no tenderness.   Musculoskeletal: He exhibits edema.   B/L extremity erythema    Neurological: He is alert and oriented to person, place, and time. Coordination normal.   Skin: No rash noted. No erythema.       Significant Labs:   BMP:   Recent Labs  Lab 06/17/17 0421   *   *   K 4.2      CO2 23   BUN 22*   CREATININE 1.5*   CALCIUM 7.7*   MG 1.4*     CBC:   Recent Labs  Lab 06/16/17 1958 06/17/17 0420   WBC 5.77 3.93   HGB 8.9* 8.0*   HCT 27.3* 24.9*   PLT 94* 70*       Significant Imaging: I have reviewed all pertinent imaging results/findings within the past 24 hours.    Assessment/Plan:        # Bilateral lower extremity cellulitis with venous stasis due to streptococcus   -  on unasyn   - give another dose of vanc today     # Diabetic type 2 foot ulcers B/L  - podiatry consult,  appreciate recs  - X rays B/ L feet     # Decompensated Hep C cirrhosis with ascites  MELD-Na score: 17 at 6/17/2017  4:21 AM  MELD score: 14 at 6/17/2017  4:21 AM  Calculated from:  Serum Creatinine: 1.5 mg/dL at 6/17/2017  4:21 AM  Serum Sodium: 133 mmol/L at 6/17/2017  4:21 AM  Total Bilirubin: 1.1 mg/dL at 6/17/2017  4:21 AM  INR(ratio): 1.3 at 6/17/2017  4:21 AM  Age: 46 years    - hepatology consult  - last viral load 21,000 and has never been treated  - abdomen U/S with liver dopplers  - will set up for outpatient follow up     # CKD stage 3 due to uncontrolled DM 2  - seems to be new baseline at 1.5     # DM2 with nephropathy  - Hba1c of 8.2 recently  - levemir 20 units at night and 8 of novolog with meals     # Anemia of CKD stage 3  # Thrombocytopenia  - monitor      # Hypoalbuminemia   - PAB in AM     # Hypomagnesemia   - replace     Discharge Disposition- monday           VTE Risk Mitigation         Ordered     Low Risk of VTE  Once      06/16/17 9863          Bandar Blunt MD  Department of Hospital Medicine   [facility]

## 2017-06-18 NOTE — PROGRESS NOTES
Patient Consulted by CTTS:     The following was discussed by the Tobacco Treatment Specialist:  ? Relevance of Quitting  ? Risk to Health  ? Long Term Risk  ? Risk for Others  ? Rewards of Quitting  ? Motivation Intervention to Quit          Pt not interested in program. No education given.

## 2017-06-18 NOTE — PLAN OF CARE
Problem: Patient Care Overview  Goal: Plan of Care Review  Pt remains AAOx4, vital signs have been stable. Pt continuous to get out of bed without calling for assistance despite being educated on the need to call prior to ambulating. Pt is refusing bed alarm.  Antibiotics given as ordered. Pt complains of pain rating it 8/10 even though PRN medication given for pain. Plan of care reviewed with patient. Will continue to monitor.

## 2017-06-18 NOTE — SUBJECTIVE & OBJECTIVE
Interval History: patient states he feels hungry, pain under better control;     Review of Systems   Constitutional: Negative for activity change and appetite change.   HENT: Negative for drooling and facial swelling.    Eyes: Negative for discharge and itching.   Respiratory: Negative for cough, shortness of breath and wheezing.    Cardiovascular: Negative for chest pain and palpitations.   Gastrointestinal: Negative for abdominal pain and nausea.   Genitourinary: Negative for difficulty urinating and dysuria.   Skin: Negative for color change and pallor.   Neurological: Negative for dizziness and numbness.   Psychiatric/Behavioral: Negative for behavioral problems and confusion.     Objective:     Vital Signs (Most Recent):  Temp: 98.3 °F (36.8 °C) (06/17/17 1700)  Pulse: 66 (06/17/17 1700)  Resp: 18 (06/17/17 1700)  BP: 138/70 (06/17/17 1700)  SpO2: 100 % (06/17/17 1700) Vital Signs (24h Range):  Temp:  [97.7 °F (36.5 °C)-98.3 °F (36.8 °C)] 98.3 °F (36.8 °C)  Pulse:  [60-74] 66  Resp:  [18-31] 18  SpO2:  [98 %-100 %] 100 %  BP: (132-172)/(58-89) 138/70     Weight: 126.8 kg (279 lb 8 oz)  Body mass index is 35.89 kg/m².    Intake/Output Summary (Last 24 hours) at 06/17/17 1900  Last data filed at 06/17/17 1800   Gross per 24 hour   Intake             2610 ml   Output             1825 ml   Net              785 ml      Physical Exam   Constitutional: He is oriented to person, place, and time. He appears well-developed and well-nourished.   HENT:   Head: Normocephalic and atraumatic.   Eyes: Conjunctivae are normal. Pupils are equal, round, and reactive to light.   Neck: Normal range of motion. No thyromegaly present.   Cardiovascular: Normal rate, regular rhythm and normal heart sounds.    Pulmonary/Chest: Effort normal and breath sounds normal.   Abdominal: Soft. He exhibits no distension. There is no tenderness.   Musculoskeletal: He exhibits edema.   B/L extremity erythema    Neurological: He is alert and  oriented to person, place, and time. Coordination normal.   Skin: No rash noted. No erythema.       Significant Labs:   BMP:   Recent Labs  Lab 06/17/17  0421   *   *   K 4.2      CO2 23   BUN 22*   CREATININE 1.5*   CALCIUM 7.7*   MG 1.4*     CBC:   Recent Labs  Lab 06/16/17 1958 06/17/17 0420   WBC 5.77 3.93   HGB 8.9* 8.0*   HCT 27.3* 24.9*   PLT 94* 70*       Significant Imaging: I have reviewed all pertinent imaging results/findings within the past 24 hours.

## 2017-06-18 NOTE — PT/OT/SLP PROGRESS
"Physical Therapy      Leif Nino  MRN: [id number]    Patient not seen today secondary to pt refused, reports he "Cannot even tolerate getting to the edge of the bed, my legs are too swollen"  . Will follow-up next session.    Jennifer Alexandre, PT    "

## 2017-06-19 LAB
ABO + RH BLD: NORMAL
ALBUMIN SERPL BCP-MCNC: 2.8 G/DL
ALP SERPL-CCNC: 89 U/L
ALT SERPL W/O P-5'-P-CCNC: 14 U/L
ANION GAP SERPL CALC-SCNC: 8 MMOL/L
AST SERPL-CCNC: 41 U/L
BASOPHILS # BLD AUTO: 0.02 K/UL
BASOPHILS NFR BLD: 0.6 %
BILIRUB SERPL-MCNC: 1.3 MG/DL
BLD GP AB SCN CELLS X3 SERPL QL: NORMAL
BLD PROD TYP BPU: NORMAL
BLD PROD TYP BPU: NORMAL
BLOOD UNIT EXPIRATION DATE: NORMAL
BLOOD UNIT EXPIRATION DATE: NORMAL
BLOOD UNIT TYPE CODE: 6200
BLOOD UNIT TYPE CODE: 6200
BLOOD UNIT TYPE: NORMAL
BLOOD UNIT TYPE: NORMAL
BUN SERPL-MCNC: 25 MG/DL
CALCIUM SERPL-MCNC: 8.4 MG/DL
CHLORIDE SERPL-SCNC: 103 MMOL/L
CO2 SERPL-SCNC: 24 MMOL/L
CODING SYSTEM: NORMAL
CODING SYSTEM: NORMAL
CREAT SERPL-MCNC: 1.5 MG/DL
DIFFERENTIAL METHOD: ABNORMAL
DISPENSE STATUS: NORMAL
DISPENSE STATUS: NORMAL
EOSINOPHIL # BLD AUTO: 0.2 K/UL
EOSINOPHIL NFR BLD: 5.6 %
ERYTHROCYTE [DISTWIDTH] IN BLOOD BY AUTOMATED COUNT: 17.2 %
EST. GFR  (AFRICAN AMERICAN): >60 ML/MIN/1.73 M^2
EST. GFR  (NON AFRICAN AMERICAN): 55.8 ML/MIN/1.73 M^2
FIBRINOGEN PPP-MCNC: 166 MG/DL
GLUCOSE SERPL-MCNC: 153 MG/DL
HAPTOGLOB SERPL-MCNC: <10 MG/DL
HCT VFR BLD AUTO: 20.7 %
HGB BLD-MCNC: 6.8 G/DL
INR PPP: 1.4
LDH SERPL L TO P-CCNC: 229 U/L
LYMPHOCYTES # BLD AUTO: 0.7 K/UL
LYMPHOCYTES NFR BLD: 20.5 %
MAGNESIUM SERPL-MCNC: 1.5 MG/DL
MCH RBC QN AUTO: 27.6 PG
MCHC RBC AUTO-ENTMCNC: 32.9 %
MCV RBC AUTO: 84 FL
MONOCYTES # BLD AUTO: 0.3 K/UL
MONOCYTES NFR BLD: 10.6 %
NEUTROPHILS # BLD AUTO: 2 K/UL
NEUTROPHILS NFR BLD: 62.7 %
PHOSPHATE SERPL-MCNC: 3.7 MG/DL
PLATELET # BLD AUTO: 52 K/UL
PMV BLD AUTO: 10.1 FL
POCT GLUCOSE: 138 MG/DL (ref 70–110)
POCT GLUCOSE: 158 MG/DL (ref 70–110)
POCT GLUCOSE: 173 MG/DL (ref 70–110)
POCT GLUCOSE: 269 MG/DL (ref 70–110)
POTASSIUM SERPL-SCNC: 4.1 MMOL/L
PROT SERPL-MCNC: 5.9 G/DL
PROTHROMBIN TIME: 14.1 SEC
RBC # BLD AUTO: 2.46 M/UL
SODIUM SERPL-SCNC: 135 MMOL/L
TRANS ERYTHROCYTES VOL PATIENT: NORMAL ML
TRANS ERYTHROCYTES VOL PATIENT: NORMAL ML
WBC # BLD AUTO: 3.22 K/UL

## 2017-06-19 PROCEDURE — 86900 BLOOD TYPING SEROLOGIC ABO: CPT

## 2017-06-19 PROCEDURE — 83615 LACTATE (LD) (LDH) ENZYME: CPT

## 2017-06-19 PROCEDURE — 63600175 PHARM REV CODE 636 W HCPCS: Performed by: INTERNAL MEDICINE

## 2017-06-19 PROCEDURE — 97162 PT EVAL MOD COMPLEX 30 MIN: CPT

## 2017-06-19 PROCEDURE — 63600175 PHARM REV CODE 636 W HCPCS: Performed by: HOSPITALIST

## 2017-06-19 PROCEDURE — 99233 SBSQ HOSP IP/OBS HIGH 50: CPT | Mod: ,,, | Performed by: HOSPITALIST

## 2017-06-19 PROCEDURE — 36430 TRANSFUSION BLD/BLD COMPNT: CPT

## 2017-06-19 PROCEDURE — 20600001 HC STEP DOWN PRIVATE ROOM

## 2017-06-19 PROCEDURE — 85025 COMPLETE CBC W/AUTO DIFF WBC: CPT

## 2017-06-19 PROCEDURE — 84100 ASSAY OF PHOSPHORUS: CPT

## 2017-06-19 PROCEDURE — 85384 FIBRINOGEN ACTIVITY: CPT

## 2017-06-19 PROCEDURE — 36415 COLL VENOUS BLD VENIPUNCTURE: CPT

## 2017-06-19 PROCEDURE — 25000003 PHARM REV CODE 250: Performed by: HOSPITALIST

## 2017-06-19 PROCEDURE — P9021 RED BLOOD CELLS UNIT: HCPCS

## 2017-06-19 PROCEDURE — 85610 PROTHROMBIN TIME: CPT

## 2017-06-19 PROCEDURE — 86920 COMPATIBILITY TEST SPIN: CPT

## 2017-06-19 PROCEDURE — 86850 RBC ANTIBODY SCREEN: CPT

## 2017-06-19 PROCEDURE — 97166 OT EVAL MOD COMPLEX 45 MIN: CPT

## 2017-06-19 PROCEDURE — 25000003 PHARM REV CODE 250: Performed by: INTERNAL MEDICINE

## 2017-06-19 PROCEDURE — P9047 ALBUMIN (HUMAN), 25%, 50ML: HCPCS | Performed by: HOSPITALIST

## 2017-06-19 PROCEDURE — 83735 ASSAY OF MAGNESIUM: CPT

## 2017-06-19 PROCEDURE — 83010 ASSAY OF HAPTOGLOBIN QUANT: CPT

## 2017-06-19 PROCEDURE — 80053 COMPREHEN METABOLIC PANEL: CPT

## 2017-06-19 RX ORDER — OXYCODONE HYDROCHLORIDE 5 MG/1
15 TABLET ORAL EVERY 6 HOURS PRN
Status: DISCONTINUED | OUTPATIENT
Start: 2017-06-19 | End: 2017-07-07 | Stop reason: HOSPADM

## 2017-06-19 RX ORDER — HYDROCODONE BITARTRATE AND ACETAMINOPHEN 500; 5 MG/1; MG/1
TABLET ORAL
Status: DISCONTINUED | OUTPATIENT
Start: 2017-06-19 | End: 2017-07-07 | Stop reason: HOSPADM

## 2017-06-19 RX ORDER — OXYCODONE HYDROCHLORIDE 5 MG/1
15 TABLET ORAL ONCE
Status: COMPLETED | OUTPATIENT
Start: 2017-06-19 | End: 2017-06-19

## 2017-06-19 RX ADMIN — HYDROMORPHONE HYDROCHLORIDE 1 MG: 1 INJECTION, SOLUTION INTRAMUSCULAR; INTRAVENOUS; SUBCUTANEOUS at 08:06

## 2017-06-19 RX ADMIN — INSULIN ASPART 1 UNITS: 100 INJECTION, SOLUTION INTRAVENOUS; SUBCUTANEOUS at 09:06

## 2017-06-19 RX ADMIN — VANCOMYCIN HYDROCHLORIDE 1250 MG: 100 INJECTION, POWDER, LYOPHILIZED, FOR SOLUTION INTRAVENOUS at 03:06

## 2017-06-19 RX ADMIN — INSULIN ASPART 8 UNITS: 100 INJECTION, SOLUTION INTRAVENOUS; SUBCUTANEOUS at 07:06

## 2017-06-19 RX ADMIN — QUETIAPINE FUMARATE 100 MG: 25 TABLET ORAL at 08:06

## 2017-06-19 RX ADMIN — ALBUMIN (HUMAN) 25 G: 12.5 SOLUTION INTRAVENOUS at 05:06

## 2017-06-19 RX ADMIN — DICYCLOMINE HYDROCHLORIDE 20 MG: 20 TABLET ORAL at 05:06

## 2017-06-19 RX ADMIN — HYDROMORPHONE HYDROCHLORIDE 1 MG: 1 INJECTION, SOLUTION INTRAMUSCULAR; INTRAVENOUS; SUBCUTANEOUS at 03:06

## 2017-06-19 RX ADMIN — ASPIRIN 81 MG: 81 TABLET, COATED ORAL at 10:06

## 2017-06-19 RX ADMIN — ALBUMIN (HUMAN) 25 G: 12.5 SOLUTION INTRAVENOUS at 11:06

## 2017-06-19 RX ADMIN — AMPICILLIN SODIUM AND SULBACTAM SODIUM 3 G: 2; 1 INJECTION, POWDER, FOR SOLUTION INTRAMUSCULAR; INTRAVENOUS at 05:06

## 2017-06-19 RX ADMIN — CARVEDILOL 6.25 MG: 6.25 TABLET, FILM COATED ORAL at 04:06

## 2017-06-19 RX ADMIN — ATORVASTATIN CALCIUM 40 MG: 20 TABLET, FILM COATED ORAL at 10:06

## 2017-06-19 RX ADMIN — AMPICILLIN SODIUM AND SULBACTAM SODIUM 3 G: 2; 1 INJECTION, POWDER, FOR SOLUTION INTRAMUSCULAR; INTRAVENOUS at 11:06

## 2017-06-19 RX ADMIN — VANCOMYCIN HYDROCHLORIDE 1250 MG: 100 INJECTION, POWDER, LYOPHILIZED, FOR SOLUTION INTRAVENOUS at 04:06

## 2017-06-19 RX ADMIN — AMPICILLIN SODIUM AND SULBACTAM SODIUM 3 G: 2; 1 INJECTION, POWDER, FOR SOLUTION INTRAMUSCULAR; INTRAVENOUS at 07:06

## 2017-06-19 RX ADMIN — INSULIN ASPART 6 UNITS: 100 INJECTION, SOLUTION INTRAVENOUS; SUBCUTANEOUS at 12:06

## 2017-06-19 RX ADMIN — CARVEDILOL 6.25 MG: 6.25 TABLET, FILM COATED ORAL at 10:06

## 2017-06-19 RX ADMIN — INSULIN ASPART 8 UNITS: 100 INJECTION, SOLUTION INTRAVENOUS; SUBCUTANEOUS at 12:06

## 2017-06-19 RX ADMIN — OXYCODONE HYDROCHLORIDE 15 MG: 5 TABLET ORAL at 10:06

## 2017-06-19 RX ADMIN — HYDROMORPHONE HYDROCHLORIDE 1 MG: 1 INJECTION, SOLUTION INTRAMUSCULAR; INTRAVENOUS; SUBCUTANEOUS at 04:06

## 2017-06-19 RX ADMIN — INSULIN ASPART 8 UNITS: 100 INJECTION, SOLUTION INTRAVENOUS; SUBCUTANEOUS at 06:06

## 2017-06-19 RX ADMIN — INSULIN DETEMIR 10 UNITS: 100 INJECTION, SOLUTION SUBCUTANEOUS at 09:06

## 2017-06-19 RX ADMIN — HYDROMORPHONE HYDROCHLORIDE 1 MG: 1 INJECTION, SOLUTION INTRAMUSCULAR; INTRAVENOUS; SUBCUTANEOUS at 12:06

## 2017-06-19 NOTE — PLAN OF CARE
Problem: Patient Care Overview  Goal: Plan of Care Review  Pt remains AAOx4, vital signs have been stable. Pt continuous to get out of bed without calling for assistance despite being educated on the need to call prior to ambulating. Pt is still refusing bed alarm.  Antibiotics given as ordered. Pt complains of pain rating it 8/10 even though PRN medication given for pain. Plan of care reviewed with patient. Will continue to monitor.

## 2017-06-19 NOTE — SUBJECTIVE & OBJECTIVE
Interval History: patient is still having pain in his feet and legs; cellulitis is improving; cont abx    Review of Systems   Constitutional: Negative for activity change and appetite change.   HENT: Negative for drooling and facial swelling.    Eyes: Negative for discharge and itching.   Respiratory: Negative for cough, shortness of breath and wheezing.    Cardiovascular: Negative for chest pain and palpitations.   Gastrointestinal: Negative for abdominal pain and nausea.   Genitourinary: Negative for difficulty urinating and dysuria.   Skin: Negative for color change and pallor.   Neurological: Negative for dizziness and numbness.   Psychiatric/Behavioral: Negative for behavioral problems and confusion.     Objective:     Vital Signs (Most Recent):  Temp: 98.5 °F (36.9 °C) (06/18/17 1933)  Pulse: 63 (06/18/17 1933)  Resp: 17 (06/18/17 1933)  BP: 117/60 (06/18/17 1933)  SpO2: 99 % (06/18/17 1933) Vital Signs (24h Range):  Temp:  [97.8 °F (36.6 °C)-98.9 °F (37.2 °C)] 98.5 °F (36.9 °C)  Pulse:  [63-82] 63  Resp:  [17-18] 17  SpO2:  [97 %-100 %] 99 %  BP: (103-126)/(52-60) 117/60     Weight: 126.8 kg (279 lb 8 oz)  Body mass index is 35.89 kg/m².    Intake/Output Summary (Last 24 hours) at 06/18/17 2102  Last data filed at 06/18/17 1700   Gross per 24 hour   Intake          1411.46 ml   Output             1400 ml   Net            11.46 ml      Physical Exam   Constitutional: He is oriented to person, place, and time. He appears well-developed and well-nourished.   HENT:   Head: Normocephalic and atraumatic.   Eyes: Conjunctivae are normal. Pupils are equal, round, and reactive to light.   Neck: Normal range of motion. No thyromegaly present.   Cardiovascular: Normal rate, regular rhythm and normal heart sounds.    Pulmonary/Chest: Effort normal and breath sounds normal.   Abdominal: Soft. He exhibits no distension. There is no tenderness.   Musculoskeletal: He exhibits edema.   B/L extremity erythema    Neurological:  He is alert and oriented to person, place, and time. Coordination normal.   Skin: No rash noted. No erythema.       Significant Labs:   BMP:     Recent Labs  Lab 06/18/17  0353   *   *   K 4.3      CO2 20*   BUN 24*   CREATININE 1.5*   CALCIUM 7.8*   MG 1.5*     CBC:     Recent Labs  Lab 06/17/17  0420 06/18/17  0353   WBC 3.93 3.44*   HGB 8.0* 7.4*   HCT 24.9* 22.2*   PLT 70* 61*       Significant Imaging: I have reviewed all pertinent imaging results/findings within the past 24 hours.

## 2017-06-19 NOTE — PLAN OF CARE
Patient denies having primary care      [facility] [address] at [facility], [place] - [address]  [address]  [id number]  [facility] [address]  Phone: [phone] Fax: [phone]      Payor: MEDICAID / Plan: [facility] HEALTH / Product Type: Managed Medicaid /        06/19/17 1104   Discharge Assessment   Assessment Type Discharge Planning Assessment   Confirmed/corrected address and phone number on facesheet? Yes   Assessment information obtained from? Patient   Expected Length of Stay (days) 4   Communicated expected length of stay with patient/caregiver yes   Prior to hospitilization cognitive status: Alert/Oriented   Prior to hospitalization functional status: Independent   Current cognitive status: Alert/Oriented   Current Functional Status: Independent   Arrived From home or self-care   Lives With (lives with his friend Isael Delgado)   Able to Return to Prior Arrangements yes   Is patient able to care for self after discharge? Yes   Who are your caregiver(s) and their phone number(s)? Isael Delgado  friend  [phone]   Patient's perception of discharge disposition home or selfcare   Patient currently being followed by outpatient case management? No   Patient currently receives home health services? No   Patient currently receives any other outside agency services? No   Equipment Currently Used at Home none   Does the patient have transportation to healthcare appointments? Yes   Transportation Available family or friend will provide   On Dialysis? No   Discharge Plan A Home   Patient/Family In Agreement With Plan yes

## 2017-06-19 NOTE — CONSULTS
PharmD Consult: Vancomycin Dosing/Renal Dosing    45yo M admitted with bilateral lower extremity cellulitis/DM foot infection started on unasyn and vancomycin.    A/P:    1. Amp-sulbactam 3g IV q6h is dosed correctly for renal function.  2. Vancomycin 1750mg IV q12h started over the weekend. Lvl drawn appropriately Sunday morning was 22 mcg/mL. Two doses yesterday were held. This AM vancomycin 1250 IV q12h was restarted. Renal function stable, thus dose likely fine for cellulitis as goal is 10-15 mcg/mL. Since missed two doses will check level prior to 3rd dose tomorrow at 0300.   3. Renal function trended up since admission, but currently stable. SCr 1.5    Dianne Spencer, PharmD  [id number]

## 2017-06-19 NOTE — PLAN OF CARE
Problem: Occupational Therapy Goal  Goal: Occupational Therapy Goal  Goals to be met by: 6/26/17    Patient will increase functional independence with ADLs by performing:    UE Dressing with Set-up Assistance.  LE Dressing with Minimal Assistance.  Grooming while standing at sink with Supervision.  Toileting from toilet with Supervision for hygiene and clothing management.   Supine to sit with [place].  Stand pivot transfers with Modified [place].  Toilet transfer to toilet with Modified [place].    Outcome: Ongoing (interventions implemented as appropriate)  Evaluation completed and POC established.    NARINDER Du

## 2017-06-19 NOTE — PT/OT/SLP EVAL
Occupational Therapy  Evaluation    Leif Nino   MRN: [id number]   Admitting Diagnosis: Bilateral cellulitis of lower leg    OT Date of Treatment: 06/19/17   OT Start Time: 0936  OT Stop Time: 0950  OT Total Time (min): 14 min    Billable Minutes:  Evaluation 14    Diagnosis: Bilateral cellulitis of lower leg   R toe ulcer; L foot abscess    Past Medical History:   Diagnosis Date    Ascites of liver 5/30/2017    Chronic hepatitis C without hepatic coma     Chronic liver failure without hepatic coma 5/30/2017    Diabetic foot ulcer     right foot    DM (diabetes mellitus), type 2 with peripheral vascular complications     Essential hypertension     Gallstones     Hepatic encephalopathy 5/30/2017    Hepatitis C     Pancreatitis       Past Surgical History:   Procedure Laterality Date    BACK SURGERY         Referring physician: Jose Raul  Date referred to OT: 6/17/17    General Precautions: Standard,    Orthopedic Precautions:    Braces:            Patient History:  Living Environment  Lives With: friend(s)  Living Arrangements: house  Home Accessibility: stairs to enter home  Number of Stairs to Enter Home: 5  Stair Railings at Home: outside, present on right side  Transportation Available: family or friend will provide  Living Environment Comment: Pt lives in a [facility] which has a tub/shower. Prior to 2 wks ago, pt was (I) with ADLs/walking but has been declining functionally 2/2 pain for the last 2 weeks including unable to get into bathtub and 2 falls in the last month. Pt has no support upon D/C.  Equipment Currently Used at Home: bedside commode    Prior level of function:   Bed Mobility/Transfers: independent (Pt's functional ability has been declining a last 2 weeks 2/2 increasing pain.)  Grooming: independent  Bathing: independent  Upper Body Dressing: independent  Lower Body Dressing: independent  Toileting: independent  Home Management Skills: independent        Dominant hand:  right    Subjective:  Communicated with RN prior to session.  Chief Complaint: BLE/scrotal pain  Patient/Family stated goals: Decrease pain    Pain/Comfort  Pain Rating 1: 7/10  Location - Side 1: Bilateral  Location 1: leg  Pain Addressed 1: Reposition, Distraction  Pain Rating Post-Intervention 1: 10/10    Objective:  Patient found with: telemetry, peripheral IV    Cognitive Exam:  Oriented to: Person, Place, Time and Situation  Follows Commands/attention: Follows multistep  commands  Communication: clear/fluent  Memory:  No Deficits noted  Safety awareness/insight to disability: intact  Coping skills/emotional control: Screaming in pain with movement    Visual/perceptual:  Intact    Physical Exam:  Postural examination/scapula alignment: No postural abnormalities identified  Skin integrity: Bandages on B calves  Edema: None noted     Sensation:   Intact    Upper Extremity Range of Motion:  Right Upper Extremity: WFL  Left Upper Extremity: WFL    Upper Extremity Strength:  Right Upper Extremity: WFL  Left Upper Extremity: WFL   Strength: wfl    Fine motor coordination:   Intact    Gross motor coordination: WFL    Functional Mobility:  Bed Mobility:  Rolling/Turning Right: Stand by assistance  Scooting/Bridging: Stand by Assistance  Supine to Sit: Moderate Assistance  Sit to Supine: Minimum Assistance    Transfers:  Sit <> Stand Assistance: Contact Guard Assistance (x 2 trials)  Sit <> Stand Assistive Device: No Assistive Device    Functional Ambulation: Pt refused walking 2/2 10/10 BLe pain - was able to stand briefly 2x at bedside.    Activities of Daily Living: Pt refused ADLs 2/2 10/10 BLE pain with movement.      Balance:   Static Sit: GOOD-: Takes MODERATE challenges from all directions but inconsistently  Dynamic Sit: FAIR+: Maintains balance through MINIMAL excursions of active trunk motion  Static Stand: FAIR+: Takes MINIMAL challenges from all directions  Dynamic stand: FAIR: Needs CONTACT GUARD  "during gait    Therapeutic Activities and Exercises:  UE ROM/MMT  Functional mobility assessment  Sit/standing balance assessment  OT POC    AM-PAC 6 CLICK ADL  How much help from another person does this patient currently need?  1 = Unable, Total/Dependent Assistance  2 = A lot, Maximum/Moderate Assistance  3 = A little, Minimum/Contact Guard/Supervision  4 = None, Modified [place]/Independent    Putting on and taking off regular lower body clothing? : 1  Bathing (including washing, rinsing, drying)?: 1  Toileting, which includes using toilet, bedpan, or urinal? : 1  Putting on and taking off regular upper body clothing?: 2  Taking care of personal grooming such as brushing teeth?: 3  Eating meals?: 3  Total Score: 11    AM-PAC Raw Score CMS "G-Code Modifier Level of Impairment Assistance   6 % Total / Unable   7 - 9 CM 80 - 100% Maximal Assist   10-14 CL 60 - 80% Moderate Assist   15 - 19 CK 40 - 60% Moderate Assist   20 - 22 CJ 20 - 40% Minimal Assist   23 CI 1-20% SBA / CGA   24 CH 0% Independent/ Mod I       Patient left supine with all lines intact and call button in reach    Assessment:  Leif Nino is a 46 y.o. male with a medical diagnosis of Bilateral cellulitis of lower leg and presents with high level pain at rest and with movement which is the biggest barrier to pt's ability to move and participate in therapy. Due to his pain, pt has decreased (I) in multiple ADL areas, functional mobility & t/fs and would benefit from IP OT to address these deficits and to facilitate improving (I) with daily tasks. At this time, participation is poor and pt's functional level is difficult to accurately assess due to pain, so post-acute recommendation is SNF vs. HH at this time.       Rehab identified problem list/impairments: Rehab identified problem list/impairments: weakness, impaired self care skills, impaired balance, decreased coordination, pain, impaired functional mobilty, impaired endurance, gait " instability, decreased lower extremity function, edema, impaired skin, decreased safety awareness    Rehab potential is fair.    Activity tolerance: Poor    Discharge recommendations: Discharge Facility/Level Of Care Needs: nursing facility, skilled, home health OT     Barriers to discharge: Barriers to Discharge: Decreased caregiver support, Inaccessible home environment    Equipment recommendations:  (TBD depending on progress)     GOALS:    Occupational Therapy Goals        Problem: Occupational Therapy Goal    Goal Priority Disciplines Outcome Interventions   Occupational Therapy Goal     OT, PT/OT Ongoing (interventions implemented as appropriate)    Description:  Goals to be met by: 6/26/17    Patient will increase functional independence with ADLs by performing:    UE Dressing with Set-up Assistance.  LE Dressing with Minimal Assistance.  Grooming while standing at sink with Supervision.  Toileting from toilet with Supervision for hygiene and clothing management.   Supine to sit with [place].  Stand pivot transfers with Modified [place].  Toilet transfer to toilet with Modified [place].                      PLAN:  Patient to be seen 3 x/week to address the above listed problems via self-care/home management, therapeutic activities, therapeutic exercises, neuromuscular re-education  Plan of Care expires: 07/19/17  Plan of Care reviewed with: patient         NARINDER Yates  06/19/2017

## 2017-06-19 NOTE — NURSING
Pt refused am Lasix and wound care. Stated that he will get it later because he was too tired. Will let incoming nurse know.

## 2017-06-19 NOTE — PROGRESS NOTES
[facility] Hospital Medicine  Progress Note    Patient Name: Leif Nino  MRN: [id number]  Patient Class: IP- Inpatient   Admission Date: 6/16/2017  Length of Stay: 2 days  Attending Physician: Bandar Blunt MD  Primary Care Provider: Primary Doctor [facility][1] Medicine Team: [facility] MED A Bandar Blunt MD    Subjective:     Principal Problem:Bilateral cellulitis of lower leg    HPI:  Mr. Nino is a 47 yo WM w/ h/o HCV cirrhosis & poorly controlled DM complicated by BL diabetic ulceration s/p 6/1 R 4th toe debridement who presents c/o generalized weakness w/ associated worsening BL LE edema.  He reports that 5d ago he began having dysuria.  Two day afterwards he woke up noticing his chronic BL LE edema was significantly worse with an associated erythematous, painful rash.  Later that day he began developing dyspnea on exertion that progressively worsened to the point where he is now dyspneic at rest.  Patient denies any vomiting but has mild, persistant nausea that has worsened acutely over the last 3 days.  He reports no fevers, chills, abdominal pain, worsening abdominal distention, diarrhea.  Patient states he has had no productive cough or wheezes.    Hospital Course:  No notes on file    Interval History: patient is still having pain in his feet and legs; cellulitis is improving; cont abx    Review of Systems   Constitutional: Negative for activity change and appetite change.   HENT: Negative for drooling and facial swelling.    Eyes: Negative for discharge and itching.   Respiratory: Negative for cough, shortness of breath and wheezing.    Cardiovascular: Negative for chest pain and palpitations.   Gastrointestinal: Negative for abdominal pain and nausea.   Genitourinary: Negative for difficulty urinating and dysuria.   Skin: Negative for color change and pallor.   Neurological: Negative for dizziness and numbness.   Psychiatric/Behavioral: Negative for behavioral problems and  confusion.     Objective:     Vital Signs (Most Recent):  Temp: 98.5 °F (36.9 °C) (06/18/17 1933)  Pulse: 63 (06/18/17 1933)  Resp: 17 (06/18/17 1933)  BP: 117/60 (06/18/17 1933)  SpO2: 99 % (06/18/17 1933) Vital Signs (24h Range):  Temp:  [97.8 °F (36.6 °C)-98.9 °F (37.2 °C)] 98.5 °F (36.9 °C)  Pulse:  [63-82] 63  Resp:  [17-18] 17  SpO2:  [97 %-100 %] 99 %  BP: (103-126)/(52-60) 117/60     Weight: 126.8 kg (279 lb 8 oz)  Body mass index is 35.89 kg/m².    Intake/Output Summary (Last 24 hours) at 06/18/17 2102  Last data filed at 06/18/17 1700   Gross per 24 hour   Intake          1411.46 ml   Output             1400 ml   Net            11.46 ml      Physical Exam   Constitutional: He is oriented to person, place, and time. He appears well-developed and well-nourished.   HENT:   Head: Normocephalic and atraumatic.   Eyes: Conjunctivae are normal. Pupils are equal, round, and reactive to light.   Neck: Normal range of motion. No thyromegaly present.   Cardiovascular: Normal rate, regular rhythm and normal heart sounds.    Pulmonary/Chest: Effort normal and breath sounds normal.   Abdominal: Soft. He exhibits no distension. There is no tenderness.   Musculoskeletal: He exhibits edema.   B/L extremity erythema    Neurological: He is alert and oriented to person, place, and time. Coordination normal.   Skin: No rash noted. No erythema.       Significant Labs:   BMP:     Recent Labs  Lab 06/18/17  0353   *   *   K 4.3      CO2 20*   BUN 24*   CREATININE 1.5*   CALCIUM 7.8*   MG 1.5*     CBC:     Recent Labs  Lab 06/17/17  0420 06/18/17  0353   WBC 3.93 3.44*   HGB 8.0* 7.4*   HCT 24.9* 22.2*   PLT 70* 61*       Significant Imaging: I have reviewed all pertinent imaging results/findings within the past 24 hours.    Assessment/Plan:      # Bilateral lower extremity cellulitis with venous stasis due to streptococcus   -  on unasyn   - give another dose of vanc today     # Diabetic type 2 foot ulcers  B/L  - podiatry consult, appreciate recs  - X rays B/ L feet     # Decompensated Hep C cirrhosis with ascites  MELD-Na score: 17 at 6/17/2017  4:21 AM  MELD score: 14 at 6/17/2017  4:21 AM  Calculated from:  Serum Creatinine: 1.5 mg/dL at 6/17/2017  4:21 AM  Serum Sodium: 133 mmol/L at 6/17/2017  4:21 AM  Total Bilirubin: 1.1 mg/dL at 6/17/2017  4:21 AM  INR(ratio): 1.3 at 6/17/2017  4:21 AM  Age: 46 years     - hepatology consult  - last viral load 21,000 and has never been treated  - abdomen U/S with liver dopplers  - will set up for outpatient follow up     # CKD stage 3 due to uncontrolled DM 2  - seems to be new baseline at 1.5     # DM2 with nephropathy  - Hba1c of 8.2 recently  - levemir 20 units at night and 8 of novolog with meals     # Anemia of CKD stage 3  # Thrombocytopenia  - blood counts are dropping, possible hemolysis, check DIC and hemolysis labs in the AM; FOBT     # Hypoalbuminemia due to moderate protein fernando malnutrition  - PAB 3, glucerna     # Hypomagnesemia   - replace     Discharge Disposition- monday               VTE Risk Mitigation         Ordered     Low Risk of VTE  Once      06/16/17 1526          Bandar Blunt MD  Department of Hospital Medicine   [facility]

## 2017-06-19 NOTE — PLAN OF CARE
Problem: Physical Therapy Goal  Goal: Physical Therapy Goal  Goals to be met by: 17     Patient will increase functional independence with mobility by performin. Supine to sit with Stand-by Assistance  2. Sit to supine with Stand-by Assistance  3. Sit to stand transfer with Stand-by Assistance  4. Bed to chair transfer with Stand-by Assistance using LRAD  5. Gait  x 100 feet with Contact Guard Assistance using LRAD  6. Ascend/descend 5 stair with right Handrails Moderate Assistance  7. Lower extremity exercise program x 15 reps per handout, with independence    Outcome: Ongoing (interventions implemented as appropriate)  Evaluation complete and goals established.     Elba Marcos DPT, PT  2017

## 2017-06-19 NOTE — PT/OT/SLP EVAL
Physical Therapy  Evaluation    Leif Nino   MRN: [id number]   Admitting Diagnosis: Bilateral cellulitis of lower leg    PT Received On: 06/19/17  PT Start Time: 0936     PT Stop Time: 0952    PT Total Time (min): 16 min       Billable Minutes:  Evaluation 16 minutes     History: personal factors and/or comorbidities that impact the plan of care: Hep C, liver failure, HTN, DM type 2, diabetic foot ulcer   Evaluation of Body Systems: cardiovascular/pulmonary, integumentary, musculoskeletal, neuromuscular, cognition/communication  Functional Outcome Tools: AMPAC, ROM, MMT  Clinical Presentation:  evolving    Evaluation Complexity Level: Moderate    Diagnosis: Bilateral cellulitis of lower leg    Past Medical History:   Diagnosis Date    Ascites of liver 5/30/2017    Chronic hepatitis C without hepatic coma     Chronic liver failure without hepatic coma 5/30/2017    Diabetic foot ulcer     right foot    DM (diabetes mellitus), type 2 with peripheral vascular complications     Essential hypertension     Gallstones     Hepatic encephalopathy 5/30/2017    Hepatitis C     Pancreatitis       Past Surgical History:   Procedure Laterality Date    BACK SURGERY         Referring physician: ARIC Blunt   Date referred to PT: 6/18/17    General Precautions: Standard, fall  Orthopedic Precautions: N/A   Braces: N/A       Do you have any cultural, spiritual, Druze conflicts, given your current situation?: None stated     Patient History:  Lives With: friend(s)  Living Arrangements: house  Home Accessibility: stairs to enter home  Home Layout: Able to live on 1st floor  Number of Stairs to Enter Home: 5  Stair Railings at Home: outside, present on right side  Living Environment Comment: Pt reports living with roommate in [facility] with ~5 CAMERON; R handrail. Bathroom has tub/shower combo with standard commode. PTA, pt was (I) with functional mobility and ADLs until increase in pain in BLE which caused decline in function. Pt  "reports "furniture walking" and unable to get in/out tub secondary to pain/weakness. Pt reports 2 falls within the last month. Pt has no support upon discharge.   Equipment Currently Used at Home: BS gabriella     Previous Level of Function:  Ambulation Skills: independent  Transfer Skills: independent  ADL Skills: independent  Work/Leisure Activity: independent    Subjective:  Communicated with RN prior to session.  Pt agreeable to PT/OT evaluation. Pt states "I can't do it by myself. I need help. I am in too much pain. I don't know how long the pain has been going on."   Chief Complaint: pain   Patient goals: decrease pain     Pain/Comfort  Pain Rating 1: 8/10  Location - Side 1: Bilateral  Location - Orientation 1: generalized  Location 1: leg  Pain Addressed 1: Cessation of Activity, Distraction, Nurse notified  Pain Rating Post-Intervention 1:  (12/10)  Pain Rating 2: 8/10  Location 2:  (scrotum )  Pain Addressed 2: Distraction, Cessation of Activity      Objective:   Patient found with: peripheral IV, telemetry, bed alarm     Cognitive Exam:  Oriented to: Person, Place, Time and Situation    Follows Commands/attention: Easily distracted and Follows one-step commands  Communication: clear/fluent  Safety awareness/insight to disability: impaired    Physical Exam:  Postural examination/scapula alignment: Rounded shoulder, Head forward and Posterior pelvic tilt    Skin integrity: Thin, Dry and bandage on BLE for cellulitis   Edema: Mild in BLE    Sensation:   Intact    Lower Extremity Range of Motion:  Right Lower Extremity: WFL  Left Lower Extremity: WFL    Lower Extremity Strength: JENNI secondary to pain     Fine motor coordination:JENNI    Gross motor coordination:  Impaired    Functional Mobility:  Bed Mobility:  Rolling/Turning Right: Moderate assistance  Scooting/Bridging: Stand by Assistance (anterior scoot towards EOB )  Supine to Sit: Moderate Assistance  Sit to Supine: Contact Guard Assistance (at " BLE)    Transfers:  Sit <> Stand Assistance: Contact Guard Assistance (x 2 trials. HOB elevated )  Sit <> Stand Assistive Device: No Assistive Device  Bed <> Chair Technique:  (Pt refused)    Gait:   Gait Distance: Pt refused.     Balance:   Static Sit: FAIR+: Able to take MINIMAL challenges from all directions  Dynamic Sit: FAIR+: Maintains balance through MINIMAL excursions of active trunk motion  Static Stand: FAIR: Maintains without assist but unable to take challenges    Therapeutic Activities and Exercises:  Pt educated on role of PT and POC/goals for therapy as well as safety with mobility. Pt verbalized understanding. Pt expressed no further concerns/questions.     AM-PAC 6 CLICK MOBILITY  How much help from another person does this patient currently need?   1 = Unable, Total/Dependent Assistance  2 = A lot, Maximum/Moderate Assistance  3 = A little, Minimum/Contact Guard/Supervision  4 = None, Modified [place]/Independent    Turning over in bed (including adjusting bedclothes, sheets and blankets)?: 2  Sitting down on and standing up from a chair with arms (e.g., wheelchair, bedside commode, etc.): 3  Moving from lying on back to sitting on the side of the bed?: 2  Moving to and from a bed to a chair (including a wheelchair)?: 2  Need to walk in hospital room?: 2  Climbing 3-5 steps with a railing?: 1  Total Score: 12     AM-PAC Raw Score CMS G-Code Modifier Level of Impairment Assistance   6 % Total / Unable   7 - 9 CM 80 - 100% Maximal Assist   10 - 14 CL 60 - 80% Moderate Assist   15 - 19 CK 40 - 60% Moderate Assist   20 - 22 CJ 20 - 40% Minimal Assist   23 CI 1-20% SBA / CGA   24 CH 0% Independent/ Mod I     Patient left with bed in chair position with all lines intact, call button in reach, bed alarm on and Dr. Blunt notified.    Assessment:   Leif Nino is a 46 y.o. male with a medical diagnosis of Bilateral cellulitis of lower leg. Upon initial PT evaluation, pt presents with   impaired endurance, impaired self care skills, impaired functional mobilty, gait instability, weakness, impaired balance, decreased coordination, decreased lower extremity function, decreased safety awareness, pain, impaired skin, and impaired cardiopulmonary response to activity. Pt completed bed mobility with mod A and transfers with CGA using no AD. Pt refused gait secondary to BLE pain. Pt reports 2 recent falls and limited assistance upon discharge. Pt would benefit from skilled PT services to address these deficits and improve return to PLOF. Anticipate d/c to SNF (pending patient's participation and motivation).     Rehab identified problem list/impairments: Rehab identified problem list/impairments: impaired endurance, impaired self care skills, impaired functional mobilty, gait instability, weakness, impaired balance, decreased coordination, decreased lower extremity function, decreased safety awareness, pain, impaired skin, impaired cardiopulmonary response to activity    Rehab potential is fair.    Activity tolerance: Fair    Discharge recommendations: Discharge Facility/Level Of Care Needs: nursing facility, skilled (pending pt's motivation to participate in therapy and progress in therapy)     Barriers to discharge: Barriers to Discharge: Inaccessible home environment, Decreased caregiver support    Equipment recommendations: Equipment Needed After Discharge:  (TBD at next level of care)     GOALS:    Physical Therapy Goals        Problem: Physical Therapy Goal    Goal Priority Disciplines Outcome Goal Variances Interventions   Physical Therapy Goal     PT/OT, PT Ongoing (interventions implemented as appropriate)     Description:  Goals to be met by: 17     Patient will increase functional independence with mobility by performin. Supine to sit with Stand-by Assistance  2. Sit to supine with Stand-by Assistance  3. Sit to stand transfer with Stand-by Assistance  4. Bed to chair transfer with  Stand-by Assistance using LRAD  5. Gait  x 100 feet with Contact Guard Assistance using LRAD  6. Ascend/descend 5 stair with right Handrails Moderate Assistance  7. Lower extremity exercise program x 15 reps per handout, with independence                      PLAN:    Patient to be seen 4 x/week to address the above listed problems via gait training, therapeutic activities, therapeutic exercises, neuromuscular re-education  Plan of Care expires: 07/19/17  Plan of Care reviewed with: patient      Elba Marcos, PT  06/19/2017

## 2017-06-20 LAB
ALBUMIN SERPL BCP-MCNC: 3.1 G/DL
ALP SERPL-CCNC: 90 U/L
ALT SERPL W/O P-5'-P-CCNC: 14 U/L
ANION GAP SERPL CALC-SCNC: 7 MMOL/L
AST SERPL-CCNC: 40 U/L
BASOPHILS # BLD AUTO: 0.02 K/UL
BASOPHILS NFR BLD: 0.5 %
BILIRUB SERPL-MCNC: 2.7 MG/DL
BUN SERPL-MCNC: 24 MG/DL
CALCIUM SERPL-MCNC: 8.4 MG/DL
CHLORIDE SERPL-SCNC: 105 MMOL/L
CO2 SERPL-SCNC: 24 MMOL/L
CREAT SERPL-MCNC: 1.5 MG/DL
DIFFERENTIAL METHOD: ABNORMAL
EOSINOPHIL # BLD AUTO: 0.2 K/UL
EOSINOPHIL NFR BLD: 4.9 %
ERYTHROCYTE [DISTWIDTH] IN BLOOD BY AUTOMATED COUNT: 17 %
EST. GFR  (AFRICAN AMERICAN): >60 ML/MIN/1.73 M^2
EST. GFR  (NON AFRICAN AMERICAN): 55.8 ML/MIN/1.73 M^2
FIBRINOGEN PPP-MCNC: 191 MG/DL
FIBRINOGEN PPP-MCNC: 191 MG/DL
GLUCOSE SERPL-MCNC: 108 MG/DL
HCT VFR BLD AUTO: 25 %
HGB BLD-MCNC: 8.4 G/DL
INR PPP: 1.3
LYMPHOCYTES # BLD AUTO: 0.9 K/UL
LYMPHOCYTES NFR BLD: 22 %
MAGNESIUM SERPL-MCNC: 1.5 MG/DL
MCH RBC QN AUTO: 28.3 PG
MCHC RBC AUTO-ENTMCNC: 33.6 %
MCV RBC AUTO: 84 FL
MONOCYTES # BLD AUTO: 0.5 K/UL
MONOCYTES NFR BLD: 11.2 %
NEUTROPHILS # BLD AUTO: 2.6 K/UL
NEUTROPHILS NFR BLD: 60.7 %
PHOSPHATE SERPL-MCNC: 3.4 MG/DL
PLATELET # BLD AUTO: 58 K/UL
PMV BLD AUTO: 10 FL
POCT GLUCOSE: 107 MG/DL (ref 70–110)
POCT GLUCOSE: 137 MG/DL (ref 70–110)
POCT GLUCOSE: 143 MG/DL (ref 70–110)
POCT GLUCOSE: 188 MG/DL (ref 70–110)
POTASSIUM SERPL-SCNC: 4.3 MMOL/L
PROT SERPL-MCNC: 6.3 G/DL
PROTHROMBIN TIME: 13.6 SEC
RBC # BLD AUTO: 2.97 M/UL
SODIUM SERPL-SCNC: 136 MMOL/L
VANCOMYCIN TROUGH SERPL-MCNC: 20.6 UG/ML
WBC # BLD AUTO: 4.27 K/UL

## 2017-06-20 PROCEDURE — 85384 FIBRINOGEN ACTIVITY: CPT

## 2017-06-20 PROCEDURE — 99233 SBSQ HOSP IP/OBS HIGH 50: CPT | Mod: ,,, | Performed by: HOSPITALIST

## 2017-06-20 PROCEDURE — 85610 PROTHROMBIN TIME: CPT

## 2017-06-20 PROCEDURE — 85025 COMPLETE CBC W/AUTO DIFF WBC: CPT

## 2017-06-20 PROCEDURE — 84100 ASSAY OF PHOSPHORUS: CPT

## 2017-06-20 PROCEDURE — 63600175 PHARM REV CODE 636 W HCPCS: Performed by: HOSPITALIST

## 2017-06-20 PROCEDURE — 25000003 PHARM REV CODE 250: Performed by: HOSPITALIST

## 2017-06-20 PROCEDURE — 63600175 PHARM REV CODE 636 W HCPCS: Performed by: INTERNAL MEDICINE

## 2017-06-20 PROCEDURE — 80202 ASSAY OF VANCOMYCIN: CPT

## 2017-06-20 PROCEDURE — 20600001 HC STEP DOWN PRIVATE ROOM

## 2017-06-20 PROCEDURE — P9047 ALBUMIN (HUMAN), 25%, 50ML: HCPCS | Performed by: HOSPITALIST

## 2017-06-20 PROCEDURE — 25000003 PHARM REV CODE 250: Performed by: INTERNAL MEDICINE

## 2017-06-20 PROCEDURE — 36415 COLL VENOUS BLD VENIPUNCTURE: CPT

## 2017-06-20 PROCEDURE — 80053 COMPREHEN METABOLIC PANEL: CPT

## 2017-06-20 PROCEDURE — 83735 ASSAY OF MAGNESIUM: CPT

## 2017-06-20 RX ORDER — IPRATROPIUM BROMIDE AND ALBUTEROL SULFATE 2.5; .5 MG/3ML; MG/3ML
3 SOLUTION RESPIRATORY (INHALATION) EVERY 4 HOURS PRN
Status: DISCONTINUED | OUTPATIENT
Start: 2017-06-20 | End: 2017-07-06

## 2017-06-20 RX ORDER — MAGNESIUM SULFATE HEPTAHYDRATE 40 MG/ML
2 INJECTION, SOLUTION INTRAVENOUS ONCE
Status: COMPLETED | OUTPATIENT
Start: 2017-06-20 | End: 2017-06-20

## 2017-06-20 RX ADMIN — AMPICILLIN SODIUM AND SULBACTAM SODIUM 3 G: 2; 1 INJECTION, POWDER, FOR SOLUTION INTRAMUSCULAR; INTRAVENOUS at 12:06

## 2017-06-20 RX ADMIN — HYDROMORPHONE HYDROCHLORIDE 1 MG: 1 INJECTION, SOLUTION INTRAMUSCULAR; INTRAVENOUS; SUBCUTANEOUS at 04:06

## 2017-06-20 RX ADMIN — HYDROMORPHONE HYDROCHLORIDE 1 MG: 1 INJECTION, SOLUTION INTRAMUSCULAR; INTRAVENOUS; SUBCUTANEOUS at 11:06

## 2017-06-20 RX ADMIN — MAGNESIUM SULFATE IN WATER 2 G: 40 INJECTION, SOLUTION INTRAVENOUS at 12:06

## 2017-06-20 RX ADMIN — INSULIN ASPART 8 UNITS: 100 INJECTION, SOLUTION INTRAVENOUS; SUBCUTANEOUS at 12:06

## 2017-06-20 RX ADMIN — HYDROMORPHONE HYDROCHLORIDE 1 MG: 1 INJECTION, SOLUTION INTRAMUSCULAR; INTRAVENOUS; SUBCUTANEOUS at 06:06

## 2017-06-20 RX ADMIN — AMPICILLIN SODIUM AND SULBACTAM SODIUM 3 G: 2; 1 INJECTION, POWDER, FOR SOLUTION INTRAMUSCULAR; INTRAVENOUS at 11:06

## 2017-06-20 RX ADMIN — ALBUMIN (HUMAN) 25 G: 12.5 SOLUTION INTRAVENOUS at 06:06

## 2017-06-20 RX ADMIN — VANCOMYCIN HYDROCHLORIDE 1250 MG: 100 INJECTION, POWDER, LYOPHILIZED, FOR SOLUTION INTRAVENOUS at 07:06

## 2017-06-20 RX ADMIN — INSULIN ASPART 8 UNITS: 100 INJECTION, SOLUTION INTRAVENOUS; SUBCUTANEOUS at 07:06

## 2017-06-20 RX ADMIN — QUETIAPINE FUMARATE 100 MG: 25 TABLET ORAL at 09:06

## 2017-06-20 RX ADMIN — ATORVASTATIN CALCIUM 40 MG: 20 TABLET, FILM COATED ORAL at 10:06

## 2017-06-20 RX ADMIN — ALBUMIN (HUMAN) 25 G: 12.5 SOLUTION INTRAVENOUS at 02:06

## 2017-06-20 RX ADMIN — CARVEDILOL 6.25 MG: 6.25 TABLET, FILM COATED ORAL at 05:06

## 2017-06-20 RX ADMIN — DICYCLOMINE HYDROCHLORIDE 20 MG: 20 TABLET ORAL at 09:06

## 2017-06-20 RX ADMIN — HYDROMORPHONE HYDROCHLORIDE 1 MG: 1 INJECTION, SOLUTION INTRAMUSCULAR; INTRAVENOUS; SUBCUTANEOUS at 12:06

## 2017-06-20 RX ADMIN — CARVEDILOL 6.25 MG: 6.25 TABLET, FILM COATED ORAL at 10:06

## 2017-06-20 RX ADMIN — AMPICILLIN SODIUM AND SULBACTAM SODIUM 3 G: 2; 1 INJECTION, POWDER, FOR SOLUTION INTRAMUSCULAR; INTRAVENOUS at 06:06

## 2017-06-20 RX ADMIN — OXYCODONE HYDROCHLORIDE 15 MG: 5 TABLET ORAL at 09:06

## 2017-06-20 RX ADMIN — VANCOMYCIN HYDROCHLORIDE 750 MG: 750 INJECTION, POWDER, LYOPHILIZED, FOR SOLUTION INTRAVENOUS at 07:06

## 2017-06-20 RX ADMIN — ALBUMIN (HUMAN) 25 G: 12.5 SOLUTION INTRAVENOUS at 11:06

## 2017-06-20 RX ADMIN — HYDROMORPHONE HYDROCHLORIDE 1 MG: 1 INJECTION, SOLUTION INTRAMUSCULAR; INTRAVENOUS; SUBCUTANEOUS at 10:06

## 2017-06-20 RX ADMIN — INSULIN DETEMIR 10 UNITS: 100 INJECTION, SOLUTION SUBCUTANEOUS at 09:06

## 2017-06-20 RX ADMIN — ASPIRIN 81 MG: 81 TABLET, COATED ORAL at 10:06

## 2017-06-20 RX ADMIN — DICYCLOMINE HYDROCHLORIDE 20 MG: 20 TABLET ORAL at 05:06

## 2017-06-20 RX ADMIN — OXYCODONE HYDROCHLORIDE 15 MG: 5 TABLET ORAL at 01:06

## 2017-06-20 RX ADMIN — INSULIN ASPART 8 UNITS: 100 INJECTION, SOLUTION INTRAVENOUS; SUBCUTANEOUS at 06:06

## 2017-06-20 RX ADMIN — HYDROMORPHONE HYDROCHLORIDE 1 MG: 1 INJECTION, SOLUTION INTRAMUSCULAR; INTRAVENOUS; SUBCUTANEOUS at 02:06

## 2017-06-20 RX ADMIN — ALBUMIN (HUMAN) 25 G: 12.5 SOLUTION INTRAVENOUS at 05:06

## 2017-06-20 NOTE — PLAN OF CARE
Problem: Diabetes, Type 2 (Adult)  Goal: Signs and Symptoms of Listed Potential Problems Will be Absent, Minimized or Managed (Diabetes, Type 2)  Signs and symptoms of listed potential problems will be absent, minimized or managed by discharge/transition of care (reference Diabetes, Type 2 (Adult) CPG).   Pt AOx4; VSS; blood sugars monitored closely and insulin coverage administered; pt receiving second unit of PRBS; no reaction noted; analgesics administered for pain; will continue to monitor

## 2017-06-20 NOTE — PROGRESS NOTES
Pt wheezing and appears to be short of breath. Oxygen placed on pt for comfort. Way notified. New orders placed. WCTM.

## 2017-06-20 NOTE — SUBJECTIVE & OBJECTIVE
Interval History: patient is still having pain in his feet and legs; cellulitis is improving; cont abx; pain medications have been increased     Review of Systems   Constitutional: Negative for activity change and appetite change.   HENT: Negative for drooling and facial swelling.    Eyes: Negative for discharge and itching.   Respiratory: Negative for cough, shortness of breath and wheezing.    Cardiovascular: Negative for chest pain and palpitations.   Gastrointestinal: Negative for abdominal pain and nausea.   Genitourinary: Negative for difficulty urinating and dysuria.   Skin: Negative for color change and pallor.   Neurological: Negative for dizziness and numbness.   Psychiatric/Behavioral: Negative for behavioral problems and confusion.     Objective:     Vital Signs (Most Recent):  Temp: 99.3 °F (37.4 °C) (06/19/17 1830)  Pulse: 76 (06/19/17 1900)  Resp: 19 (06/19/17 1830)  BP: (!) 145/70 (06/19/17 1830)  SpO2: 95 % (06/19/17 1830) Vital Signs (24h Range):  Temp:  [98.3 °F (36.8 °C)-99.6 °F (37.6 °C)] 99.3 °F (37.4 °C)  Pulse:  [63-96] 76  Resp:  [17-20] 19  SpO2:  [94 %-99 %] 95 %  BP: (111-158)/(58-85) 145/70     Weight: 126.8 kg (279 lb 8 oz)  Body mass index is 35.89 kg/m².    Intake/Output Summary (Last 24 hours) at 06/19/17 1926  Last data filed at 06/19/17 1815   Gross per 24 hour   Intake             1425 ml   Output             3100 ml   Net            -1675 ml      Physical Exam   Constitutional: He is oriented to person, place, and time. He appears well-developed and well-nourished.   HENT:   Head: Normocephalic and atraumatic.   Eyes: Conjunctivae are normal. Pupils are equal, round, and reactive to light.   Neck: Normal range of motion. No thyromegaly present.   Cardiovascular: Normal rate, regular rhythm and normal heart sounds.    Pulmonary/Chest: Effort normal and breath sounds normal.   Abdominal: Soft. He exhibits no distension. There is no tenderness.   Musculoskeletal: He exhibits edema.    B/L extremity erythema    Neurological: He is alert and oriented to person, place, and time. Coordination normal.   Skin: No rash noted. No erythema.       Significant Labs:   BMP:     Recent Labs  Lab 06/19/17  0603   *   *   K 4.1      CO2 24   BUN 25*   CREATININE 1.5*   CALCIUM 8.4*   MG 1.5*     CBC:     Recent Labs  Lab 06/18/17  0353 06/19/17  0603   WBC 3.44* 3.22*   HGB 7.4* 6.8*   HCT 22.2* 20.7*   PLT 61* 52*       Significant Imaging: I have reviewed all pertinent imaging results/findings within the past 24 hours.

## 2017-06-20 NOTE — PROGRESS NOTES
[facility] Hospital Medicine  Progress Note    Patient Name: Leif Nino  MRN: [id number]  Patient Class: IP- Inpatient   Admission Date: 6/16/2017  Length of Stay: 3 days  Attending Physician: Bandar Blunt MD  Primary Care Provider: Primary Doctor [facility] Medicine Team: [facility] [place] [facility] A Bandar Blunt MD    Subjective:     Principal Problem:Bilateral cellulitis of lower leg    HPI:  Mr. Nino is a 45 yo WM w/ h/o HCV cirrhosis & poorly controlled DM complicated by BL diabetic ulceration s/p 6/1 R 4th toe debridement who presents c/o generalized weakness w/ associated worsening BL LE edema.  He reports that 5d ago he began having dysuria.  Two day afterwards he woke up noticing his chronic BL LE edema was significantly worse with an associated erythematous, painful rash.  Later that day he began developing dyspnea on exertion that progressively worsened to the point where he is now dyspneic at rest.  Patient denies any vomiting but has mild, persistant nausea that has worsened acutely over the last 3 days.  He reports no fevers, chills, abdominal pain, worsening abdominal distention, diarrhea.  Patient states he has had no productive cough or wheezes.    Hospital Course:  No notes on file    Interval History: patient is still having pain in his feet and legs; cellulitis is improving; cont abx; pain medications have been increased     Review of Systems   Constitutional: Negative for activity change and appetite change.   HENT: Negative for drooling and facial swelling.    Eyes: Negative for discharge and itching.   Respiratory: Negative for cough, shortness of breath and wheezing.    Cardiovascular: Negative for chest pain and palpitations.   Gastrointestinal: Negative for abdominal pain and nausea.   Genitourinary: Negative for difficulty urinating and dysuria.   Skin: Negative for color change and pallor.   Neurological: Negative for dizziness and numbness.   Psychiatric/Behavioral:  Negative for behavioral problems and confusion.     Objective:     Vital Signs (Most Recent):  Temp: 99.3 °F (37.4 °C) (06/19/17 1830)  Pulse: 76 (06/19/17 1900)  Resp: 19 (06/19/17 1830)  BP: (!) 145/70 (06/19/17 1830)  SpO2: 95 % (06/19/17 1830) Vital Signs (24h Range):  Temp:  [98.3 °F (36.8 °C)-99.6 °F (37.6 °C)] 99.3 °F (37.4 °C)  Pulse:  [63-96] 76  Resp:  [17-20] 19  SpO2:  [94 %-99 %] 95 %  BP: (111-158)/(58-85) 145/70     Weight: 126.8 kg (279 lb 8 oz)  Body mass index is 35.89 kg/m².    Intake/Output Summary (Last 24 hours) at 06/19/17 1926  Last data filed at 06/19/17 1815   Gross per 24 hour   Intake             1425 ml   Output             3100 ml   Net            -1675 ml      Physical Exam   Constitutional: He is oriented to person, place, and time. He appears well-developed and well-nourished.   HENT:   Head: Normocephalic and atraumatic.   Eyes: Conjunctivae are normal. Pupils are equal, round, and reactive to light.   Neck: Normal range of motion. No thyromegaly present.   Cardiovascular: Normal rate, regular rhythm and normal heart sounds.    Pulmonary/Chest: Effort normal and breath sounds normal.   Abdominal: Soft. He exhibits no distension. There is no tenderness.   Musculoskeletal: He exhibits edema.   B/L extremity erythema    Neurological: He is alert and oriented to person, place, and time. Coordination normal.   Skin: No rash noted. No erythema.       Significant Labs:   BMP:     Recent Labs  Lab 06/19/17  0603   *   *   K 4.1      CO2 24   BUN 25*   CREATININE 1.5*   CALCIUM 8.4*   MG 1.5*     CBC:     Recent Labs  Lab 06/18/17  0353 06/19/17  0603   WBC 3.44* 3.22*   HGB 7.4* 6.8*   HCT 22.2* 20.7*   PLT 61* 52*       Significant Imaging: I have reviewed all pertinent imaging results/findings within the past 24 hours.    Assessment/Plan:      # Bilateral lower extremity cellulitis with venous stasis due to streptococcus   -  on unasyn   - give another dose of vanc  today  - can likely send home on doxycycline     # Diabetic type 2 foot ulcers B/L  - podiatry consult, appreciate recs  - X rays B/ L feet     # Decompensated Hep C cirrhosis with ascites  MELD-Na score: 17 at 6/19/2017  6:03 AM  MELD score: 15 at 6/19/2017  6:03 AM  Calculated from:  Serum Creatinine: 1.5 mg/dL at 6/19/2017  6:03 AM  Serum Sodium: 135 mmol/L at 6/19/2017  6:03 AM  Total Bilirubin: 1.3 mg/dL at 6/19/2017  6:03 AM  INR(ratio): 1.4 at 6/19/2017  6:03 AM  Age: 46 years       - hepatology consult  - last viral load 21,000 and has never been treated  - abdomen U/S with liver dopplers  - will set up for outpatient follow up     # CKD stage 3 due to uncontrolled DM 2  - seems to be new baseline at 1.5     # DM2 with nephropathy  - Hba1c of 8.2 recently  - levemir 20 units at night and 8 of novolog with meals     # Anemia of CKD stage 3  # Thrombocytopenia  # Hemolysis   - hemolysis labs have come back positive and patient is close to DIC  - will transfuse 2 units of PRBC today, if fibrinogen is less than 70 tomorrow, transfuse cryo      # Hypoalbuminemia due to moderate protein fernando malnutrition  - PAB 3, glucerna     # Hypomagnesemia   - replace     Discharge Disposition- tomorrow if H/H is stable and patient is not in DIC                  VTE Risk Mitigation         Ordered     Low Risk of VTE  Once      06/16/17 4127          Bandar Blunt MD  Department of Hospital Medicine   [facility]

## 2017-06-20 NOTE — CONSULTS
PharmD Consult: Vancomycin Dosing/Renal Dosing    47yo M admitted with bilateral lower extremity cellulitis/DM foot infection started on unasyn and vancomycin.    A/P:    1. Amp-sulbactam 3g IV q6h is dosed correctly for renal function.  2. Pt had been receiving vancomycin 1250 IV q12h for 3 doses. Level drawn correctly this AM was 20 mcg/mL, goal only 10-15 mcg/mL for skin and soft tissues infections thus will decrease to 750mg IV q12h.Will continue to follow.  3. Renal function trended up since admission, but currently stable. SCr 1.5.    Dianne Spencer, WojciechD  [id number]

## 2017-06-21 LAB
ALBUMIN SERPL BCP-MCNC: 3.2 G/DL
ALP SERPL-CCNC: 86 U/L
ALT SERPL W/O P-5'-P-CCNC: 11 U/L
ANION GAP SERPL CALC-SCNC: 7 MMOL/L
AST SERPL-CCNC: 39 U/L
BASOPHILS # BLD AUTO: 0.03 K/UL
BASOPHILS NFR BLD: 0.8 %
BILIRUB SERPL-MCNC: 2.2 MG/DL
BUN SERPL-MCNC: 24 MG/DL
CALCIUM SERPL-MCNC: 8.8 MG/DL
CHLORIDE SERPL-SCNC: 105 MMOL/L
CO2 SERPL-SCNC: 23 MMOL/L
CREAT SERPL-MCNC: 1.5 MG/DL
CRP SERPL-MCNC: 30.4 MG/L
DIFFERENTIAL METHOD: ABNORMAL
EOSINOPHIL # BLD AUTO: 0.2 K/UL
EOSINOPHIL NFR BLD: 5.8 %
ERYTHROCYTE [DISTWIDTH] IN BLOOD BY AUTOMATED COUNT: 16.9 %
EST. GFR  (AFRICAN AMERICAN): >60 ML/MIN/1.73 M^2
EST. GFR  (NON AFRICAN AMERICAN): 55.8 ML/MIN/1.73 M^2
GLUCOSE SERPL-MCNC: 163 MG/DL
HCT VFR BLD AUTO: 24.9 %
HCV GENTYP SERPL NAA+PROBE: ABNORMAL
HCV QUALITATIVE RESULT: DETECTED
HCV QUANTITATIVE LOG: 5.24 LOG (10) IU/ML
HCV RNA SPEC NAA+PROBE-ACNC: ABNORMAL IU/ML
HGB BLD-MCNC: 8.1 G/DL
INR PPP: 1.3
LYMPHOCYTES # BLD AUTO: 0.7 K/UL
LYMPHOCYTES NFR BLD: 19.4 %
MAGNESIUM SERPL-MCNC: 1.6 MG/DL
MCH RBC QN AUTO: 28.1 PG
MCHC RBC AUTO-ENTMCNC: 32.5 %
MCV RBC AUTO: 87 FL
MONOCYTES # BLD AUTO: 0.4 K/UL
MONOCYTES NFR BLD: 10.2 %
NEUTROPHILS # BLD AUTO: 2.4 K/UL
NEUTROPHILS NFR BLD: 63.8 %
PHOSPHATE SERPL-MCNC: 3 MG/DL
PLATELET # BLD AUTO: 52 K/UL
PMV BLD AUTO: 10 FL
POCT GLUCOSE: 156 MG/DL (ref 70–110)
POCT GLUCOSE: 177 MG/DL (ref 70–110)
POCT GLUCOSE: 272 MG/DL (ref 70–110)
POTASSIUM SERPL-SCNC: 4.2 MMOL/L
PROT SERPL-MCNC: 6.4 G/DL
PROTHROMBIN TIME: 13.8 SEC
RBC # BLD AUTO: 2.88 M/UL
SODIUM SERPL-SCNC: 135 MMOL/L
WBC # BLD AUTO: 3.82 K/UL

## 2017-06-21 PROCEDURE — 36415 COLL VENOUS BLD VENIPUNCTURE: CPT

## 2017-06-21 PROCEDURE — 25000003 PHARM REV CODE 250: Performed by: HOSPITALIST

## 2017-06-21 PROCEDURE — 86140 C-REACTIVE PROTEIN: CPT

## 2017-06-21 PROCEDURE — 97530 THERAPEUTIC ACTIVITIES: CPT

## 2017-06-21 PROCEDURE — 85025 COMPLETE CBC W/AUTO DIFF WBC: CPT

## 2017-06-21 PROCEDURE — 63600175 PHARM REV CODE 636 W HCPCS: Performed by: HOSPITALIST

## 2017-06-21 PROCEDURE — 27000221 HC OXYGEN, UP TO 24 HOURS

## 2017-06-21 PROCEDURE — 97110 THERAPEUTIC EXERCISES: CPT

## 2017-06-21 PROCEDURE — 25000242 PHARM REV CODE 250 ALT 637 W/ HCPCS: Performed by: NURSE PRACTITIONER

## 2017-06-21 PROCEDURE — 20600001 HC STEP DOWN PRIVATE ROOM

## 2017-06-21 PROCEDURE — 63600175 PHARM REV CODE 636 W HCPCS: Performed by: INTERNAL MEDICINE

## 2017-06-21 PROCEDURE — 83735 ASSAY OF MAGNESIUM: CPT

## 2017-06-21 PROCEDURE — 82272 OCCULT BLD FECES 1-3 TESTS: CPT

## 2017-06-21 PROCEDURE — 85610 PROTHROMBIN TIME: CPT

## 2017-06-21 PROCEDURE — 94640 AIRWAY INHALATION TREATMENT: CPT

## 2017-06-21 PROCEDURE — 80053 COMPREHEN METABOLIC PANEL: CPT

## 2017-06-21 PROCEDURE — P9047 ALBUMIN (HUMAN), 25%, 50ML: HCPCS | Performed by: HOSPITALIST

## 2017-06-21 PROCEDURE — 99233 SBSQ HOSP IP/OBS HIGH 50: CPT | Mod: ,,, | Performed by: HOSPITALIST

## 2017-06-21 PROCEDURE — 84100 ASSAY OF PHOSPHORUS: CPT

## 2017-06-21 PROCEDURE — 25000003 PHARM REV CODE 250: Performed by: INTERNAL MEDICINE

## 2017-06-21 PROCEDURE — 94761 N-INVAS EAR/PLS OXIMETRY MLT: CPT

## 2017-06-21 RX ORDER — AMOXICILLIN 250 MG
1 CAPSULE ORAL DAILY PRN
Status: DISCONTINUED | OUTPATIENT
Start: 2017-06-21 | End: 2017-07-07 | Stop reason: HOSPADM

## 2017-06-21 RX ORDER — SYRING-NEEDL,DISP,INSUL,0.3 ML 29 G X1/2"
296 SYRINGE, EMPTY DISPOSABLE MISCELLANEOUS ONCE
Status: COMPLETED | OUTPATIENT
Start: 2017-06-21 | End: 2017-06-21

## 2017-06-21 RX ADMIN — CARVEDILOL 6.25 MG: 6.25 TABLET, FILM COATED ORAL at 05:06

## 2017-06-21 RX ADMIN — INSULIN DETEMIR 10 UNITS: 100 INJECTION, SOLUTION SUBCUTANEOUS at 09:06

## 2017-06-21 RX ADMIN — VANCOMYCIN HYDROCHLORIDE 750 MG: 750 INJECTION, POWDER, LYOPHILIZED, FOR SOLUTION INTRAVENOUS at 11:06

## 2017-06-21 RX ADMIN — CARVEDILOL 6.25 MG: 6.25 TABLET, FILM COATED ORAL at 09:06

## 2017-06-21 RX ADMIN — ALBUMIN (HUMAN) 25 G: 12.5 SOLUTION INTRAVENOUS at 05:06

## 2017-06-21 RX ADMIN — VANCOMYCIN HYDROCHLORIDE 750 MG: 750 INJECTION, POWDER, LYOPHILIZED, FOR SOLUTION INTRAVENOUS at 06:06

## 2017-06-21 RX ADMIN — DICYCLOMINE HYDROCHLORIDE 20 MG: 20 TABLET ORAL at 09:06

## 2017-06-21 RX ADMIN — QUETIAPINE FUMARATE 100 MG: 25 TABLET ORAL at 09:06

## 2017-06-21 RX ADMIN — INSULIN ASPART 8 UNITS: 100 INJECTION, SOLUTION INTRAVENOUS; SUBCUTANEOUS at 01:06

## 2017-06-21 RX ADMIN — INSULIN ASPART 8 UNITS: 100 INJECTION, SOLUTION INTRAVENOUS; SUBCUTANEOUS at 07:06

## 2017-06-21 RX ADMIN — MAGESIUM CITRATE 296 ML: 1.75 LIQUID ORAL at 12:06

## 2017-06-21 RX ADMIN — HYDROMORPHONE HYDROCHLORIDE 1 MG: 1 INJECTION, SOLUTION INTRAMUSCULAR; INTRAVENOUS; SUBCUTANEOUS at 01:06

## 2017-06-21 RX ADMIN — HYDROMORPHONE HYDROCHLORIDE 1 MG: 1 INJECTION, SOLUTION INTRAMUSCULAR; INTRAVENOUS; SUBCUTANEOUS at 04:06

## 2017-06-21 RX ADMIN — AMPICILLIN SODIUM AND SULBACTAM SODIUM 3 G: 2; 1 INJECTION, POWDER, FOR SOLUTION INTRAMUSCULAR; INTRAVENOUS at 12:06

## 2017-06-21 RX ADMIN — IPRATROPIUM BROMIDE AND ALBUTEROL SULFATE 3 ML: .5; 3 SOLUTION RESPIRATORY (INHALATION) at 11:06

## 2017-06-21 RX ADMIN — HYDROMORPHONE HYDROCHLORIDE 1 MG: 1 INJECTION, SOLUTION INTRAMUSCULAR; INTRAVENOUS; SUBCUTANEOUS at 09:06

## 2017-06-21 RX ADMIN — ALBUMIN (HUMAN) 25 G: 12.5 SOLUTION INTRAVENOUS at 07:06

## 2017-06-21 RX ADMIN — AMPICILLIN SODIUM AND SULBACTAM SODIUM 3 G: 2; 1 INJECTION, POWDER, FOR SOLUTION INTRAMUSCULAR; INTRAVENOUS at 05:06

## 2017-06-21 RX ADMIN — DICYCLOMINE HYDROCHLORIDE 20 MG: 20 TABLET ORAL at 05:06

## 2017-06-21 RX ADMIN — ASPIRIN 81 MG: 81 TABLET, COATED ORAL at 09:06

## 2017-06-21 RX ADMIN — ALBUMIN (HUMAN) 25 G: 12.5 SOLUTION INTRAVENOUS at 12:06

## 2017-06-21 RX ADMIN — HYDROMORPHONE HYDROCHLORIDE 1 MG: 1 INJECTION, SOLUTION INTRAMUSCULAR; INTRAVENOUS; SUBCUTANEOUS at 05:06

## 2017-06-21 RX ADMIN — ATORVASTATIN CALCIUM 40 MG: 20 TABLET, FILM COATED ORAL at 09:06

## 2017-06-21 RX ADMIN — AMPICILLIN SODIUM AND SULBACTAM SODIUM 3 G: 2; 1 INJECTION, POWDER, FOR SOLUTION INTRAMUSCULAR; INTRAVENOUS at 09:06

## 2017-06-21 NOTE — PLAN OF CARE
Problem: Physical Therapy Goal  Goal: Physical Therapy Goal  Goals to be met by: 17     Patient will increase functional independence with mobility by performin. Supine to sit with Stand-by Assistance - not met  2. Sit to supine with Stand-by Assistance - not met  3. Sit to stand transfer with Stand-by Assistance - not met  4. Bed to chair transfer with Stand-by Assistance using LRAD - not assessed  5. Gait  x 100 feet with Contact Guard Assistance using LRAD - not met  6. Ascend/descend 5 stair with right Handrails Moderate Assistance - not met  7. Lower extremity exercise program x 15 reps per handout, with independence - not met      Outcome: Ongoing (interventions implemented as appropriate)  No Goals Met

## 2017-06-21 NOTE — PLAN OF CARE
Problem: Diabetes, Type 2 (Adult)  Goal: Signs and Symptoms of Listed Potential Problems Will be Absent, Minimized or Managed (Diabetes, Type 2)  Signs and symptoms of listed potential problems will be absent, minimized or managed by discharge/transition of care (reference Diabetes, Type 2 (Adult) CPG).   Outcome: Ongoing (interventions implemented as appropriate)  Pt AOx4; VSS; blood sugars WNL; analgesics administered for pain; oxygen for comfort; no acute distress; will continue to monitor

## 2017-06-21 NOTE — PROGRESS NOTES
[facility] Hospital Medicine  Progress Note    Patient Name: Leif Nino  MRN: [id number]  Patient Class: IP- Inpatient   Admission Date: 6/16/2017  Length of Stay: 5 days  Attending Physician: Hema Ferrara MD  Primary Care Provider: [facility] Medicine Team: [facility] MED A Hema Ferrara MD    Subjective:     Principal Problem:Bilateral cellulitis of lower leg    HPI:  Mr. Nino is a 47 yo WM w/ h/o HCV cirrhosis & poorly controlled DM complicated by BL diabetic ulceration s/p 6/1 R 4th toe debridement who presents c/o generalized weakness w/ associated worsening BL LE edema.  He reports that 5d ago he began having dysuria.  Two day afterwards he woke up noticing his chronic BL LE edema was significantly worse with an associated erythematous, painful rash.  Later that day he began developing dyspnea on exertion that progressively worsened to the point where he is now dyspneic at rest.  Patient denies any vomiting but has mild, persistant nausea that has worsened acutely over the last 3 days.  He reports no fevers, chills, abdominal pain, worsening abdominal distention, diarrhea.  Patient states he has had no productive cough or wheezes.    Hospital Course:  No notes on file    Interval History: patient is still complaining of erythrema and pain at the extremity. Unable to bear weight.     Review of Systems   Constitutional: Negative for activity change and appetite change.   HENT: Negative for drooling and facial swelling.    Eyes: Negative for discharge and itching.   Respiratory: Negative for cough, shortness of breath and wheezing.    Cardiovascular: Negative for chest pain and palpitations.   Gastrointestinal: Negative for abdominal pain and nausea.   Genitourinary: Negative for difficulty urinating and dysuria.   Skin: Negative for color change and pallor.   Neurological: Negative for dizziness and numbness.   Psychiatric/Behavioral: Negative for behavioral  problems and confusion.   All other systems reviewed and are negative.  Objective:     Vital Signs (Most Recent):  Temp: 98.3 °F (36.8 °C) (06/20/17 1925)  Pulse: 79 (06/20/17 1925)  Resp: 16 (06/20/17 1925)  BP: (!) 122/58 (06/20/17 1925)  SpO2: (!) 92 % (06/20/17 1925) Vital Signs (24h Range):  Temp:  [98.3 °F (36.8 °C)-99.1 °F (37.3 °C)] 98.3 °F (36.8 °C)  Pulse:  [] 79  Resp:  [16-18] 16  SpO2:  [92 %-96 %] 92 %  BP: (122-159)/(58-76) 122/58     Weight: 126.8 kg (279 lb 8 oz)  Body mass index is 35.89 kg/m².    Intake/Output Summary (Last 24 hours) at 06/21/17 0215  Last data filed at 06/20/17 2309   Gross per 24 hour   Intake             1070 ml   Output             1275 ml   Net             -205 ml      Physical Exam   Constitutional: He is oriented to person, place, and time. He appears well-developed and well-nourished.   HENT:   Head: Normocephalic and atraumatic.   Eyes: Conjunctivae are normal. Pupils are equal, round, and reactive to light.   Neck: Normal range of motion. No thyromegaly present.   Cardiovascular: Normal rate, regular rhythm and normal heart sounds.    Pulmonary/Chest: Effort normal and breath sounds normal.   Abdominal: Soft. He exhibits no distension. There is no tenderness.   Musculoskeletal: He exhibits edema.   B/L extremity erythema    Neurological: He is alert and oriented to person, place, and time. Coordination normal.   Skin: No rash noted. No erythema.       Significant Labs:   BMP:     Recent Labs  Lab 06/20/17  0441         K 4.3      CO2 24   BUN 24*   CREATININE 1.5*   CALCIUM 8.4*   MG 1.5*     CBC:     Recent Labs  Lab 06/19/17  0603 06/20/17  0441   WBC 3.22* 4.27   HGB 6.8* 8.4*   HCT 20.7* 25.0*   PLT 52* 58*       Significant Imaging: I have reviewed all pertinent imaging results/findings within the past 24 hours.    Assessment/Plan:   # Bilateral lower extremity cellulitis with venous stasis due to streptococcus   -  on unasyn   - give  another dose of vanc today  - can likely send home on doxycycline once stable     # Diabetic type 2 foot ulcers B/L  - podiatry consult, appreciate recs  - X rays B/ L feet     # Decompensated Hep C cirrhosis with ascites  MELD-Na score: 18 at 6/20/2017  4:41 AM  MELD score: 17 at 6/20/2017  4:41 AM  Calculated from:  Serum Creatinine: 1.5 mg/dL at 6/20/2017  4:41 AM  Serum Sodium: 136 mmol/L at 6/20/2017  4:41 AM  Total Bilirubin: 2.7 mg/dL at 6/20/2017  4:41 AM  INR(ratio): 1.3 at 6/20/2017  4:41 AM  Age: 46 years      - hepatology consult  - last viral load 21,000 and has never been treated  - abdomen U/S with liver dopplers  - will set up for outpatient follow up     # CKD stage 3 due to uncontrolled DM 2  - seems to be new baseline at 1.5     # DM2 with nephropathy  - Hba1c of 8.2 recently  - levemir 20 units at night and 8 of novolog with meals     # Anemia of CKD stage 3  # Thrombocytopenia  # Hemolysis   - hemolysis labs have come back positive and patient is close to DIC  - will transfuse 2 units of PRBC today, if fibrinogen is less than 70 tomorrow, transfuse cryo      # Hypoalbuminemia due to moderate protein fernando malnutrition  - PAB 3, glucerna     # Hypomagnesemia   - replace     Discharge Disposition- tomorrow if H/H is stable and patient is not in DIC  VTE Risk Mitigation         Ordered     Low Risk of VTE  Once      06/16/17 7010          Hema Ferrara MD  Department of Hospital Medicine   [facility]

## 2017-06-21 NOTE — PT/OT/SLP PROGRESS
Physical Therapy  Treatment    Leif Nino   MRN: [id number]   Admitting Diagnosis: Bilateral cellulitis of lower leg    PT Received On: 06/21/17  PT Start Time: 1505     PT Stop Time: 1535    PT Total Time (min): 30 min       Billable Minutes:  Therapeutic Activity 18 and Therapeutic Exercise 12    Treatment Type: Treatment  PT/PTA: PT             General Precautions: Standard, fall  Orthopedic Precautions: N/A   Braces:      Do you have any cultural, spiritual, Religion conflicts, given your current situation?: None stated     Subjective:  Communicated with nursing prior to session.  Pt. complained of pain in bilateral LE with movement or contact.   While performing therex, pt. complained of 9/10 pain in L groin.  While standing, pt. complained of 7/10 pain in bilateral feet and LE's.  Pain addressed by cessation of activity.  After standing, pt. reported SOB.    Objective:   Patient found with: peripheral IV, oxygen (2L)   Pt. found with HOB elevated.  Generalized pain observed with movement.  Wounds observed on bottom of bilateral feet.    Functional Mobility:  Bed Mobility:   Rolling/Turning to Left: Stand by assistance  Rolling/Turning Right: Stand by assistance  Scooting/Bridging: Stand by Assistance while scooting to EOB  Supine to Sit: Contact Guard Assistance  Sit to Supine: Minimum Assistance to help lift LE's into bed    Transfers:  Sit <> Stand Assistance: Minimum Assistance x 2 while standing from EOB  Sit <> Stand Assistive Device: Rolling Walker    Gait:  Not performed due to severe pain and DAR[employer] shoes not present in room.    Balance:   Static Sit: FAIR: Maintains without assist, but unable to take any challenges   Dynamic Sit: FAIR+: Maintains balance through MINIMAL excursions of active trunk motion  Static Stand: POOR+: Needs MINIMAL assist to maintain  Dynamic stand: POOR: N/A     Therapeutic Activities and Exercises:  Sit<>Stand x 5 reps from EOB with Min Assist x 2 using rolling walker.    Therex performed on bilateral LE while seated at EOB  and supine in bed: Hip flexion, Knee Ext, and Ankle pumps - Repetitions limited by L groin and bilateral foot pain. Pt. observed to be SOB and giving maximal effort. Despite complaints of SOB, O2 sat noted to be 92% on room air. Break taken in between each repetition.     AM-PAC 6 CLICK MOBILITY  How much help from another person does this patient currently need?   1 = Unable, Total/Dependent Assistance  2 = A lot, Maximum/Moderate Assistance  3 = A little, Minimum/Contact Guard/Supervision  4 = None, Modified [place]/Independent    Turning over in bed (including adjusting bedclothes, sheets and blankets)?: 4  Sitting down on and standing up from a chair with arms (e.g., wheelchair, bedside commode, etc.): 3  Moving from lying on back to sitting on the side of the bed?: 3  Moving to and from a bed to a chair (including a wheelchair)?: 3  Need to walk in hospital room?: 2  Climbing 3-5 steps with a railing?: 2  Total Score: 17    AM-PAC Raw Score CMS G-Code Modifier Level of Impairment Assistance   6 % Total / Unable   7 - 9 CM 80 - 100% Maximal Assist   10 - 14 CL 60 - 80% Moderate Assist   15 - 19 CK 40 - 60% Moderate Assist   20 - 22 CJ 20 - 40% Minimal Assist   23 CI 1-20% SBA / CGA   24 CH 0% Independent/ Mod I     Patient left HOB elevated with all lines intact and call button in reach.  Podiatry notified to order DARCO shoes.    Assessment:  Leif Nino is a 46 y.o. male with a medical diagnosis of Bilateral cellulitis of lower leg and presents with pain upon movement, impaired posture, and rehab impairments listed below. Pt. observed to experience severe pain with each aspect of treatment session. Pt. requires assist of at least two while transferring or ambulating in room. Pt. remains a good candidate for acute PT to improve independence with ADL's and ambulation. It is recommended pt. use rolling walker while ambulating or transferring  to decrease demand on caregiver. Pt. recommended SNF upon discharge. Rolling walker recommended upon discharge.     Rehab identified problem list/impairments: Rehab identified problem list/impairments: weakness, impaired endurance, pain, impaired sensation, gait instability, decreased lower extremity function, impaired skin, edema    Rehab potential is good.    Activity tolerance: Fair    Discharge recommendations: Discharge Facility/Level Of Care Needs: nursing facility, skilled     Barriers to discharge: Barriers to Discharge: Inaccessible home environment, Decreased caregiver support    Equipment recommendations: Equipment Needed After Discharge: walker, rolling     GOALS:    Physical Therapy Goals        Problem: Physical Therapy Goal    Goal Priority Disciplines Outcome Goal Variances Interventions   Physical Therapy Goal     PT/OT, PT Ongoing (interventions implemented as appropriate)     Description:  Goals to be met by: 17     Patient will increase functional independence with mobility by performin. Supine to sit with Stand-by Assistance - not met  2. Sit to supine with Stand-by Assistance - not met  3. Sit to stand transfer with Stand-by Assistance - not met  4. Bed to chair transfer with Stand-by Assistance using LRAD - not assessed  5. Gait  x 100 feet with Contact Guard Assistance using LRAD - not met  6. Ascend/descend 5 stair with right Handrails Moderate Assistance - not met  7. Lower extremity exercise program x 15 reps per handout, with independence - not met                       PLAN:    Patient to be seen 4 x/week  to address the above listed problems via gait training, therapeutic activities, therapeutic exercises  Plan of Care expires: 17  Plan of Care reviewed with: patient         Keith Bean, DANIEL  2017

## 2017-06-21 NOTE — SUBJECTIVE & OBJECTIVE
Interval History: patient is still complaining of erythrema and pain at the extremity. Unable to bear weight.     Review of Systems   Constitutional: Negative for activity change and appetite change.   HENT: Negative for drooling and facial swelling.    Eyes: Negative for discharge and itching.   Respiratory: Negative for cough, shortness of breath and wheezing.    Cardiovascular: Negative for chest pain and palpitations.   Gastrointestinal: Negative for abdominal pain and nausea.   Genitourinary: Negative for difficulty urinating and dysuria.   Skin: Negative for color change and pallor.   Neurological: Negative for dizziness and numbness.   Psychiatric/Behavioral: Negative for behavioral problems and confusion.   All other systems reviewed and are negative.  Objective:     Vital Signs (Most Recent):  Temp: 98.3 °F (36.8 °C) (06/20/17 1925)  Pulse: 79 (06/20/17 1925)  Resp: 16 (06/20/17 1925)  BP: (!) 122/58 (06/20/17 1925)  SpO2: (!) 92 % (06/20/17 1925) Vital Signs (24h Range):  Temp:  [98.3 °F (36.8 °C)-99.1 °F (37.3 °C)] 98.3 °F (36.8 °C)  Pulse:  [] 79  Resp:  [16-18] 16  SpO2:  [92 %-96 %] 92 %  BP: (122-159)/(58-76) 122/58     Weight: 126.8 kg (279 lb 8 oz)  Body mass index is 35.89 kg/m².    Intake/Output Summary (Last 24 hours) at 06/21/17 0215  Last data filed at 06/20/17 2309   Gross per 24 hour   Intake             1070 ml   Output             1275 ml   Net             -205 ml      Physical Exam   Constitutional: He is oriented to person, place, and time. He appears well-developed and well-nourished.   HENT:   Head: Normocephalic and atraumatic.   Eyes: Conjunctivae are normal. Pupils are equal, round, and reactive to light.   Neck: Normal range of motion. No thyromegaly present.   Cardiovascular: Normal rate, regular rhythm and normal heart sounds.    Pulmonary/Chest: Effort normal and breath sounds normal.   Abdominal: Soft. He exhibits no distension. There is no tenderness.   Musculoskeletal:  He exhibits edema.   B/L extremity erythema    Neurological: He is alert and oriented to person, place, and time. Coordination normal.   Skin: No rash noted. No erythema.       Significant Labs:   BMP:     Recent Labs  Lab 06/20/17  0441         K 4.3      CO2 24   BUN 24*   CREATININE 1.5*   CALCIUM 8.4*   MG 1.5*     CBC:     Recent Labs  Lab 06/19/17  0603 06/20/17  0441   WBC 3.22* 4.27   HGB 6.8* 8.4*   HCT 20.7* 25.0*   PLT 52* 58*       Significant Imaging: I have reviewed all pertinent imaging results/findings within the past 24 hours.

## 2017-06-22 LAB
ALBUMIN SERPL BCP-MCNC: 3.7 G/DL
ALP SERPL-CCNC: 81 U/L
ALT SERPL W/O P-5'-P-CCNC: 10 U/L
ANION GAP SERPL CALC-SCNC: 10 MMOL/L
AST SERPL-CCNC: 36 U/L
BASOPHILS # BLD AUTO: 0.02 K/UL
BASOPHILS NFR BLD: 0.5 %
BILIRUB SERPL-MCNC: 2.2 MG/DL
BUN SERPL-MCNC: 24 MG/DL
CALCIUM SERPL-MCNC: 8.6 MG/DL
CHLORIDE SERPL-SCNC: 105 MMOL/L
CO2 SERPL-SCNC: 20 MMOL/L
CREAT SERPL-MCNC: 1.5 MG/DL
DIFFERENTIAL METHOD: ABNORMAL
EOSINOPHIL # BLD AUTO: 0.2 K/UL
EOSINOPHIL NFR BLD: 5.5 %
ERYTHROCYTE [DISTWIDTH] IN BLOOD BY AUTOMATED COUNT: 17.1 %
EST. GFR  (AFRICAN AMERICAN): >60 ML/MIN/1.73 M^2
EST. GFR  (NON AFRICAN AMERICAN): 55.8 ML/MIN/1.73 M^2
GLUCOSE SERPL-MCNC: 161 MG/DL
HCT VFR BLD AUTO: 24 %
HGB BLD-MCNC: 8 G/DL
INR PPP: 1.4
LYMPHOCYTES # BLD AUTO: 0.7 K/UL
LYMPHOCYTES NFR BLD: 17 %
MAGNESIUM SERPL-MCNC: 1.8 MG/DL
MCH RBC QN AUTO: 28.7 PG
MCHC RBC AUTO-ENTMCNC: 33.3 %
MCV RBC AUTO: 86 FL
MONOCYTES # BLD AUTO: 0.4 K/UL
MONOCYTES NFR BLD: 11 %
NEUTROPHILS # BLD AUTO: 2.6 K/UL
NEUTROPHILS NFR BLD: 65.7 %
OB PNL STL: NEGATIVE
PHOSPHATE SERPL-MCNC: 3.1 MG/DL
PLATELET # BLD AUTO: 58 K/UL
PMV BLD AUTO: 9.8 FL
POCT GLUCOSE: 157 MG/DL (ref 70–110)
POCT GLUCOSE: 158 MG/DL (ref 70–110)
POCT GLUCOSE: 158 MG/DL (ref 70–110)
POCT GLUCOSE: 162 MG/DL (ref 70–110)
POCT GLUCOSE: 171 MG/DL (ref 70–110)
POCT GLUCOSE: 181 MG/DL (ref 70–110)
POCT GLUCOSE: 29 MG/DL (ref 70–110)
POTASSIUM SERPL-SCNC: 4.1 MMOL/L
PROT SERPL-MCNC: 6.8 G/DL
PROTHROMBIN TIME: 14.1 SEC
RBC # BLD AUTO: 2.79 M/UL
SODIUM SERPL-SCNC: 135 MMOL/L
VANCOMYCIN TROUGH SERPL-MCNC: 17.2 UG/ML
WBC # BLD AUTO: 3.99 K/UL

## 2017-06-22 PROCEDURE — 84100 ASSAY OF PHOSPHORUS: CPT

## 2017-06-22 PROCEDURE — 85025 COMPLETE CBC W/AUTO DIFF WBC: CPT

## 2017-06-22 PROCEDURE — 25000003 PHARM REV CODE 250: Performed by: HOSPITALIST

## 2017-06-22 PROCEDURE — 85610 PROTHROMBIN TIME: CPT

## 2017-06-22 PROCEDURE — 36415 COLL VENOUS BLD VENIPUNCTURE: CPT

## 2017-06-22 PROCEDURE — 99222 1ST HOSP IP/OBS MODERATE 55: CPT | Mod: ,,, | Performed by: INTERNAL MEDICINE

## 2017-06-22 PROCEDURE — 63600175 PHARM REV CODE 636 W HCPCS: Performed by: INTERNAL MEDICINE

## 2017-06-22 PROCEDURE — 99233 SBSQ HOSP IP/OBS HIGH 50: CPT | Mod: ,,, | Performed by: HOSPITALIST

## 2017-06-22 PROCEDURE — P9047 ALBUMIN (HUMAN), 25%, 50ML: HCPCS | Performed by: HOSPITALIST

## 2017-06-22 PROCEDURE — 80202 ASSAY OF VANCOMYCIN: CPT

## 2017-06-22 PROCEDURE — 63600175 PHARM REV CODE 636 W HCPCS: Performed by: HOSPITALIST

## 2017-06-22 PROCEDURE — 80053 COMPREHEN METABOLIC PANEL: CPT

## 2017-06-22 PROCEDURE — 20600001 HC STEP DOWN PRIVATE ROOM

## 2017-06-22 PROCEDURE — 83735 ASSAY OF MAGNESIUM: CPT

## 2017-06-22 RX ADMIN — OXYCODONE HYDROCHLORIDE 15 MG: 5 TABLET ORAL at 02:06

## 2017-06-22 RX ADMIN — INSULIN ASPART 8 UNITS: 100 INJECTION, SOLUTION INTRAVENOUS; SUBCUTANEOUS at 12:06

## 2017-06-22 RX ADMIN — INSULIN ASPART 8 UNITS: 100 INJECTION, SOLUTION INTRAVENOUS; SUBCUTANEOUS at 05:06

## 2017-06-22 RX ADMIN — OXYCODONE HYDROCHLORIDE 15 MG: 5 TABLET ORAL at 12:06

## 2017-06-22 RX ADMIN — HYDROMORPHONE HYDROCHLORIDE 1 MG: 1 INJECTION, SOLUTION INTRAMUSCULAR; INTRAVENOUS; SUBCUTANEOUS at 10:06

## 2017-06-22 RX ADMIN — ALBUMIN (HUMAN) 25 G: 12.5 SOLUTION INTRAVENOUS at 12:06

## 2017-06-22 RX ADMIN — ALBUMIN (HUMAN) 25 G: 12.5 SOLUTION INTRAVENOUS at 05:06

## 2017-06-22 RX ADMIN — ALBUMIN (HUMAN) 25 G: 12.5 SOLUTION INTRAVENOUS at 11:06

## 2017-06-22 RX ADMIN — INSULIN ASPART 8 UNITS: 100 INJECTION, SOLUTION INTRAVENOUS; SUBCUTANEOUS at 08:06

## 2017-06-22 RX ADMIN — DICYCLOMINE HYDROCHLORIDE 20 MG: 20 TABLET ORAL at 02:06

## 2017-06-22 RX ADMIN — HYDROMORPHONE HYDROCHLORIDE 1 MG: 1 INJECTION, SOLUTION INTRAMUSCULAR; INTRAVENOUS; SUBCUTANEOUS at 05:06

## 2017-06-22 RX ADMIN — CARVEDILOL 6.25 MG: 6.25 TABLET, FILM COATED ORAL at 08:06

## 2017-06-22 RX ADMIN — INSULIN ASPART 2 UNITS: 100 INJECTION, SOLUTION INTRAVENOUS; SUBCUTANEOUS at 08:06

## 2017-06-22 RX ADMIN — QUETIAPINE FUMARATE 100 MG: 25 TABLET ORAL at 09:06

## 2017-06-22 RX ADMIN — INSULIN ASPART 1 UNITS: 100 INJECTION, SOLUTION INTRAVENOUS; SUBCUTANEOUS at 09:06

## 2017-06-22 RX ADMIN — OXYCODONE HYDROCHLORIDE 15 MG: 5 TABLET ORAL at 08:06

## 2017-06-22 RX ADMIN — HYDROMORPHONE HYDROCHLORIDE 1 MG: 1 INJECTION, SOLUTION INTRAMUSCULAR; INTRAVENOUS; SUBCUTANEOUS at 01:06

## 2017-06-22 RX ADMIN — AMPICILLIN SODIUM AND SULBACTAM SODIUM 3 G: 2; 1 INJECTION, POWDER, FOR SOLUTION INTRAMUSCULAR; INTRAVENOUS at 10:06

## 2017-06-22 RX ADMIN — INSULIN ASPART 2 UNITS: 100 INJECTION, SOLUTION INTRAVENOUS; SUBCUTANEOUS at 05:06

## 2017-06-22 RX ADMIN — ATORVASTATIN CALCIUM 40 MG: 20 TABLET, FILM COATED ORAL at 08:06

## 2017-06-22 RX ADMIN — VANCOMYCIN HYDROCHLORIDE 750 MG: 750 INJECTION, POWDER, LYOPHILIZED, FOR SOLUTION INTRAVENOUS at 11:06

## 2017-06-22 RX ADMIN — ASPIRIN 81 MG: 81 TABLET, COATED ORAL at 08:06

## 2017-06-22 RX ADMIN — DICYCLOMINE HYDROCHLORIDE 20 MG: 20 TABLET ORAL at 09:06

## 2017-06-22 RX ADMIN — CARVEDILOL 6.25 MG: 6.25 TABLET, FILM COATED ORAL at 05:06

## 2017-06-22 RX ADMIN — AMPICILLIN SODIUM AND SULBACTAM SODIUM 3 G: 2; 1 INJECTION, POWDER, FOR SOLUTION INTRAMUSCULAR; INTRAVENOUS at 04:06

## 2017-06-22 RX ADMIN — DICYCLOMINE HYDROCHLORIDE 20 MG: 20 TABLET ORAL at 05:06

## 2017-06-22 RX ADMIN — INSULIN ASPART 2 UNITS: 100 INJECTION, SOLUTION INTRAVENOUS; SUBCUTANEOUS at 12:06

## 2017-06-22 RX ADMIN — INSULIN DETEMIR 10 UNITS: 100 INJECTION, SOLUTION SUBCUTANEOUS at 09:06

## 2017-06-22 RX ADMIN — OXYCODONE HYDROCHLORIDE 15 MG: 5 TABLET ORAL at 09:06

## 2017-06-22 NOTE — PLAN OF CARE
06/22/17 1142   Discharge Reassessment   Assessment Type Discharge Planning Reassessment   Can the patient answer the patient profile reliably? Yes, cognitively intact   How does the patient rate their overall health at the present time? Fair   Describe the patient's ability to walk at the present time. Major restrictions/daily assistance from another person   How often would a person be available to care for the patient? Occasionally   Number of comorbid conditions (as recorded on the chart) Four   During the past month, has the patient often been bothered by feeling down, depressed or hopeless? No   During the past month, has the patient often been bothered by little interest or pleasure in doing things? No   Discharge plan remains the same: No   Provided patient/caregiver education on the expected discharge date and the discharge plan Yes   Discharge Plan A Long-term acute care facility (LTAC)   Discharge Plan B Skilled Nursing Facility   Change in patient condition or support system No

## 2017-06-22 NOTE — SUBJECTIVE & OBJECTIVE
Interval History: Patient seen and examined, still has lower extremity swelling and difficulty ambulating. Seen by ID today who recommended stopping all antibiotics.     Review of Systems   Constitutional: Negative for activity change and appetite change.   HENT: Negative for drooling and facial swelling.    Eyes: Negative for discharge and itching.   Respiratory: Negative for cough, shortness of breath and wheezing.    Cardiovascular: Negative for chest pain and palpitations.   Gastrointestinal: Negative for abdominal pain and nausea.   Genitourinary: Negative for difficulty urinating and dysuria.   Skin: Negative for color change and pallor.   Neurological: Negative for dizziness and numbness.   Psychiatric/Behavioral: Negative for behavioral problems and confusion.   All other systems reviewed and are negative.    Objective:     Vital Signs (Most Recent):  Temp: 98.2 °F (36.8 °C) (06/22/17 1158)  Pulse: 89 (06/22/17 1158)  Resp: (!) 22 (06/22/17 1158)  BP: 135/70 (06/22/17 1158)  SpO2: 97 % (06/22/17 1158) Vital Signs (24h Range):  Temp:  [97.2 °F (36.2 °C)-99.1 °F (37.3 °C)] 98.2 °F (36.8 °C)  Pulse:  [78-89] 89  Resp:  [20-22] 22  SpO2:  [94 %-97 %] 97 %  BP: (135-165)/(70-81) 135/70     Weight: 126.8 kg (279 lb 8 oz)  Body mass index is 35.89 kg/m².    Intake/Output Summary (Last 24 hours) at 06/22/17 1442  Last data filed at 06/22/17 1300   Gross per 24 hour   Intake             2470 ml   Output             1525 ml   Net              945 ml      Physical Exam   Constitutional: He is oriented to person, place, and time. He appears well-developed and well-nourished.   HENT:   Head: Normocephalic and atraumatic.   Eyes: Conjunctivae are normal. Pupils are equal, round, and reactive to light.   Neck: Normal range of motion. No thyromegaly present.   Cardiovascular: Normal rate, regular rhythm and normal heart sounds.    Pulmonary/Chest: Effort normal and breath sounds normal.   Abdominal: Soft. He exhibits no  distension. There is no tenderness.   Musculoskeletal: He exhibits edema.   B/L extremity erythema    Neurological: He is alert and oriented to person, place, and time. Coordination normal.   Skin: No rash noted. No erythema.       Significant Labs:   CBC:   Recent Labs  Lab 06/21/17 0342 06/22/17 0435   WBC 3.82* 3.99   HGB 8.1* 8.0*   HCT 24.9* 24.0*   PLT 52* 58*     CMP:   Recent Labs  Lab 06/21/17 0342 06/22/17 0435   * 135*   K 4.2 4.1    105   CO2 23 20*   * 161*   BUN 24* 24*   CREATININE 1.5* 1.5*   CALCIUM 8.8 8.6*   PROT 6.4 6.8   ALBUMIN 3.2* 3.7   BILITOT 2.2* 2.2*   ALKPHOS 86 81   AST 39 36   ALT 11 10   ANIONGAP 7* 10   EGFRNONAA 55.8* 55.8*       Significant Imaging: I have reviewed all pertinent imaging results/findings within the past 24 hours.

## 2017-06-22 NOTE — HPI
This is a 46 year old male with PMH of HCV cirrhosis (untreated), DM, and bilateral diabetic foot ulcers who presented with weakness and bilateral lower extremity edema. Patient has had these ulcers for the past several months and has seen podiatry for debridement. Patient has had normal ABIs and evaluation with vascular surgery. MRI was obtained which shows possible osteomyelitis of the 4th toe. Patient has been on vancomycin and unasyn x 7 days and we are now consulted for antibiotic recs. He is afebrile and without leukocytosis. He complains of bilateral leg pain and abdominal pain on exam.

## 2017-06-22 NOTE — CONSULTS
PharmD Consult: Vancomycin Dosing/Renal Dosing    45yo M admitted with bilateral lower extremity cellulitis/DM foot infection started on unasyn and vancomycin.    A/P:    1. Amp-sulbactam 3g IV q6h is dosed correctly for renal function.  2. Vancomycin dose adjusted to 750 IV q12h two days ago. Level drawn today prior to 4th dose was 17.2 mcg/mL. No changes for now.  3. Renal function trended up since admission, but currently stable. SCr 1.5.     Dianne Spencer, PharmD  [id number]

## 2017-06-22 NOTE — PROGRESS NOTES
[facility] Hospital Medicine  Progress Note    Patient Name: Leif Nino  MRN: [id number]  Patient Class: IP- Inpatient   Admission Date: 6/16/2017  Length of Stay: 6 days  Attending Physician: Hema Ferrara MD  Primary Care Provider: Primary Doctor [facility] Medicine Team: [facility] MED A Hema Ferrara MD    Subjective:     Principal Problem:Bilateral cellulitis of lower leg    HPI:  Mr. Nino is a 47 yo WM w/ h/o HCV cirrhosis & poorly controlled DM complicated by BL diabetic ulceration s/p 6/1 R 4th toe debridement who presents c/o generalized weakness w/ associated worsening BL LE edema.  He reports that 5d ago he began having dysuria.  Two day afterwards he woke up noticing his chronic BL LE edema was significantly worse with an associated erythematous, painful rash.  Later that day he began developing dyspnea on exertion that progressively worsened to the point where he is now dyspneic at rest.  Patient denies any vomiting but has mild, persistant nausea that has worsened acutely over the last 3 days.  He reports no fevers, chills, abdominal pain, worsening abdominal distention, diarrhea.  Patient states he has had no productive cough or wheezes.    Hospital Course:  No notes on file    Interval History: Patient seen and examined at bedside. No acute events overnight. Patient still is unable to bear weight on leg, MRI of the lower extremity ordered.     Review of Systems   Constitutional: Negative for activity change and appetite change.   HENT: Negative for drooling and facial swelling.    Eyes: Negative for discharge and itching.   Respiratory: Negative for cough, shortness of breath and wheezing.    Cardiovascular: Negative for chest pain and palpitations.   Gastrointestinal: Negative for abdominal pain and nausea.   Genitourinary: Negative for difficulty urinating and dysuria.   Skin: Negative for color change and pallor.   Neurological: Negative for dizziness and  numbness.   Psychiatric/Behavioral: Negative for behavioral problems and confusion.   All other systems reviewed and are negative.    Objective:     Vital Signs (Most Recent):  Temp: 99.1 °F (37.3 °C) (06/21/17 2118)  Pulse: 88 (06/21/17 2322)  Resp: (!) 22 (06/21/17 2322)  BP: (!) 165/76 (06/21/17 2118)  SpO2: (!) 94 % (06/21/17 2322) Vital Signs (24h Range):  Temp:  [97.5 °F (36.4 °C)-99.1 °F (37.3 °C)] 99.1 °F (37.3 °C)  Pulse:  [78-92] 88  Resp:  [16-22] 22  SpO2:  [92 %-97 %] 94 %  BP: (138-165)/(65-78) 165/76     Weight: 126.8 kg (279 lb 8 oz)  Body mass index is 35.89 kg/m².    Intake/Output Summary (Last 24 hours) at 06/22/17 0001  Last data filed at 06/21/17 1214   Gross per 24 hour   Intake              540 ml   Output              850 ml   Net             -310 ml      Physical Exam   Constitutional: He is oriented to person, place, and time. He appears well-developed and well-nourished.   HENT:   Head: Normocephalic and atraumatic.   Eyes: Conjunctivae are normal. Pupils are equal, round, and reactive to light.   Neck: Normal range of motion. No thyromegaly present.   Cardiovascular: Normal rate, regular rhythm and normal heart sounds.    Pulmonary/Chest: Effort normal and breath sounds normal.   Abdominal: Soft. He exhibits no distension. There is no tenderness.   Musculoskeletal: He exhibits edema.   B/L extremity erythema    Neurological: He is alert and oriented to person, place, and time. Coordination normal.   Skin: No rash noted. No erythema.       Significant Labs:   CBC:   Recent Labs  Lab 06/20/17 0441 06/21/17 0342   WBC 4.27 3.82*   HGB 8.4* 8.1*   HCT 25.0* 24.9*   PLT 58* 52*     CMP:   Recent Labs  Lab 06/20/17 0441 06/21/17 0342    135*   K 4.3 4.2    105   CO2 24 23    163*   BUN 24* 24*   CREATININE 1.5* 1.5*   CALCIUM 8.4* 8.8   PROT 6.3 6.4   ALBUMIN 3.1* 3.2*   BILITOT 2.7* 2.2*   ALKPHOS 90 86   AST 40 39   ALT 14 11   ANIONGAP 7* 7*   EGFRNONAA 55.8* 55.8*        Significant Imaging: I have reviewed all pertinent imaging results/findings within the past 24 hours.    Assessment/Plan:   # Bilateral lower extremity cellulitis with venous stasis due to streptococcus   #Toe Osteomyelitis  -  on unasyn   - give another dose of vanc today  - infectious disease consult for antibiotic guidance  - can likely send home on doxycycline once stable     # Diabetic type 2 foot ulcers B/L  - podiatry consult, appreciate recs  - X rays B/ L feet     # Decompensated Hep C cirrhosis with ascites  MELD-Na score: 18 at 6/21/2017  3:42 AM  MELD score: 16 at 6/21/2017  3:42 AM  Calculated from:  Serum Creatinine: 1.5 mg/dL at 6/21/2017  3:42 AM  Serum Sodium: 135 mmol/L at 6/21/2017  3:42 AM  Total Bilirubin: 2.2 mg/dL at 6/21/2017  3:42 AM  INR(ratio): 1.3 at 6/21/2017  3:42 AM  Age: 46 years   - hepatology consult  - last viral load 21,000 and has never been treated  - abdomen U/S with liver dopplers  - will set up for outpatient follow up     # CKD stage 3 due to uncontrolled DM 2  - seems to be new baseline at 1.5     # DM2 with nephropathy  - Hba1c of 8.2 recently  - levemir 20 units at night and 8 of novolog with meals     # Anemia of CKD stage 3  # Thrombocytopenia  # Hemolysis   - hemolysis labs have come back positive and patient is close to DIC  - will transfuse 2 units of PRBC today, if fibrinogen is less than 70 tomorrow, transfuse cryo      # Hypoalbuminemia due to moderate protein fernando malnutrition  - PAB 3, glucerna     # Hypomagnesemia   - replace     Discharge Disposition- tomorrow if H/H is stable and patient is not in DIC        VTE Risk Mitigation          VTE Risk Mitigation         Ordered     Low Risk of VTE  Once      06/16/17 5165          Hema Ferrara MD  Department of Hospital Medicine   [facility]

## 2017-06-22 NOTE — SUBJECTIVE & OBJECTIVE
"Past Medical History:   Diagnosis Date    Ascites of liver 5/30/2017    Chronic hepatitis C without hepatic coma     Chronic liver failure without hepatic coma 5/30/2017    Diabetic foot ulcer     right foot    DM (diabetes mellitus), type 2 with peripheral vascular complications     Essential hypertension     Gallstones     Hepatic encephalopathy 5/30/2017    Hepatitis C     Pancreatitis        Past Surgical History:   Procedure Laterality Date    BACK SURGERY         Review of patient's allergies indicates:   Allergen Reactions    Shellfish containing products Hives     Shortness of breath       Medications:  Prescriptions Prior to Admission   Medication Sig    aspirin (ECOTRIN) 81 MG EC tablet Take 1 tablet (81 mg total) by mouth once daily.    atorvastatin (LIPITOR) 40 MG tablet Take 1 tablet (40 mg total) by mouth once daily.    cadexomer iodine (IODOSORB) 0.9 % gel Apply topically daily as needed for Wound Care (to foot wounds).    carvedilol (COREG) 6.25 MG tablet Take 6.25 mg by mouth 2 (two) times daily with meals.    dicyclomine (BENTYL) 20 mg tablet Take 20 mg by mouth 3 (three) times daily.    furosemide (LASIX) 40 MG tablet Take 1 tablet (40 mg total) by mouth 2 (two) times daily.    INSULIN GLARGINE,HUM.REC.ANLOG (LANTUS SOLOSTAR SUBQ) Inject 25 Units into the skin every evening.    insulin glulisine (APIDRA) 100 unit/mL InPn pen Inject 0.15 mLs (15 Units total) into the skin 3 (three) times daily with meals.    pen needle, diabetic 32 gauge x 5/32" Ndle To use with Apidra.    quetiapine (SEROQUEL) 100 MG Tab Take 100 mg by mouth every evening.     Antibiotics     Start     Stop Route Frequency Ordered    06/20/17 1900  vancomycin (VANCOCIN) 750 mg in dextrose 5 % 250 mL IVPB      -- IV Every 12 hours (non-standard times) 06/20/17 0851    06/16/17 2245  ampicillin-sulbactam 3 g in sodium chloride 0.9 % 100 mL IVPB (ready to mix system)      -- IV Every 6 hours (non-standard times) " 06/16/17 2208        Antifungals     None        Antivirals     None             There is no immunization history on file for this patient.    Family History     None        Social History     Social History    Marital status: Single     Spouse name: N/A    Number of children: N/A    Years of education: N/A     Social History Main Topics    Smoking status: Current Every Day Smoker     Types: Cigarettes    Smokeless tobacco: None    Alcohol use No    Drug use: No    Sexual activity: Not Asked     Other Topics Concern    None     Social History Narrative    None     Review of Systems   Constitutional: Negative for chills and fever.   Respiratory: Negative for cough and shortness of breath.    Cardiovascular: Negative for chest pain and leg swelling.   Gastrointestinal: Positive for abdominal pain. Negative for constipation, diarrhea, nausea and vomiting.   Genitourinary: Negative for dysuria, frequency and hematuria.   Musculoskeletal: Positive for arthralgias. Negative for back pain and myalgias.   Skin: Positive for wound. Negative for rash.   Neurological: Positive for weakness. Negative for dizziness, light-headedness, numbness and headaches.   Psychiatric/Behavioral: Negative for agitation and behavioral problems. The patient is not nervous/anxious.      Objective:     Vital Signs (Most Recent):  Temp: 98.2 °F (36.8 °C) (06/22/17 1158)  Pulse: 89 (06/22/17 1158)  Resp: (!) 22 (06/22/17 1158)  BP: 135/70 (06/22/17 1158)  SpO2: 97 % (06/22/17 1158) Vital Signs (24h Range):  Temp:  [97.2 °F (36.2 °C)-99.1 °F (37.3 °C)] 98.2 °F (36.8 °C)  Pulse:  [78-89] 89  Resp:  [18-22] 22  SpO2:  [92 %-97 %] 97 %  BP: (135-165)/(65-81) 135/70     Weight: 126.8 kg (279 lb 8 oz)  Body mass index is 35.89 kg/m².    Estimated Creatinine Clearance: 88.9 mL/min (based on Cr of 1.5).    Physical Exam   Constitutional: He is oriented to person, place, and time. He appears well-developed and well-nourished. No distress.    Cardiovascular: Normal rate and regular rhythm.    No murmur heard.  Pulmonary/Chest: Effort normal and breath sounds normal. No respiratory distress.   Abdominal: Soft. Bowel sounds are normal. He exhibits no distension. There is tenderness (diffuse).   Musculoskeletal: Normal range of motion. He exhibits edema (bilateral lower extremity).   Neurological: He is alert and oriented to person, place, and time.   Skin: Skin is warm and dry.   Diabetic foot ulcer noted to the plantar aspect of left foot; no erythema, drainage, malodor, or signs of infection.     Right great toe with ulceration note; see image below; no signs of active infection.    Right fourth digit with callous noted to dorsal aspect; no signs of active infection    Venous changes noted to bilateral lower extremities; no erythema.    Psychiatric: He has a normal mood and affect. His behavior is normal.           Significant Labs:   Blood Culture:   Recent Labs  Lab 06/01/17  1734 06/01/17  1735   LABBLOO No growth after 5 days. No growth after 5 days.     CBC:   Recent Labs  Lab 06/21/17  0342 06/22/17  0435   WBC 3.82* 3.99   HGB 8.1* 8.0*   HCT 24.9* 24.0*   PLT 52* 58*     CMP:   Recent Labs  Lab 06/21/17  0342 06/22/17  0435   * 135*   K 4.2 4.1    105   CO2 23 20*   * 161*   BUN 24* 24*   CREATININE 1.5* 1.5*   CALCIUM 8.8 8.6*   PROT 6.4 6.8   ALBUMIN 3.2* 3.7   BILITOT 2.2* 2.2*   ALKPHOS 86 81   AST 39 36   ALT 11 10   ANIONGAP 7* 10   EGFRNONAA 55.8* 55.8*       Significant Imaging: I have reviewed all pertinent imaging results/findings within the past 24 hours.

## 2017-06-22 NOTE — PLAN OF CARE
Problem: Patient Care Overview  Goal: Plan of Care Review  Outcome: Ongoing (interventions implemented as appropriate)  POC reviewed with patient. Patient vital signs stable wnl. Patient tolerating diabetic diet. Pain managed with prn pain medication. Voiding via urinal, clear jadon urine. No complications noted at this time.  Patient remains free from falls. No s/s of skin breakdown noted at this time.  See MAR and Flowsheets for further details.

## 2017-06-22 NOTE — CONSULTS
[facility]  Infectious Disease  Consult Note    Patient Name: Leif Nino  MRN: [id number]  Admission Date: 6/16/2017  Hospital Length of Stay: 6 days  Attending Physician: Hema Ferrara MD  Primary Care Provider: Primary Doctor No     Isolation Status: No active isolations    Patient information was obtained from patient and past medical records.      Inpatient consult to Infectious Diseases  Consult performed by: BRANDI JAIN  Consult ordered by: HEMA FERRARA        Assessment/Plan:     Diabetic ulcer of toe of right foot associated with type 2 diabetes mellitus, with fat layer exposed    46 year old with PMH of HCV cirrhosis, DM, and bilateral diabetic foot ulcers; ID consulted for antibiotic recs  - patient has completed 7 days of unasyn and vancomycin  - upon clinical exam, wounds do not appear acutely infected  - per podiatry notes, do not feel patient has acute infection; wounds are stable  - would recommend d/c all antibiotics at this time and monitor off  - will need good wound care and control of sugars  - f/u with podiatry in outpatient setting; if patient undergoes evaluation for liver transplantation in the future, ideally all open wounds and ulcerations would be healed prior to transplant  - ID will sign off            Thank you for your consult. I will sign off. Please contact us if you have any additional questions.    TOAN Corona  Infectious Disease  [facility]    Subjective:     Principal Problem: Bilateral cellulitis of lower leg    HPI: This is a 46 year old male with PMH of HCV cirrhosis (untreated), DM, and bilateral diabetic foot ulcers who presented with weakness and bilateral lower extremity edema. Patient has had these ulcers for the past several months and has seen podiatry for debridement. Patient has had normal ABIs and evaluation with vascular surgery. MRI was obtained which shows possible osteomyelitis of the 4th toe. Patient has  "been on vancomycin and unasyn x 7 days and we are now consulted for antibiotic recs. He is afebrile and without leukocytosis. He complains of bilateral leg pain and abdominal pain on exam.     Past Medical History:   Diagnosis Date    Ascites of liver 5/30/2017    Chronic hepatitis C without hepatic coma     Chronic liver failure without hepatic coma 5/30/2017    Diabetic foot ulcer     right foot    DM (diabetes mellitus), type 2 with peripheral vascular complications     Essential hypertension     Gallstones     Hepatic encephalopathy 5/30/2017    Hepatitis C     Pancreatitis        Past Surgical History:   Procedure Laterality Date    BACK SURGERY         Review of patient's allergies indicates:   Allergen Reactions    Shellfish containing products Hives     Shortness of breath       Medications:  Prescriptions Prior to Admission   Medication Sig    aspirin (ECOTRIN) 81 MG EC tablet Take 1 tablet (81 mg total) by mouth once daily.    atorvastatin (LIPITOR) 40 MG tablet Take 1 tablet (40 mg total) by mouth once daily.    cadexomer iodine (IODOSORB) 0.9 % gel Apply topically daily as needed for Wound Care (to foot wounds).    carvedilol (COREG) 6.25 MG tablet Take 6.25 mg by mouth 2 (two) times daily with meals.    dicyclomine (BENTYL) 20 mg tablet Take 20 mg by mouth 3 (three) times daily.    furosemide (LASIX) 40 MG tablet Take 1 tablet (40 mg total) by mouth 2 (two) times daily.    INSULIN GLARGINE,HUM.REC.ANLOG (LANTUS SOLOSTAR SUBQ) Inject 25 Units into the skin every evening.    insulin glulisine (APIDRA) 100 unit/mL InPn pen Inject 0.15 mLs (15 Units total) into the skin 3 (three) times daily with meals.    pen needle, diabetic 32 gauge x 5/32" Ndle To use with Apidra.    quetiapine (SEROQUEL) 100 MG Tab Take 100 mg by mouth every evening.     Antibiotics     Start     Stop Route Frequency Ordered    06/20/17 1900  vancomycin (VANCOCIN) 750 mg in dextrose 5 % 250 mL IVPB      -- IV " Every 12 hours (non-standard times) 06/20/17 0851    06/16/17 2245  ampicillin-sulbactam 3 g in sodium chloride 0.9 % 100 mL IVPB (ready to mix system)      -- IV Every 6 hours (non-standard times) 06/16/17 2208        Antifungals     None        Antivirals     None             There is no immunization history on file for this patient.    Family History     None        Social History     Social History    Marital status: Single     Spouse name: N/A    Number of children: N/A    Years of education: N/A     Social History Main Topics    Smoking status: Current Every Day Smoker     Types: Cigarettes    Smokeless tobacco: None    Alcohol use No    Drug use: No    Sexual activity: Not Asked     Other Topics Concern    None     Social History Narrative    None     Review of Systems   Constitutional: Negative for chills and fever.   Respiratory: Negative for cough and shortness of breath.    Cardiovascular: Negative for chest pain and leg swelling.   Gastrointestinal: Positive for abdominal pain. Negative for constipation, diarrhea, nausea and vomiting.   Genitourinary: Negative for dysuria, frequency and hematuria.   Musculoskeletal: Positive for arthralgias. Negative for back pain and myalgias.   Skin: Positive for wound. Negative for rash.   Neurological: Positive for weakness. Negative for dizziness, light-headedness, numbness and headaches.   Psychiatric/Behavioral: Negative for agitation and behavioral problems. The patient is not nervous/anxious.      Objective:     Vital Signs (Most Recent):  Temp: 98.2 °F (36.8 °C) (06/22/17 1158)  Pulse: 89 (06/22/17 1158)  Resp: (!) 22 (06/22/17 1158)  BP: 135/70 (06/22/17 1158)  SpO2: 97 % (06/22/17 1158) Vital Signs (24h Range):  Temp:  [97.2 °F (36.2 °C)-99.1 °F (37.3 °C)] 98.2 °F (36.8 °C)  Pulse:  [78-89] 89  Resp:  [18-22] 22  SpO2:  [92 %-97 %] 97 %  BP: (135-165)/(65-81) 135/70     Weight: 126.8 kg (279 lb 8 oz)  Body mass index is 35.89 kg/m².    Estimated  Creatinine Clearance: 88.9 mL/min (based on Cr of 1.5).    Physical Exam   Constitutional: He is oriented to person, place, and time. He appears well-developed and well-nourished. No distress.   Cardiovascular: Normal rate and regular rhythm.    No murmur heard.  Pulmonary/Chest: Effort normal and breath sounds normal. No respiratory distress.   Abdominal: Soft. Bowel sounds are normal. He exhibits no distension. There is tenderness (diffuse).   Musculoskeletal: Normal range of motion. He exhibits edema (bilateral lower extremity).   Neurological: He is alert and oriented to person, place, and time.   Skin: Skin is warm and dry.   Diabetic foot ulcer noted to the plantar aspect of left foot; no erythema, drainage, malodor, or signs of infection.     Right great toe with ulceration note; see image below; no signs of active infection.    Right fourth digit with callous noted to dorsal aspect; no signs of active infection    Venous changes noted to bilateral lower extremities; no erythema.    Psychiatric: He has a normal mood and affect. His behavior is normal.           Significant Labs:   Blood Culture:   Recent Labs  Lab 06/01/17  1734 06/01/17  1735   LABBLOO No growth after 5 days. No growth after 5 days.     CBC:   Recent Labs  Lab 06/21/17  0342 06/22/17  0435   WBC 3.82* 3.99   HGB 8.1* 8.0*   HCT 24.9* 24.0*   PLT 52* 58*     CMP:   Recent Labs  Lab 06/21/17  0342 06/22/17  0435   * 135*   K 4.2 4.1    105   CO2 23 20*   * 161*   BUN 24* 24*   CREATININE 1.5* 1.5*   CALCIUM 8.8 8.6*   PROT 6.4 6.8   ALBUMIN 3.2* 3.7   BILITOT 2.2* 2.2*   ALKPHOS 86 81   AST 39 36   ALT 11 10   ANIONGAP 7* 10   EGFRNONAA 55.8* 55.8*       Significant Imaging: I have reviewed all pertinent imaging results/findings within the past 24 hours.

## 2017-06-22 NOTE — PLAN OF CARE
Problem: Patient Care Overview  Goal: Plan of Care Review  Outcome: Ongoing (interventions implemented as appropriate)  Plan of care discussed with patient AAOX4. Vital signs stable b/p a little elevated related to pain. ATC pain med given pain not relieved blood sugar stable. Patient complain of new onset of abd pain since admit last para was 2 weeks ago removed about 4 liters. 600 cc urine output dark jadon. Did not rest well. Will continue to monitor.

## 2017-06-22 NOTE — SUBJECTIVE & OBJECTIVE
Interval History: Patient seen and examined at bedside. No acute events overnight. Patient still is unable to bear weight on leg, MRI of the lower extremity ordered.     Review of Systems   Constitutional: Negative for activity change and appetite change.   HENT: Negative for drooling and facial swelling.    Eyes: Negative for discharge and itching.   Respiratory: Negative for cough, shortness of breath and wheezing.    Cardiovascular: Negative for chest pain and palpitations.   Gastrointestinal: Negative for abdominal pain and nausea.   Genitourinary: Negative for difficulty urinating and dysuria.   Skin: Negative for color change and pallor.   Neurological: Negative for dizziness and numbness.   Psychiatric/Behavioral: Negative for behavioral problems and confusion.   All other systems reviewed and are negative.    Objective:     Vital Signs (Most Recent):  Temp: 99.1 °F (37.3 °C) (06/21/17 2118)  Pulse: 88 (06/21/17 2322)  Resp: (!) 22 (06/21/17 2322)  BP: (!) 165/76 (06/21/17 2118)  SpO2: (!) 94 % (06/21/17 2322) Vital Signs (24h Range):  Temp:  [97.5 °F (36.4 °C)-99.1 °F (37.3 °C)] 99.1 °F (37.3 °C)  Pulse:  [78-92] 88  Resp:  [16-22] 22  SpO2:  [92 %-97 %] 94 %  BP: (138-165)/(65-78) 165/76     Weight: 126.8 kg (279 lb 8 oz)  Body mass index is 35.89 kg/m².    Intake/Output Summary (Last 24 hours) at 06/22/17 0001  Last data filed at 06/21/17 1214   Gross per 24 hour   Intake              540 ml   Output              850 ml   Net             -310 ml      Physical Exam   Constitutional: He is oriented to person, place, and time. He appears well-developed and well-nourished.   HENT:   Head: Normocephalic and atraumatic.   Eyes: Conjunctivae are normal. Pupils are equal, round, and reactive to light.   Neck: Normal range of motion. No thyromegaly present.   Cardiovascular: Normal rate, regular rhythm and normal heart sounds.    Pulmonary/Chest: Effort normal and breath sounds normal.   Abdominal: Soft. He  exhibits no distension. There is no tenderness.   Musculoskeletal: He exhibits edema.   B/L extremity erythema    Neurological: He is alert and oriented to person, place, and time. Coordination normal.   Skin: No rash noted. No erythema.       Significant Labs:   CBC:   Recent Labs  Lab 06/20/17 0441 06/21/17 0342   WBC 4.27 3.82*   HGB 8.4* 8.1*   HCT 25.0* 24.9*   PLT 58* 52*     CMP:   Recent Labs  Lab 06/20/17 0441 06/21/17 0342    135*   K 4.3 4.2    105   CO2 24 23    163*   BUN 24* 24*   CREATININE 1.5* 1.5*   CALCIUM 8.4* 8.8   PROT 6.3 6.4   ALBUMIN 3.1* 3.2*   BILITOT 2.7* 2.2*   ALKPHOS 90 86   AST 40 39   ALT 14 11   ANIONGAP 7* 7*   EGFRNONAA 55.8* 55.8*       Significant Imaging: I have reviewed all pertinent imaging results/findings within the past 24 hours.

## 2017-06-22 NOTE — PLAN OF CARE
SW and CM met with pt face to face discuss d/c planning of possibly LTAC placement for long term IV abx. Pt in agreement plan and understands that we will be working closely with his insurance to find a facility in network for LTAC placement.     SW and CM will remain open and continue to assist

## 2017-06-22 NOTE — PROGRESS NOTES
[facility] Hospital Medicine  Progress Note    Patient Name: Leif Nino  MRN: [id number]  Patient Class: IP- Inpatient   Admission Date: 6/16/2017  Length of Stay: 6 days  Attending Physician: Hema Ferrara MD  Primary Care Provider: [facility] Medicine Team: [facility] Hema Ferrara MD    Subjective:     Principal Problem:Bilateral cellulitis of lower leg    HPI:  Mr. Nino is a 45 yo WM w/ h/o HCV cirrhosis & poorly controlled DM complicated by BL diabetic ulceration s/p 6/1 R 4th toe debridement who presents c/o generalized weakness w/ associated worsening BL LE edema.  He reports that 5d ago he began having dysuria.  Two day afterwards he woke up noticing his chronic BL LE edema was significantly worse with an associated erythematous, painful rash.  Later that day he began developing dyspnea on exertion that progressively worsened to the point where he is now dyspneic at rest.  Patient denies any vomiting but has mild, persistant nausea that has worsened acutely over the last 3 days.  He reports no fevers, chills, abdominal pain, worsening abdominal distention, diarrhea.  Patient states he has had no productive cough or wheezes.    Hospital Course:  No notes on file    Interval History: Patient seen and examined, still has lower extremity swelling and difficulty ambulating. Seen by ID today who recommended stopping all antibiotics.     Review of Systems   Constitutional: Negative for activity change and appetite change.   HENT: Negative for drooling and facial swelling.    Eyes: Negative for discharge and itching.   Respiratory: Negative for cough, shortness of breath and wheezing.    Cardiovascular: Negative for chest pain and palpitations.   Gastrointestinal: Negative for abdominal pain and nausea.   Genitourinary: Negative for difficulty urinating and dysuria.   Skin: Negative for color change and pallor.   Neurological: Negative for dizziness and  numbness.   Psychiatric/Behavioral: Negative for behavioral problems and confusion.   All other systems reviewed and are negative.    Objective:     Vital Signs (Most Recent):  Temp: 98.2 °F (36.8 °C) (06/22/17 1158)  Pulse: 89 (06/22/17 1158)  Resp: (!) 22 (06/22/17 1158)  BP: 135/70 (06/22/17 1158)  SpO2: 97 % (06/22/17 1158) Vital Signs (24h Range):  Temp:  [97.2 °F (36.2 °C)-99.1 °F (37.3 °C)] 98.2 °F (36.8 °C)  Pulse:  [78-89] 89  Resp:  [20-22] 22  SpO2:  [94 %-97 %] 97 %  BP: (135-165)/(70-81) 135/70     Weight: 126.8 kg (279 lb 8 oz)  Body mass index is 35.89 kg/m².    Intake/Output Summary (Last 24 hours) at 06/22/17 1442  Last data filed at 06/22/17 1300   Gross per 24 hour   Intake             2470 ml   Output             1525 ml   Net              945 ml      Physical Exam   Constitutional: He is oriented to person, place, and time. He appears well-developed and well-nourished.   HENT:   Head: Normocephalic and atraumatic.   Eyes: Conjunctivae are normal. Pupils are equal, round, and reactive to light.   Neck: Normal range of motion. No thyromegaly present.   Cardiovascular: Normal rate, regular rhythm and normal heart sounds.    Pulmonary/Chest: Effort normal and breath sounds normal.   Abdominal: Soft. He exhibits no distension. There is no tenderness.   Musculoskeletal: He exhibits edema.   B/L extremity erythema    Neurological: He is alert and oriented to person, place, and time. Coordination normal.   Skin: No rash noted. No erythema.       Significant Labs:   CBC:   Recent Labs  Lab 06/21/17  0342 06/22/17  0435   WBC 3.82* 3.99   HGB 8.1* 8.0*   HCT 24.9* 24.0*   PLT 52* 58*     CMP:   Recent Labs  Lab 06/21/17  0342 06/22/17  0435   * 135*   K 4.2 4.1    105   CO2 23 20*   * 161*   BUN 24* 24*   CREATININE 1.5* 1.5*   CALCIUM 8.8 8.6*   PROT 6.4 6.8   ALBUMIN 3.2* 3.7   BILITOT 2.2* 2.2*   ALKPHOS 86 81   AST 39 36   ALT 11 10   ANIONGAP 7* 10   EGFRNONAA 55.8* 55.8*        Significant Imaging: I have reviewed all pertinent imaging results/findings within the past 24 hours.    Assessment/Plan:   # Bilateral lower extremity cellulitis with venous stasis due to streptococcus   -  on unasyn and vancomycin, discontinued today after 7 days of treatment  - infectious disease consult for antibiotic guidance recommended stopping antibiotics for cellulitis since he finished one week.   - can likely send home on doxycycline once stable     # Diabetic type 2 foot ulcers B/L  - podiatry consult, appreciate recs  - X rays B/ L feet     # Decompensated Hep C cirrhosis with ascites  MELD-Na score: 19 at 6/22/2017  4:35 AM  MELD score: 17 at 6/22/2017  4:35 AM  Calculated from:  Serum Creatinine: 1.5 mg/dL at 6/22/2017  4:35 AM  Serum Sodium: 135 mmol/L at 6/22/2017  4:35 AM  Total Bilirubin: 2.2 mg/dL at 6/22/2017  4:35 AM  INR(ratio): 1.4 at 6/22/2017  4:35 AM  Age: 46 years   - hepatology consult  - last viral load 21,000 and has never been treated  - abdomen U/S with liver dopplers  - will set up for outpatient follow up     # CKD stage 3 due to uncontrolled DM 2  - seems to be new baseline at 1.5     # DM2 with nephropathy  - Hba1c of 8.2 recently  - levemir 20 units at night and 8 of novolog with meals     # Anemia of CKD stage 3  # Thrombocytopenia  # Hemolysis   - hemolysis labs have come back positive and patient is close to DIC        # Hypoalbuminemia due to moderate protein fernando malnutrition  - PAB 3, glucerna     # Hypomagnesemia   - replaced, resolved        VTE Risk Mitigation         Ordered     Low Risk of VTE  Once      06/16/17 5697          Hema Ferrara MD  Department of Hospital Medicine   [facility]

## 2017-06-23 LAB
ALBUMIN SERPL BCP-MCNC: 4.1 G/DL
ALP SERPL-CCNC: 92 U/L
ALT SERPL W/O P-5'-P-CCNC: 12 U/L
ANION GAP SERPL CALC-SCNC: 11 MMOL/L
AST SERPL-CCNC: 44 U/L
BASOPHILS # BLD AUTO: 0.03 K/UL
BASOPHILS NFR BLD: 0.8 %
BILIRUB SERPL-MCNC: 2.2 MG/DL
BUN SERPL-MCNC: 25 MG/DL
CALCIUM SERPL-MCNC: 9.4 MG/DL
CHLORIDE SERPL-SCNC: 105 MMOL/L
CO2 SERPL-SCNC: 19 MMOL/L
CREAT SERPL-MCNC: 1.4 MG/DL
DIFFERENTIAL METHOD: ABNORMAL
EOSINOPHIL # BLD AUTO: 0.2 K/UL
EOSINOPHIL NFR BLD: 6.7 %
ERYTHROCYTE [DISTWIDTH] IN BLOOD BY AUTOMATED COUNT: 17.3 %
EST. GFR  (AFRICAN AMERICAN): >60 ML/MIN/1.73 M^2
EST. GFR  (NON AFRICAN AMERICAN): >60 ML/MIN/1.73 M^2
GLUCOSE SERPL-MCNC: 129 MG/DL
HCT VFR BLD AUTO: 24.5 %
HGB BLD-MCNC: 8 G/DL
INR PPP: 1.4
LYMPHOCYTES # BLD AUTO: 0.7 K/UL
LYMPHOCYTES NFR BLD: 19 %
MAGNESIUM SERPL-MCNC: 1.9 MG/DL
MCH RBC QN AUTO: 28.3 PG
MCHC RBC AUTO-ENTMCNC: 32.7 %
MCV RBC AUTO: 87 FL
MONOCYTES # BLD AUTO: 0.4 K/UL
MONOCYTES NFR BLD: 11.7 %
NEUTROPHILS # BLD AUTO: 2.2 K/UL
NEUTROPHILS NFR BLD: 61.8 %
PHOSPHATE SERPL-MCNC: 3.5 MG/DL
PLATELET # BLD AUTO: 62 K/UL
PMV BLD AUTO: 9.8 FL
POCT GLUCOSE: 115 MG/DL (ref 70–110)
POCT GLUCOSE: 153 MG/DL (ref 70–110)
POCT GLUCOSE: 158 MG/DL (ref 70–110)
POCT GLUCOSE: 219 MG/DL (ref 70–110)
POTASSIUM SERPL-SCNC: 4.6 MMOL/L
PROT SERPL-MCNC: 7.4 G/DL
PROTHROMBIN TIME: 14.4 SEC
RBC # BLD AUTO: 2.83 M/UL
SODIUM SERPL-SCNC: 135 MMOL/L
WBC # BLD AUTO: 3.58 K/UL

## 2017-06-23 PROCEDURE — 20600001 HC STEP DOWN PRIVATE ROOM

## 2017-06-23 PROCEDURE — 99233 SBSQ HOSP IP/OBS HIGH 50: CPT | Mod: ,,, | Performed by: PODIATRIST

## 2017-06-23 PROCEDURE — 99233 SBSQ HOSP IP/OBS HIGH 50: CPT | Mod: ,,, | Performed by: HOSPITALIST

## 2017-06-23 PROCEDURE — 85610 PROTHROMBIN TIME: CPT

## 2017-06-23 PROCEDURE — 25000003 PHARM REV CODE 250: Performed by: HOSPITALIST

## 2017-06-23 PROCEDURE — 25000242 PHARM REV CODE 250 ALT 637 W/ HCPCS: Performed by: NURSE PRACTITIONER

## 2017-06-23 PROCEDURE — 83735 ASSAY OF MAGNESIUM: CPT

## 2017-06-23 PROCEDURE — P9047 ALBUMIN (HUMAN), 25%, 50ML: HCPCS | Performed by: HOSPITALIST

## 2017-06-23 PROCEDURE — 63600175 PHARM REV CODE 636 W HCPCS: Performed by: HOSPITALIST

## 2017-06-23 PROCEDURE — 36415 COLL VENOUS BLD VENIPUNCTURE: CPT

## 2017-06-23 PROCEDURE — 94640 AIRWAY INHALATION TREATMENT: CPT

## 2017-06-23 PROCEDURE — 85025 COMPLETE CBC W/AUTO DIFF WBC: CPT

## 2017-06-23 PROCEDURE — 80053 COMPREHEN METABOLIC PANEL: CPT

## 2017-06-23 PROCEDURE — 84100 ASSAY OF PHOSPHORUS: CPT

## 2017-06-23 RX ADMIN — HYDROMORPHONE HYDROCHLORIDE 1 MG: 1 INJECTION, SOLUTION INTRAMUSCULAR; INTRAVENOUS; SUBCUTANEOUS at 01:06

## 2017-06-23 RX ADMIN — HYDROMORPHONE HYDROCHLORIDE 1 MG: 1 INJECTION, SOLUTION INTRAMUSCULAR; INTRAVENOUS; SUBCUTANEOUS at 07:06

## 2017-06-23 RX ADMIN — INSULIN ASPART 1 UNITS: 100 INJECTION, SOLUTION INTRAVENOUS; SUBCUTANEOUS at 08:06

## 2017-06-23 RX ADMIN — IPRATROPIUM BROMIDE AND ALBUTEROL SULFATE 3 ML: .5; 3 SOLUTION RESPIRATORY (INHALATION) at 12:06

## 2017-06-23 RX ADMIN — CARVEDILOL 6.25 MG: 6.25 TABLET, FILM COATED ORAL at 08:06

## 2017-06-23 RX ADMIN — ASPIRIN 81 MG: 81 TABLET, COATED ORAL at 08:06

## 2017-06-23 RX ADMIN — INSULIN ASPART 8 UNITS: 100 INJECTION, SOLUTION INTRAVENOUS; SUBCUTANEOUS at 08:06

## 2017-06-23 RX ADMIN — ALBUMIN (HUMAN) 25 G: 12.5 SOLUTION INTRAVENOUS at 05:06

## 2017-06-23 RX ADMIN — DICYCLOMINE HYDROCHLORIDE 20 MG: 20 TABLET ORAL at 05:06

## 2017-06-23 RX ADMIN — QUETIAPINE FUMARATE 100 MG: 25 TABLET ORAL at 08:06

## 2017-06-23 RX ADMIN — INSULIN ASPART 8 UNITS: 100 INJECTION, SOLUTION INTRAVENOUS; SUBCUTANEOUS at 12:06

## 2017-06-23 RX ADMIN — HYDROMORPHONE HYDROCHLORIDE 1 MG: 1 INJECTION, SOLUTION INTRAMUSCULAR; INTRAVENOUS; SUBCUTANEOUS at 04:06

## 2017-06-23 RX ADMIN — DICYCLOMINE HYDROCHLORIDE 20 MG: 20 TABLET ORAL at 08:06

## 2017-06-23 RX ADMIN — ALBUMIN (HUMAN) 25 G: 12.5 SOLUTION INTRAVENOUS at 01:06

## 2017-06-23 RX ADMIN — INSULIN ASPART 8 UNITS: 100 INJECTION, SOLUTION INTRAVENOUS; SUBCUTANEOUS at 05:06

## 2017-06-23 RX ADMIN — OXYCODONE HYDROCHLORIDE 15 MG: 5 TABLET ORAL at 08:06

## 2017-06-23 RX ADMIN — HYDROMORPHONE HYDROCHLORIDE 1 MG: 1 INJECTION, SOLUTION INTRAMUSCULAR; INTRAVENOUS; SUBCUTANEOUS at 10:06

## 2017-06-23 RX ADMIN — OXYCODONE HYDROCHLORIDE 15 MG: 5 TABLET ORAL at 05:06

## 2017-06-23 RX ADMIN — DICYCLOMINE HYDROCHLORIDE 20 MG: 20 TABLET ORAL at 01:06

## 2017-06-23 RX ADMIN — CARVEDILOL 6.25 MG: 6.25 TABLET, FILM COATED ORAL at 05:06

## 2017-06-23 RX ADMIN — IPRATROPIUM BROMIDE AND ALBUTEROL SULFATE 3 ML: .5; 3 SOLUTION RESPIRATORY (INHALATION) at 01:06

## 2017-06-23 RX ADMIN — ATORVASTATIN CALCIUM 40 MG: 20 TABLET, FILM COATED ORAL at 10:06

## 2017-06-23 RX ADMIN — INSULIN DETEMIR 10 UNITS: 100 INJECTION, SOLUTION SUBCUTANEOUS at 08:06

## 2017-06-23 NOTE — PLAN OF CARE
Problem: Patient Care Overview  Goal: Plan of Care Review  Patient remains AAOx4, vital signs have been stable. Pt complains of back and abdominal pain, PRN medication given. Voiding per urinal. Pt remains free of falls. Plan of care reviewed with patient, all questions and concerns were addressed.  Will continue to monitor.

## 2017-06-23 NOTE — PROGRESS NOTES
Progress Note  Podiatry        SUBJECTIVE     History of Present Illness:  Leif Nino is a 46 y.o. male  has a past medical history of Ascites of liver; Chronic hepatitis C without hepatic coma; Chronic liver failure without hepatic coma; Diabetic foot ulcer; DM (diabetes mellitus), type 2 with peripheral vascular complications; Essential hypertension; Gallstones; Hepatic encephalopathy; Hepatitis C; and Pancreatitis.  Admitted for BLE swellin, cellulitis. Consulted for B/L LE wounds. Pt. Recently discharged from [facility] [place] on 6/5/17.  Reports wounds to B/L feet started two months ago reports applying silver to wounds daily. Reports never seeing a podiatrist before.  During recent admission from 5/30/17-6/5/17 MRI ordered for RLE revealing early osteo, ID consulted recommended discontinuing abx as wound not actuley infected. Vascular surgery consulted for diminished pulse B/L LE recommended no intervention at this time with f/u in clinic. Since discharge pt. Has not followed up in podiatry clinic and friend at home continues to change his dressings daily applying silver. Reports increased swelling and erythema in the past couple of days. Today patient lethargic with pain upon palpation B/L LE.     6/23/17: Pt. Seen bedside. No bandages intact reports they fell off.     Scheduled Meds:   albumin human 25%  25 g Intravenous Q6H    aspirin  81 mg Oral Daily    atorvastatin  40 mg Oral Daily    carvedilol  6.25 mg Oral BID WM    dicyclomine  20 mg Oral TID    insulin aspart  8 Units Subcutaneous TIDWM    insulin detemir  10 Units Subcutaneous QHS    quetiapine  100 mg Oral QHS     Continuous Infusions:   PRN Meds:sodium chloride, acetaminophen, albuterol-ipratropium 2.5mg-0.5mg/3mL, cadexomer iodine, dextrose 50%, dextrose 50%, glucagon (human recombinant), glucose, glucose, guaifenesin 100 mg/5 ml, HYDROmorphone, insulin aspart, ondansetron, oxycodone, senna-docusate 8.6-50 mg    Review of patient's allergies  indicates:   Allergen Reactions    Shellfish containing products Hives     Shortness of breath        Past Medical History:   Diagnosis Date    Ascites of liver 5/30/2017    Chronic hepatitis C without hepatic coma     Chronic liver failure without hepatic coma 5/30/2017    Diabetic foot ulcer     right foot    DM (diabetes mellitus), type 2 with peripheral vascular complications     Essential hypertension     Gallstones     Hepatic encephalopathy 5/30/2017    Hepatitis C     Pancreatitis      Past Surgical History:   Procedure Laterality Date    BACK SURGERY       History reviewed. No pertinent family history.  Social History   Substance Use Topics    Smoking status: Current Every Day Smoker     Types: Cigarettes    Smokeless tobacco: Not on file    Alcohol use No       Review of Systems:  Constitutional: no fever or chills  Respiratory: no cough or shortness of breath  Cardiovascular: no chest pain or palpitations  Gastrointestinal: no nausea or vomiting, tolerating diet  Integument/Breast: history of prior ulcerations  Musculoskeletal: positive for arthralgias  Neurological: history of numbness or paresthesias in the feet    OBJECTIVE     Vital Signs (Most Recent):  Temp: 97.8 °F (36.6 °C) (06/23/17 1628)  Pulse: 83 (06/23/17 1628)  Resp: (!) 22 (06/23/17 1628)  BP: (!) 191/93 (06/23/17 1628)  SpO2: 95 % (06/23/17 1628)  Body mass index is 35.89 kg/m².    Vital Signs Range (Last 24H):  Temp:  [97.7 °F (36.5 °C)-98.4 °F (36.9 °C)]   Pulse:  [73-94]   Resp:  [20-22]   BP: (158-191)/(77-95)   SpO2:  [94 %-99 %]     Physical Exam:    General: Pt. is well-developed, obese, appears stated age, in no acute distress, alert and oriented x 3.  Somnolent    Vascular: Distal DP/PT pulses diminished  b/l. CRT < 3 sec to tips of toes. Edema noted to b/l LE. vericosities noted to b/l LEs.     Dermatologic:     Right foot:  Ulcer Location: Plantar right hallux   Measurements:Post debridement  1.2x1.1x0.3  Periwound: Intact  Drainage: serosanguinous.  Malodor: None.  Base:  90% granular. 10% fibrin.  Signs of infection: None.     Wound 3 : Left plantar foot ulceration   Measurement: 1.5cmx1.5cmx0.1cm  Base: granular base   Periwound skin: hyperkeratosis  Drainage:none  Erythema: mild  Probe: none, no bone exposed    Wound 1: Right 4th toe ulceration   Measurement: 0.5cmx0.5cmx0.1cm  Base: red granular   Periwound skin: maceration  Drainage: none  Erythema: none  Probe:bone exposed       Musculoskeletal: Pain upon ROM DF/PF B/L . Pain to bilateral calf with compression.    Neurological: Light touch, proprioception, and sharp/dull sensation are all decreased bilaterally. Protective threshold with the [place]-Wienstein monofilament is decreased bilaterally.       Left 4th toe ulceration 6/23/17:           Laboratory:  CBC:     Recent Labs  Lab 06/23/17  0535   WBC 3.58*   RBC 2.83*   HGB 8.0*   HCT 24.5*   PLT 62*   MCV 87   MCH 28.3   MCHC 32.7     BMP:     Recent Labs  Lab 06/23/17  0429   *   *   K 4.6      CO2 19*   BUN 25*   CREATININE 1.4   CALCIUM 9.4   MG 1.9       Diagnostic Results:  Xray:  No definite osteomyelitis identified. Abnormality at the right third metatarsal head is likely from remote trauma . Small metal foreign body in right plantar soft tissues. Diffuse soft tissue swelling in the feet with more prominent swelling of the right first and fourth toes.    Clinical Findings:  Ulcerations stable no active signs of infection.     ASSESSMENT/PLAN     Assessment:  -DM II with peripheral neuropathy  -Diabetic ulcers of bilateral feet, appear stable.   - Venous Stasis  -PAD      Plan:  Ulcerations evaluate probe to bone on right 4th toe. ID reconsulted   Iodosorb applied to wounds wrapped with Kerlix, ACE  Nursing orders placed for daily dressing changes iodosorb, kerlix,ACE up leg.   Heel protector boots ordered B/L LE.   Podiatry will follow     Weightbearing Status: WBAT  B/L   Offloading Device: Darco shoe.     Ana Luisa Ochoa DPM PGY-2  Pager: [phone]

## 2017-06-23 NOTE — PLAN OF CARE
Rehab referral packet sent to [facility] for review.  Pt no longer meets LTAC criteria and does not have a skilled nursing benefit through his insurance.      Will continue to follow.    Niya Garcia LMSW [phone]

## 2017-06-24 PROBLEM — L97.511 DIABETIC ULCER OF TOE OF RIGHT FOOT ASSOCIATED WITH TYPE 2 DIABETES MELLITUS, LIMITED TO BREAKDOWN OF SKIN: Status: ACTIVE | Noted: 2017-06-01

## 2017-06-24 PROBLEM — M86.9 OSTEOMYELITIS OF TOE OF RIGHT FOOT: Status: RESOLVED | Noted: 2017-06-24 | Resolved: 2017-06-24

## 2017-06-24 PROBLEM — M86.9 OSTEOMYELITIS OF TOE OF RIGHT FOOT: Status: ACTIVE | Noted: 2017-06-24

## 2017-06-24 LAB
ALBUMIN SERPL BCP-MCNC: 4.1 G/DL
ALP SERPL-CCNC: 87 U/L
ALT SERPL W/O P-5'-P-CCNC: 10 U/L
ANION GAP SERPL CALC-SCNC: 8 MMOL/L
AST SERPL-CCNC: 40 U/L
BASOPHILS # BLD AUTO: 0.05 K/UL
BASOPHILS NFR BLD: 1.5 %
BILIRUB SERPL-MCNC: 2.3 MG/DL
BUN SERPL-MCNC: 28 MG/DL
CALCIUM SERPL-MCNC: 9.4 MG/DL
CHLORIDE SERPL-SCNC: 106 MMOL/L
CO2 SERPL-SCNC: 22 MMOL/L
CREAT SERPL-MCNC: 1.4 MG/DL
DIFFERENTIAL METHOD: ABNORMAL
EOSINOPHIL # BLD AUTO: 0.2 K/UL
EOSINOPHIL NFR BLD: 6.5 %
ERYTHROCYTE [DISTWIDTH] IN BLOOD BY AUTOMATED COUNT: 17.6 %
EST. GFR  (AFRICAN AMERICAN): >60 ML/MIN/1.73 M^2
EST. GFR  (NON AFRICAN AMERICAN): >60 ML/MIN/1.73 M^2
GLUCOSE SERPL-MCNC: 161 MG/DL
HCT VFR BLD AUTO: 23.7 %
HGB BLD-MCNC: 7.8 G/DL
INR PPP: 1.4
LYMPHOCYTES # BLD AUTO: 0.7 K/UL
LYMPHOCYTES NFR BLD: 20.1 %
MAGNESIUM SERPL-MCNC: 1.9 MG/DL
MCH RBC QN AUTO: 28.4 PG
MCHC RBC AUTO-ENTMCNC: 32.9 %
MCV RBC AUTO: 86 FL
MONOCYTES # BLD AUTO: 0.3 K/UL
MONOCYTES NFR BLD: 10.2 %
NEUTROPHILS # BLD AUTO: 2 K/UL
NEUTROPHILS NFR BLD: 61.7 %
PHOSPHATE SERPL-MCNC: 3.6 MG/DL
PLATELET # BLD AUTO: 61 K/UL
PMV BLD AUTO: 9.3 FL
POCT GLUCOSE: 119 MG/DL (ref 70–110)
POCT GLUCOSE: 143 MG/DL (ref 70–110)
POCT GLUCOSE: 160 MG/DL (ref 70–110)
POCT GLUCOSE: 184 MG/DL (ref 70–110)
POTASSIUM SERPL-SCNC: 4.2 MMOL/L
PROT SERPL-MCNC: 7.1 G/DL
PROTHROMBIN TIME: 14.7 SEC
RBC # BLD AUTO: 2.75 M/UL
SODIUM SERPL-SCNC: 136 MMOL/L
WBC # BLD AUTO: 3.24 K/UL

## 2017-06-24 PROCEDURE — 87116 MYCOBACTERIA CULTURE: CPT

## 2017-06-24 PROCEDURE — 25000242 PHARM REV CODE 250 ALT 637 W/ HCPCS: Performed by: NURSE PRACTITIONER

## 2017-06-24 PROCEDURE — 94640 AIRWAY INHALATION TREATMENT: CPT

## 2017-06-24 PROCEDURE — 80053 COMPREHEN METABOLIC PANEL: CPT

## 2017-06-24 PROCEDURE — 25000003 PHARM REV CODE 250: Performed by: HOSPITALIST

## 2017-06-24 PROCEDURE — 25000003 PHARM REV CODE 250: Performed by: PODIATRIST

## 2017-06-24 PROCEDURE — 36415 COLL VENOUS BLD VENIPUNCTURE: CPT

## 2017-06-24 PROCEDURE — 87070 CULTURE OTHR SPECIMN AEROBIC: CPT

## 2017-06-24 PROCEDURE — 85610 PROTHROMBIN TIME: CPT

## 2017-06-24 PROCEDURE — 84100 ASSAY OF PHOSPHORUS: CPT

## 2017-06-24 PROCEDURE — 99499 UNLISTED E&M SERVICE: CPT | Mod: ,,, | Performed by: PHYSICIAN ASSISTANT

## 2017-06-24 PROCEDURE — 87075 CULTR BACTERIA EXCEPT BLOOD: CPT

## 2017-06-24 PROCEDURE — 87176 TISSUE HOMOGENIZATION CULTR: CPT

## 2017-06-24 PROCEDURE — 99233 SBSQ HOSP IP/OBS HIGH 50: CPT | Mod: ,,, | Performed by: INTERNAL MEDICINE

## 2017-06-24 PROCEDURE — 88305 TISSUE EXAM BY PATHOLOGIST: CPT | Performed by: PATHOLOGY

## 2017-06-24 PROCEDURE — 87186 SC STD MICRODIL/AGAR DIL: CPT

## 2017-06-24 PROCEDURE — 99233 SBSQ HOSP IP/OBS HIGH 50: CPT | Mod: 25,,, | Performed by: PODIATRIST

## 2017-06-24 PROCEDURE — 27000221 HC OXYGEN, UP TO 24 HOURS

## 2017-06-24 PROCEDURE — 88311 DECALCIFY TISSUE: CPT | Mod: 26,,, | Performed by: PATHOLOGY

## 2017-06-24 PROCEDURE — 85025 COMPLETE CBC W/AUTO DIFF WBC: CPT

## 2017-06-24 PROCEDURE — 20220 BONE BIOPSY TROCAR/NDL SUPFC: CPT | Mod: ,,, | Performed by: PODIATRIST

## 2017-06-24 PROCEDURE — 20600001 HC STEP DOWN PRIVATE ROOM

## 2017-06-24 PROCEDURE — P9047 ALBUMIN (HUMAN), 25%, 50ML: HCPCS | Performed by: HOSPITALIST

## 2017-06-24 PROCEDURE — 97530 THERAPEUTIC ACTIVITIES: CPT

## 2017-06-24 PROCEDURE — 87015 SPECIMEN INFECT AGNT CONCNTJ: CPT

## 2017-06-24 PROCEDURE — 99233 SBSQ HOSP IP/OBS HIGH 50: CPT | Mod: ,,, | Performed by: HOSPITALIST

## 2017-06-24 PROCEDURE — 0QBQ3ZX EXCISION OF RIGHT TOE PHALANX, PERCUTANEOUS APPROACH, DIAGNOSTIC: ICD-10-PCS | Performed by: PODIATRIST

## 2017-06-24 PROCEDURE — 63600175 PHARM REV CODE 636 W HCPCS: Performed by: HOSPITALIST

## 2017-06-24 PROCEDURE — 97535 SELF CARE MNGMENT TRAINING: CPT

## 2017-06-24 PROCEDURE — 87205 SMEAR GRAM STAIN: CPT

## 2017-06-24 PROCEDURE — 87077 CULTURE AEROBIC IDENTIFY: CPT

## 2017-06-24 PROCEDURE — 83735 ASSAY OF MAGNESIUM: CPT

## 2017-06-24 RX ORDER — LIDOCAINE HYDROCHLORIDE 20 MG/ML
10 INJECTION, SOLUTION INFILTRATION; PERINEURAL ONCE
Status: DISCONTINUED | OUTPATIENT
Start: 2017-06-24 | End: 2017-06-24

## 2017-06-24 RX ORDER — LIDOCAINE HYDROCHLORIDE 10 MG/ML
5 INJECTION INFILTRATION; PERINEURAL ONCE
Status: COMPLETED | OUTPATIENT
Start: 2017-06-24 | End: 2017-06-24

## 2017-06-24 RX ADMIN — ALBUMIN (HUMAN) 25 G: 12.5 SOLUTION INTRAVENOUS at 05:06

## 2017-06-24 RX ADMIN — DICYCLOMINE HYDROCHLORIDE 20 MG: 20 TABLET ORAL at 10:06

## 2017-06-24 RX ADMIN — INSULIN ASPART 8 UNITS: 100 INJECTION, SOLUTION INTRAVENOUS; SUBCUTANEOUS at 08:06

## 2017-06-24 RX ADMIN — HYDROMORPHONE HYDROCHLORIDE 1 MG: 1 INJECTION, SOLUTION INTRAMUSCULAR; INTRAVENOUS; SUBCUTANEOUS at 05:06

## 2017-06-24 RX ADMIN — DICYCLOMINE HYDROCHLORIDE 20 MG: 20 TABLET ORAL at 04:06

## 2017-06-24 RX ADMIN — OXYCODONE HYDROCHLORIDE 15 MG: 5 TABLET ORAL at 11:06

## 2017-06-24 RX ADMIN — HYDROMORPHONE HYDROCHLORIDE 1 MG: 1 INJECTION, SOLUTION INTRAMUSCULAR; INTRAVENOUS; SUBCUTANEOUS at 12:06

## 2017-06-24 RX ADMIN — IPRATROPIUM BROMIDE AND ALBUTEROL SULFATE 3 ML: .5; 3 SOLUTION RESPIRATORY (INHALATION) at 02:06

## 2017-06-24 RX ADMIN — QUETIAPINE FUMARATE 100 MG: 25 TABLET ORAL at 10:06

## 2017-06-24 RX ADMIN — HYDROMORPHONE HYDROCHLORIDE 1 MG: 1 INJECTION, SOLUTION INTRAMUSCULAR; INTRAVENOUS; SUBCUTANEOUS at 04:06

## 2017-06-24 RX ADMIN — HYDROMORPHONE HYDROCHLORIDE 1 MG: 1 INJECTION, SOLUTION INTRAMUSCULAR; INTRAVENOUS; SUBCUTANEOUS at 01:06

## 2017-06-24 RX ADMIN — HYDROMORPHONE HYDROCHLORIDE 1 MG: 1 INJECTION, SOLUTION INTRAMUSCULAR; INTRAVENOUS; SUBCUTANEOUS at 08:06

## 2017-06-24 RX ADMIN — ASPIRIN 81 MG: 81 TABLET, COATED ORAL at 08:06

## 2017-06-24 RX ADMIN — INSULIN DETEMIR 10 UNITS: 100 INJECTION, SOLUTION SUBCUTANEOUS at 10:06

## 2017-06-24 RX ADMIN — ATORVASTATIN CALCIUM 40 MG: 20 TABLET, FILM COATED ORAL at 08:06

## 2017-06-24 RX ADMIN — ALBUMIN (HUMAN) 25 G: 12.5 SOLUTION INTRAVENOUS at 11:06

## 2017-06-24 RX ADMIN — CARVEDILOL 6.25 MG: 6.25 TABLET, FILM COATED ORAL at 05:06

## 2017-06-24 RX ADMIN — LIDOCAINE HYDROCHLORIDE 5 ML: 10 INJECTION, SOLUTION INFILTRATION; PERINEURAL at 03:06

## 2017-06-24 RX ADMIN — IPRATROPIUM BROMIDE AND ALBUTEROL SULFATE 3 ML: .5; 3 SOLUTION RESPIRATORY (INHALATION) at 03:06

## 2017-06-24 RX ADMIN — INSULIN ASPART 2 UNITS: 100 INJECTION, SOLUTION INTRAVENOUS; SUBCUTANEOUS at 12:06

## 2017-06-24 RX ADMIN — ALBUMIN (HUMAN) 25 G: 12.5 SOLUTION INTRAVENOUS at 12:06

## 2017-06-24 RX ADMIN — OXYCODONE HYDROCHLORIDE 15 MG: 5 TABLET ORAL at 08:06

## 2017-06-24 RX ADMIN — Medication 40 G: at 11:06

## 2017-06-24 RX ADMIN — INSULIN ASPART 8 UNITS: 100 INJECTION, SOLUTION INTRAVENOUS; SUBCUTANEOUS at 12:06

## 2017-06-24 RX ADMIN — HYDROMORPHONE HYDROCHLORIDE 1 MG: 1 INJECTION, SOLUTION INTRAMUSCULAR; INTRAVENOUS; SUBCUTANEOUS at 10:06

## 2017-06-24 RX ADMIN — INSULIN ASPART 2 UNITS: 100 INJECTION, SOLUTION INTRAVENOUS; SUBCUTANEOUS at 05:06

## 2017-06-24 RX ADMIN — CARVEDILOL 6.25 MG: 6.25 TABLET, FILM COATED ORAL at 08:06

## 2017-06-24 RX ADMIN — DICYCLOMINE HYDROCHLORIDE 20 MG: 20 TABLET ORAL at 03:06

## 2017-06-24 RX ADMIN — OXYCODONE HYDROCHLORIDE 15 MG: 5 TABLET ORAL at 03:06

## 2017-06-24 RX ADMIN — INSULIN ASPART 8 UNITS: 100 INJECTION, SOLUTION INTRAVENOUS; SUBCUTANEOUS at 10:06

## 2017-06-24 NOTE — PROGRESS NOTES
Progress Note  Podiatry        SUBJECTIVE     History of Present Illness:  Leif Nino is a 46 y.o. male  has a past medical history of Ascites of liver; Chronic hepatitis C without hepatic coma; Chronic liver failure without hepatic coma; Diabetic foot ulcer; DM (diabetes mellitus), type 2 with peripheral vascular complications; Essential hypertension; Gallstones; Hepatic encephalopathy; Hepatitis C; and Pancreatitis.  Admitted for BLE swellin, cellulitis. Consulted for B/L LE wounds. [place] [facility] [place].  Reports wounds to B/L feet started two months ago reports applying silver to wounds daily. Reports never seeing a podiatrist before.  During recent admission from 5/30/17-6/5/17 MRI ordered for RLE revealing early osteo, ID consulted recommended discontinuing abx as wound not actuley infected. Vascular surgery consulted for diminished pulse B/L LE recommended no intervention at this time with f/u in clinic. Since discharge pt. Has not followed up in podiatry clinic and friend at home continues to change his dressings daily applying silver. Reports increased swelling and erythema in the past couple of days. Today patient lethargic with pain upon palpation B/L LE. Having chronic SOB that is intermittently worse, particularly when he does any activity such as getting up or answering phone. Had nursing come in to address his nasal oxygen flow per his request and she said she would have respiratory come back and give him a treatment. He calmed his breathing prior to the procedure, but I stayed with him for a long time unti lthat was the case. He actually fell asleep during some of the procedure and began jumping a lot, but once he woke up his foot stopped jumping.     Interval History:  6/23/17: Pt. Seen bedside. No bandages intact reports they fell off.     6/24/17: Patient was seen at bedside with shortness of breath after getting back in bed. Denies an increase in pedal pain, no acute  pedal changes since yesterday.     Scheduled Meds:   albumin human 25%  25 g Intravenous Q6H    aspirin  81 mg Oral Daily    atorvastatin  40 mg Oral Daily    carvedilol  6.25 mg Oral BID WM    dicyclomine  20 mg Oral TID    insulin aspart  8 Units Subcutaneous TIDWM    insulin detemir  10 Units Subcutaneous QHS    quetiapine  100 mg Oral QHS     Continuous Infusions:   PRN Meds:sodium chloride, acetaminophen, albuterol-ipratropium 2.5mg-0.5mg/3mL, cadexomer iodine, dextrose 50%, dextrose 50%, glucagon (human recombinant), glucose, glucose, guaifenesin 100 mg/5 ml, HYDROmorphone, insulin aspart, ondansetron, oxycodone, senna-docusate 8.6-50 mg    Review of patient's allergies indicates:   Allergen Reactions    Shellfish containing products Hives     Shortness of breath        Past Medical History:   Diagnosis Date    Ascites of liver 5/30/2017    Chronic hepatitis C without hepatic coma     Chronic liver failure without hepatic coma 5/30/2017    Diabetic foot ulcer     right foot    DM (diabetes mellitus), type 2 with peripheral vascular complications     Essential hypertension     Gallstones     Hepatic encephalopathy 5/30/2017    Hepatitis C     Pancreatitis      Past Surgical History:   Procedure Laterality Date    BACK SURGERY       History reviewed. No pertinent family history.  Social History   Substance Use Topics    Smoking status: Current Every Day Smoker     Types: Cigarettes    Smokeless tobacco: Not on file    Alcohol use No       Review of Systems:  Constitutional: no fever or chills  Respiratory: positive for dyspnea on exertion and shortnes of breath  Cardiovascular: no chest pain or palpitations  Gastrointestinal: no nausea or vomiting, tolerating diet  Integument/Breast: history of prior ulcerations  Musculoskeletal: positive for arthralgias  Neurological: history of numbness or paresthesias in the feet    OBJECTIVE     Vital Signs (Most Recent):  Temp: 98.3 °F (36.8 °C)  (06/24/17 1557)  Pulse: 75 (06/24/17 1557)  Resp: (!) 22 (06/24/17 1557)  BP: (!) 175/82 (06/24/17 1557)  SpO2: 97 % (06/24/17 1557)  Body mass index is 35.89 kg/m².    Vital Signs Range (Last 24H):  Temp:  [98 °F (36.7 °C)-98.5 °F (36.9 °C)]   Pulse:  [75-96]   Resp:  [17-22]   BP: (128-179)/(77-99)   SpO2:  [93 %-98 %]     Physical Exam:    General: Pt. is well-developed, obese, appears stated age, alert and oriented x 3.      Vascular: Distal DP/PT pulses diminished  b/l. CRT < 3 sec to tips of toes. Edema noted to b/l LE. vericosities noted to b/l LEs.     Dermatologic:     Right foot:  Ulcer Location: Plantar right hallux   Measurements: 1.2x1.1x0.3  Periwound: Intact  Drainage: serosanguinous.  Malodor: None.  Base:  90% granular. 10% fibrin.  Signs of infection: None.     Wound 3 : Left plantar foot ulceration   Measurement: 1.5cmx1.5cmx0.1cm  Base: granular base   Periwound skin: hyperkeratosis  Drainage:none  Erythema: mild  Probe: none, no bone exposed    Wound 1: Right 4th toe ulceration   Measurement: 0.5cmx0.5cmx0.1cm  Base: red granular   Periwound skin: maceration  Drainage: none  Erythema: none  Probe: None, bone covered with granular tissue       Musculoskeletal: Pain upon ROM DF/PF B/L . Pain to bilateral calf with compression.    Neurological: Light touch, proprioception, and sharp/dull sensation are all decreased bilaterally. Protective threshold with the [place]-Wienstein monofilament is decreased bilaterally.       Left 4th toe ulceration 6/23/17:           Laboratory:  CBC:     Recent Labs  Lab 06/24/17 0414   WBC 3.24*   RBC 2.75*   HGB 7.8*   HCT 23.7*   PLT 61*   MCV 86   MCH 28.4   MCHC 32.9     BMP:     Recent Labs  Lab 06/24/17 0414   *      K 4.2      CO2 22*   BUN 28*   CREATININE 1.4   CALCIUM 9.4   MG 1.9       Diagnostic Results:  Xray:  No definite osteomyelitis identified. Abnormality at the right third metatarsal head is likely from remote trauma . Small metal  "foreign body in right plantar soft tissues. Diffuse soft tissue swelling in the feet with more prominent swelling of the right first and fourth toes.    MRI: Right foot  1. Abnormal edema with heterogeneous T1 marrow hypointensity involving the middle and distal phalanges of the fourth toe with adjacent dorsal soft tissue ulceration concerning for possible osteomyelitis.    2. Subcutaneous edema about the dorsum of the foot. Edema of the plantar foot musculature which may represent neuropathic myositis in this patient with history of diabetes.    3. Mild marrow edema within the cuboid and medial hallux sesamoid which may relate to altered biomechanics. Chronic fracture deformity of the third metatarsal head.      ASSESSMENT/PLAN     Assessment:  -DM II with peripheral neuropathy  -Diabetic ulcers of bilateral feet, appear stable.  -MRI with right fourth toe osteomyelitis middle and distal phalanx.   - Venous Stasis  -PAD      Plan:  -patient was short of breath throughout the evaluation, nurse was notified who said she would contact pulmonary to come give him his breathing treatments.    -Ulcerations evaluates. ID Recommended bone biopsy right fourth toe, discussed with patient the need for bone biopsy to guide antibiotic management versus toe amputation to remove infected bone. Patient would like to try to save the toe with antibiotic management. Upon reviewing the risks/benefits,  the planned procedure, the surgical goal, and the postoperative course for the Right Foot fourth toe bone culture and biopsy, the written consent was signed, timed, and dated by the patient with a witness present.      Bone Biopsy  Surgeon: Dr. Alcala DPM  Assistant: Dr. Dawson DPM PGY-3  Estimated blood loss: 5-10 mL  Specimen removed: Bone from right fourth toe, middle phalanx    The patient was brought to the clinic room where 6 cc's 1% Lido P  local anesthetic  was injected into the base of the right fourth toe.  A "time out" was " performed to ensure the correct surgical site was being operated on.  The patient was prepped in the usual sterile fashion. Patient was uncomfortable and flailing throughout the procedure making it difficult to obtain the specimen however A Jamshidi needle was used to remove a piece of bone from the right fourth middle phalanx. The specimen was sent to for culture and to Pathology. Hemostasis was achieved with direct pressure.  Iodosorb and gauze was used to dress the wounds to the foot and the foot was wrapped in kerlix secured with an ace.      Nursing orders placed for daily dressing changes iodosorb, kerlix,ACE up leg.   Heel protector boots  B/L LE.   Podiatry will follow     Weightbearing Status: WBAT B/L   Offloading Device: Darco shoe.     Denilson Dawson DPM PGY-3  Pager [phone]    Over an hour was spent with the patient with great greater than 50% spent counseling and coordinating care.

## 2017-06-24 NOTE — SUBJECTIVE & OBJECTIVE
Interval History: Patient seen and examined at bedside. Complains of fluid overload and not able to bear any weight on legs. Antibiotics discontinued by ID.     Review of Systems   Constitutional: Negative for activity change and appetite change.   HENT: Negative for drooling and facial swelling.    Eyes: Negative for discharge and itching.   Respiratory: Negative for cough, shortness of breath and wheezing.    Cardiovascular: Negative for chest pain and palpitations.   Gastrointestinal: Negative for abdominal pain and nausea.   Genitourinary: Negative for difficulty urinating and dysuria.   Skin: Negative for color change and pallor.   Neurological: Negative for dizziness and numbness.   Psychiatric/Behavioral: Negative for behavioral problems and confusion.   All other systems reviewed and are negative.    Objective:     Vital Signs (Most Recent):  Temp: 98 °F (36.7 °C) (06/23/17 1945)  Pulse: 76 (06/23/17 1945)  Resp: 20 (06/23/17 1945)  BP: (!) 179/85 (06/23/17 1945)  SpO2: 95 % (06/23/17 1945) Vital Signs (24h Range):  Temp:  [97.7 °F (36.5 °C)-98.4 °F (36.9 °C)] 98 °F (36.7 °C)  Pulse:  [76-94] 76  Resp:  [20-22] 20  SpO2:  [94 %-99 %] 95 %  BP: (158-191)/(77-95) 179/85     Weight: 126.8 kg (279 lb 8 oz)  Body mass index is 35.89 kg/m².    Intake/Output Summary (Last 24 hours) at 06/23/17 3147  Last data filed at 06/23/17 1238   Gross per 24 hour   Intake                0 ml   Output             1020 ml   Net            -1020 ml      Physical Exam   Constitutional: He is oriented to person, place, and time. He appears well-developed and well-nourished.   HENT:   Head: Normocephalic and atraumatic.   Eyes: Conjunctivae are normal. Pupils are equal, round, and reactive to light.   Neck: Normal range of motion. No thyromegaly present.   Cardiovascular: Normal rate, regular rhythm and normal heart sounds.    Pulmonary/Chest: Effort normal and breath sounds normal.   Abdominal: Soft. He exhibits no distension.  There is no tenderness.   Musculoskeletal: He exhibits edema.   B/L extremity erythema    Neurological: He is alert and oriented to person, place, and time. Coordination normal.   Skin: No rash noted. No erythema.       Significant Labs:   CBC:   Recent Labs  Lab 06/22/17 0435 06/23/17  0535   WBC 3.99 3.58*   HGB 8.0* 8.0*   HCT 24.0* 24.5*   PLT 58* 62*     CMP:   Recent Labs  Lab 06/22/17 0435 06/23/17  0429   * 135*   K 4.1 4.6    105   CO2 20* 19*   * 129*   BUN 24* 25*   CREATININE 1.5* 1.4   CALCIUM 8.6* 9.4   PROT 6.8 7.4   ALBUMIN 3.7 4.1   BILITOT 2.2* 2.2*   ALKPHOS 81 92   AST 36 44*   ALT 10 12   ANIONGAP 10 11   EGFRNONAA 55.8* >60.0       Significant Imaging: I have reviewed all pertinent imaging results/findings within the past 24 hours.

## 2017-06-24 NOTE — SUBJECTIVE & OBJECTIVE
Interval History: Patient seen and examined at bedside. No acute events overnight. Skin biopsy performed at bedside today. US abdomen ordered    Review of Systems   Constitutional: Negative for activity change and appetite change.   HENT: Negative for drooling and facial swelling.    Eyes: Negative for discharge and itching.   Respiratory: Negative for cough, shortness of breath and wheezing.    Cardiovascular: Negative for chest pain and palpitations.   Gastrointestinal: Negative for abdominal pain and nausea.   Genitourinary: Negative for difficulty urinating and dysuria.   Skin: Negative for color change and pallor.   Neurological: Negative for dizziness and numbness.   Psychiatric/Behavioral: Negative for behavioral problems and confusion.   All other systems reviewed and are negative.    Objective:     Vital Signs (Most Recent):  Temp: 98.3 °F (36.8 °C) (06/24/17 1100)  Pulse: 76 (06/24/17 1100)  Resp: 18 (06/24/17 1100)  BP: (!) 157/77 (06/24/17 1100)  SpO2: 95 % (06/24/17 1100) Vital Signs (24h Range):  Temp:  [97.8 °F (36.6 °C)-98.5 °F (36.9 °C)] 98.3 °F (36.8 °C)  Pulse:  [76-96] 76  Resp:  [17-22] 18  SpO2:  [93 %-98 %] 95 %  BP: (128-191)/(77-99) 157/77     Weight: 126.8 kg (279 lb 8 oz)  Body mass index is 35.89 kg/m².    Intake/Output Summary (Last 24 hours) at 06/24/17 1459  Last data filed at 06/24/17 1300   Gross per 24 hour   Intake              480 ml   Output             1500 ml   Net            -1020 ml      Physical Exam   Constitutional: He is oriented to person, place, and time. He appears well-developed and well-nourished.   HENT:   Head: Normocephalic and atraumatic.   Eyes: Conjunctivae are normal. Pupils are equal, round, and reactive to light.   Neck: Normal range of motion. No thyromegaly present.   Cardiovascular: Normal rate, regular rhythm and normal heart sounds.    Pulmonary/Chest: Effort normal and breath sounds normal.   Abdominal: Soft. He exhibits no distension. There is no  tenderness.   Musculoskeletal: He exhibits edema.   B/L extremity erythema    Neurological: He is alert and oriented to person, place, and time. Coordination normal.   Skin: No rash noted. No erythema.       Significant Labs:   CBC:   Recent Labs  Lab 06/23/17  0535 06/24/17 0414   WBC 3.58* 3.24*   HGB 8.0* 7.8*   HCT 24.5* 23.7*   PLT 62* 61*     CMP:   Recent Labs  Lab 06/23/17  0429 06/24/17 0414   * 136   K 4.6 4.2    106   CO2 19* 22*   * 161*   BUN 25* 28*   CREATININE 1.4 1.4   CALCIUM 9.4 9.4   PROT 7.4 7.1   ALBUMIN 4.1 4.1   BILITOT 2.2* 2.3*   ALKPHOS 92 87   AST 44* 40   ALT 12 10   ANIONGAP 11 8   EGFRNONAA >60.0 >60.0       Significant Imaging: I have reviewed all pertinent imaging results/findings within the past 24 hours.

## 2017-06-24 NOTE — PLAN OF CARE
Problem: Occupational Therapy Goal  Goal: Occupational Therapy Goal  Goals to be met by: 6/26/17    Patient will increase functional independence with ADLs by performing:    UE Dressing with Set-up Assistance.  LE Dressing with Minimal Assistance.  Grooming while standing at sink with Supervision.  Toileting from toilet with Supervision for hygiene and clothing management.   Supine to sit with [place].  Stand pivot transfers with Modified [place].  Toilet transfer to toilet with Modified [place].     Outcome: Ongoing (interventions implemented as appropriate)  Goals remain appropriate.  GARTEH Peacock  6/24/2017

## 2017-06-24 NOTE — PT/OT/SLP PROGRESS
"Occupational Therapy  Treatment    Leif Nino   MRN: [id number]   Admitting Diagnosis: Bilateral cellulitis of lower leg    OT Date of Treatment: 06/24/17   OT Start Time: 0842  OT Stop Time: 0914  OT Total Time (min): 32 min    Billable Minutes:  Self Care/Home Management 12 and Therapeutic Activity 20    General Precautions: Standard, fall  Orthopedic Precautions: N/A  Braces: N/A       Subjective:  Communicated with RN prior to session.  "I'm not doing to good today" "Everything below my waist hurts" -pt stated  Pain/Comfort  Pain Rating 1: 8/10  Location - Side 1: Bilateral  Location - Orientation 1: generalized  Location 1: leg  Pain Addressed 1: Reposition, Distraction, Pre-medicate for activity  Pain Rating Post-Intervention 1: 8/10    Objective:  Patient found with: oxygen     Functional Mobility:  Bed Mobility:  Rolling/Turning Right: Stand by assistance, With side rail (HOB elevated)  Scooting/Bridging: Stand by Assistance (to EOB)  Supine to Sit: Stand by Assistance (HOB elevated)    Transfers:   Sit <> Stand Assistance: Minimum Assistance (EOB>chair; min A x 1 person, 2nd person present providing CGA for safety 2* pt repeatedly stating, "I'm going to fall".)  Sit <> Stand Assistive Device: Rolling Walker    Functional Ambulation: Pt performed functional mobility ~40ft with CGA and RW. Towards end of session pt demonstrated increase pacing, increased respiratory rate, and required therapist-initiated seated rest break.     Activities of Daily Living:     UE Dressing Level of Assistance: Minimum assistance (Pt donned hospital gown at EOB)  LE Dressing Level of Assistance: Maximum assistance (Pt donned [employer] shoes while seated EOB.  Pt able to place feet in shoes, pt attempted to fasten but unable to complete task.)  Grooming Position: bedside chair  Grooming Level of Assistance: Supervision (washed face with cloth)           Therapeutic Activities and Exercises:  -Pt demonstrated increased respiratory " "rate and reported SOB during OOB ax, pt educated on importance of self-initiated rest breaks.  -Pt educated on importance of wearing [employer] shoes during OOB ax.   -Pt educated on importance and benefits of sitting up in chair rather than remaining in bed for extended periods.  -Pt whiteboard updated.      AM-PAC 6 CLICK ADL   How much help from another person does this patient currently need?   1 = Unable, Total/Dependent Assistance  2 = A lot, Maximum/Moderate Assistance  3 = A little, Minimum/Contact Guard/Supervision  4 = None, Modified [place]/Independent    Putting on and taking off regular lower body clothing? : 2  Bathing (including washing, rinsing, drying)?: 2  Toileting, which includes using toilet, bedpan, or urinal? : 2  Putting on and taking off regular upper body clothing?: 3  Taking care of personal grooming such as brushing teeth?: 3  Eating meals?: 4  Total Score: 16     AM-PAC Raw Score CMS "G-Code Modifier Level of Impairment Assistance   6 % Total / Unable   7 - 8 CM 80 - 100% Maximal Assist   9-13 CL 60 - 80% Moderate Assist   14 - 19 CK 40 - 60% Moderate Assist   20 - 22 CJ 20 - 40% Minimal Assist   23 CI 1-20% SBA / CGA   24 CH 0% Independent/ Mod I       Patient left up in chair with all lines intact, call button in reach and RN notified    ASSESSMENT:  Leif Nino is a 46 y.o. male with a medical diagnosis of Bilateral cellulitis of lower leg. Pt tolerated session well and after slight encouragement pt agreeable to participate in ADLs at EOB but refused bathroom tasks. Pt was able to initiate lower body dressing task but was limited by decreased ROM, edema, and pain, requiring increased A to fasten shoes. Post seated ADLs, pt verbalized determination to ambulate in hallway. Pt demonstrated decreased functional endurance during OOB ax, reporting SOB and requiring a therapist-initiated seated rest break. Pt slightly discouraged with progress and required encouragement to remain " up in chair post session. Pt will continue to benefit from continued OT services in order to maximize independence and increase safety during ADL tasks.     Rehab identified problem list/impairments: Rehab identified problem list/impairments: weakness, impaired endurance, impaired self care skills, impaired functional mobilty, gait instability, impaired balance, decreased lower extremity function, decreased safety awareness, pain, edema    Rehab potential is good.    Activity tolerance: Good    Discharge recommendations: Discharge Facility/Level Of Care Needs: nursing facility, skilled     Barriers to discharge: Barriers to Discharge: Inaccessible home environment, Decreased caregiver support    Equipment recommendations: walker, rolling     GOALS:    Occupational Therapy Goals        Problem: Occupational Therapy Goal    Goal Priority Disciplines Outcome Interventions   Occupational Therapy Goal     OT, PT/OT Ongoing (interventions implemented as appropriate)    Description:  Goals to be met by: 6/26/17    Patient will increase functional independence with ADLs by performing:    UE Dressing with Set-up Assistance.  LE Dressing with Minimal Assistance.  Grooming while standing at sink with Supervision.  Toileting from toilet with Supervision for hygiene and clothing management.   Supine to sit with [place].  Stand pivot transfers with Modified [place].  Toilet transfer to toilet with Modified [place].                      Plan:  Patient to be seen 3 x/week to address the above listed problems via self-care/home management, community/work re-entry, therapeutic activities, therapeutic exercises  Plan of Care expires: 07/19/17  Plan of Care reviewed with: patient       GARETH Peacock  06/24/2017

## 2017-06-24 NOTE — PLAN OF CARE
Problem: Patient Care Overview  Goal: Plan of Care Review  Pt remains AAOx4, vital signs have been stable. Pt complains of pain to back and abdomen rating it 7-8/10, PRN pain medication given as ordered. Pt remains free of falls/injury during shift. Personal belongings and call light within reach. Plan of care reviewed with pt. All questions and concerns were addressed. Will continue to monitor.

## 2017-06-24 NOTE — PROGRESS NOTES
[facility] Hospital Medicine  Progress Note    Patient Name: Leif Nino  MRN: [id number]  Patient Class: IP- Inpatient   Admission Date: 6/16/2017  Length of Stay: 7 days  Attending Physician: Hema Ferrara MD  Primary Care Provider: Primary Doctor [facility] Medicine Team: [facility] MED A Hema Ferrara MD    Subjective:     Principal Problem:Bilateral cellulitis of lower leg    HPI:  Mr. Nino is a 47 yo WM w/ h/o HCV cirrhosis & poorly controlled DM complicated by BL diabetic ulceration s/p 6/1 R 4th toe debridement who presents c/o generalized weakness w/ associated worsening BL LE edema.  He reports that 5d ago he began having dysuria.  Two day afterwards he woke up noticing his chronic BL LE edema was significantly worse with an associated erythematous, painful rash.  Later that day he began developing dyspnea on exertion that progressively worsened to the point where he is now dyspneic at rest.  Patient denies any vomiting but has mild, persistant nausea that has worsened acutely over the last 3 days.  He reports no fevers, chills, abdominal pain, worsening abdominal distention, diarrhea.  Patient states he has had no productive cough or wheezes.    Hospital Course:  No notes on file    Interval History: Patient seen and examined at bedside. Complains of fluid overload and not able to bear any weight on legs. Antibiotics discontinued by ID.     Review of Systems   Constitutional: Negative for activity change and appetite change.   HENT: Negative for drooling and facial swelling.    Eyes: Negative for discharge and itching.   Respiratory: Negative for cough, shortness of breath and wheezing.    Cardiovascular: Negative for chest pain and palpitations.   Gastrointestinal: Negative for abdominal pain and nausea.   Genitourinary: Negative for difficulty urinating and dysuria.   Skin: Negative for color change and pallor.   Neurological: Negative for dizziness and numbness.    Psychiatric/Behavioral: Negative for behavioral problems and confusion.   All other systems reviewed and are negative.    Objective:     Vital Signs (Most Recent):  Temp: 98 °F (36.7 °C) (06/23/17 1945)  Pulse: 76 (06/23/17 1945)  Resp: 20 (06/23/17 1945)  BP: (!) 179/85 (06/23/17 1945)  SpO2: 95 % (06/23/17 1945) Vital Signs (24h Range):  Temp:  [97.7 °F (36.5 °C)-98.4 °F (36.9 °C)] 98 °F (36.7 °C)  Pulse:  [76-94] 76  Resp:  [20-22] 20  SpO2:  [94 %-99 %] 95 %  BP: (158-191)/(77-95) 179/85     Weight: 126.8 kg (279 lb 8 oz)  Body mass index is 35.89 kg/m².    Intake/Output Summary (Last 24 hours) at 06/23/17 2217  Last data filed at 06/23/17 1238   Gross per 24 hour   Intake                0 ml   Output             1020 ml   Net            -1020 ml      Physical Exam   Constitutional: He is oriented to person, place, and time. He appears well-developed and well-nourished.   HENT:   Head: Normocephalic and atraumatic.   Eyes: Conjunctivae are normal. Pupils are equal, round, and reactive to light.   Neck: Normal range of motion. No thyromegaly present.   Cardiovascular: Normal rate, regular rhythm and normal heart sounds.    Pulmonary/Chest: Effort normal and breath sounds normal.   Abdominal: Soft. He exhibits no distension. There is no tenderness.   Musculoskeletal: He exhibits edema.   B/L extremity erythema    Neurological: He is alert and oriented to person, place, and time. Coordination normal.   Skin: No rash noted. No erythema.       Significant Labs:   CBC:   Recent Labs  Lab 06/22/17  0435 06/23/17  0535   WBC 3.99 3.58*   HGB 8.0* 8.0*   HCT 24.0* 24.5*   PLT 58* 62*     CMP:   Recent Labs  Lab 06/22/17  0435 06/23/17  0429   * 135*   K 4.1 4.6    105   CO2 20* 19*   * 129*   BUN 24* 25*   CREATININE 1.5* 1.4   CALCIUM 8.6* 9.4   PROT 6.8 7.4   ALBUMIN 3.7 4.1   BILITOT 2.2* 2.2*   ALKPHOS 81 92   AST 36 44*   ALT 10 12   ANIONGAP 10 11   EGFRNONAA 55.8* >60.0       Significant  Imaging: I have reviewed all pertinent imaging results/findings within the past 24 hours.    Assessment/Plan:    # Bilateral lower extremity cellulitis with venous stasis due to streptococcus   -  on unasyn and vancomycin, discontinued today after 7 days of treatment  - infectious disease consult for antibiotic guidance recommended stopping antibiotics for cellulitis since he finished one week.   - can likely send home on doxycycline once stable     # Diabetic type 2 foot ulcers B/L  - podiatry consult, appreciate recs  - X rays B/ L feet     # Decompensated Hep C cirrhosis with ascites  MELD-Na score: 19 at 6/22/2017  4:35 AM  MELD score: 17 at 6/22/2017  4:35 AM  Calculated from:  Serum Creatinine: 1.5 mg/dL at 6/22/2017  4:35 AM  Serum Sodium: 135 mmol/L at 6/22/2017  4:35 AM  Total Bilirubin: 2.2 mg/dL at 6/22/2017  4:35 AM  INR(ratio): 1.4 at 6/22/2017  4:35 AM  Age: 46 years   - hepatology consult  - last viral load 21,000 and has never been treated  - abdomen U/S with liver dopplers  - will set up for outpatient follow up     # CKD stage 3 due to uncontrolled DM 2  - seems to be new baseline at 1.5     # DM2 with nephropathy  - Hba1c of 8.2 recently  - levemir 20 units at night and 8 of novolog with meals     # Anemia of CKD stage 3  # Thrombocytopenia  # Hemolysis   - hemolysis labs have come back positive and patient is close to DIC        # Hypoalbuminemia due to moderate protein fernando malnutrition  - PAB 3, glucerna     # Hypomagnesemia   - replaced, resolved        VTE Risk Mitigation         Ordered     Low Risk of VTE  Once      06/16/17 5231          Hema Ferrara MD  Department of Hospital Medicine   [facility]

## 2017-06-24 NOTE — CONSULTS
[facility]  Infectious Disease  Consult Note        Inpatient consult to Infectious Diseases  Consult performed by: BRANDI JAIN  Consult ordered by: MATTHEW ASTUDILLO        Assessment/Plan:     Osteomyelitis of toe of right foot    ID re-consulted for osteomyelitis right 4th digit; patient known to ID service.   After discussion with podiatry, they state that after removal of callous on right 4th digit, there is exposed bone. This is consistent with MRI findings that show osteo of 4th digit.   Patient currently not on antibiotics and clinically stable.   Plan today is for bone biopsy per podiatry. Please send for aerobic, anaerobic, fungal, and AFB cultures as well as gram stain. Please also send bone to pathology.  After bone biopsy is obtained, would recommend starting empiric vancomycin 1750 mg IV q 12 hours (with vanc trough before 4th dose) and ceftriaxone 2 gram IV q 24 hours.  Will tailor antibiotics according to culture data.  Anticipate 6 weeks of antibiotic therapy- hold off on PICC for now in the event that oral options may be able to be used.  Full ID note to follow.

## 2017-06-24 NOTE — ASSESSMENT & PLAN NOTE
ID re-consulted for osteomyelitis right 4th digit.   After discussion with podiatry, upon removal of callous of 4th digit, there is exposed bone which is consistent with MRI findings that show osteo of 4th digit.   Patient currently not on antibiotics and clinically stable.   Plan today is for bone biopsy per podiatry.  After bone biopsy is obtained, would recommend starting empiric vancomycin 1750 mg IV q 12 hours (with vanc trough before 4th dose) and ceftriaxone 2 gram IV q 24 hours.  Will tailor antibiotics according to culture data.  Anticipate 6 weeks of antibiotic therapy- hold off on PICC for now in the event that oral options may be able to be used.  Full ID note to follow.

## 2017-06-24 NOTE — NURSING
Spoke to Dr. Dutton re: plan of care for pt. Pt to receive ultrasound of abdomen. Also discussed pt's IV site due to be changed, pt reporting he is a hard stick, MD states OK to leave current IV in at this time. Will continue to monitor.

## 2017-06-24 NOTE — PROGRESS NOTES
[facility] Hospital Medicine  Progress Note    Patient Name: Leif Nino  MRN: [id number]  Patient Class: IP- Inpatient   Admission Date: 6/16/2017  Length of Stay: 8 days  Attending Physician: Hema Ferrara MD  Primary Care Provider: Primary Doctor [facility] Medicine Team: [facility] Hema Ferrara MD    Subjective:     Principal Problem:Bilateral cellulitis of lower leg    HPI:  Mr. Nino is a 47 yo WM w/ h/o HCV cirrhosis & poorly controlled DM complicated by BL diabetic ulceration s/p 6/1 R 4th toe debridement who presents c/o generalized weakness w/ associated worsening BL LE edema.  He reports that 5d ago he began having dysuria.  Two day afterwards he woke up noticing his chronic BL LE edema was significantly worse with an associated erythematous, painful rash.  Later that day he began developing dyspnea on exertion that progressively worsened to the point where he is now dyspneic at rest.  Patient denies any vomiting but has mild, persistant nausea that has worsened acutely over the last 3 days.  He reports no fevers, chills, abdominal pain, worsening abdominal distention, diarrhea.  Patient states he has had no productive cough or wheezes.    Hospital Course:  No notes on file    Interval History: Patient seen and examined at bedside. No acute events overnight. Skin biopsy performed at bedside today. US abdomen ordered    Review of Systems   Constitutional: Negative for activity change and appetite change.   HENT: Negative for drooling and facial swelling.    Eyes: Negative for discharge and itching.   Respiratory: Negative for cough, shortness of breath and wheezing.    Cardiovascular: Negative for chest pain and palpitations.   Gastrointestinal: Negative for abdominal pain and nausea.   Genitourinary: Negative for difficulty urinating and dysuria.   Skin: Negative for color change and pallor.   Neurological: Negative for dizziness and numbness.    Psychiatric/Behavioral: Negative for behavioral problems and confusion.   All other systems reviewed and are negative.    Objective:     Vital Signs (Most Recent):  Temp: 98.3 °F (36.8 °C) (06/24/17 1100)  Pulse: 76 (06/24/17 1100)  Resp: 18 (06/24/17 1100)  BP: (!) 157/77 (06/24/17 1100)  SpO2: 95 % (06/24/17 1100) Vital Signs (24h Range):  Temp:  [97.8 °F (36.6 °C)-98.5 °F (36.9 °C)] 98.3 °F (36.8 °C)  Pulse:  [76-96] 76  Resp:  [17-22] 18  SpO2:  [93 %-98 %] 95 %  BP: (128-191)/(77-99) 157/77     Weight: 126.8 kg (279 lb 8 oz)  Body mass index is 35.89 kg/m².    Intake/Output Summary (Last 24 hours) at 06/24/17 1459  Last data filed at 06/24/17 1300   Gross per 24 hour   Intake              480 ml   Output             1500 ml   Net            -1020 ml      Physical Exam   Constitutional: He is oriented to person, place, and time. He appears well-developed and well-nourished.   HENT:   Head: Normocephalic and atraumatic.   Eyes: Conjunctivae are normal. Pupils are equal, round, and reactive to light.   Neck: Normal range of motion. No thyromegaly present.   Cardiovascular: Normal rate, regular rhythm and normal heart sounds.    Pulmonary/Chest: Effort normal and breath sounds normal.   Abdominal: Soft. He exhibits no distension. There is no tenderness.   Musculoskeletal: He exhibits edema.   B/L extremity erythema    Neurological: He is alert and oriented to person, place, and time. Coordination normal.   Skin: No rash noted. No erythema.       Significant Labs:   CBC:   Recent Labs  Lab 06/23/17  0535 06/24/17  0414   WBC 3.58* 3.24*   HGB 8.0* 7.8*   HCT 24.5* 23.7*   PLT 62* 61*     CMP:   Recent Labs  Lab 06/23/17  0429 06/24/17  0414   * 136   K 4.6 4.2    106   CO2 19* 22*   * 161*   BUN 25* 28*   CREATININE 1.4 1.4   CALCIUM 9.4 9.4   PROT 7.4 7.1   ALBUMIN 4.1 4.1   BILITOT 2.2* 2.3*   ALKPHOS 92 87   AST 44* 40   ALT 12 10   ANIONGAP 11 8   EGFRNONAA >60.0 >60.0       Significant  Imaging: I have reviewed all pertinent imaging results/findings within the past 24 hours.    Assessment/Plan:   # Bilateral lower extremity cellulitis with venous stasis due to streptococcus   -  on unasyn and vancomycin, discontinued today after 7 days of treatment  - infectious disease consult for antibiotic guidance recommended stopping antibiotics for cellulitis since he finished one week.   - can likely send home on doxycycline once stable     # Diabetic type 2 foot ulcers B/L  - podiatry consult, appreciate recs  - X rays B/ L feet     # Decompensated Hep C cirrhosis with ascites  MELD-Na score: 18 at 6/24/2017  4:14 AM  MELD score: 17 at 6/24/2017  4:14 AM  Calculated from:  Serum Creatinine: 1.4 mg/dL at 6/24/2017  4:14 AM  Serum Sodium: 136 mmol/L at 6/24/2017  4:14 AM  Total Bilirubin: 2.3 mg/dL at 6/24/2017  4:14 AM  INR(ratio): 1.4 at 6/24/2017  4:14 AM  Age: 46 years    - hepatology consult  - last viral load 21,000 and has never been treated  - abdomen U/S with liver dopplers  - will set up for outpatient follow up     # CKD stage 3 due to uncontrolled DM 2  - seems to be new baseline at 1.5     # DM2 with nephropathy  - Hba1c of 8.2 recently  - levemir 20 units at night and 8 of novolog with meals     # Anemia of CKD stage 3  # Thrombocytopenia  # Hemolysis   - hemolysis labs have come back positive and patient is close to DIC        # Hypoalbuminemia due to moderate protein fernando malnutrition  - PAB 3, glucerna     # Hypomagnesemia   - replaced, resolved       VTE Risk Mitigation         Ordered     Place BASILIO hose  Until discontinued      06/24/17 1443     Low Risk of VTE  Once      06/16/17 2243          Hema Ferrara MD  Department of Hospital Medicine   [facility]

## 2017-06-25 PROBLEM — M86.9 OSTEOMYELITIS OF RIGHT FOOT: Status: ACTIVE | Noted: 2017-06-25

## 2017-06-25 LAB
ALBUMIN SERPL BCP-MCNC: 4.1 G/DL
ALP SERPL-CCNC: 85 U/L
ALT SERPL W/O P-5'-P-CCNC: 11 U/L
ANION GAP SERPL CALC-SCNC: 10 MMOL/L
AST SERPL-CCNC: 42 U/L
BASOPHILS # BLD AUTO: 0.05 K/UL
BASOPHILS NFR BLD: 1.4 %
BILIRUB SERPL-MCNC: 2.6 MG/DL
BUN SERPL-MCNC: 30 MG/DL
CALCIUM SERPL-MCNC: 9.6 MG/DL
CHLORIDE SERPL-SCNC: 107 MMOL/L
CO2 SERPL-SCNC: 20 MMOL/L
CREAT SERPL-MCNC: 1.3 MG/DL
DIFFERENTIAL METHOD: ABNORMAL
EOSINOPHIL # BLD AUTO: 0.2 K/UL
EOSINOPHIL NFR BLD: 6.3 %
ERYTHROCYTE [DISTWIDTH] IN BLOOD BY AUTOMATED COUNT: 17.7 %
EST. GFR  (AFRICAN AMERICAN): >60 ML/MIN/1.73 M^2
EST. GFR  (NON AFRICAN AMERICAN): >60 ML/MIN/1.73 M^2
GLUCOSE SERPL-MCNC: 127 MG/DL
GRAM STN SPEC: NORMAL
GRAM STN SPEC: NORMAL
HCT VFR BLD AUTO: 22.5 %
HGB BLD-MCNC: 7.5 G/DL
INR PPP: 1.5
LYMPHOCYTES # BLD AUTO: 0.7 K/UL
LYMPHOCYTES NFR BLD: 18.8 %
MAGNESIUM SERPL-MCNC: 1.9 MG/DL
MCH RBC QN AUTO: 29 PG
MCHC RBC AUTO-ENTMCNC: 33.3 %
MCV RBC AUTO: 87 FL
MONOCYTES # BLD AUTO: 0.4 K/UL
MONOCYTES NFR BLD: 11.1 %
NEUTROPHILS # BLD AUTO: 2.2 K/UL
NEUTROPHILS NFR BLD: 62.1 %
PHOSPHATE SERPL-MCNC: 3.8 MG/DL
PLATELET # BLD AUTO: 61 K/UL
PMV BLD AUTO: 10.1 FL
POCT GLUCOSE: 127 MG/DL (ref 70–110)
POCT GLUCOSE: 148 MG/DL (ref 70–110)
POCT GLUCOSE: 175 MG/DL (ref 70–110)
POCT GLUCOSE: 176 MG/DL (ref 70–110)
POTASSIUM SERPL-SCNC: 4.3 MMOL/L
PROT SERPL-MCNC: 7.1 G/DL
PROTHROMBIN TIME: 15.2 SEC
RBC # BLD AUTO: 2.59 M/UL
SODIUM SERPL-SCNC: 137 MMOL/L
WBC # BLD AUTO: 3.52 K/UL

## 2017-06-25 PROCEDURE — 25000003 PHARM REV CODE 250: Performed by: HOSPITALIST

## 2017-06-25 PROCEDURE — 85025 COMPLETE CBC W/AUTO DIFF WBC: CPT

## 2017-06-25 PROCEDURE — 63600175 PHARM REV CODE 636 W HCPCS: Performed by: HOSPITALIST

## 2017-06-25 PROCEDURE — 84100 ASSAY OF PHOSPHORUS: CPT

## 2017-06-25 PROCEDURE — 99233 SBSQ HOSP IP/OBS HIGH 50: CPT | Mod: ,,, | Performed by: HOSPITALIST

## 2017-06-25 PROCEDURE — 25000003 PHARM REV CODE 250: Performed by: PODIATRIST

## 2017-06-25 PROCEDURE — 25000003 PHARM REV CODE 250: Performed by: PHYSICIAN ASSISTANT

## 2017-06-25 PROCEDURE — 63600175 PHARM REV CODE 636 W HCPCS: Performed by: PHYSICIAN ASSISTANT

## 2017-06-25 PROCEDURE — P9047 ALBUMIN (HUMAN), 25%, 50ML: HCPCS | Performed by: HOSPITALIST

## 2017-06-25 PROCEDURE — 87040 BLOOD CULTURE FOR BACTERIA: CPT

## 2017-06-25 PROCEDURE — 94640 AIRWAY INHALATION TREATMENT: CPT

## 2017-06-25 PROCEDURE — 25000242 PHARM REV CODE 250 ALT 637 W/ HCPCS: Performed by: NURSE PRACTITIONER

## 2017-06-25 PROCEDURE — 83735 ASSAY OF MAGNESIUM: CPT

## 2017-06-25 PROCEDURE — 20600001 HC STEP DOWN PRIVATE ROOM

## 2017-06-25 PROCEDURE — 85610 PROTHROMBIN TIME: CPT

## 2017-06-25 PROCEDURE — 99233 SBSQ HOSP IP/OBS HIGH 50: CPT | Mod: ,,, | Performed by: INTERNAL MEDICINE

## 2017-06-25 PROCEDURE — 36415 COLL VENOUS BLD VENIPUNCTURE: CPT

## 2017-06-25 PROCEDURE — 27000221 HC OXYGEN, UP TO 24 HOURS

## 2017-06-25 PROCEDURE — 80053 COMPREHEN METABOLIC PANEL: CPT

## 2017-06-25 RX ORDER — CARVEDILOL 12.5 MG/1
12.5 TABLET ORAL 2 TIMES DAILY WITH MEALS
Status: DISCONTINUED | OUTPATIENT
Start: 2017-06-25 | End: 2017-07-06

## 2017-06-25 RX ADMIN — INSULIN DETEMIR 10 UNITS: 100 INJECTION, SOLUTION SUBCUTANEOUS at 10:06

## 2017-06-25 RX ADMIN — HYDROMORPHONE HYDROCHLORIDE 1 MG: 1 INJECTION, SOLUTION INTRAMUSCULAR; INTRAVENOUS; SUBCUTANEOUS at 02:06

## 2017-06-25 RX ADMIN — CARVEDILOL 6.25 MG: 6.25 TABLET, FILM COATED ORAL at 08:06

## 2017-06-25 RX ADMIN — IPRATROPIUM BROMIDE AND ALBUTEROL SULFATE 3 ML: .5; 3 SOLUTION RESPIRATORY (INHALATION) at 12:06

## 2017-06-25 RX ADMIN — DICYCLOMINE HYDROCHLORIDE 20 MG: 20 TABLET ORAL at 06:06

## 2017-06-25 RX ADMIN — HYDROMORPHONE HYDROCHLORIDE 1 MG: 1 INJECTION, SOLUTION INTRAMUSCULAR; INTRAVENOUS; SUBCUTANEOUS at 12:06

## 2017-06-25 RX ADMIN — IPRATROPIUM BROMIDE AND ALBUTEROL SULFATE 3 ML: .5; 3 SOLUTION RESPIRATORY (INHALATION) at 10:06

## 2017-06-25 RX ADMIN — ALBUMIN (HUMAN) 25 G: 12.5 SOLUTION INTRAVENOUS at 05:06

## 2017-06-25 RX ADMIN — DICYCLOMINE HYDROCHLORIDE 20 MG: 20 TABLET ORAL at 10:06

## 2017-06-25 RX ADMIN — ALBUMIN (HUMAN) 25 G: 12.5 SOLUTION INTRAVENOUS at 12:06

## 2017-06-25 RX ADMIN — INSULIN ASPART 8 UNITS: 100 INJECTION, SOLUTION INTRAVENOUS; SUBCUTANEOUS at 05:06

## 2017-06-25 RX ADMIN — HYDROMORPHONE HYDROCHLORIDE 1 MG: 1 INJECTION, SOLUTION INTRAMUSCULAR; INTRAVENOUS; SUBCUTANEOUS at 05:06

## 2017-06-25 RX ADMIN — OXYCODONE HYDROCHLORIDE 15 MG: 5 TABLET ORAL at 06:06

## 2017-06-25 RX ADMIN — INSULIN ASPART 8 UNITS: 100 INJECTION, SOLUTION INTRAVENOUS; SUBCUTANEOUS at 12:06

## 2017-06-25 RX ADMIN — DICYCLOMINE HYDROCHLORIDE 20 MG: 20 TABLET ORAL at 01:06

## 2017-06-25 RX ADMIN — HYDROMORPHONE HYDROCHLORIDE 1 MG: 1 INJECTION, SOLUTION INTRAMUSCULAR; INTRAVENOUS; SUBCUTANEOUS at 09:06

## 2017-06-25 RX ADMIN — INSULIN ASPART 2 UNITS: 100 INJECTION, SOLUTION INTRAVENOUS; SUBCUTANEOUS at 05:06

## 2017-06-25 RX ADMIN — CARVEDILOL 12.5 MG: 12.5 TABLET, FILM COATED ORAL at 05:06

## 2017-06-25 RX ADMIN — IPRATROPIUM BROMIDE AND ALBUTEROL SULFATE 3 ML: .5; 3 SOLUTION RESPIRATORY (INHALATION) at 03:06

## 2017-06-25 RX ADMIN — Medication 40 G: at 11:06

## 2017-06-25 RX ADMIN — ASPIRIN 81 MG: 81 TABLET, COATED ORAL at 08:06

## 2017-06-25 RX ADMIN — OXYCODONE HYDROCHLORIDE 15 MG: 5 TABLET ORAL at 01:06

## 2017-06-25 RX ADMIN — ATORVASTATIN CALCIUM 40 MG: 20 TABLET, FILM COATED ORAL at 08:06

## 2017-06-25 RX ADMIN — HYDROMORPHONE HYDROCHLORIDE 1 MG: 1 INJECTION, SOLUTION INTRAMUSCULAR; INTRAVENOUS; SUBCUTANEOUS at 08:06

## 2017-06-25 RX ADMIN — CEFTRIAXONE 2 G: 2 INJECTION, SOLUTION INTRAVENOUS at 11:06

## 2017-06-25 RX ADMIN — QUETIAPINE FUMARATE 100 MG: 25 TABLET ORAL at 10:06

## 2017-06-25 RX ADMIN — INSULIN ASPART 8 UNITS: 100 INJECTION, SOLUTION INTRAVENOUS; SUBCUTANEOUS at 08:06

## 2017-06-25 RX ADMIN — VANCOMYCIN HYDROCHLORIDE 1750 MG: 100 INJECTION, POWDER, LYOPHILIZED, FOR SOLUTION INTRAVENOUS at 01:06

## 2017-06-25 NOTE — NURSING
Dr. Dutton to room, discussed plan of care. MD aware of moderate- lrg amount sanguinous drainage on dressing of R 4th toe biopsy site requiring multiple dressing changes. Continuing to monitor. Discussed plan for abx, states pt will likely have PICC placed, awaiting final biopsy results. Will continue to monitor.

## 2017-06-25 NOTE — NURSING
Spoke to ultrasound tech, pt to be NPO for 8 hours prior to abdominal ultrasound. Pt updated and agreeable. Will continue to monitor.

## 2017-06-25 NOTE — PLAN OF CARE
Problem: Patient Care Overview  Goal: Plan of Care Review  Outcome: Ongoing (interventions implemented as appropriate)  POC reviewed with pt, pt verbalizes understanding. Pt aaox4, sleeping intermittently through the day, easily arousable. Pt on 2L O2, pt removes O2 when sitting on EOB to eat/urinate, sats low 90s on RA. Pt intermittently wheezy, received prn nebulizer x1 today. Pt on diabetic diet, AC&HS blood sugars. Pt voiding sm amounts jadon urine in urinal. OOB with assist of 1. Pt c/o pain to abdomen, bilateral legs, and R foot. Pt medicated with prn dilaudid and oxycodone per orders, clustered care, encouraged repositioning for comfort. Biopsy of R 4th toe done yesterday, mod-large amounts sanguinous drainage to dressing, dressing changed x2 this shift, R foot elevated on pillow. Pt needing frequent reminders to keep foot elevated on pillow, pt at times letting leg dangle off of bed. Bilateral lower extremity dressings CDI. Pt resting in bed, call bell within reach, bed extender in place, camera in place, will continue to monitor.

## 2017-06-25 NOTE — PLAN OF CARE
Problem: Patient Care Overview  Goal: Plan of Care Review  Outcome: Ongoing (interventions implemented as appropriate)  POC reviewed with pt, acknowledged understanding. Pt remains free from falls/injuries. BP higher this shift. Pt complaining of R foot pain at biopsy site. Pain meds given per MAR. Resp tx for wheezing. Blood glucose monitored AC/HS. Dressing to R foot changed 3 times this shift. Pt hangs foot off side of bed despite encouragement to keep it elevated. Pt stands frequently to urinate.  Bed extender placed. Camera place in pt room for safety. WCTM.

## 2017-06-25 NOTE — PROGRESS NOTES
[facility]  Infectious Disease  Progress Note    Patient Name: Leif Nino  MRN: [id number]  Admission Date: 6/16/2017  Length of Stay: 9 days  Attending Physician: Hema Ferrara MD  Primary Care Provider: Primary Doctor No    Isolation Status: No active isolations  Assessment/Plan:      Osteomyelitis of right foot    46 year old male with HCV cirrhosis with 4th toe osteomyelitis; bone culture sent and pending;  - start on empiric vancomycin and ceftriaxone for now  - will tailor antibiotics according to culture data   - biopsy may not yield results given that patient received a week of vanc/unasyn and was only off of antibiotics for a couple of days  - anticipate 6 weeks of therapy for osteomyelitis  - will f/u with further recs          Anticipated Disposition: to LTAC with IV antibiotics  Thank you for your consult. I will follow-up with patient. Please contact us if you have any additional questions.  TOAN Corona, pager: [phone]  Infectious Disease  [facility]    Subjective:     Principal Problem:Bilateral cellulitis of lower leg    HPI: This is a 46 year old male with PMH of HCV cirrhosis (untreated), DM, and bilateral diabetic foot ulcers who presented with weakness and bilateral lower extremity edema. Patient has had these ulcers for the past several months and has seen podiatry for debridement. Patient has had normal ABIs and evaluation with vascular surgery. MRI was obtained which shows possible osteomyelitis of the 4th toe. Patient has been on vancomycin and unasyn x 7 days and we are now consulted for antibiotic recs. He is afebrile and without leukocytosis. He complains of bilateral leg pain and abdominal pain on exam.   Interval History: initially recommended no antibiotics, however, after discussion with podiatry and removal of callous on right 4th toe, there was noted to be bone exposure; underwent bone biopsy yesterday. Afebrile.     Review of  Systems   Constitutional: Negative for chills and fever.   Respiratory: Negative for cough and shortness of breath.    Cardiovascular: Negative for chest pain and leg swelling.   Gastrointestinal: Positive for abdominal pain. Negative for constipation, diarrhea, nausea and vomiting.   Genitourinary: Negative for dysuria, frequency and hematuria.   Musculoskeletal: Positive for arthralgias. Negative for back pain and myalgias.   Skin: Positive for wound. Negative for rash.   Neurological: Positive for weakness. Negative for dizziness, light-headedness, numbness and headaches.   Psychiatric/Behavioral: Negative for agitation and behavioral problems. The patient is not nervous/anxious.      Objective:     Vital Signs (Most Recent):  Temp: 98.3 °F (36.8 °C) (06/25/17 1134)  Pulse: 74 (06/25/17 1134)  Resp: 20 (06/25/17 1134)  BP: (!) 179/90 (06/25/17 1134)  SpO2: 99 % (06/25/17 1134) Vital Signs (24h Range):  Temp:  [97.8 °F (36.6 °C)-98.4 °F (36.9 °C)] 98.3 °F (36.8 °C)  Pulse:  [74-92] 74  Resp:  [18-22] 20  SpO2:  [93 %-99 %] 99 %  BP: (175-179)/(82-91) 179/90     Weight: 126.8 kg (279 lb 8 oz)  Body mass index is 35.89 kg/m².    Estimated Creatinine Clearance: 102.6 mL/min (based on Cr of 1.3).    Physical Exam   Constitutional: He is oriented to person, place, and time. He appears well-developed and well-nourished. No distress.   Cardiovascular: Normal rate and regular rhythm.    No murmur heard.  Pulmonary/Chest: Effort normal and breath sounds normal. No respiratory distress.   Abdominal: Soft. Bowel sounds are normal. He exhibits no distension. There is tenderness (diffuse).   Musculoskeletal: Normal range of motion. He exhibits edema (bilateral lower extremity).   Neurological: He is alert and oriented to person, place, and time.   Skin: Skin is warm and dry.   Dressings noted to bilateral feet.     Venous changes noted to bilateral lower extremities; no erythema.    Psychiatric: He has a normal mood and  affect. His behavior is normal.       Significant Labs:   Blood Culture:   Recent Labs  Lab 06/01/17  1734 06/01/17  1735   LABBLOO No growth after 5 days. No growth after 5 days.     CBC:   Recent Labs  Lab 06/24/17 0414 06/25/17  0433   WBC 3.24* 3.52*   HGB 7.8* 7.5*   HCT 23.7* 22.5*   PLT 61* 61*     CMP:   Recent Labs  Lab 06/24/17 0414 06/25/17  0433    137   K 4.2 4.3    107   CO2 22* 20*   * 127*   BUN 28* 30*   CREATININE 1.4 1.3   CALCIUM 9.4 9.6   PROT 7.1 7.1   ALBUMIN 4.1 4.1   BILITOT 2.3* 2.6*   ALKPHOS 87 85   AST 40 42*   ALT 10 11   ANIONGAP 8 10   EGFRNONAA >60.0 >60.0       Significant Imaging: I have reviewed all pertinent imaging results/findings within the past 24 hours.

## 2017-06-25 NOTE — SUBJECTIVE & OBJECTIVE
Interval History: initially recommended no antibiotics, however, after discussion with podiatry and removal of callous on right 4th toe, there was noted to be bone exposure; underwent bone biopsy yesterday. Afebrile.     Review of Systems   Constitutional: Negative for chills and fever.   Respiratory: Negative for cough and shortness of breath.    Cardiovascular: Negative for chest pain and leg swelling.   Gastrointestinal: Positive for abdominal pain. Negative for constipation, diarrhea, nausea and vomiting.   Genitourinary: Negative for dysuria, frequency and hematuria.   Musculoskeletal: Positive for arthralgias. Negative for back pain and myalgias.   Skin: Positive for wound. Negative for rash.   Neurological: Positive for weakness. Negative for dizziness, light-headedness, numbness and headaches.   Psychiatric/Behavioral: Negative for agitation and behavioral problems. The patient is not nervous/anxious.      Objective:     Vital Signs (Most Recent):  Temp: 98.3 °F (36.8 °C) (06/25/17 1134)  Pulse: 74 (06/25/17 1134)  Resp: 20 (06/25/17 1134)  BP: (!) 179/90 (06/25/17 1134)  SpO2: 99 % (06/25/17 1134) Vital Signs (24h Range):  Temp:  [97.8 °F (36.6 °C)-98.4 °F (36.9 °C)] 98.3 °F (36.8 °C)  Pulse:  [74-92] 74  Resp:  [18-22] 20  SpO2:  [93 %-99 %] 99 %  BP: (175-179)/(82-91) 179/90     Weight: 126.8 kg (279 lb 8 oz)  Body mass index is 35.89 kg/m².    Estimated Creatinine Clearance: 102.6 mL/min (based on Cr of 1.3).    Physical Exam   Constitutional: He is oriented to person, place, and time. He appears well-developed and well-nourished. No distress.   Cardiovascular: Normal rate and regular rhythm.    No murmur heard.  Pulmonary/Chest: Effort normal and breath sounds normal. No respiratory distress.   Abdominal: Soft. Bowel sounds are normal. He exhibits no distension. There is tenderness (diffuse).   Musculoskeletal: Normal range of motion. He exhibits edema (bilateral lower extremity).   Neurological: He is  alert and oriented to person, place, and time.   Skin: Skin is warm and dry.   Dressings noted to bilateral feet.     Venous changes noted to bilateral lower extremities; no erythema.    Psychiatric: He has a normal mood and affect. His behavior is normal.       Significant Labs:   Blood Culture:   Recent Labs  Lab 06/01/17  1734 06/01/17  1735   LABBLOO No growth after 5 days. No growth after 5 days.     CBC:   Recent Labs  Lab 06/24/17 0414 06/25/17  0433   WBC 3.24* 3.52*   HGB 7.8* 7.5*   HCT 23.7* 22.5*   PLT 61* 61*     CMP:   Recent Labs  Lab 06/24/17  0414 06/25/17  0433    137   K 4.2 4.3    107   CO2 22* 20*   * 127*   BUN 28* 30*   CREATININE 1.4 1.3   CALCIUM 9.4 9.6   PROT 7.1 7.1   ALBUMIN 4.1 4.1   BILITOT 2.3* 2.6*   ALKPHOS 87 85   AST 40 42*   ALT 10 11   ANIONGAP 8 10   EGFRNONAA >60.0 >60.0       Significant Imaging: I have reviewed all pertinent imaging results/findings within the past 24 hours.

## 2017-06-25 NOTE — PLAN OF CARE
Attending physician, Dr. Dutton provided SW with verbal update in regards to d/c planning, stating that Podiatry re-consulted ID and pt will in turn need long term IV abx therefore, needs this SW to process with initial plans of LTAC placement.     SW sent LTAC referrals to the following: [facility] ( [place] and [place]), . [facility] and [facility].     SW to follow up on referrals in the morning.

## 2017-06-25 NOTE — NURSING
Spoke to Dr. Dutton re: order for BASILIO stockings on pt. Discussed pt's bilateral foot wounds, and pt's biopsy of R 4th toe this afternoon. Plan is to attempt SCDs at this time. SCD machine requested. Will continue to monitor.

## 2017-06-25 NOTE — NURSING
Pt noted to have audible wheezing, paged RT to request nebulizer tx. RT states she will be up to see pt. Will continue to monitor.

## 2017-06-25 NOTE — ASSESSMENT & PLAN NOTE
46 year old male with HCV cirrhosis with 4th toe osteomyelitis; bone culture sent and pending;  - start on empiric vancomycin and ceftriaxone for now  - will tailor antibiotics according to culture data   - biopsy may not yield results given that patient received a week of vanc/unasyn and was only off of antibiotics for a couple of days  - anticipate 6 weeks of therapy for osteomyelitis  - will f/u with further recs

## 2017-06-25 NOTE — PROGRESS NOTES
[facility] Hospital Medicine  Progress Note    Patient Name: Leif Nino  MRN: [id number]  Patient Class: IP- Inpatient   Admission Date: 6/16/2017  Length of Stay: 9 days  Attending Physician: Hema Ferrara MD  Primary Care Provider: Primary Doctor [facility] Medicine Team: [facility] HOSP MED A Hema Ferrara MD    Subjective:     Principal Problem:Bilateral cellulitis of lower leg    HPI:  Mr. Nino is a 45 yo WM w/ h/o HCV cirrhosis & poorly controlled DM complicated by BL diabetic ulceration s/p 6/1 R 4th toe debridement who presents c/o generalized weakness w/ associated worsening BL LE edema.  He reports that 5d ago he began having dysuria.  Two day afterwards he woke up noticing his chronic BL LE edema was significantly worse with an associated erythematous, painful rash.  Later that day he began developing dyspnea on exertion that progressively worsened to the point where he is now dyspneic at rest.  Patient denies any vomiting but has mild, persistant nausea that has worsened acutely over the last 3 days.  He reports no fevers, chills, abdominal pain, worsening abdominal distention, diarrhea.  Patient states he has had no productive cough or wheezes.    Hospital Course:  No notes on file    Interval History: Patient seen and examined at bedside. Bone biopsy performed yesterday at bedside by podiatry. Antibiotics restarted out of concerns for Osteomyelitis.     Review of Systems   Constitutional: Negative for activity change and appetite change.   HENT: Negative for drooling and facial swelling.    Eyes: Negative for discharge and itching.   Respiratory: Negative for cough, shortness of breath and wheezing.    Cardiovascular: Negative for chest pain and palpitations.   Gastrointestinal: Negative for abdominal pain and nausea.   Genitourinary: Negative for difficulty urinating and dysuria.   Skin: Negative for color change and pallor.   Neurological: Negative for dizziness and  numbness.   Psychiatric/Behavioral: Negative for behavioral problems and confusion.   All other systems reviewed and are negative.    Objective:     Vital Signs (Most Recent):  Temp: 98.3 °F (36.8 °C) (06/25/17 1134)  Pulse: 88 (06/25/17 1251)  Resp: 18 (06/25/17 1251)  BP: (!) 179/90 (06/25/17 1134)  SpO2: 99 % (06/25/17 1251) Vital Signs (24h Range):  Temp:  [97.8 °F (36.6 °C)-98.4 °F (36.9 °C)] 98.3 °F (36.8 °C)  Pulse:  [74-92] 88  Resp:  [17-22] 18  SpO2:  [93 %-99 %] 99 %  BP: (175-179)/(82-91) 179/90     Weight: 126.8 kg (279 lb 8 oz)  Body mass index is 35.89 kg/m².    Intake/Output Summary (Last 24 hours) at 06/25/17 1334  Last data filed at 06/25/17 1320   Gross per 24 hour   Intake             1410 ml   Output             1000 ml   Net              410 ml      Physical Exam   Constitutional: He is oriented to person, place, and time. He appears well-developed and well-nourished.   HENT:   Head: Normocephalic and atraumatic.   Eyes: Conjunctivae are normal. Pupils are equal, round, and reactive to light.   Neck: Normal range of motion. No thyromegaly present.   Cardiovascular: Normal rate, regular rhythm and normal heart sounds.    Pulmonary/Chest: Effort normal and breath sounds normal.   Abdominal: Soft. He exhibits no distension. There is no tenderness.   Musculoskeletal: He exhibits edema.   B/L extremity erythema    Neurological: He is alert and oriented to person, place, and time. Coordination normal.   Skin: No rash noted. No erythema.       Significant Labs:   CBC:   Recent Labs  Lab 06/24/17 0414 06/25/17  0433   WBC 3.24* 3.52*   HGB 7.8* 7.5*   HCT 23.7* 22.5*   PLT 61* 61*     CMP:   Recent Labs  Lab 06/24/17 0414 06/25/17  0433    137   K 4.2 4.3    107   CO2 22* 20*   * 127*   BUN 28* 30*   CREATININE 1.4 1.3   CALCIUM 9.4 9.6   PROT 7.1 7.1   ALBUMIN 4.1 4.1   BILITOT 2.3* 2.6*   ALKPHOS 87 85   AST 40 42*   ALT 10 11   ANIONGAP 8 10   EGFRNONAA >60.0 >60.0        Significant Imaging: I have reviewed all pertinent imaging results/findings within the past 24 hours.    Assessment/Plan:   # Bilateral lower extremity cellulitis with venous stasis due to streptococcus   #4th toe osteomyelitis  - s/p bone biopsy on 06/24  - restarted on IV Ceftriaxone and Vancomycin by ID  - will probably need 6 weeks of antibiotics through PICC Line   - follow up Bone Biopsy results      # Diabetic type 2 foot ulcers B/L  - podiatry consult, appreciate recs  - X rays B/ L feet: no osteomyelitis     # Decompensated Hep C cirrhosis with ascites  MELD-Na score: 17 at 6/25/2017  4:33 AM  MELD score: 17 at 6/25/2017  4:33 AM  Calculated from:  Serum Creatinine: 1.3 mg/dL at 6/25/2017  4:33 AM  Serum Sodium: 137 mmol/L at 6/25/2017  4:33 AM  Total Bilirubin: 2.6 mg/dL at 6/25/2017  4:33 AM  INR(ratio): 1.5 at 6/25/2017  4:33 AM  Age: 46 years   - hepatology consult  - last viral load 21,000 and has never been treated  - will set up for outpatient follow up     # CKD stage 3 due to uncontrolled DM 2  - seems to be new baseline at 1.5     # DM2 with nephropathy  - Hba1c of 8.2 recently  - levemir 20 units at night and 8 of novolog with meals     # Anemia of CKD stage 3  # Thrombocytopenia  # Hemolysis   - hemolysis labs have come back positive and patient is close to DIC        # Hypoalbuminemia due to moderate protein fernando malnutrition  - PAB 3, glucerna     # Hypomagnesemia   - replaced, resolved    VTE Risk Mitigation         Ordered     Place BASILIO hose  Until discontinued      06/24/17 1443     Low Risk of VTE  Once      06/16/17 2243          Heam Ferrara MD  Department of Hospital Medicine   [facility]

## 2017-06-25 NOTE — PLAN OF CARE
Problem: Patient Care Overview  Goal: Plan of Care Review  Outcome: Ongoing (interventions implemented as appropriate)  POC reviewed with pt, pt verbalizes understanding. Pt aaox4, on 2L O2, intermittently wheezy, received prn nebulizer x1 today. Pt on diabetic diet, AC&HS blood sugars. Pt currently NPO for abdominal ultrasound. Pt voiding in urinal. OOB with assist of 1. Pt c/o pain to abdomen and R foot this afternoon, medicated with prn dilaudid and oxycodone per orders. Biopsy of R 4th toe done this afternoon. Sanguinous drainage noted to foot this evening, dressing changed, R foot elevated on pillow. Bilateral lower extremity dressings CDI. Pt resting in bed, call bell within reach, bed alarm on, will continue to monitor.

## 2017-06-25 NOTE — NURSING
Spoke to Dr. Dutton, made him aware of pt requiring multiple dressing changes to R foot d/t biopsy site bleeding. Also made him aware of pt's c/o nose bleed this AM. States he will be in to see pt. Will continue to monitor.

## 2017-06-25 NOTE — SUBJECTIVE & OBJECTIVE
Interval History: Patient seen and examined at bedside. Bone biopsy performed yesterday at bedside by podiatry. Antibiotics restarted out of concerns for Osteomyelitis.     Review of Systems   Constitutional: Negative for activity change and appetite change.   HENT: Negative for drooling and facial swelling.    Eyes: Negative for discharge and itching.   Respiratory: Negative for cough, shortness of breath and wheezing.    Cardiovascular: Negative for chest pain and palpitations.   Gastrointestinal: Negative for abdominal pain and nausea.   Genitourinary: Negative for difficulty urinating and dysuria.   Skin: Negative for color change and pallor.   Neurological: Negative for dizziness and numbness.   Psychiatric/Behavioral: Negative for behavioral problems and confusion.   All other systems reviewed and are negative.    Objective:     Vital Signs (Most Recent):  Temp: 98.3 °F (36.8 °C) (06/25/17 1134)  Pulse: 88 (06/25/17 1251)  Resp: 18 (06/25/17 1251)  BP: (!) 179/90 (06/25/17 1134)  SpO2: 99 % (06/25/17 1251) Vital Signs (24h Range):  Temp:  [97.8 °F (36.6 °C)-98.4 °F (36.9 °C)] 98.3 °F (36.8 °C)  Pulse:  [74-92] 88  Resp:  [17-22] 18  SpO2:  [93 %-99 %] 99 %  BP: (175-179)/(82-91) 179/90     Weight: 126.8 kg (279 lb 8 oz)  Body mass index is 35.89 kg/m².    Intake/Output Summary (Last 24 hours) at 06/25/17 1334  Last data filed at 06/25/17 1320   Gross per 24 hour   Intake             1410 ml   Output             1000 ml   Net              410 ml      Physical Exam   Constitutional: He is oriented to person, place, and time. He appears well-developed and well-nourished.   HENT:   Head: Normocephalic and atraumatic.   Eyes: Conjunctivae are normal. Pupils are equal, round, and reactive to light.   Neck: Normal range of motion. No thyromegaly present.   Cardiovascular: Normal rate, regular rhythm and normal heart sounds.    Pulmonary/Chest: Effort normal and breath sounds normal.   Abdominal: Soft. He exhibits no  distension. There is no tenderness.   Musculoskeletal: He exhibits edema.   B/L extremity erythema    Neurological: He is alert and oriented to person, place, and time. Coordination normal.   Skin: No rash noted. No erythema.       Significant Labs:   CBC:   Recent Labs  Lab 06/24/17 0414 06/25/17 0433   WBC 3.24* 3.52*   HGB 7.8* 7.5*   HCT 23.7* 22.5*   PLT 61* 61*     CMP:   Recent Labs  Lab 06/24/17 0414 06/25/17 0433    137   K 4.2 4.3    107   CO2 22* 20*   * 127*   BUN 28* 30*   CREATININE 1.4 1.3   CALCIUM 9.4 9.6   PROT 7.1 7.1   ALBUMIN 4.1 4.1   BILITOT 2.3* 2.6*   ALKPHOS 87 85   AST 40 42*   ALT 10 11   ANIONGAP 8 10   EGFRNONAA >60.0 >60.0       Significant Imaging: I have reviewed all pertinent imaging results/findings within the past 24 hours.

## 2017-06-25 NOTE — NURSING
Spoke to Dr. Dutton re: pt's L forearm PIV. IV has been in place since 6/19. IV CDI, flushing without difficulty, pt reporting he is a very difficult stick. Per MD, OK to leave in IV at this time. Will continue to monitor.

## 2017-06-26 ENCOUNTER — DOCUMENTATION ONLY (OUTPATIENT)
Dept: CARDIOLOGY | Facility: CLINIC | Age: 47
End: 2017-06-26

## 2017-06-26 LAB
ALBUMIN SERPL BCP-MCNC: 4.5 G/DL
ALLENS TEST: ABNORMAL
ALP SERPL-CCNC: 90 U/L
ALT SERPL W/O P-5'-P-CCNC: 11 U/L
ANION GAP SERPL CALC-SCNC: 9 MMOL/L
AORTIC VALVE REGURGITATION: ABNORMAL
AST SERPL-CCNC: 39 U/L
BASOPHILS # BLD AUTO: 0.03 K/UL
BASOPHILS NFR BLD: 0.9 %
BILIRUB SERPL-MCNC: 2.3 MG/DL
BUN SERPL-MCNC: 32 MG/DL
CALCIUM SERPL-MCNC: 9.4 MG/DL
CHLORIDE SERPL-SCNC: 105 MMOL/L
CO2 SERPL-SCNC: 21 MMOL/L
CREAT SERPL-MCNC: 1.4 MG/DL
DELSYS: ABNORMAL
DIASTOLIC DYSFUNCTION: YES
DIFFERENTIAL METHOD: ABNORMAL
EOSINOPHIL # BLD AUTO: 0.2 K/UL
EOSINOPHIL NFR BLD: 4.4 %
ERYTHROCYTE [DISTWIDTH] IN BLOOD BY AUTOMATED COUNT: 17.9 %
EST. GFR  (AFRICAN AMERICAN): >60 ML/MIN/1.73 M^2
EST. GFR  (NON AFRICAN AMERICAN): >60 ML/MIN/1.73 M^2
ESTIMATED PA SYSTOLIC PRESSURE: 49.47
FLOW: 2
GLUCOSE SERPL-MCNC: 165 MG/DL
HCO3 UR-SCNC: 23.1 MMOL/L (ref 24–28)
HCT VFR BLD AUTO: 22.7 %
HGB BLD-MCNC: 7.4 G/DL
INR PPP: 1.6
LYMPHOCYTES # BLD AUTO: 0.6 K/UL
LYMPHOCYTES NFR BLD: 17.3 %
MAGNESIUM SERPL-MCNC: 1.8 MG/DL
MCH RBC QN AUTO: 28.2 PG
MCHC RBC AUTO-ENTMCNC: 32.6 %
MCV RBC AUTO: 87 FL
MODE: ABNORMAL
MONOCYTES # BLD AUTO: 0.4 K/UL
MONOCYTES NFR BLD: 10.9 %
NEUTROPHILS # BLD AUTO: 2.3 K/UL
NEUTROPHILS NFR BLD: 66.2 %
PCO2 BLDA: 34.2 MMHG (ref 35–45)
PH SMN: 7.44 [PH] (ref 7.35–7.45)
PHOSPHATE SERPL-MCNC: 3.7 MG/DL
PLATELET # BLD AUTO: 65 K/UL
PMV BLD AUTO: 9.4 FL
PO2 BLDA: 86 MMHG (ref 80–100)
POC BE: -1 MMOL/L
POC SATURATED O2: 97 % (ref 95–100)
POC TCO2: 24 MMOL/L (ref 23–27)
POCT GLUCOSE: 156 MG/DL (ref 70–110)
POCT GLUCOSE: 159 MG/DL (ref 70–110)
POCT GLUCOSE: 217 MG/DL (ref 70–110)
POTASSIUM SERPL-SCNC: 4.4 MMOL/L
PROT SERPL-MCNC: 7.4 G/DL
PROTHROMBIN TIME: 15.8 SEC
RBC # BLD AUTO: 2.62 M/UL
RETIRED EF AND QEF - SEE NOTES: 60 (ref 55–65)
SAMPLE: ABNORMAL
SITE: ABNORMAL
SODIUM SERPL-SCNC: 135 MMOL/L
TRICUSPID VALVE REGURGITATION: ABNORMAL
WBC # BLD AUTO: 3.41 K/UL

## 2017-06-26 PROCEDURE — 25000003 PHARM REV CODE 250: Performed by: PHYSICIAN ASSISTANT

## 2017-06-26 PROCEDURE — 36415 COLL VENOUS BLD VENIPUNCTURE: CPT

## 2017-06-26 PROCEDURE — 25000003 PHARM REV CODE 250: Performed by: HOSPITALIST

## 2017-06-26 PROCEDURE — 36600 WITHDRAWAL OF ARTERIAL BLOOD: CPT

## 2017-06-26 PROCEDURE — 80053 COMPREHEN METABOLIC PANEL: CPT

## 2017-06-26 PROCEDURE — 63600175 PHARM REV CODE 636 W HCPCS: Performed by: HOSPITALIST

## 2017-06-26 PROCEDURE — 85610 PROTHROMBIN TIME: CPT

## 2017-06-26 PROCEDURE — 85025 COMPLETE CBC W/AUTO DIFF WBC: CPT

## 2017-06-26 PROCEDURE — 97802 MEDICAL NUTRITION INDIV IN: CPT

## 2017-06-26 PROCEDURE — 27000221 HC OXYGEN, UP TO 24 HOURS

## 2017-06-26 PROCEDURE — 82803 BLOOD GASES ANY COMBINATION: CPT

## 2017-06-26 PROCEDURE — 25500020 PHARM REV CODE 255: Performed by: RADIOLOGY

## 2017-06-26 PROCEDURE — 20600001 HC STEP DOWN PRIVATE ROOM

## 2017-06-26 PROCEDURE — P9047 ALBUMIN (HUMAN), 25%, 50ML: HCPCS | Performed by: HOSPITALIST

## 2017-06-26 PROCEDURE — 99233 SBSQ HOSP IP/OBS HIGH 50: CPT | Mod: ,,, | Performed by: HOSPITALIST

## 2017-06-26 PROCEDURE — 93306 TTE W/DOPPLER COMPLETE: CPT | Mod: PBBFAC | Performed by: INTERNAL MEDICINE

## 2017-06-26 PROCEDURE — 25000242 PHARM REV CODE 250 ALT 637 W/ HCPCS: Performed by: NURSE PRACTITIONER

## 2017-06-26 PROCEDURE — 84100 ASSAY OF PHOSPHORUS: CPT

## 2017-06-26 PROCEDURE — 94761 N-INVAS EAR/PLS OXIMETRY MLT: CPT

## 2017-06-26 PROCEDURE — 94640 AIRWAY INHALATION TREATMENT: CPT

## 2017-06-26 PROCEDURE — 97116 GAIT TRAINING THERAPY: CPT

## 2017-06-26 PROCEDURE — 97530 THERAPEUTIC ACTIVITIES: CPT

## 2017-06-26 PROCEDURE — 96374 THER/PROPH/DIAG INJ IV PUSH: CPT

## 2017-06-26 PROCEDURE — 83735 ASSAY OF MAGNESIUM: CPT

## 2017-06-26 PROCEDURE — 99233 SBSQ HOSP IP/OBS HIGH 50: CPT | Mod: ,,, | Performed by: PHYSICIAN ASSISTANT

## 2017-06-26 PROCEDURE — 25000003 PHARM REV CODE 250: Performed by: PODIATRIST

## 2017-06-26 PROCEDURE — 63600175 PHARM REV CODE 636 W HCPCS: Performed by: PHYSICIAN ASSISTANT

## 2017-06-26 RX ORDER — FUROSEMIDE 10 MG/ML
40 INJECTION INTRAMUSCULAR; INTRAVENOUS ONCE
Status: COMPLETED | OUTPATIENT
Start: 2017-06-26 | End: 2017-06-26

## 2017-06-26 RX ADMIN — ALBUMIN (HUMAN) 25 G: 12.5 SOLUTION INTRAVENOUS at 11:06

## 2017-06-26 RX ADMIN — QUETIAPINE FUMARATE 100 MG: 25 TABLET ORAL at 10:06

## 2017-06-26 RX ADMIN — ALBUMIN (HUMAN) 25 G: 12.5 SOLUTION INTRAVENOUS at 12:06

## 2017-06-26 RX ADMIN — ALBUMIN (HUMAN) 25 G: 12.5 SOLUTION INTRAVENOUS at 06:06

## 2017-06-26 RX ADMIN — INSULIN ASPART 8 UNITS: 100 INJECTION, SOLUTION INTRAVENOUS; SUBCUTANEOUS at 06:06

## 2017-06-26 RX ADMIN — HYDROMORPHONE HYDROCHLORIDE 1 MG: 1 INJECTION, SOLUTION INTRAMUSCULAR; INTRAVENOUS; SUBCUTANEOUS at 02:06

## 2017-06-26 RX ADMIN — ASPIRIN 81 MG: 81 TABLET, COATED ORAL at 08:06

## 2017-06-26 RX ADMIN — ALBUMIN (HUMAN) 25 G: 12.5 SOLUTION INTRAVENOUS at 10:06

## 2017-06-26 RX ADMIN — Medication 40 G: at 06:06

## 2017-06-26 RX ADMIN — DICYCLOMINE HYDROCHLORIDE 20 MG: 20 TABLET ORAL at 02:06

## 2017-06-26 RX ADMIN — INSULIN ASPART 8 UNITS: 100 INJECTION, SOLUTION INTRAVENOUS; SUBCUTANEOUS at 08:06

## 2017-06-26 RX ADMIN — HYDROMORPHONE HYDROCHLORIDE 1 MG: 1 INJECTION, SOLUTION INTRAMUSCULAR; INTRAVENOUS; SUBCUTANEOUS at 07:06

## 2017-06-26 RX ADMIN — OXYCODONE HYDROCHLORIDE 15 MG: 5 TABLET ORAL at 10:06

## 2017-06-26 RX ADMIN — INSULIN ASPART 1 UNITS: 100 INJECTION, SOLUTION INTRAVENOUS; SUBCUTANEOUS at 10:06

## 2017-06-26 RX ADMIN — HYDROMORPHONE HYDROCHLORIDE 1 MG: 1 INJECTION, SOLUTION INTRAMUSCULAR; INTRAVENOUS; SUBCUTANEOUS at 10:06

## 2017-06-26 RX ADMIN — CARVEDILOL 12.5 MG: 12.5 TABLET, FILM COATED ORAL at 08:06

## 2017-06-26 RX ADMIN — CEFTRIAXONE 2 G: 2 INJECTION, SOLUTION INTRAVENOUS at 10:06

## 2017-06-26 RX ADMIN — INSULIN DETEMIR 10 UNITS: 100 INJECTION, SOLUTION SUBCUTANEOUS at 10:06

## 2017-06-26 RX ADMIN — INSULIN ASPART 4 UNITS: 100 INJECTION, SOLUTION INTRAVENOUS; SUBCUTANEOUS at 06:06

## 2017-06-26 RX ADMIN — FUROSEMIDE 40 MG: 10 INJECTION, SOLUTION INTRAVENOUS at 02:06

## 2017-06-26 RX ADMIN — STANDARDIZED SENNA CONCENTRATE AND DOCUSATE SODIUM 1 TABLET: 8.6; 5 TABLET, FILM COATED ORAL at 02:06

## 2017-06-26 RX ADMIN — OXYCODONE HYDROCHLORIDE 15 MG: 5 TABLET ORAL at 12:06

## 2017-06-26 RX ADMIN — IOHEXOL 15 ML: 350 INJECTION, SOLUTION INTRAVENOUS at 08:06

## 2017-06-26 RX ADMIN — DICYCLOMINE HYDROCHLORIDE 20 MG: 20 TABLET ORAL at 10:06

## 2017-06-26 RX ADMIN — ATORVASTATIN CALCIUM 40 MG: 20 TABLET, FILM COATED ORAL at 08:06

## 2017-06-26 RX ADMIN — IOHEXOL 15 ML: 350 INJECTION, SOLUTION INTRAVENOUS at 09:06

## 2017-06-26 RX ADMIN — ALBUMIN (HUMAN) 25 G: 12.5 SOLUTION INTRAVENOUS at 05:06

## 2017-06-26 RX ADMIN — INSULIN ASPART 8 UNITS: 100 INJECTION, SOLUTION INTRAVENOUS; SUBCUTANEOUS at 02:06

## 2017-06-26 RX ADMIN — HYDROMORPHONE HYDROCHLORIDE 1 MG: 1 INJECTION, SOLUTION INTRAMUSCULAR; INTRAVENOUS; SUBCUTANEOUS at 06:06

## 2017-06-26 RX ADMIN — OXYCODONE HYDROCHLORIDE 15 MG: 5 TABLET ORAL at 08:06

## 2017-06-26 RX ADMIN — IPRATROPIUM BROMIDE AND ALBUTEROL SULFATE 3 ML: .5; 3 SOLUTION RESPIRATORY (INHALATION) at 11:06

## 2017-06-26 RX ADMIN — VANCOMYCIN HYDROCHLORIDE 1750 MG: 100 INJECTION, POWDER, LYOPHILIZED, FOR SOLUTION INTRAVENOUS at 02:06

## 2017-06-26 RX ADMIN — CARVEDILOL 12.5 MG: 12.5 TABLET, FILM COATED ORAL at 06:06

## 2017-06-26 RX ADMIN — OXYCODONE HYDROCHLORIDE 15 MG: 5 TABLET ORAL at 02:06

## 2017-06-26 NOTE — PROGRESS NOTES
06/26/2017 1445  Patient scheduled for echo with bubble study. piv flushed before and after use. Administered agitated bacteriostatic NS ivp per protocol x2 (with valsalva and without valsalva). Pt tolerated well.

## 2017-06-26 NOTE — PROGRESS NOTES
"  [facility]  Adult Nutrition  Consult Note    SUMMARY     Recommendations    1. Continue current 1800 kcal ADA diet.   2. RD to monitor & follow-up.    Goals: PO intake >50%  Nutrition Goal Status: new  Communication of RD Recs: reviewed with RN    Reason for Assessment    Reason for Assessment: length of stay  Diagnosis: other (see comments) (Cellulitis)  Relevent Medical History: DM   Interdisciplinary Rounds: did not attend     General Information Comments: Pt with good appetite, consuming 100% of meals. Pt not interested in diabetic diet education.  Nutrition Discharge Planning: Adequate PO intake.    Nutrition Prescription Ordered    Current Diet Order: 1800 kcal ADA    Nutrition Risk Screen     Nutrition Risk Screen: no indicators present    Nutrition/Diet History    Patient Reported Diet/Restrictions/Preferences: general     Factors Affecting Nutritional Intake: other (see comments) (None)    Labs/Tests/Procedures/Meds    Pertinent Labs Reviewed: reviewed, pertinent  Pertinent Labs Comments: Na 135, BUN 32, Gluc 165, A1C 8.4  Pertinent Medications Reviewed: reviewed, pertinent  Pertinent Medications Comments: Statin, Insulin    Physical Findings    Overall Physical Appearance: overweight  Oral/Mouth Cavity: WDL  Skin: intact    Anthropometrics    Height: 6' 2" (188 cm)  Weight Method: Bed Scale  Weight: 126.8 kg (279 lb 8.7 oz)    Ideal Body Weight (IBW), Male: 190 lb  % Ideal Body Weight, Male (lb): 147.13 lb     BMI (Calculated): 36  BMI Grade: 35 - 39.9 - obesity - grade II    Assessment and Plan    No nutritional dx at this time.    Monitor and Evaluation    Food and Nutrient Intake: energy intake, food and beverage intake  Food and Nutrient Adminstration: diet order     Physical Activity and Function: nutrition-related ADLs and IADLs  Anthropometric Measurements: weight, weight change, body mass index  Biochemical Data, Medical Tests and Procedures: electrolyte and renal panel, " gastrointestinal profile, glucose/endocrine profile, inflammatory profile, lipid profile  Nutrition-Focused Physical Findings: overall appearance    Nutrition Risk    Level of Risk: other (see comments) (1x/week)    Nutrition Follow-Up    RD Follow-up?: Yes

## 2017-06-26 NOTE — PT/OT/SLP PROGRESS
Physical Therapy  Treatment    Leif Nino   MRN: [id number]   Admitting Diagnosis: Bilateral cellulitis of lower leg    PT Received On: 06/26/17  PT Start Time: 0755     PT Stop Time: 0818    PT Total Time (min): 23 min       Billable Minutes:  Gait [id number] and Therapeutic Activity 8    Treatment Type: Treatment  PT/PTA: PT             General Precautions: Standard, fall  Orthopedic Precautions: N/A   Braces: N/A    Do you have any cultural, spiritual, Baptism conflicts, given your current situation?: None stated     Subjective:  Communicated with RN prior to session.  Patient agreeable to PT.    Pain/Comfort  Pain Rating 1: 8/10  Location - Side 1: Bilateral (R>L)  Location - Orientation 1: generalized  Location 1: leg  Pain Addressed 1: Reposition, Distraction  Pain Rating Post-Intervention 1: 8/10    Objective:   Patient found with: oxygen    Functional Mobility:  Bed Mobility:   Rolling/Turning to Left:  (Not assessed patient seated EOB upon room entry)    Transfers:  Sit <> Stand Assistance: Supervision  Sit <> Stand Assistive Device: Rolling Walker  Bed <> Chair Technique: Stand Pivot  Bed <> Chair Assistance: Contact Guard Assistance  Bed <> Chair Assistive Device: Rolling Walker    Gait:   Gait Distance: 100ft x 1 (1 standing rest break for 30sec)  Assistance 1: Contact Guard Assistance  Gait Assistive Device: Rolling walker  Gait Deviation(s): decreased marianne, increased time in double stance (Ambulates with excessive R hip external rotation, decreased weightshift over RLE secondary to pain. Increased weightbearing thru BUE on walker)    Stairs:  Not assessed.    Balance:   Static Sit: GOOD-: Takes MODERATE challenges from all directions but inconsistently  Dynamic Sit: GOOD-: Maintains balance through MODERATE excursions of active trunk movement,     Static Stand: FAIR: Maintains without assist but unable to take challenges  Dynamic stand: FAIR: Needs CONTACT GUARD during gait     Therapeutic  Activities and Exercises:  Patient educated on role of PT/POC and performing LE therapeutic exercise 3x/day. Patient seated EOB upon room entry. Sit<>stand and standpivot transfer to chair CGA with rolling walker. Patient able to ambulate with rolling walker 100' CGA but required 1 standing rest break before returning back to bedside chair in room. White board updated: safe to transfer with RN staff with rolling walker. Additional education given on benefits of LE elevation and PRAFO boots.    AM-PAC 6 CLICK MOBILITY  How much help from another person does this patient currently need?   1 = Unable, Total/Dependent Assistance  2 = A lot, Maximum/Moderate Assistance  3 = A little, Minimum/Contact Guard/Supervision  4 = None, Modified Colbert/Independent    Turning over in bed (including adjusting bedclothes, sheets and blankets)?: 4  Sitting down on and standing up from a chair with arms (e.g., wheelchair, bedside commode, etc.): 3  Moving from lying on back to sitting on the side of the bed?: 3  Moving to and from a bed to a chair (including a wheelchair)?: 3  Need to walk in hospital room?: 3  Climbing 3-5 steps with a railing?: 2  Total Score: 18    AM-PAC Raw Score CMS G-Code Modifier Level of Impairment Assistance   6 % Total / Unable   7 - 9 CM 80 - 100% Maximal Assist   10 - 14 CL 60 - 80% Moderate Assist   15 - 19 CK 40 - 60% Moderate Assist   20 - 22 CJ 20 - 40% Minimal Assist   23 CI 1-20% SBA / CGA   24 CH 0% Independent/ Mod I     Patient left up in chair with all lines intact, call button in reach and RN notified.    Assessment:  Leif Nino is a 46 y.o. male with a medical diagnosis of Bilateral cellulitis of lower leg and presents with significant LE pain, weakness, and impaired cardiorespiratory endurance limiting functional mobility including bed mobility, transfers, and ambulation. Significant fatigue and SOB after 100' walk on 2L O2. Patient to benefit from skilled intervention to  address deficits prior to transition to SNF to improve safety and overall functional mobility.    Rehab identified problem list/impairments: Rehab identified problem list/impairments: weakness, impaired endurance, gait instability, impaired sensation, impaired functional mobilty, impaired balance, decreased coordination, decreased lower extremity function, impaired cardiopulmonary response to activity, edema, pain    Rehab potential is good.    Activity tolerance: Fair    Discharge recommendations: Discharge Facility/Level Of Care Needs: nursing facility, skilled     Barriers to discharge: Barriers to Discharge: Inaccessible home environment, Decreased caregiver support    Equipment recommendations: Equipment Needed After Discharge: walker, rolling     GOALS:    Physical Therapy Goals        Problem: Physical Therapy Goal    Goal Priority Disciplines Outcome Goal Variances Interventions   Physical Therapy Goal     PT/OT, PT Ongoing (interventions implemented as appropriate)     Description:  Goals to be met by: 17     Patient will increase functional independence with mobility by performin. Supine to sit with Stand-by Assistance - not met  2. Sit to supine with Stand-by Assistance - not met  3. Sit to stand transfer with Stand-by Assistance - not met  4. Bed to chair transfer with Stand-by Assistance using LRAD - not met  5. Gait  x 100 feet with Contact Guard Assistance using LRAD - Met   Update: Gait x 200ft Christal with rolling walker - not met  6. Ascend/descend 5 stair with right Handrails Moderate Assistance - not met  7. Lower extremity exercise program x 15 reps per handout, with independence - not met                        PLAN:    Patient to be seen 4 x/week  to address the above listed problems via gait training, therapeutic activities, therapeutic exercises  Plan of Care expires: 17  Plan of Care reviewed with: patient         Rgoer Sahu III, PT  2017

## 2017-06-26 NOTE — SUBJECTIVE & OBJECTIVE
Interval History: Patient seen and examined, complains of abdominal pain and tenderness, IR paracentesis but aborted due to low ascitic fluid .     Review of Systems   Constitutional: Negative for activity change and appetite change.   HENT: Negative for drooling and facial swelling.    Eyes: Negative for discharge and itching.   Respiratory: Negative for cough, shortness of breath and wheezing.    Cardiovascular: Negative for chest pain and palpitations.   Gastrointestinal: Negative for abdominal pain and nausea.   Genitourinary: Negative for difficulty urinating and dysuria.   Skin: Negative for color change and pallor.   Neurological: Negative for dizziness and numbness.   Psychiatric/Behavioral: Negative for behavioral problems and confusion.   All other systems reviewed and are negative.    Objective:     Vital Signs (Most Recent):  Temp: 98.7 °F (37.1 °C) (06/26/17 1624)  Pulse: 82 (06/26/17 1624)  Resp: 20 (06/26/17 1624)  BP: (!) 180/93 (06/26/17 1624)  SpO2: 95 % (06/26/17 1624) Vital Signs (24h Range):  Temp:  [97.6 °F (36.4 °C)-98.7 °F (37.1 °C)] 98.7 °F (37.1 °C)  Pulse:  [71-88] 82  Resp:  [18-24] 20  SpO2:  [92 %-97 %] 95 %  BP: (150-198)/() 180/93     Weight: 126.8 kg (279 lb 8.7 oz)  Body mass index is 35.89 kg/m².    Intake/Output Summary (Last 24 hours) at 06/26/17 1711  Last data filed at 06/26/17 1454   Gross per 24 hour   Intake             1930 ml   Output             1465 ml   Net              465 ml      Physical Exam   Constitutional: He is oriented to person, place, and time. He appears well-developed and well-nourished.   HENT:   Head: Normocephalic and atraumatic.   Eyes: Conjunctivae are normal. Pupils are equal, round, and reactive to light.   Neck: Normal range of motion. No thyromegaly present.   Cardiovascular: Normal rate, regular rhythm and normal heart sounds.    Pulmonary/Chest: Effort normal and breath sounds normal.   Abdominal: Soft. He exhibits no distension. There is  no tenderness.   Musculoskeletal: He exhibits edema.   B/L extremity erythema    Neurological: He is alert and oriented to person, place, and time. Coordination normal.   Skin: No rash noted. No erythema.       Significant Labs:   CBC:   Recent Labs  Lab 06/25/17 0433 06/26/17 0447   WBC 3.52* 3.41*   HGB 7.5* 7.4*   HCT 22.5* 22.7*   PLT 61* 65*     CMP:   Recent Labs  Lab 06/25/17 0433 06/26/17 0447    135*   K 4.3 4.4    105   CO2 20* 21*   * 165*   BUN 30* 32*   CREATININE 1.3 1.4   CALCIUM 9.6 9.4   PROT 7.1 7.4   ALBUMIN 4.1 4.5   BILITOT 2.6* 2.3*   ALKPHOS 85 90   AST 42* 39   ALT 11 11   ANIONGAP 10 9   EGFRNONAA >60.0 >60.0       Significant Imaging: I have reviewed all pertinent imaging results/findings within the past 24 hours.

## 2017-06-26 NOTE — PLAN OF CARE
[facility] denied LTAC referral (please see below). [facility] will come conduct face to face assessments and them make decision on acceptance.       · 6/26/2017 12:32:25 PM Declined: Cannot Meet Needs  Reginald [email or web address]  · 6/26/2017 12:32:16 PM Note: Ashok Livingston I'm sorry but we will have to decline the Albumin Human is very costly $412.41 per day, if physician can d/c or change albumin to alternate medication then we can reconsider. Thanks for the referral!  Reginald [email or web address]  · 6/26/2017 11:42:42 AM Note: He has Medicaid insurance, I will attach his face sheet with the information  MEHID SINGH  · 6/26/2017 11:24:28 AM Under Review: Onsite Review: Reviewing information, but I need insurance information. Jessie Hawkins LPN/Admission [phone]  Reginald [email or web address]  · 6/25/2017 3:41:36 PM New: Seeking LTAC placement for discharge on Tuesday 6/27/2017 Mehdi Singh LCSW [phone]

## 2017-06-26 NOTE — PLAN OF CARE
Problem: Physical Therapy Goal  Goal: Physical Therapy Goal  Goals to be met by: 17     Patient will increase functional independence with mobility by performin. Supine to sit with Stand-by Assistance - not met  2. Sit to supine with Stand-by Assistance - not met  3. Sit to stand transfer with Stand-by Assistance - not met  4. Bed to chair transfer with Stand-by Assistance using LRAD - not met  5. Gait  x 100 feet with Contact Guard Assistance using LRAD - Met   Update: Gait x 200ft Christal with rolling walker - not met  6. Ascend/descend 5 stair with right Handrails Moderate Assistance - not met  7. Lower extremity exercise program x 15 reps per handout, with independence - not met      Outcome: Ongoing (interventions implemented as appropriate)  Goals assessed and updated to reflect progress. Goals appropriate to improve functional mobility.    Roger Sahu III, DPT  2017

## 2017-06-26 NOTE — PROGRESS NOTES
[facility] Hospital Medicine  Progress Note    Patient Name: Leif Nino  MRN: [id number]  Patient Class: IP- Inpatient   Admission Date: 6/16/2017  Length of Stay: 10 days  Attending Physician: Hema Ferrara MD  Primary Care Provider: Primary Doctor [facility] Medicine Team: [facility] A Hema Ferrara MD    Subjective:     Principal Problem:Bilateral cellulitis of lower leg    HPI:  Mr. Nino is a 45 yo WM w/ h/o HCV cirrhosis & poorly controlled DM complicated by BL diabetic ulceration s/p 6/1 R 4th toe debridement who presents c/o generalized weakness w/ associated worsening BL LE edema.  He reports that 5d ago he began having dysuria.  Two day afterwards he woke up noticing his chronic BL LE edema was significantly worse with an associated erythematous, painful rash.  Later that day he began developing dyspnea on exertion that progressively worsened to the point where he is now dyspneic at rest.  Patient denies any vomiting but has mild, persistant nausea that has worsened acutely over the last 3 days.  He reports no fevers, chills, abdominal pain, worsening abdominal distention, diarrhea.  Patient states he has had no productive cough or wheezes.    Hospital Course:  No notes on file    Interval History: Patient seen and examined, complains of abdominal pain and tenderness, IR paracentesis but aborted due to low ascitic fluid .     Review of Systems   Constitutional: Negative for activity change and appetite change.   HENT: Negative for drooling and facial swelling.    Eyes: Negative for discharge and itching.   Respiratory: Negative for cough, shortness of breath and wheezing.    Cardiovascular: Negative for chest pain and palpitations.   Gastrointestinal: Negative for abdominal pain and nausea.   Genitourinary: Negative for difficulty urinating and dysuria.   Skin: Negative for color change and pallor.   Neurological: Negative for dizziness and numbness.    Psychiatric/Behavioral: Negative for behavioral problems and confusion.   All other systems reviewed and are negative.    Objective:     Vital Signs (Most Recent):  Temp: 98.7 °F (37.1 °C) (06/26/17 1624)  Pulse: 82 (06/26/17 1624)  Resp: 20 (06/26/17 1624)  BP: (!) 180/93 (06/26/17 1624)  SpO2: 95 % (06/26/17 1624) Vital Signs (24h Range):  Temp:  [97.6 °F (36.4 °C)-98.7 °F (37.1 °C)] 98.7 °F (37.1 °C)  Pulse:  [71-88] 82  Resp:  [18-24] 20  SpO2:  [92 %-97 %] 95 %  BP: (150-198)/() 180/93     Weight: 126.8 kg (279 lb 8.7 oz)  Body mass index is 35.89 kg/m².    Intake/Output Summary (Last 24 hours) at 06/26/17 1711  Last data filed at 06/26/17 1454   Gross per 24 hour   Intake             1930 ml   Output             1465 ml   Net              465 ml      Physical Exam   Constitutional: He is oriented to person, place, and time. He appears well-developed and well-nourished.   HENT:   Head: Normocephalic and atraumatic.   Eyes: Conjunctivae are normal. Pupils are equal, round, and reactive to light.   Neck: Normal range of motion. No thyromegaly present.   Cardiovascular: Normal rate, regular rhythm and normal heart sounds.    Pulmonary/Chest: Effort normal and breath sounds normal.   Abdominal: Soft. He exhibits no distension. There is no tenderness.   Musculoskeletal: He exhibits edema.   B/L extremity erythema    Neurological: He is alert and oriented to person, place, and time. Coordination normal.   Skin: No rash noted. No erythema.       Significant Labs:   CBC:   Recent Labs  Lab 06/25/17 0433 06/26/17 0447   WBC 3.52* 3.41*   HGB 7.5* 7.4*   HCT 22.5* 22.7*   PLT 61* 65*     CMP:   Recent Labs  Lab 06/25/17 0433 06/26/17 0447    135*   K 4.3 4.4    105   CO2 20* 21*   * 165*   BUN 30* 32*   CREATININE 1.3 1.4   CALCIUM 9.6 9.4   PROT 7.1 7.4   ALBUMIN 4.1 4.5   BILITOT 2.6* 2.3*   ALKPHOS 85 90   AST 42* 39   ALT 11 11   ANIONGAP 10 9   EGFRNONAA >60.0 >60.0       Significant  Imaging: I have reviewed all pertinent imaging results/findings within the past 24 hours.    Assessment/Plan:   #Bilateral lower extremity cellulitis with venous stasis due to streptococcus   #4th toe osteomyelitis  - s/p bone biopsy on 06/24  - restarted on IV Ceftriaxone and Vancomycin by ID  - will probably need 6 weeks of antibiotics through PICC Line   - follow up Bone Biopsy results. Preliminary results show Staph Aureus      # Diabetic type 2 foot ulcers B/L  - podiatry consult, appreciate recs  - X rays B/ L feet: no osteomyelitis     # Decompensated Hep C cirrhosis with ascites  MELD-Na score: 19 at 6/26/2017  4:47 AM  MELD score: 18 at 6/26/2017  4:47 AM  Calculated from:  Serum Creatinine: 1.4 mg/dL at 6/26/2017  4:47 AM  Serum Sodium: 135 mmol/L at 6/26/2017  4:47 AM  Total Bilirubin: 2.3 mg/dL at 6/26/2017  4:47 AM  INR(ratio): 1.6 at 6/26/2017  4:47 AM  Age: 46 years   - hepatology consult  - last viral load 21,000 and has never been treated  - will set up for outpatient follow up     # CKD stage 3 due to uncontrolled DM 2  - seems to be new baseline at 1.5     # DM2 with nephropathy  - Hba1c of 8.2 recently  - levemir 20 units at night and 8 of novolog with meals     # Anemia of CKD stage 3  - stable at 1.4        # Hypoalbuminemia due to moderate protein fernando malnutrition  - PAB 3, glucerna     # Hypomagnesemia   - replaced, resolved    VTE Risk Mitigation         Ordered     Place BASILIO hose  Until discontinued      06/24/17 1443     Low Risk of VTE  Once      06/16/17 2243          Hema Ferrara MD  Department of Hospital Medicine   [facility]

## 2017-06-26 NOTE — PROGRESS NOTES
"[facility]  Infectious Disease  Progress Note    Patient Name: Leif Nino  MRN: [id number]  Admission Date: 6/16/2017  Length of Stay: 10 days  Attending Physician: Hema Ferrara MD  Primary Care Provider: Primary Doctor No    Isolation Status: No active isolations  Assessment/Plan:      Osteomyelitis of right foot    46 year old male with HCV cirrhosis, poorly controlled DM, bilateral diabetic foot ulcerations now complicated by right 4th toe osteomyelitis s/p bone biopsy 6/24.     - started on empiric vancomycin and ceftriaxone    - Bone cultures are growing staph aureus, susceptibilities and pathology pending   - Remains afebrile. Pancytopenic. Clinically stable.     Plan  - Continue IV Ceftriaxone and Vancomycin for now  - Vanc trough due tonight.  - Will tailor antibiotics according to culture data   - Anticipate 6 weeks of therapy for osteomyelitis  - ID will follow.             Please call for any questions. Thank you.  Matilda Atkinson PA-C  Phone: [phone]  Pager: [phone]    Subjective:     Principal Problem:Bilateral cellulitis of lower leg    HPI: This is a 46 year old male with PMH of HCV cirrhosis (untreated), DM, and bilateral diabetic foot ulcers who presented with weakness and bilateral lower extremity edema. Patient has had these ulcers for the past several months and has seen podiatry for debridement. Patient has had normal ABIs and evaluation with vascular surgery. MRI was obtained which shows possible osteomyelitis of the 4th toe. Patient has been on vancomycin and unasyn x 7 days and we are now consulted for antibiotic recs. He is afebrile and without leukocytosis. He complains of bilateral leg pain and abdominal pain on exam.   Interval History: Patient seen at bedside. Complaining of "needle like pain" to feet (R>L) along with stomach pain. Denies fevers, chills, sweats. Drainage noted to right dressing. Bone culture is growing staph aureus.     Review of Systems "   Constitutional: Negative for chills, diaphoresis and fever.   Respiratory: Negative for cough and shortness of breath.    Cardiovascular: Positive for leg swelling. Negative for chest pain.   Gastrointestinal: Positive for abdominal pain. Negative for constipation, diarrhea, nausea and vomiting.   Genitourinary: Negative for dysuria, frequency and hematuria.   Musculoskeletal: Positive for arthralgias. Negative for back pain and myalgias.        + lower extremity pain   Skin: Positive for wound. Negative for color change and rash.   Neurological: Positive for weakness. Negative for dizziness, numbness and headaches.   Psychiatric/Behavioral: Negative for agitation.     Objective:     Vital Signs (Most Recent):  Temp: 97.6 °F (36.4 °C) (06/26/17 1127)  Pulse: 75 (06/26/17 1231)  Resp: 20 (06/26/17 1231)  BP: (!) 180/102 (06/26/17 1231)  SpO2: 97 % (06/26/17 1231) Vital Signs (24h Range):  Temp:  [97.4 °F (36.3 °C)-98.3 °F (36.8 °C)] 97.6 °F (36.4 °C)  Pulse:  [71-88] 75  Resp:  [18-24] 20  SpO2:  [92 %-97 %] 97 %  BP: (150-198)/() 180/102     Weight: 126.8 kg (279 lb 8.7 oz)  Body mass index is 35.89 kg/m².    Estimated Creatinine Clearance: 95.2 mL/min (based on Cr of 1.4).    Physical Exam   Constitutional: He is oriented to person, place, and time. He appears well-developed and well-nourished. He appears distressed (2/2 pain).   Cardiovascular: Normal rate and regular rhythm.    No murmur heard.  Pulmonary/Chest: Effort normal and breath sounds normal. No respiratory distress.   Abdominal: Soft. He exhibits no distension. There is tenderness (diffuse).   Musculoskeletal: Normal range of motion. He exhibits edema (BLE).   Neurological: He is alert and oriented to person, place, and time.   Skin: Skin is warm and dry. Rash (erythematous non pruritic rash to lower abdomen) noted.   Dressings to bilateral feet with scant drainage. Venous changes noted to bilateral lower extremities; no erythema.     Psychiatric: He has a normal mood and affect. His behavior is normal.       Significant Labs:   Blood Culture:     Recent Labs  Lab 06/01/17  1734 06/01/17  1735 06/25/17  1249   LABBLOO No growth after 5 days. No growth after 5 days. No Growth to date     CBC:     Recent Labs  Lab 06/25/17  0433 06/26/17  0447   WBC 3.52* 3.41*   HGB 7.5* 7.4*   HCT 22.5* 22.7*   PLT 61* 65*     CMP:     Recent Labs  Lab 06/25/17  0433 06/26/17  0447    135*   K 4.3 4.4    105   CO2 20* 21*   * 165*   BUN 30* 32*   CREATININE 1.3 1.4   CALCIUM 9.6 9.4   PROT 7.1 7.4   ALBUMIN 4.1 4.5   BILITOT 2.6* 2.3*   ALKPHOS 85 90   AST 42* 39   ALT 11 11   ANIONGAP 10 9   EGFRNONAA >60.0 >60.0       Significant Imaging: I have reviewed all pertinent imaging results/findings within the past 24 hours.

## 2017-06-26 NOTE — PROGRESS NOTES
06/26/17 0756   Vital Signs   Temp 98 °F (36.7 °C)   Temp src Oral   Pulse 85   Heart Rate Source SpO2   Resp (!) 22   SpO2 (!) 92 %   Pulse Oximetry Type Intermittent   O2 Device (Oxygen Therapy) room air   BP (!) 198/107   BP Location Left arm   BP Method Automatic   Patient Position Sitting   Assessments (Pre/Post)   Level of Consciousness (AVPU) alert   Dr. Dutton has been notified, schedule bp med and pain med administered. Will follow up with the bp.

## 2017-06-26 NOTE — ASSESSMENT & PLAN NOTE
46 year old male with HCV cirrhosis, poorly controlled DM, bilateral diabetic foot ulcerations now complicated by right 4th toe osteomyelitis s/p bone biopsy 6/24.     - started on empiric vancomycin and ceftriaxone    - Bone cultures are growing staph aureus, susceptibilities and pathology pending   - Remains afebrile. Pancytopenic. Clinically stable.     Plan  - Continue IV Ceftriaxone and Vancomycin for now  - Vanc trough due tonight.  - Will tailor antibiotics according to culture data   - Anticipate 6 weeks of therapy for osteomyelitis  - ID will follow.

## 2017-06-26 NOTE — PROGRESS NOTES
Unable to perform paracentesis due to insufficient amount of fluid found on ultrasound, per JEWELL Newman NP. Floor nurse given report. Patient transferred to inpatient lobby via stretcher awaiting transport back to room.

## 2017-06-26 NOTE — SUBJECTIVE & OBJECTIVE
"Interval History: Patient seen at bedside. Complaining of "needle like pain" to feet (R>L) along with stomach pain. Denies fevers, chills, sweats. Drainage noted to right dressing. Bone culture is growing staph aureus.     Review of Systems   Constitutional: Negative for chills, diaphoresis and fever.   Respiratory: Negative for cough and shortness of breath.    Cardiovascular: Positive for leg swelling. Negative for chest pain.   Gastrointestinal: Positive for abdominal pain. Negative for constipation, diarrhea, nausea and vomiting.   Genitourinary: Negative for dysuria, frequency and hematuria.   Musculoskeletal: Positive for arthralgias. Negative for back pain and myalgias.        + lower extremity pain   Skin: Positive for wound. Negative for color change and rash.   Neurological: Positive for weakness. Negative for dizziness, numbness and headaches.   Psychiatric/Behavioral: Negative for agitation.     Objective:     Vital Signs (Most Recent):  Temp: 97.6 °F (36.4 °C) (06/26/17 1127)  Pulse: 75 (06/26/17 1231)  Resp: 20 (06/26/17 1231)  BP: (!) 180/102 (06/26/17 1231)  SpO2: 97 % (06/26/17 1231) Vital Signs (24h Range):  Temp:  [97.4 °F (36.3 °C)-98.3 °F (36.8 °C)] 97.6 °F (36.4 °C)  Pulse:  [71-88] 75  Resp:  [18-24] 20  SpO2:  [92 %-97 %] 97 %  BP: (150-198)/() 180/102     Weight: 126.8 kg (279 lb 8.7 oz)  Body mass index is 35.89 kg/m².    Estimated Creatinine Clearance: 95.2 mL/min (based on Cr of 1.4).    Physical Exam   Constitutional: He is oriented to person, place, and time. He appears well-developed and well-nourished. He appears distressed (2/2 pain).   Cardiovascular: Normal rate and regular rhythm.    No murmur heard.  Pulmonary/Chest: Effort normal and breath sounds normal. No respiratory distress.   Abdominal: Soft. He exhibits no distension. There is tenderness (diffuse).   Musculoskeletal: Normal range of motion. He exhibits edema (BLE).   Neurological: He is alert and oriented to person, " place, and time.   Skin: Skin is warm and dry. Rash (erythematous non pruritic rash to lower abdomen) noted.   Dressings to bilateral feet with scant drainage. Venous changes noted to bilateral lower extremities; no erythema.    Psychiatric: He has a normal mood and affect. His behavior is normal.       Significant Labs:   Blood Culture:     Recent Labs  Lab 06/01/17  1734 06/01/17  1735 06/25/17  1249   LABBLOO No growth after 5 days. No growth after 5 days. No Growth to date     CBC:     Recent Labs  Lab 06/25/17  0433 06/26/17  0447   WBC 3.52* 3.41*   HGB 7.5* 7.4*   HCT 22.5* 22.7*   PLT 61* 65*     CMP:     Recent Labs  Lab 06/25/17  0433 06/26/17  0447    135*   K 4.3 4.4    105   CO2 20* 21*   * 165*   BUN 30* 32*   CREATININE 1.3 1.4   CALCIUM 9.6 9.4   PROT 7.1 7.4   ALBUMIN 4.1 4.5   BILITOT 2.6* 2.3*   ALKPHOS 85 90   AST 42* 39   ALT 11 11   ANIONGAP 10 9   EGFRNONAA >60.0 >60.0       Significant Imaging: I have reviewed all pertinent imaging results/findings within the past 24 hours.

## 2017-06-26 NOTE — PROGRESS NOTES
Limited ultrasound of the abdomen done. No ascites noted for a safe diagnostic or therapeutic paracentesis today.

## 2017-06-26 NOTE — PROGRESS NOTES
Spoke with [facility] regarding pts increase in respiratory rate and SOB.  Pt breathing in mid 20's while lying in bed; will sit on side of bed to breath easier. Breathing tx given earlier in shift by respiratory. Pt stated it did not help as long as usual. SPO2 mid to high 90's on 2L NC. New orders placed. WCTM.

## 2017-06-26 NOTE — PLAN OF CARE
Problem: Diabetes, Type 2 (Adult)  Intervention: Support/Optimize Psychosocial Response to Condition   06/26/17 0756   Coping/Psychosocial Interventions   Supportive Measures active listening utilized;decision-making supported;positive reinforcement provided;problem solving facilitated;relaxation techniques promoted;self-responsibility promoted;self-care encouraged     Intervention: Optimize Glycemic Control   06/26/17 1846   Nutrition Interventions   Glycemic Management blood glucose monitoring;supplemental insulin given       Goal: Signs and Symptoms of Listed Potential Problems Will be Absent, Minimized or Managed (Diabetes, Type 2)  Signs and symptoms of listed potential problems will be absent, minimized or managed by discharge/transition of care (reference Diabetes, Type 2 (Adult) CPG).    06/26/17 1846   Diabetes, Type 2   Problems Assessed (Type 2 Diabetes) hyperglycemia;hypoglycemia       Problem: Patient Care Overview  Goal: Plan of Care Review   06/26/17 1846   Coping/Psychosocial   Plan Of Care Reviewed With Patient; Safety: call light in reach, patient oriented to room & instructed how to notify nurse if assistance is needed, current questions/concerns addressed, bed in lowest position with wheels locked & side rails up X 3. Pt and family were educated regarding fall precaution and taking appropriate action.  Activity: is up with 1 assist.  Neurological: Oriented x4 Respiratory: On 2L NC, tachypnic,  Denies sob. O2 sat WNL. RT tx prn, due to wheezing.  Cardiac: BP stable. HR stable. Afebrile this shift. Intake/Output: No problem urinating. No bm today, gaver prn stool softener. Diet intake adequate. Pain: controlled with prn medication Skin: b/l LE wound, dressing changed today. Plan: PICC placed for long term abx and CT of abd to be done. Pt to remain NPO past midnight for the abd CT. Patient was educated with plan of care. All questions and concerns were addressed. Will continue to monitor.           Problem: Fall Risk (Adult)  Intervention: Reduce Risk/Promote Restraint Free Environment   17   Safety Interventions   Environmental Safety Modification assistive device/personal items within reach;clutter free environment maintained   Safety Interventions   Safety Precautions emergency equipment at bedside   Prevent  Drop/Fall   Safety/Security Measures bed alarm set     Intervention: Patient Rounds   17 1600   Safety Interventions   Patient Rounds bed in low position;bed wheels locked;call light in reach;clutter free environment maintained;ID band on;placement of personal items at bedside     Intervention: Safety Promotion/Fall Prevention   17 1600   Safety Interventions   Safety Promotion/Fall Prevention assistive device/personal item within reach;commode/urinal/bedpan at bedside;diversional activities provided;Fall Risk reviewed with patient/family;medications reviewed;muscle strengthening facilitated;high risk medications identified;/camera at bedside       Goal: Identify Related Risk Factors and Signs and Symptoms  Related risk factors and signs and symptoms are identified upon initiation of Human Response Clinical Practice Guideline (CPG)   Outcome: Ongoing (interventions implemented as appropriate)   17   Fall Risk   Related Risk Factors (Fall Risk) depression/anxiety;fatigue/slow reaction;gait/mobility problems;sensory deficits     Goal: Absence of Falls  Patient will demonstrate the desired outcomes by discharge/transition of care.   Outcome: Ongoing (interventions implemented as appropriate)   17   Fall Risk (Adult)   Absence of Falls making progress toward outcome

## 2017-06-27 ENCOUNTER — ANESTHESIA EVENT (OUTPATIENT)
Dept: ENDOSCOPY | Facility: HOSPITAL | Age: 47
DRG: 580 | End: 2017-06-27
Payer: MEDICAID

## 2017-06-27 PROBLEM — K80.70 CHOLELITHIASIS WITH CHOLEDOCHOLITHIASIS: Status: ACTIVE | Noted: 2017-06-27

## 2017-06-27 LAB
ALBUMIN SERPL BCP-MCNC: 4.4 G/DL
ALP SERPL-CCNC: 83 U/L
ALT SERPL W/O P-5'-P-CCNC: 12 U/L
ANION GAP SERPL CALC-SCNC: 9 MMOL/L
AST SERPL-CCNC: 40 U/L
BACTERIA SPEC AEROBE CULT: NORMAL
BASOPHILS # BLD AUTO: 0.04 K/UL
BASOPHILS NFR BLD: 1.2 %
BILIRUB SERPL-MCNC: 2.3 MG/DL
BLD PROD TYP BPU: NORMAL
BLOOD UNIT EXPIRATION DATE: NORMAL
BLOOD UNIT TYPE CODE: 600
BLOOD UNIT TYPE: NORMAL
BUN SERPL-MCNC: 36 MG/DL
CALCIUM SERPL-MCNC: 9 MG/DL
CHLORIDE SERPL-SCNC: 104 MMOL/L
CO2 SERPL-SCNC: 21 MMOL/L
CODING SYSTEM: NORMAL
CREAT SERPL-MCNC: 1.4 MG/DL
DIFFERENTIAL METHOD: ABNORMAL
DISPENSE STATUS: NORMAL
EOSINOPHIL # BLD AUTO: 0.1 K/UL
EOSINOPHIL NFR BLD: 4.3 %
ERYTHROCYTE [DISTWIDTH] IN BLOOD BY AUTOMATED COUNT: 18 %
EST. GFR  (AFRICAN AMERICAN): >60 ML/MIN/1.73 M^2
EST. GFR  (NON AFRICAN AMERICAN): >60 ML/MIN/1.73 M^2
GLUCOSE SERPL-MCNC: 162 MG/DL
HCT VFR BLD AUTO: 21.4 %
HGB BLD-MCNC: 7.1 G/DL
INR PPP: 1.6
LYMPHOCYTES # BLD AUTO: 0.5 K/UL
LYMPHOCYTES NFR BLD: 15.1 %
MAGNESIUM SERPL-MCNC: 1.8 MG/DL
MCH RBC QN AUTO: 29 PG
MCHC RBC AUTO-ENTMCNC: 33.2 %
MCV RBC AUTO: 87 FL
MONOCYTES # BLD AUTO: 0.3 K/UL
MONOCYTES NFR BLD: 9.8 %
NEUTROPHILS # BLD AUTO: 2.3 K/UL
NEUTROPHILS NFR BLD: 69.3 %
NUM UNITS TRANS FFP: NORMAL
PHOSPHATE SERPL-MCNC: 3.8 MG/DL
PLATELET # BLD AUTO: 55 K/UL
PMV BLD AUTO: 9.5 FL
POCT GLUCOSE: 122 MG/DL (ref 70–110)
POCT GLUCOSE: 152 MG/DL (ref 70–110)
POCT GLUCOSE: 156 MG/DL (ref 70–110)
POCT GLUCOSE: 161 MG/DL (ref 70–110)
POCT GLUCOSE: 96 MG/DL (ref 70–110)
POTASSIUM SERPL-SCNC: 4.3 MMOL/L
PROT SERPL-MCNC: 7.2 G/DL
PROTHROMBIN TIME: 16.4 SEC
RBC # BLD AUTO: 2.45 M/UL
SODIUM SERPL-SCNC: 134 MMOL/L
VANCOMYCIN TROUGH SERPL-MCNC: 29 UG/ML
WBC # BLD AUTO: 3.25 K/UL

## 2017-06-27 PROCEDURE — 80053 COMPREHEN METABOLIC PANEL: CPT

## 2017-06-27 PROCEDURE — 36569 INSJ PICC 5 YR+ W/O IMAGING: CPT

## 2017-06-27 PROCEDURE — 36415 COLL VENOUS BLD VENIPUNCTURE: CPT

## 2017-06-27 PROCEDURE — 85610 PROTHROMBIN TIME: CPT

## 2017-06-27 PROCEDURE — 25000003 PHARM REV CODE 250: Performed by: PHYSICIAN ASSISTANT

## 2017-06-27 PROCEDURE — 83735 ASSAY OF MAGNESIUM: CPT

## 2017-06-27 PROCEDURE — 02H633Z INSERTION OF INFUSION DEVICE INTO RIGHT ATRIUM, PERCUTANEOUS APPROACH: ICD-10-PCS | Performed by: HOSPITALIST

## 2017-06-27 PROCEDURE — 99233 SBSQ HOSP IP/OBS HIGH 50: CPT | Mod: ,,, | Performed by: HOSPITALIST

## 2017-06-27 PROCEDURE — 63600175 PHARM REV CODE 636 W HCPCS: Performed by: HOSPITALIST

## 2017-06-27 PROCEDURE — P9017 PLASMA 1 DONOR FRZ W/IN 8 HR: HCPCS

## 2017-06-27 PROCEDURE — C1751 CATH, INF, PER/CENT/MIDLINE: HCPCS

## 2017-06-27 PROCEDURE — 20600001 HC STEP DOWN PRIVATE ROOM

## 2017-06-27 PROCEDURE — 99233 SBSQ HOSP IP/OBS HIGH 50: CPT | Mod: ,,, | Performed by: PHYSICIAN ASSISTANT

## 2017-06-27 PROCEDURE — 25000003 PHARM REV CODE 250: Performed by: HOSPITALIST

## 2017-06-27 PROCEDURE — 63600175 PHARM REV CODE 636 W HCPCS: Performed by: PHYSICIAN ASSISTANT

## 2017-06-27 PROCEDURE — 80202 ASSAY OF VANCOMYCIN: CPT

## 2017-06-27 PROCEDURE — 25000003 PHARM REV CODE 250: Performed by: PODIATRIST

## 2017-06-27 PROCEDURE — 85025 COMPLETE CBC W/AUTO DIFF WBC: CPT

## 2017-06-27 PROCEDURE — P9047 ALBUMIN (HUMAN), 25%, 50ML: HCPCS | Performed by: HOSPITALIST

## 2017-06-27 PROCEDURE — 84100 ASSAY OF PHOSPHORUS: CPT

## 2017-06-27 PROCEDURE — 76937 US GUIDE VASCULAR ACCESS: CPT

## 2017-06-27 RX ORDER — SODIUM CHLORIDE 0.9 % (FLUSH) 0.9 %
10 SYRINGE (ML) INJECTION EVERY 6 HOURS
Status: DISCONTINUED | OUTPATIENT
Start: 2017-06-27 | End: 2017-07-03

## 2017-06-27 RX ORDER — SODIUM CHLORIDE 0.9 % (FLUSH) 0.9 %
10 SYRINGE (ML) INJECTION
Status: DISCONTINUED | OUTPATIENT
Start: 2017-06-27 | End: 2017-07-07 | Stop reason: HOSPADM

## 2017-06-27 RX ORDER — METRONIDAZOLE 500 MG/100ML
500 INJECTION, SOLUTION INTRAVENOUS
Status: DISCONTINUED | OUTPATIENT
Start: 2017-06-27 | End: 2017-06-30

## 2017-06-27 RX ADMIN — HYDROMORPHONE HYDROCHLORIDE 1 MG: 1 INJECTION, SOLUTION INTRAMUSCULAR; INTRAVENOUS; SUBCUTANEOUS at 12:06

## 2017-06-27 RX ADMIN — Medication 10 ML: at 06:06

## 2017-06-27 RX ADMIN — ALBUMIN (HUMAN) 25 G: 12.5 SOLUTION INTRAVENOUS at 05:06

## 2017-06-27 RX ADMIN — OXYCODONE HYDROCHLORIDE 15 MG: 5 TABLET ORAL at 08:06

## 2017-06-27 RX ADMIN — INSULIN ASPART 1 UNITS: 100 INJECTION, SOLUTION INTRAVENOUS; SUBCUTANEOUS at 08:06

## 2017-06-27 RX ADMIN — HYDROMORPHONE HYDROCHLORIDE 1 MG: 1 INJECTION, SOLUTION INTRAMUSCULAR; INTRAVENOUS; SUBCUTANEOUS at 10:06

## 2017-06-27 RX ADMIN — METRONIDAZOLE 500 MG: 500 INJECTION, SOLUTION INTRAVENOUS at 01:06

## 2017-06-27 RX ADMIN — ALBUMIN (HUMAN) 25 G: 12.5 SOLUTION INTRAVENOUS at 11:06

## 2017-06-27 RX ADMIN — VANCOMYCIN HYDROCHLORIDE 1750 MG: 100 INJECTION, POWDER, LYOPHILIZED, FOR SOLUTION INTRAVENOUS at 12:06

## 2017-06-27 RX ADMIN — Medication 40 G: at 03:06

## 2017-06-27 RX ADMIN — METRONIDAZOLE 500 MG: 500 INJECTION, SOLUTION INTRAVENOUS at 03:06

## 2017-06-27 RX ADMIN — PHYTONADIONE 10 MG: 10 INJECTION, EMULSION INTRAMUSCULAR; INTRAVENOUS; SUBCUTANEOUS at 12:06

## 2017-06-27 RX ADMIN — STANDARDIZED SENNA CONCENTRATE AND DOCUSATE SODIUM 1 TABLET: 8.6; 5 TABLET, FILM COATED ORAL at 08:06

## 2017-06-27 RX ADMIN — DICYCLOMINE HYDROCHLORIDE 20 MG: 20 TABLET ORAL at 05:06

## 2017-06-27 RX ADMIN — DICYCLOMINE HYDROCHLORIDE 20 MG: 20 TABLET ORAL at 08:06

## 2017-06-27 RX ADMIN — INSULIN ASPART 8 UNITS: 100 INJECTION, SOLUTION INTRAVENOUS; SUBCUTANEOUS at 06:06

## 2017-06-27 RX ADMIN — ATORVASTATIN CALCIUM 40 MG: 20 TABLET, FILM COATED ORAL at 08:06

## 2017-06-27 RX ADMIN — INSULIN ASPART 8 UNITS: 100 INJECTION, SOLUTION INTRAVENOUS; SUBCUTANEOUS at 08:06

## 2017-06-27 RX ADMIN — ALBUMIN (HUMAN) 25 G: 12.5 SOLUTION INTRAVENOUS at 10:06

## 2017-06-27 RX ADMIN — CARVEDILOL 12.5 MG: 12.5 TABLET, FILM COATED ORAL at 08:06

## 2017-06-27 RX ADMIN — QUETIAPINE FUMARATE 100 MG: 25 TABLET ORAL at 08:06

## 2017-06-27 RX ADMIN — INSULIN DETEMIR 10 UNITS: 100 INJECTION, SOLUTION SUBCUTANEOUS at 08:06

## 2017-06-27 RX ADMIN — CARVEDILOL 12.5 MG: 12.5 TABLET, FILM COATED ORAL at 05:06

## 2017-06-27 RX ADMIN — HYDROMORPHONE HYDROCHLORIDE 1 MG: 1 INJECTION, SOLUTION INTRAMUSCULAR; INTRAVENOUS; SUBCUTANEOUS at 02:06

## 2017-06-27 RX ADMIN — INSULIN ASPART 8 UNITS: 100 INJECTION, SOLUTION INTRAVENOUS; SUBCUTANEOUS at 12:06

## 2017-06-27 RX ADMIN — ASPIRIN 81 MG: 81 TABLET, COATED ORAL at 08:06

## 2017-06-27 RX ADMIN — HYDROMORPHONE HYDROCHLORIDE 1 MG: 1 INJECTION, SOLUTION INTRAMUSCULAR; INTRAVENOUS; SUBCUTANEOUS at 05:06

## 2017-06-27 RX ADMIN — METRONIDAZOLE 500 MG: 500 INJECTION, SOLUTION INTRAVENOUS at 08:06

## 2017-06-27 RX ADMIN — DICYCLOMINE HYDROCHLORIDE 20 MG: 20 TABLET ORAL at 01:06

## 2017-06-27 RX ADMIN — HYDROMORPHONE HYDROCHLORIDE 1 MG: 1 INJECTION, SOLUTION INTRAMUSCULAR; INTRAVENOUS; SUBCUTANEOUS at 06:06

## 2017-06-27 RX ADMIN — INSULIN ASPART 2 UNITS: 100 INJECTION, SOLUTION INTRAVENOUS; SUBCUTANEOUS at 08:06

## 2017-06-27 RX ADMIN — DEXTROSE 6 G: 5 SOLUTION INTRAVENOUS at 10:06

## 2017-06-27 NOTE — CONSULTS
Double lumen PICC placed in right basilic vein of RUE,44cm in length with 0cm exposed and 30cm arm circumference. Lot#[id number]

## 2017-06-27 NOTE — SUBJECTIVE & OBJECTIVE
Past Medical History:   Diagnosis Date    Ascites of liver 5/30/2017    Chronic hepatitis C without hepatic coma     Chronic liver failure without hepatic coma 5/30/2017    Diabetic foot ulcer     right foot    DM (diabetes mellitus), type 2 with peripheral vascular complications     Essential hypertension     Gallstones     Hepatic encephalopathy 5/30/2017    Hepatitis C     Pancreatitis        Past Surgical History:   Procedure Laterality Date    BACK SURGERY         Review of patient's allergies indicates:   Allergen Reactions    Shellfish containing products Hives     Shortness of breath     Family History     None        Social History Main Topics    Smoking status: Current Every Day Smoker     Types: Cigarettes    Smokeless tobacco: Not on file    Alcohol use No    Drug use: No    Sexual activity: Not on file     Review of Systems   Constitutional: Negative for chills and fever.   HENT: Negative for trouble swallowing.    Respiratory: Positive for shortness of breath. Negative for cough.    Cardiovascular: Negative for chest pain and palpitations.   Gastrointestinal: Positive for abdominal distention, abdominal pain and nausea. Negative for blood in stool, constipation, diarrhea and vomiting.   Musculoskeletal: Positive for back pain.   Skin: Negative for pallor and rash.   Hematological: Negative for adenopathy. Bruises/bleeds easily.   Psychiatric/Behavioral: Negative for agitation and behavioral problems.     Objective:     Vital Signs (Most Recent):  Temp: 98 °F (36.7 °C) (06/27/17 0730)  Pulse: 82 (06/27/17 0730)  Resp: 18 (06/27/17 0730)  BP: (!) 164/91 (06/27/17 0730)  SpO2: 97 % (06/27/17 0730) Vital Signs (24h Range):  Temp:  [97.6 °F (36.4 °C)-98.7 °F (37.1 °C)] 98 °F (36.7 °C)  Pulse:  [72-88] 82  Resp:  [17-21] 18  SpO2:  [91 %-100 %] 97 %  BP: (164-180)/() 164/91     Weight: 126.8 kg (279 lb 8.7 oz) (06/26/17 1000)  Body mass index is 35.89 kg/m².      Intake/Output Summary  (Last 24 hours) at 06/27/17 0958  Last data filed at 06/27/17 0800   Gross per 24 hour   Intake             3150 ml   Output             1000 ml   Net             2150 ml       Lines/Drains/Airways     Peripheral Intravenous Line                 Peripheral IV - Single Lumen 06/19/17 1830 Left 7 days                Physical Exam   Constitutional: He is oriented to person, place, and time. He appears well-developed and well-nourished.   HENT:   Head: Normocephalic and atraumatic.   Eyes: Conjunctivae are normal. No scleral icterus.   Neck: Normal range of motion. Neck supple.   Cardiovascular: Normal rate and regular rhythm.    No murmur heard.  Pulmonary/Chest: Effort normal and breath sounds normal. No respiratory distress.   Bibasilar crackles appreciated    Abdominal: Bowel sounds are normal. He exhibits distension and fluid wave. There is tenderness in the right upper quadrant. There is no rigidity, no rebound and no guarding.   Musculoskeletal: He exhibits no tenderness.   Neurological: He is alert and oriented to person, place, and time.   Skin: Skin is warm and dry.   Psychiatric: He has a normal mood and affect. His behavior is normal.       Significant Labs:  All pertinent lab results from the last 24 hours have been reviewed.    Significant Imaging:  Imaging results within the past 24 hours have been reviewed.

## 2017-06-27 NOTE — PLAN OF CARE
Problem: Patient Care Overview  Goal: Plan of Care Review  Outcome: Ongoing (interventions implemented as appropriate)  Pt aaox4 through shift, AVSS on 2L NC, up with standby assist to edge of bed when using urinal for pt safety. Pt compliant with calling prior to standing on own, avasys camera in room for extra precaution. Pt c/o abdominal pain through shift, prn dilaudid and oxycodone administered. PICC line placed today, continuous ancef infusing @ 20.8ml/hr. accuchecks achs maintained. Pt instructed on NPO order at midnight due to ERCP scheduled for AM. BLE foot dressings changed per orders. Night RN to administer FFPs and Plt's at midnight and 2am respectively, prior to 4am labs. Bed in lowest position, wheels locked, call light and personal belongings within reach. WCTM.

## 2017-06-27 NOTE — PROGRESS NOTES
Pt was SOB lying flat and could not hold his breath for his MRI scan. Incomplete scan due to pts condition.

## 2017-06-27 NOTE — PROGRESS NOTES
[facility]  Infectious Disease  Progress Note    Patient Name: Leif Nino  MRN: [id number]  Admission Date: 6/16/2017  Length of Stay: 11 days  Attending Physician: Hema Ferrara MD  Primary Care Provider: Primary Doctor No    Isolation Status: No active isolations  Assessment/Plan:      Osteomyelitis of right foot    46 year old male with HCV cirrhosis, poorly controlled DM, bilateral diabetic foot ulcerations now complicated by right 4th toe osteomyelitis s/p bone biopsy 6/24.     - On empiric vancomycin and ceftriaxone    - Bone cultures are growing MSSA. Pathology pending   - Remains afebrile. Pancytopenic. Clinically stable.    - Complaints of diffuse abdominal pain. CT revealed choledocholithiasis. GI following and plans for EUS tomorrow.     Plan  - Discontinue Vancomycin and Ceftriaxone  - Start Cefazolin 6 g IV continuous infusion q 24 hours   - Recommend treating osteomyelitis with 6 weeks of IV antibiotics (estimated end date 8/5/17)  - Patient will need ESR, CRP, CBC and CMP to be drawn weekly with results faxed to the ID Department at  [phone]  - Will schedule patient an ID follow up appt after discharged  - Patient will also need to see Hepatology as an outpatient for HCV management  - ID will sign off.            Please call for any questions. Feel free to re-consult ID as needed. Thank you.  Matilda Atkinson PA-C  Phone: [phone]  Pager: [phone]      Subjective:     Principal Problem:Bilateral cellulitis of lower leg    HPI: This is a 46 year old male with PMH of HCV cirrhosis (untreated), DM, and bilateral diabetic foot ulcers who presented with weakness and bilateral lower extremity edema. Patient has had these ulcers for the past several months and has seen podiatry for debridement. Patient has had normal ABIs and evaluation with vascular surgery. MRI was obtained which shows possible osteomyelitis of the 4th toe. Patient has been on vancomycin and unasyn x 7 days and  we are now consulted for antibiotic recs. He is afebrile and without leukocytosis. He complains of bilateral leg pain and abdominal pain on exam.   Interval History: Patient seen at bedside. Sitting up today. Still complains of pain to feet (R>L) along with diffuse stomach pain, unchanged. Denies fevers, chills, sweats. Right foot wound still with ample drainage. Bone culture is growing MSSA.    Review of Systems   Constitutional: Negative for chills, diaphoresis and fever.   Respiratory: Negative for cough and shortness of breath.    Cardiovascular: Positive for leg swelling. Negative for chest pain.   Gastrointestinal: Positive for abdominal distention and abdominal pain. Negative for diarrhea, nausea and vomiting.   Genitourinary: Negative for dysuria, frequency and hematuria.   Musculoskeletal: Positive for arthralgias. Negative for back pain and myalgias.        + lower extremity pain   Skin: Positive for wound. Negative for color change and rash.   Neurological: Positive for weakness. Negative for dizziness, numbness and headaches.   Psychiatric/Behavioral: Negative for agitation.     Objective:     Vital Signs (Most Recent):  Temp: 98 °F (36.7 °C) (06/27/17 0730)  Pulse: 82 (06/27/17 0730)  Resp: 18 (06/27/17 0730)  BP: (!) 164/91 (06/27/17 0730)  SpO2: 97 % (06/27/17 0730) Vital Signs (24h Range):  Temp:  [97.6 °F (36.4 °C)-98.7 °F (37.1 °C)] 98 °F (36.7 °C)  Pulse:  [72-88] 82  Resp:  [17-21] 18  SpO2:  [91 %-100 %] 97 %  BP: (164-180)/() 164/91     Weight: 126.8 kg (279 lb 8.7 oz)  Body mass index is 35.89 kg/m².    Estimated Creatinine Clearance: 95.2 mL/min (based on Cr of 1.4).    Physical Exam   Constitutional: He is oriented to person, place, and time. He appears well-developed and well-nourished. He appears distressed (2/2 pain).   Cardiovascular: Normal rate and regular rhythm.    No murmur heard.  Pulmonary/Chest: Effort normal and breath sounds normal. No respiratory distress.   Abdominal:  Soft. He exhibits distension. There is tenderness (diffuse).   Musculoskeletal: Normal range of motion. He exhibits edema (BLE).   Neurological: He is alert and oriented to person, place, and time.   Skin: Skin is warm and dry. Rash (erythematous non pruritic rash to lower abdomen) noted.   Dressings to bilateral feet with scant drainage. Venous changes noted to bilateral lower extremities; no erythema.    Psychiatric: He has a normal mood and affect. His behavior is normal.       Significant Labs:   Blood Culture:     Recent Labs  Lab 06/01/17  1734 06/01/17  1735 06/25/17  1249   LABBLOO No growth after 5 days. No growth after 5 days. No Growth to date  No Growth to date     CBC:     Recent Labs  Lab 06/26/17 0447 06/27/17  0336   WBC 3.41* 3.25*   HGB 7.4* 7.1*   HCT 22.7* 21.4*   PLT 65* 55*     CMP:     Recent Labs  Lab 06/26/17 0447 06/27/17  0336   * 134*   K 4.4 4.3    104   CO2 21* 21*   * 162*   BUN 32* 36*   CREATININE 1.4 1.4   CALCIUM 9.4 9.0   PROT 7.4 7.2   ALBUMIN 4.5 4.4   BILITOT 2.3* 2.3*   ALKPHOS 90 83   AST 39 40   ALT 11 12   ANIONGAP 9 9   EGFRNONAA >60.0 >60.0       Significant Imaging: I have reviewed all pertinent imaging results/findings within the past 24 hours.

## 2017-06-27 NOTE — CONSULTS
[facility]  Advanced Endoscopy Service  Consult Note    Patient Name: Leif Nino  MRN: [id number]  Admission Date: 6/16/2017  Hospital Length of Stay: 11 days  Code Status: Full Code   Attending Provider: Hema Ferrara MD   Consulting Provider: Rachel Ahmadi MD  Primary Care Physician: Primary Doctor No  Principal Problem:Bilateral cellulitis of lower leg    Inpatient consult to [facility] ([facility])  Consult performed by: RACHEL AHMADI  Consult ordered by: HEMA FERRAAR  Reason for consult: choledocholithiasis         Subjective:     HPI:  45 yo WM w/ h/o HCV cirrhosis & poorly controlled DM complicated by BL diabetic ulceration s/p 6/1 R 4th toe debridement who presents c/o generalized weakness w/ associated worsening BL LE edema on 6/16/17, subsequently found to have osteomyelitis.  He reports no fevers, chills, worsening abdominal distention, diarrhea.  Patient states he has had no productive cough or wheezes.  We were consulted because patient over the last 2-3 days began to experience sharp, stabbing, right upper quadrant pain, constant, but intermittently worse, associated with nausea, patient states he has never felt pain like this before, he denies prior cholecystectomy.      Past Medical History:   Diagnosis Date    Ascites of liver 5/30/2017    Chronic hepatitis C without hepatic coma     Chronic liver failure without hepatic coma 5/30/2017    Diabetic foot ulcer     right foot    DM (diabetes mellitus), type 2 with peripheral vascular complications     Essential hypertension     Gallstones     Hepatic encephalopathy 5/30/2017    Hepatitis C     Pancreatitis        Past Surgical History:   Procedure Laterality Date    BACK SURGERY         Review of patient's allergies indicates:   Allergen Reactions    Shellfish containing products Hives     Shortness of breath     Family History     None        Social History Main Topics    Smoking status: Current  Every Day Smoker     Types: Cigarettes    Smokeless tobacco: Not on file    Alcohol use No    Drug use: No    Sexual activity: Not on file     Review of Systems   Constitutional: Negative for chills and fever.   HENT: Negative for trouble swallowing.    Respiratory: Positive for shortness of breath. Negative for cough.    Cardiovascular: Negative for chest pain and palpitations.   Gastrointestinal: Positive for abdominal distention, abdominal pain and nausea. Negative for blood in stool, constipation, diarrhea and vomiting.   Musculoskeletal: Positive for back pain.   Skin: Negative for pallor and rash.   Hematological: Negative for adenopathy. Bruises/bleeds easily.   Psychiatric/Behavioral: Negative for agitation and behavioral problems.     Objective:     Vital Signs (Most Recent):  Temp: 98 °F (36.7 °C) (06/27/17 0730)  Pulse: 82 (06/27/17 0730)  Resp: 18 (06/27/17 0730)  BP: (!) 164/91 (06/27/17 0730)  SpO2: 97 % (06/27/17 0730) Vital Signs (24h Range):  Temp:  [97.6 °F (36.4 °C)-98.7 °F (37.1 °C)] 98 °F (36.7 °C)  Pulse:  [72-88] 82  Resp:  [17-21] 18  SpO2:  [91 %-100 %] 97 %  BP: (164-180)/() 164/91     Weight: 126.8 kg (279 lb 8.7 oz) (06/26/17 1000)  Body mass index is 35.89 kg/m².      Intake/Output Summary (Last 24 hours) at 06/27/17 0958  Last data filed at 06/27/17 0800   Gross per 24 hour   Intake             3150 ml   Output             1000 ml   Net             2150 ml       Lines/Drains/Airways     Peripheral Intravenous Line                 Peripheral IV - Single Lumen 06/19/17 1830 Left 7 days                Physical Exam   Constitutional: He is oriented to person, place, and time. He appears well-developed and well-nourished.   HENT:   Head: Normocephalic and atraumatic.   Eyes: Conjunctivae are normal. No scleral icterus.   Neck: Normal range of motion. Neck supple.   Cardiovascular: Normal rate and regular rhythm.    No murmur heard.  Pulmonary/Chest: Effort normal and breath sounds  normal. No respiratory distress.   Bibasilar crackles appreciated    Abdominal: Bowel sounds are normal. He exhibits distension and fluid wave. There is tenderness in the right upper quadrant. There is no rigidity, no rebound and no guarding.   Musculoskeletal: He exhibits no tenderness.   Neurological: He is alert and oriented to person, place, and time.   Skin: Skin is warm and dry.   Psychiatric: He has a normal mood and affect. His behavior is normal.       Significant Labs:  All pertinent lab results from the last 24 hours have been reviewed.    Significant Imaging:  Imaging results within the past 24 hours have been reviewed.    Assessment/Plan:     Cholelithiasis with choledocholithiasis    46 year old male with hepatitis C cirrhosis with uncontrolled DM who is admitted for osteomyelitis and diabetic foot ulcers, now with with right upper quadrant abdominal pain and imaging showing cholelithiasis and choledocholithiasis.  Afebrile, no leukocytosis, bilirubin around 2, elevated MELD score with child prasad class B.  INR 1.6, plt 55.    - patient ate an Egg and cheese omelet with grits just prior to evaluation this am, so unable to perform endoscopy procedures today   - keep NPO at midnight with plans for EUS +/- ERCP tomorrow   - will need INR < 1.5 and platelets > 80k for procedure/sphincterotomy           Thank you for your consult. I will follow-up with patient. Please contact us if you have any additional questions.    Paolo Rosales MD  Gastroenterology  [facility]

## 2017-06-27 NOTE — ANESTHESIA PREPROCEDURE EVALUATION
Pre-operative evaluation for ERCP (N/A), ULTRASOUND-ENDOSCOPIC-UPPER (N/A)    Case Discussion:  Leif Nino is a 46 y.o. male with Hep C Cirrhosis, HTN, IDDM-II with chronic wounds and osteomyelitis, CKD-II, and obesity. He presents for procedure above for choledocholithiasis.     Currently with decompensated liver disease and volume overload.     LDA:  - 20g PIV L side  - R brachial PICC    Past Surgical History:   Procedure Laterality Date    BACK SURGERY         Vital Signs Range (Last 24H):  Temp:  [36.6 °C (97.8 °F)-37.1 °C (98.7 °F)]   Pulse:  [72-86]   Resp:  [17-20]   BP: (160-180)/(79-98)   SpO2:  [91 %-100 %]       CBC:     Recent Labs  Lab 05/30/17  1515 06/01/17  0459 06/03/17  0428 06/16/17  1958 06/17/17  0420 06/18/17  0353 06/19/17  0603 06/20/17  0441 06/21/17  0342 06/22/17  0435 06/23/17  0535 06/24/17  0414 06/25/17  0433 06/26/17  0447 06/27/17  0336   WBC 6.94 4.14 7.57 5.77 3.93 3.44* 3.22* 4.27 3.82* 3.99 3.58* 3.24* 3.52* 3.41* 3.25*   RBC 3.41* 2.79* 3.54* 3.22* 2.93* 2.66* 2.46* 2.97* 2.88* 2.79* 2.83* 2.75* 2.59* 2.62* 2.45*   HGB 9.4* 7.7* 9.9* 8.9* 8.0* 7.4* 6.8* 8.4* 8.1* 8.0* 8.0* 7.8* 7.5* 7.4* 7.1*   HCT 28.5* 22.8* 29.0* 27.3* 24.9* 22.2* 20.7* 25.0* 24.9* 24.0* 24.5* 23.7* 22.5* 22.7* 21.4*   PLT 60* 62* 82* 94* 70* 61* 52* 58* 52* 58* 62* 61* 61* 65* 55*   MCV 84 82 82 85 85 84 84 84 87 86 87 86 87 87 87   MCH 27.6 27.6 28.0 27.6 27.3 27.8 27.6 28.3 28.1 28.7 28.3 28.4 29.0 28.2 29.0   MCHC 33.0 33.8 34.1 32.6 32.1 33.3 32.9 33.6 32.5 33.3 32.7 32.9 33.3 32.6 33.2       CMP:   Recent Labs  Lab 05/30/17  1446 05/31/17  0125 06/01/17  0459 06/02/17  1058 06/03/17  0428 06/04/17  0336 06/05/17  0414 06/16/17  1958 06/17/17  0421 06/18/17  0353 06/19/17  0603 06/20/17  0441 06/21/17  0342 06/22/17  0435 06/23/17  0429 06/24/17  0414 06/25/17  0433 06/26/17  0447 06/27/17  0336   * 128* 129* 132* 129* 130* 130* 133* 133* 133* 135* 136 135* 135* 135* 136 137 135* 134*   K  5.6* 4.4 4.7 4.7 4.5 4.6 4.4 4.3 4.2 4.3 4.1 4.3 4.2 4.1 4.6 4.2 4.3 4.4 4.3    102 102 104 103 103 103 105 105 106 103 105 105 105 105 106 107 105 104   CO2 18* 22* 20* 23 18* 21* 20* 22* 23 20* 24 24 23 20* 19* 22* 20* 21* 21*   BUN 21* 20 18 19 21* 32* 35* 22* 22* 24* 25* 24* 24* 24* 25* 28* 30* 32* 36*   CREATININE 1.4 1.1 1.2 1.1 1.0 1.2 1.2 1.5* 1.5* 1.5* 1.5* 1.5* 1.5* 1.5* 1.4 1.4 1.3 1.4 1.4   * 310* 468* 289* 149* 265* 121* 305* 340* 135* 153* 108 163* 161* 129* 161* 127* 165* 162*   MG 1.2* 1.5* 1.7 1.6  1.5* 2.1  --   --  1.5* 1.4* 1.5* 1.5* 1.5* 1.6 1.8 1.9 1.9 1.9 1.8 1.8   PHOS  --  2.4* 3.2  --   --   --   --   --  2.8 2.9 3.7 3.4 3.0 3.1 3.5 3.6 3.8 3.7 3.8   CALCIUM 7.6* 7.6* 7.6* 7.6* 8.0* 7.2* 7.4* 7.8* 7.7* 7.8* 8.4* 8.4* 8.8 8.6* 9.4 9.4 9.6 9.4 9.0   ALBUMIN 1.8* 1.6* 2.0* 1.7* 2.0* 1.6* 1.7* 1.8* 1.6* 2.3* 2.8* 3.1* 3.2* 3.7 4.1 4.1 4.1 4.5 4.4   PROT 6.9 5.8* 5.6* 5.5* 6.4 5.1* 5.5* 6.5 5.9* 5.7* 5.9* 6.3 6.4 6.8 7.4 7.1 7.1 7.4 7.2   ALKPHOS 144* 117 112 112 128 112 110 141* 124 103 89 90 86 81 92 87 85 90 83   ALT 14 12 12 13 17 15 18 15 15 14 14 14 11 10 12 10 11 11 12   AST 42* 31 28 36 49* 38 47* 43* 38 40 41* 40 39 36 44* 40 42* 39 40   BILITOT 1.2* 1.3* 0.8 1.1* 1.7* 0.6 0.7 1.4* 1.1* 1.0 1.3* 2.7* 2.2* 2.2* 2.2* 2.3* 2.6* 2.3* 2.3*       INR:    Recent Labs  Lab 05/30/17  1513 06/16/17  1958 06/17/17  0421 06/18/17  0353 06/19/17  0603 06/20/17  0441 06/21/17  0342 06/22/17  0435 06/23/17  0535 06/24/17  0414 06/25/17  0433 06/26/17  0447 06/27/17  0336   INR 1.1 1.2 1.3* 1.3* 1.4* 1.3* 1.3* 1.4* 1.4* 1.4* 1.5* 1.6* 1.6*         Diagnostic Studies:      EKG:  Normal sinus rhythm  Nonspecific ST-t wave abnormality Now present  Abnormal ECG    2D Echo:  CONCLUSIONS     1 - Normal left ventricular systolic function (EF 60-65%).     2 - Impaired LV relaxation, elevated LAP (grade 2 diastolic dysfunction).     3 - Normal right ventricular systolic function .     4 -  Pulmonary hypertension. The estimated PA systolic pressure is 49 mmHg.     5 - Severe left atrial enlargement.     6 - Intermediate central venous pressure.     Anesthesia Evaluation    I have reviewed the Patient Summary Reports.     I have reviewed the Medications.     Review of Systems  Anesthesia Hx:  No problems with previous Anesthesia  History of prior surgery of interest to airway management or planning:  Denies Personal Hx of Anesthesia complications.   Cardiovascular:   Hypertension    Pulmonary:  Pulmonary Normal    Renal/:   Chronic Renal Disease, CRI    Hepatic/GI:   Liver Disease, Hepatitis, C    Neurological:  Neurology Normal    Endocrine:   Diabetes, poorly controlled        Physical Exam  General:  Obesity    Airway/Jaw/Neck:  Airway Findings: Mouth Opening: Normal Tongue: Normal  Mallampati: III  Improves to II with phonation.  TM Distance: Normal, at least 6 cm  Jaw/Neck Findings:  Neck ROM: Normal ROM  Neck Findings:  Girth Increased     Eyes/Ears/Nose:  EYES/EARS/NOSE FINDINGS: Normal   Dental:  Dental Findings:    Chest/Lungs:  Chest/Lungs Findings: Normal Respiratory Rate     Heart/Vascular:  Heart Findings: Rate: Normal  Rhythm: Regular Rhythm        Mental Status:  Mental Status Findings: Normal        Anesthesia Plan  Type of Anesthesia, risks & benefits discussed:  Anesthesia Type:  general, MAC  Patient's Preference:   Intra-op Monitoring Plan: standard ASA monitors  Intra-op Monitoring Plan Comments:   Post Op Pain Control Plan:   Post Op Pain Control Plan Comments:   Induction:   IV  Beta Blocker:  Patient is on a Beta-Blocker and has received one dose within the past 24 hours (No further documentation required).       Informed Consent: Patient understands risks and agrees with Anesthesia plan.  Questions answered. Anesthesia consent signed with patient.  ASA Score: 3     Day of Surgery Review of History & Physical:            Ready For Surgery From Anesthesia Perspective.

## 2017-06-27 NOTE — PLAN OF CARE
06/27/17 1233   Discharge Reassessment   Assessment Type Discharge Planning Reassessment   Can the patient answer the patient profile reliably? Yes, cognitively intact   How does the patient rate their overall health at the present time? Fair   Describe the patient's ability to walk at the present time. Major restrictions/daily assistance from another person   How often would a person be available to care for the patient? Occasionally   Number of comorbid conditions (as recorded on the chart) Four   During the past month, has the patient often been bothered by feeling down, depressed or hopeless? No   During the past month, has the patient often been bothered by little interest or pleasure in doing things? No   Discharge plan remains the same: Yes   Provided patient/caregiver education on the expected discharge date and the discharge plan Yes   Discharge Plan A Long-term acute care facility (LTAC)  ([facility] pending insurance authorization)   Change in patient condition or support system No     Patient to receive PICC line today and ERCP scheduled for tomorrow.  RASHAWN 06/29/17.  CM to continue to follow.

## 2017-06-27 NOTE — ASSESSMENT & PLAN NOTE
46 year old male with HCV cirrhosis, poorly controlled DM, bilateral diabetic foot ulcerations now complicated by right 4th toe osteomyelitis s/p bone biopsy 6/24.     - On empiric vancomycin and ceftriaxone    - Bone cultures are growing MSSA. Pathology pending   - Remains afebrile. Pancytopenic. Clinically stable.    - Complaints of diffuse abdominal pain. CT revealed choledocholithiasis. GI following and plans for EUS tomorrow.     Plan  - Discontinue Vancomycin and Ceftriaxone  - Start Cefazolin 6 g IV continuous infusion q 24 hours   - Recommend treating osteomyelitis with 6 weeks of IV antibiotics (estimated end date 8/5/17)  - Patient will need ESR, CRP, CBC and CMP to be drawn weekly with results faxed to the ID Department at  [phone]  - Will schedule patient an ID follow up appt after discharged  - Patient will also need to see Hepatology as an outpatient for HCV management  - ID will sign off.

## 2017-06-27 NOTE — PLAN OF CARE
Problem: Patient Care Overview  Goal: Plan of Care Review  Outcome: Ongoing (interventions implemented as appropriate)  Remains AOx4, VSS, c/o pain this shift, relieved by PRN medications. CT scan completed of abdomen. MRI ordered STAT, however pt was unable to complete exam d/t inability to lie flat as well as inability to hold breath. Accuchecks maintained ACHS. Supplemental insulin given as required. ABX therapy maintained. Will continue to monitor and assess.

## 2017-06-27 NOTE — NURSING
Notified by MRI that pt was unable to complete MRI d/t inability to hold breath during exam & SOB for lying flat. MD aware.

## 2017-06-27 NOTE — SUBJECTIVE & OBJECTIVE
Interval History: Patient seen at bedside. Sitting up today. Still complains of pain to feet (R>L) along with diffuse stomach pain, unchanged. Denies fevers, chills, sweats. Right foot wound still with ample drainage. Bone culture is growing MSSA.    Review of Systems   Constitutional: Negative for chills, diaphoresis and fever.   Respiratory: Negative for cough and shortness of breath.    Cardiovascular: Positive for leg swelling. Negative for chest pain.   Gastrointestinal: Positive for abdominal distention and abdominal pain. Negative for diarrhea, nausea and vomiting.   Genitourinary: Negative for dysuria, frequency and hematuria.   Musculoskeletal: Positive for arthralgias. Negative for back pain and myalgias.        + lower extremity pain   Skin: Positive for wound. Negative for color change and rash.   Neurological: Positive for weakness. Negative for dizziness, numbness and headaches.   Psychiatric/Behavioral: Negative for agitation.     Objective:     Vital Signs (Most Recent):  Temp: 98 °F (36.7 °C) (06/27/17 0730)  Pulse: 82 (06/27/17 0730)  Resp: 18 (06/27/17 0730)  BP: (!) 164/91 (06/27/17 0730)  SpO2: 97 % (06/27/17 0730) Vital Signs (24h Range):  Temp:  [97.6 °F (36.4 °C)-98.7 °F (37.1 °C)] 98 °F (36.7 °C)  Pulse:  [72-88] 82  Resp:  [17-21] 18  SpO2:  [91 %-100 %] 97 %  BP: (164-180)/() 164/91     Weight: 126.8 kg (279 lb 8.7 oz)  Body mass index is 35.89 kg/m².    Estimated Creatinine Clearance: 95.2 mL/min (based on Cr of 1.4).    Physical Exam   Constitutional: He is oriented to person, place, and time. He appears well-developed and well-nourished. He appears distressed (2/2 pain).   Cardiovascular: Normal rate and regular rhythm.    No murmur heard.  Pulmonary/Chest: Effort normal and breath sounds normal. No respiratory distress.   Abdominal: Soft. He exhibits distension. There is tenderness (diffuse).   Musculoskeletal: Normal range of motion. He exhibits edema (BLE).   Neurological: He  is alert and oriented to person, place, and time.   Skin: Skin is warm and dry. Rash (erythematous non pruritic rash to lower abdomen) noted.   Dressings to bilateral feet with scant drainage. Venous changes noted to bilateral lower extremities; no erythema.    Psychiatric: He has a normal mood and affect. His behavior is normal.       Significant Labs:   Blood Culture:     Recent Labs  Lab 06/01/17  1734 06/01/17  1735 06/25/17  1249   LABBLOO No growth after 5 days. No growth after 5 days. No Growth to date  No Growth to date     CBC:     Recent Labs  Lab 06/26/17 0447 06/27/17  0336   WBC 3.41* 3.25*   HGB 7.4* 7.1*   HCT 22.7* 21.4*   PLT 65* 55*     CMP:     Recent Labs  Lab 06/26/17 0447 06/27/17  0336   * 134*   K 4.4 4.3    104   CO2 21* 21*   * 162*   BUN 32* 36*   CREATININE 1.4 1.4   CALCIUM 9.4 9.0   PROT 7.4 7.2   ALBUMIN 4.5 4.4   BILITOT 2.3* 2.3*   ALKPHOS 90 83   AST 39 40   ALT 11 12   ANIONGAP 9 9   EGFRNONAA >60.0 >60.0       Significant Imaging: I have reviewed all pertinent imaging results/findings within the past 24 hours.

## 2017-06-27 NOTE — PLAN OF CARE
Pt clinically accepted by [facility] and have submitted for insurance authorization. BETSY made Kyra at [facility] aware that pt is expected to discharge on Thursday.

## 2017-06-27 NOTE — PROCEDURES
"Leif Nino is a 46 y.o. male patient.    Temp: 97.8 °F (36.6 °C) (06/27/17 1141)  Pulse: 80 (06/27/17 1141)  Resp: 20 (06/27/17 1141)  BP: (!) 160/90 (06/27/17 1141)  SpO2: 97 % (06/27/17 0730)  Weight: 126.8 kg (279 lb 8.7 oz) (06/26/17 1000)  Height: 6' 2" (188 cm) (06/26/17 1000)    PICC  Date/Time: 6/27/2017 2:59 PM  Performed by: BEVERLY DAVEY  Consent Done: Yes  Time out: Immediately prior to procedure a time out was called to verify the correct patient, procedure, equipment, support staff and site/side marked as required  Indications: med administration and vascular access  Anesthesia: local infiltration  Local anesthetic: lidocaine 1% without epinephrine  Anesthetic Total (mL): 3  Preparation: skin prepped with ChloraPrep  Skin prep agent dried: skin prep agent completely dried prior to procedure  Sterile barriers: all five maximum sterile barriers used - cap, mask, sterile gown, sterile gloves, and large sterile sheet  Hand hygiene: hand hygiene performed prior to central venous catheter insertion  Location details: right basilic  Catheter type: double lumen  Catheter size: 5 Fr  Catheter Length: 44cm    Ultrasound guidance: yes  Vessel Caliber: medium and patent, compressibility normal  Vascular Doppler: not done  Needle advanced into vessel with real time Ultrasound guidance.  Guidewire confirmed in vessel.  Image recorded and saved.  Sterile sheath used.  no esophageal manometryNumber of attempts: 1  Post-procedure: blood return through all ports, chlorhexidine patch and sterile dressing applied  Specimens: No  Implants: No  Assessment: placement verified by x-ray  Complications: none        Radha Ross  6/27/2017  "

## 2017-06-27 NOTE — NURSING
Spoke with PICC team regarding timeframe of line placement today; stated would not be able to place line until cultures result. Most likely will not come by until after lunch. Will update pt.

## 2017-06-27 NOTE — HPI
47 yo WM w/ h/o HCV cirrhosis & poorly controlled DM complicated by BL diabetic ulceration s/p 6/1 R 4th toe debridement who presents c/o generalized weakness w/ associated worsening BL LE edema on 6/16/17, subsequently found to have osteomyelitis.  He reports no fevers, chills, worsening abdominal distention, diarrhea.  Patient states he has had no productive cough or wheezes.  We were consulted because patient over the last 2-3 days began to experience sharp, stabbing, right upper quadrant pain, constant, but intermittently worse, associated with nausea, patient states he has never felt pain like this before, he denies prior cholecystectomy.

## 2017-06-27 NOTE — NURSING
Spoke with Dr. Dutton regarding multiple IV meds due with only 1 PIV and continuous antibiotic currently infusing. Stated okay to hold meds until PICC placed.

## 2017-06-27 NOTE — ASSESSMENT & PLAN NOTE
46 year old male with hepatitis C cirrhosis with uncontrolled DM who is admitted for osteomyelitis and diabetic foot ulcers, now with with right upper quadrant abdominal pain and imaging showing cholelithiasis and choledocholithiasis.  Afebrile, no leukocytosis, bilirubin around 2, elevated MELD score with child prasad class B.  INR 1.6, plt 55.    - patient ate an Egg and cheese omelet with grits just prior to evaluation this am, so unable to perform endoscopy procedures today   - keep NPO at midnight with plans for EUS +/- ERCP tomorrow   - will need INR < 1.5 and platelets > 80k for procedure/sphincterotomy

## 2017-06-28 ENCOUNTER — SURGERY (OUTPATIENT)
Age: 47
End: 2017-06-28

## 2017-06-28 ENCOUNTER — ANESTHESIA (OUTPATIENT)
Dept: ENDOSCOPY | Facility: HOSPITAL | Age: 47
DRG: 580 | End: 2017-06-28
Payer: MEDICAID

## 2017-06-28 PROBLEM — L03.116 CELLULITIS OF LEFT LOWER EXTREMITY: Status: ACTIVE | Noted: 2017-06-28

## 2017-06-28 LAB
ALBUMIN SERPL BCP-MCNC: 4.5 G/DL
ALP SERPL-CCNC: 82 U/L
ALT SERPL W/O P-5'-P-CCNC: 14 U/L
ANION GAP SERPL CALC-SCNC: 11 MMOL/L
AST SERPL-CCNC: 48 U/L
BACTERIA SPEC AEROBE CULT: NO GROWTH
BACTERIA SPEC ANAEROBE CULT: NORMAL
BASOPHILS # BLD AUTO: 0.03 K/UL
BASOPHILS NFR BLD: 1 %
BILIRUB SERPL-MCNC: 2.1 MG/DL
BLD PROD TYP BPU: NORMAL
BLD PROD TYP BPU: NORMAL
BLOOD UNIT EXPIRATION DATE: NORMAL
BLOOD UNIT EXPIRATION DATE: NORMAL
BLOOD UNIT TYPE CODE: 6200
BLOOD UNIT TYPE CODE: NORMAL
BLOOD UNIT TYPE: NORMAL
BLOOD UNIT TYPE: NORMAL
BUN SERPL-MCNC: 41 MG/DL
CALCIUM SERPL-MCNC: 9.5 MG/DL
CHLORIDE SERPL-SCNC: 104 MMOL/L
CO2 SERPL-SCNC: 19 MMOL/L
CODING SYSTEM: NORMAL
CODING SYSTEM: NORMAL
CREAT SERPL-MCNC: 1.6 MG/DL
DIFFERENTIAL METHOD: ABNORMAL
DISPENSE STATUS: NORMAL
DISPENSE STATUS: NORMAL
EOSINOPHIL # BLD AUTO: 0.2 K/UL
EOSINOPHIL NFR BLD: 5.2 %
ERYTHROCYTE [DISTWIDTH] IN BLOOD BY AUTOMATED COUNT: 18 %
EST. GFR  (AFRICAN AMERICAN): 59.7 ML/MIN/1.73 M^2
EST. GFR  (NON AFRICAN AMERICAN): 51.6 ML/MIN/1.73 M^2
GLUCOSE SERPL-MCNC: 116 MG/DL
HCT VFR BLD AUTO: 21.4 %
HGB BLD-MCNC: 7.1 G/DL
INR PPP: 1.5
LYMPHOCYTES # BLD AUTO: 0.5 K/UL
LYMPHOCYTES NFR BLD: 15.7 %
MAGNESIUM SERPL-MCNC: 1.9 MG/DL
MCH RBC QN AUTO: 28.5 PG
MCHC RBC AUTO-ENTMCNC: 33.2 %
MCV RBC AUTO: 86 FL
MONOCYTES # BLD AUTO: 0.4 K/UL
MONOCYTES NFR BLD: 13.1 %
NEUTROPHILS # BLD AUTO: 2 K/UL
NEUTROPHILS NFR BLD: 65 %
NUM UNITS TRANS POOLED PLATELETS: NORMAL
PHOSPHATE SERPL-MCNC: 4 MG/DL
PLATELET # BLD AUTO: 65 K/UL
PMV BLD AUTO: 9.5 FL
POCT GLUCOSE: 112 MG/DL (ref 70–110)
POCT GLUCOSE: 112 MG/DL (ref 70–110)
POCT GLUCOSE: 116 MG/DL (ref 70–110)
POCT GLUCOSE: 122 MG/DL (ref 70–110)
POCT GLUCOSE: 126 MG/DL (ref 70–110)
POCT GLUCOSE: 129 MG/DL (ref 70–110)
POCT GLUCOSE: 245 MG/DL (ref 70–110)
POTASSIUM SERPL-SCNC: 4.3 MMOL/L
PROT SERPL-MCNC: 7.3 G/DL
PROTHROMBIN TIME: 15.3 SEC
RBC # BLD AUTO: 2.49 M/UL
SODIUM SERPL-SCNC: 134 MMOL/L
TRANS PLATPHERESIS VOL PATIENT: NORMAL ML
WBC # BLD AUTO: 3.06 K/UL

## 2017-06-28 PROCEDURE — 97530 THERAPEUTIC ACTIVITIES: CPT

## 2017-06-28 PROCEDURE — 27201674 HC SPHINCTERTOME: Performed by: INTERNAL MEDICINE

## 2017-06-28 PROCEDURE — D9220A PRA ANESTHESIA: Mod: ANES,,, | Performed by: ANESTHESIOLOGY

## 2017-06-28 PROCEDURE — C1769 GUIDE WIRE: HCPCS | Performed by: INTERNAL MEDICINE

## 2017-06-28 PROCEDURE — P9019 PLATELETS, EACH UNIT: HCPCS

## 2017-06-28 PROCEDURE — 43259 EGD US EXAM DUODENUM/JEJUNUM: CPT | Mod: 51,,, | Performed by: INTERNAL MEDICINE

## 2017-06-28 PROCEDURE — 20600001 HC STEP DOWN PRIVATE ROOM

## 2017-06-28 PROCEDURE — 0DJ08ZZ INSPECTION OF UPPER INTESTINAL TRACT, VIA NATURAL OR ARTIFICIAL OPENING ENDOSCOPIC: ICD-10-PCS | Performed by: INTERNAL MEDICINE

## 2017-06-28 PROCEDURE — D9220A PRA ANESTHESIA: Mod: CRNA,,, | Performed by: NURSE ANESTHETIST, CERTIFIED REGISTERED

## 2017-06-28 PROCEDURE — P9035 PLATELET PHERES LEUKOREDUCED: HCPCS

## 2017-06-28 PROCEDURE — 27202125 HC BALLOON, EXTRACTION (ANY): Performed by: INTERNAL MEDICINE

## 2017-06-28 PROCEDURE — 37000009 HC ANESTHESIA EA ADD 15 MINS: Performed by: INTERNAL MEDICINE

## 2017-06-28 PROCEDURE — 25000003 PHARM REV CODE 250: Performed by: PHYSICIAN ASSISTANT

## 2017-06-28 PROCEDURE — 43262 ENDO CHOLANGIOPANCREATOGRAPH: CPT | Mod: ,,, | Performed by: INTERNAL MEDICINE

## 2017-06-28 PROCEDURE — 43259 EGD US EXAM DUODENUM/JEJUNUM: CPT | Performed by: INTERNAL MEDICINE

## 2017-06-28 PROCEDURE — 99233 SBSQ HOSP IP/OBS HIGH 50: CPT | Mod: ,,, | Performed by: HOSPITALIST

## 2017-06-28 PROCEDURE — 25000003 PHARM REV CODE 250: Performed by: HOSPITALIST

## 2017-06-28 PROCEDURE — 74328 X-RAY BILE DUCT ENDOSCOPY: CPT | Mod: 26,,, | Performed by: INTERNAL MEDICINE

## 2017-06-28 PROCEDURE — 63600175 PHARM REV CODE 636 W HCPCS: Performed by: HOSPITALIST

## 2017-06-28 PROCEDURE — 74328 X-RAY BILE DUCT ENDOSCOPY: CPT | Performed by: INTERNAL MEDICINE

## 2017-06-28 PROCEDURE — 86965 POOLING BLOOD PLATELETS: CPT

## 2017-06-28 PROCEDURE — 36430 TRANSFUSION BLD/BLD COMPNT: CPT

## 2017-06-28 PROCEDURE — 37000008 HC ANESTHESIA 1ST 15 MINUTES: Performed by: INTERNAL MEDICINE

## 2017-06-28 PROCEDURE — 27000221 HC OXYGEN, UP TO 24 HOURS

## 2017-06-28 PROCEDURE — 84100 ASSAY OF PHOSPHORUS: CPT

## 2017-06-28 PROCEDURE — 25000003 PHARM REV CODE 250: Performed by: INTERNAL MEDICINE

## 2017-06-28 PROCEDURE — 43264 ERCP REMOVE DUCT CALCULI: CPT | Mod: ,,, | Performed by: INTERNAL MEDICINE

## 2017-06-28 PROCEDURE — 43262 ENDO CHOLANGIOPANCREATOGRAPH: CPT | Performed by: INTERNAL MEDICINE

## 2017-06-28 PROCEDURE — 94640 AIRWAY INHALATION TREATMENT: CPT

## 2017-06-28 PROCEDURE — 0FC98ZZ EXTIRPATION OF MATTER FROM COMMON BILE DUCT, VIA NATURAL OR ARTIFICIAL OPENING ENDOSCOPIC: ICD-10-PCS | Performed by: INTERNAL MEDICINE

## 2017-06-28 PROCEDURE — 83735 ASSAY OF MAGNESIUM: CPT

## 2017-06-28 PROCEDURE — P9047 ALBUMIN (HUMAN), 25%, 50ML: HCPCS | Performed by: HOSPITALIST

## 2017-06-28 PROCEDURE — 25000003 PHARM REV CODE 250: Performed by: NURSE ANESTHETIST, CERTIFIED REGISTERED

## 2017-06-28 PROCEDURE — 43264 ERCP REMOVE DUCT CALCULI: CPT | Performed by: INTERNAL MEDICINE

## 2017-06-28 PROCEDURE — 63600175 PHARM REV CODE 636 W HCPCS: Performed by: PHYSICIAN ASSISTANT

## 2017-06-28 PROCEDURE — 82962 GLUCOSE BLOOD TEST: CPT | Performed by: INTERNAL MEDICINE

## 2017-06-28 PROCEDURE — 80053 COMPREHEN METABOLIC PANEL: CPT

## 2017-06-28 PROCEDURE — 25000242 PHARM REV CODE 250 ALT 637 W/ HCPCS: Performed by: NURSE PRACTITIONER

## 2017-06-28 PROCEDURE — 63600175 PHARM REV CODE 636 W HCPCS: Performed by: NURSE ANESTHETIST, CERTIFIED REGISTERED

## 2017-06-28 PROCEDURE — 85610 PROTHROMBIN TIME: CPT

## 2017-06-28 PROCEDURE — 85025 COMPLETE CBC W/AUTO DIFF WBC: CPT

## 2017-06-28 RX ORDER — LIDOCAINE HCL/PF 100 MG/5ML
SYRINGE (ML) INTRAVENOUS
Status: DISCONTINUED | OUTPATIENT
Start: 2017-06-28 | End: 2017-06-28

## 2017-06-28 RX ORDER — SODIUM CHLORIDE 9 MG/ML
INJECTION, SOLUTION INTRAVENOUS CONTINUOUS
Status: DISCONTINUED | OUTPATIENT
Start: 2017-06-28 | End: 2017-06-28

## 2017-06-28 RX ORDER — HYDROCODONE BITARTRATE AND ACETAMINOPHEN 500; 5 MG/1; MG/1
TABLET ORAL
Status: ACTIVE | OUTPATIENT
Start: 2017-06-28 | End: 2017-06-29

## 2017-06-28 RX ORDER — ONDANSETRON 2 MG/ML
INJECTION INTRAMUSCULAR; INTRAVENOUS
Status: DISCONTINUED | OUTPATIENT
Start: 2017-06-28 | End: 2017-06-28

## 2017-06-28 RX ORDER — PROPOFOL 10 MG/ML
VIAL (ML) INTRAVENOUS
Status: DISCONTINUED | OUTPATIENT
Start: 2017-06-28 | End: 2017-06-28

## 2017-06-28 RX ORDER — SUCCINYLCHOLINE CHLORIDE 20 MG/ML
INJECTION INTRAMUSCULAR; INTRAVENOUS
Status: DISCONTINUED | OUTPATIENT
Start: 2017-06-28 | End: 2017-06-28

## 2017-06-28 RX ORDER — PROPOFOL 10 MG/ML
VIAL (ML) INTRAVENOUS CONTINUOUS PRN
Status: DISCONTINUED | OUTPATIENT
Start: 2017-06-28 | End: 2017-06-28

## 2017-06-28 RX ORDER — HYDROMORPHONE HYDROCHLORIDE 1 MG/ML
0.2 INJECTION, SOLUTION INTRAMUSCULAR; INTRAVENOUS; SUBCUTANEOUS EVERY 5 MIN PRN
Status: DISCONTINUED | OUTPATIENT
Start: 2017-06-28 | End: 2017-06-28 | Stop reason: HOSPADM

## 2017-06-28 RX ADMIN — HYDROMORPHONE HYDROCHLORIDE 1 MG: 1 INJECTION, SOLUTION INTRAMUSCULAR; INTRAVENOUS; SUBCUTANEOUS at 11:06

## 2017-06-28 RX ADMIN — PROPOFOL 50 MG: 10 INJECTION, EMULSION INTRAVENOUS at 01:06

## 2017-06-28 RX ADMIN — CARVEDILOL 12.5 MG: 12.5 TABLET, FILM COATED ORAL at 05:06

## 2017-06-28 RX ADMIN — SUCCINYLCHOLINE CHLORIDE 120 MG: 20 INJECTION, SOLUTION INTRAMUSCULAR; INTRAVENOUS at 01:06

## 2017-06-28 RX ADMIN — Medication 10 ML: at 11:06

## 2017-06-28 RX ADMIN — QUETIAPINE FUMARATE 100 MG: 25 TABLET ORAL at 09:06

## 2017-06-28 RX ADMIN — DEXTROSE 241 MG: 5 SOLUTION INTRAVENOUS at 02:06

## 2017-06-28 RX ADMIN — HYDROMORPHONE HYDROCHLORIDE 1 MG: 1 INJECTION, SOLUTION INTRAMUSCULAR; INTRAVENOUS; SUBCUTANEOUS at 08:06

## 2017-06-28 RX ADMIN — Medication 10 ML: at 12:06

## 2017-06-28 RX ADMIN — DICYCLOMINE HYDROCHLORIDE 20 MG: 20 TABLET ORAL at 05:06

## 2017-06-28 RX ADMIN — INSULIN DETEMIR 10 UNITS: 100 INJECTION, SOLUTION SUBCUTANEOUS at 09:06

## 2017-06-28 RX ADMIN — PROPOFOL 60 MG: 10 INJECTION, EMULSION INTRAVENOUS at 01:06

## 2017-06-28 RX ADMIN — METRONIDAZOLE 500 MG: 500 INJECTION, SOLUTION INTRAVENOUS at 05:06

## 2017-06-28 RX ADMIN — ALBUMIN (HUMAN) 25 G: 12.5 SOLUTION INTRAVENOUS at 11:06

## 2017-06-28 RX ADMIN — LIDOCAINE HYDROCHLORIDE 50 MG: 20 INJECTION, SOLUTION INTRAVENOUS at 01:06

## 2017-06-28 RX ADMIN — Medication 10 ML: at 05:06

## 2017-06-28 RX ADMIN — DICYCLOMINE HYDROCHLORIDE 20 MG: 20 TABLET ORAL at 09:06

## 2017-06-28 RX ADMIN — PHYTONADIONE 10 MG: 10 INJECTION, EMULSION INTRAMUSCULAR; INTRAVENOUS; SUBCUTANEOUS at 08:06

## 2017-06-28 RX ADMIN — ASPIRIN 81 MG: 81 TABLET, COATED ORAL at 08:06

## 2017-06-28 RX ADMIN — SODIUM CHLORIDE: 0.9 INJECTION, SOLUTION INTRAVENOUS at 01:06

## 2017-06-28 RX ADMIN — METRONIDAZOLE 500 MG: 500 INJECTION, SOLUTION INTRAVENOUS at 09:06

## 2017-06-28 RX ADMIN — PROPOFOL 100 MCG/KG/MIN: 10 INJECTION, EMULSION INTRAVENOUS at 01:06

## 2017-06-28 RX ADMIN — LIDOCAINE HYDROCHLORIDE 25 MG: 20 INJECTION, SOLUTION INTRAVENOUS at 01:06

## 2017-06-28 RX ADMIN — INSULIN ASPART 2 UNITS: 100 INJECTION, SOLUTION INTRAVENOUS; SUBCUTANEOUS at 09:06

## 2017-06-28 RX ADMIN — HYDROMORPHONE HYDROCHLORIDE 1 MG: 1 INJECTION, SOLUTION INTRAMUSCULAR; INTRAVENOUS; SUBCUTANEOUS at 07:06

## 2017-06-28 RX ADMIN — CARVEDILOL 12.5 MG: 12.5 TABLET, FILM COATED ORAL at 08:06

## 2017-06-28 RX ADMIN — DEXTROSE 241 MG: 5 SOLUTION INTRAVENOUS at 01:06

## 2017-06-28 RX ADMIN — DEXTROSE 6 G: 5 SOLUTION INTRAVENOUS at 09:06

## 2017-06-28 RX ADMIN — IPRATROPIUM BROMIDE AND ALBUTEROL SULFATE 3 ML: .5; 3 SOLUTION RESPIRATORY (INHALATION) at 06:06

## 2017-06-28 RX ADMIN — ONDANSETRON 4 MG: 2 INJECTION INTRAMUSCULAR; INTRAVENOUS at 01:06

## 2017-06-28 RX ADMIN — ALBUMIN (HUMAN) 25 G: 12.5 SOLUTION INTRAVENOUS at 05:06

## 2017-06-28 RX ADMIN — HYDROMORPHONE HYDROCHLORIDE 1 MG: 1 INJECTION, SOLUTION INTRAMUSCULAR; INTRAVENOUS; SUBCUTANEOUS at 03:06

## 2017-06-28 RX ADMIN — OXYCODONE HYDROCHLORIDE 15 MG: 5 TABLET ORAL at 05:06

## 2017-06-28 RX ADMIN — ATORVASTATIN CALCIUM 40 MG: 20 TABLET, FILM COATED ORAL at 08:06

## 2017-06-28 NOTE — PT/OT/SLP PROGRESS
"Physical Therapy  Treatment    Leif Nino   MRN: [id number]   Admitting Diagnosis: Bilateral cellulitis of lower leg    PT Received On: 06/28/17  PT Start Time: 1115     PT Stop Time: 1123    PT Total Time (min): 8 min       Billable Minutes:  Therapeutic Activity 8    Treatment Type: Treatment  PT/PTA: PTA     PTA Visit Number: 1       General Precautions: Standard, fall  Orthopedic Precautions: N/A   Braces: N/A (Pt's feet are wrapped. )    Do you have any cultural, spiritual, Gnosticist conflicts, given your current situation?: None stated     Subjective:  Communicated with nsg, Fatoumata LIANG prior to session.  Pt agreeable to some physical therapy today after max encouragement and education.   "I have gall stones, pancreatitis, my legs are swollen and I'm going for surgery today. Can I have some water?"    Pain/Comfort  Pain Rating 1: 8/10 (back, abdomen, legs)  Pain Addressed 1: Reposition, Distraction, Nurse notified, Cessation of Activity, Pre-medicate for activity (Nsg to room during therapy and communicated with nsg about pain meds. )    Objective:   Patient found with: oxygen (Donned O2 during therapy. Did not know where it was initially.)    Functional Mobility:  Bed Mobility:   Declined    Transfers:  Declined    Gait:   Declined    Balance:   Did not perform    Therapeutic Activities and Exercises:  Pt performed therex in supine with head of bed elevated with max encouragement and educated about the benefits and purpose of the exercises are performed.     Pt with shortness of breath and reports chest pain while nsg is in the room. Pt was educated to don oxygen to which patient responded: "I don't even know where that is." Pt was communicated with to say that the oxygen was in his lap. Pt donned oxygen independently.     Pt asked for water during therapy and was educated not to drink water per precautions for his gall bladder procedure later. The patient verbalized understanding and stated he would like to " "swish it around in his mouth. Pt with water, swished it around and then swallowed one sip of water despite education. Communicated with nsg about this.     Pt educated in and performed BLE therex x 10-20 reps ea to promote strength for functional mobility:  -Ankle pumps (with rodriguez range, pt said it was painful and he was unable to perform the exercise in the full range.  -Hip abd/add with RLE x 2 reps. Pt declined more of this exercise.   -Glute sets   -Quad sets "knee squeezes"  Pt educated to do his exercises multiple times a day as tolerated.      AM-PAC 6 CLICK MOBILITY  How much help from another person does this patient currently need?   1 = Unable, Total/Dependent Assistance  2 = A lot, Maximum/Moderate Assistance  3 = A little, Minimum/Contact Guard/Supervision  4 = None, Modified [place]/Independent    Turning over in bed (including adjusting bedclothes, sheets and blankets)?: 4  Sitting down on and standing up from a chair with arms (e.g., wheelchair, bedside commode, etc.): 3  Moving from lying on back to sitting on the side of the bed?: 3  Moving to and from a bed to a chair (including a wheelchair)?: 3  Need to walk in hospital room?: 3  Climbing 3-5 steps with a railing?: 2  Total Score: 18    AM-PAC Raw Score CMS G-Code Modifier Level of Impairment Assistance   6 % Total / Unable   7 - 9 CM 80 - 100% Maximal Assist   10 - 14 CL 60 - 80% Moderate Assist   15 - 19 CK 40 - 60% Moderate Assist   20 - 22 CJ 20 - 40% Minimal Assist   23 CI 1-20% SBA / CGA   24 CH 0% Independent/ Mod I     Patient left HOB elevated with all lines intact, call button in reach and nsg notified.    Assessment:  Leif Nino is a 46 y.o. male with a medical diagnosis of Bilateral cellulitis of lower leg and presents with the rehab identified problem list below. The patient's session was significantly limited by pain today and he was unable to participate in OOB functional mobility tasks. The patient would continue " to benefit in skilled PT to address the deficits in his plan of care.     Rehab identified problem list/impairments: Rehab identified problem list/impairments: weakness, impaired endurance, gait instability, impaired self care skills, impaired functional mobilty, impaired balance, decreased lower extremity function, decreased safety awareness, pain, decreased ROM, impaired cardiopulmonary response to activity, edema    Rehab potential is good.    Activity tolerance: Poor    Discharge recommendations: Discharge Facility/Level Of Care Needs: nursing facility, skilled     Barriers to discharge: Barriers to Discharge: Inaccessible home environment, Decreased caregiver support    Equipment recommendations: Equipment Needed After Discharge: walker, rolling     GOALS:    Physical Therapy Goals        Problem: Physical Therapy Goal    Goal Priority Disciplines Outcome Goal Variances Interventions   Physical Therapy Goal     PT/OT, PT Ongoing (interventions implemented as appropriate)     Description:  Goals to be met by: 17     Patient will increase functional independence with mobility by performin. Supine to sit with Stand-by Assistance - not met  2. Sit to supine with Stand-by Assistance - not met  3. Sit to stand transfer with Stand-by Assistance - not met  4. Bed to chair transfer with Stand-by Assistance using LRAD - not met  5. Gait  x 100 feet with Contact Guard Assistance using LRAD - Met   Update: Gait x 200ft Christal with rolling walker - not met  6. Ascend/descend 5 stair with right Handrails Moderate Assistance - not met  7. Lower extremity exercise program x 15 reps per handout, with independence - not met                        PLAN:    Patient to be seen 4 x/week  to address the above listed problems via gait training, therapeutic activities, therapeutic exercises  Plan of Care expires: 17  Plan of Care reviewed with: patient         Darren Gomez, PTA  2017

## 2017-06-28 NOTE — PROGRESS NOTES
[facility] Hospital Medicine  Progress Note    Patient Name: Leif Nino  MRN: [id number]  Patient Class: IP- Inpatient   Admission Date: 6/16/2017  Length of Stay: 11 days  Attending Physician: Hema Ferrara MD  Primary Care Provider: Primary Doctor [facility] Medicine Team: [facility]P MED A Hema Ferrara MD    Subjective:     Principal Problem:Bilateral cellulitis of lower leg    HPI:  Mr. Nino is a 47 yo WM w/ h/o HCV cirrhosis & poorly controlled DM complicated by BL diabetic ulceration s/p 6/1 R 4th toe debridement who presents c/o generalized weakness w/ associated worsening BL LE edema.  He reports that 5d ago he began having dysuria.  Two day afterwards he woke up noticing his chronic BL LE edema was significantly worse with an associated erythematous, painful rash.  Later that day he began developing dyspnea on exertion that progressively worsened to the point where he is now dyspneic at rest.  Patient denies any vomiting but has mild, persistant nausea that has worsened acutely over the last 3 days.  He reports no fevers, chills, abdominal pain, worsening abdominal distention, diarrhea.  Patient states he has had no productive cough or wheezes.    Hospital Course:  No notes on file    Interval History: Patient seen and examined at bedside. CT scan and MRI Of the abdomen revealed choledocholithiasis. ERCP/EUS tomorrow.     Review of Systems   Constitutional: Negative for chills, diaphoresis and fever.   Respiratory: Negative for cough and shortness of breath.    Cardiovascular: Positive for leg swelling. Negative for chest pain.   Gastrointestinal: Positive for abdominal distention and abdominal pain. Negative for diarrhea, nausea and vomiting.   Genitourinary: Negative for dysuria, frequency and hematuria.   Musculoskeletal: Positive for arthralgias. Negative for back pain and myalgias.        + lower extremity pain   Skin: Positive for wound. Negative for color change and  rash.   Neurological: Positive for weakness. Negative for dizziness, numbness and headaches.   Psychiatric/Behavioral: Negative for agitation.     Objective:     Vital Signs (Most Recent):  Temp: 97.6 °F (36.4 °C) (06/27/17 2004)  Pulse: 65 (06/27/17 2004)  Resp: 18 (06/27/17 2004)  BP: (!) 154/81 (06/27/17 2004)  SpO2: 97 % (06/27/17 2004) Vital Signs (24h Range):  Temp:  [97.6 °F (36.4 °C)-98.1 °F (36.7 °C)] 97.6 °F (36.4 °C)  Pulse:  [65-88] 65  Resp:  [17-20] 18  SpO2:  [91 %-100 %] 97 %  BP: (153-174)/(79-95) 154/81     Weight: 126.8 kg (279 lb 8.7 oz)  Body mass index is 35.89 kg/m².    Intake/Output Summary (Last 24 hours) at 06/27/17 2209  Last data filed at 06/27/17 2019   Gross per 24 hour   Intake             1360 ml   Output             1235 ml   Net              125 ml      Physical Exam   Constitutional: He is oriented to person, place, and time. He appears well-developed and well-nourished. He appears distressed (2/2 pain).   Cardiovascular: Normal rate and regular rhythm.    No murmur heard.  Pulmonary/Chest: Effort normal and breath sounds normal. No respiratory distress.   Abdominal: Soft. He exhibits distension. There is tenderness (diffuse).   Musculoskeletal: Normal range of motion. He exhibits edema (BLE).   Neurological: He is alert and oriented to person, place, and time.   Skin: Skin is warm and dry. Rash (erythematous non pruritic rash to lower abdomen) noted.   Dressings to bilateral feet with scant drainage. Venous changes noted to bilateral lower extremities; no erythema.    Psychiatric: He has a normal mood and affect. His behavior is normal.       Significant Labs:   CBC:   Recent Labs  Lab 06/26/17 0447 06/27/17  0336   WBC 3.41* 3.25*   HGB 7.4* 7.1*   HCT 22.7* 21.4*   PLT 65* 55*     CMP:   Recent Labs  Lab 06/26/17 0447 06/27/17  0336   * 134*   K 4.4 4.3    104   CO2 21* 21*   * 162*   BUN 32* 36*   CREATININE 1.4 1.4   CALCIUM 9.4 9.0   PROT 7.4 7.2    ALBUMIN 4.5 4.4   BILITOT 2.3* 2.3*   ALKPHOS 90 83   AST 39 40   ALT 11 12   ANIONGAP 9 9   EGFRNONAA >60.0 >60.0       Significant Imaging: I have reviewed all pertinent imaging results/findings within the past 24 hours.    Assessment/Plan:   #Bilateral lower extremity cellulitis with venous stasis due to streptococcus   #4th toe osteomyelitis  - s/p bone biopsy on 06/24  - restarted on IV Ceftriaxone and Vancomycin by ID  - will probably need 6 weeks of antibiotics through PICC Line   - PICC line placed today, confirmed by CXR  - follow up Bone Biopsy results. Preliminary results show Staph Aureus      # Diabetic type 2 foot ulcers B/L  - podiatry consult, appreciate recs  - X rays B/ L feet: no osteomyelitis     # Decompensated Hep C cirrhosis with ascites  MELD-Na score: 20 at 6/27/2017  3:36 AM  MELD score: 18 at 6/27/2017  3:36 AM  Calculated from:  Serum Creatinine: 1.4 mg/dL at 6/27/2017  3:36 AM  Serum Sodium: 134 mmol/L at 6/27/2017  3:36 AM  Total Bilirubin: 2.3 mg/dL at 6/27/2017  3:36 AM  INR(ratio): 1.6 at 6/27/2017  3:36 AM  Age: 46 years   - hepatology consult  - last viral load 21,000 and has never been treated  - will set up for outpatient follow up     # CKD stage 3 due to uncontrolled DM 2  - seems to be new baseline at 1.5     # DM2 with nephropathy  - Hba1c of 8.2 recently  - levemir 20 units at night and 8 of novolog with meals     # Anemia of CKD stage 3  - stable at 1.4        # Hypoalbuminemia due to moderate protein fernando malnutrition  - PAB 3, glucerna     # Hypomagnesemia   - replaced, resolved     VTE Risk Mitigation         Ordered     Place BASILIO hose  Until discontinued      06/24/17 1443     Low Risk of VTE  Once      06/16/17 2243          Hema Ferrara MD  Department of Hospital Medicine   [facility]

## 2017-06-28 NOTE — ANESTHESIA RELEASE NOTE
Anesthesia Release from PACU Note    Patient: Leif Nino    Procedure(s) Performed: Procedure(s) (LRB):  ERCP (N/A)  ULTRASOUND-ENDOSCOPIC-UPPER (N/A)    Anesthesia type: general    Post pain: Adequate analgesia    Post assessment: no apparent anesthetic complications, tolerated procedure well and no evidence of recall    Post vital signs:   Vitals:    06/28/17 1530   BP: (!) 162/81   Pulse: 67   Resp: 12   Temp:         Level of consciousness: awake, alert  and oriented    Nausea/Vomiting: no nausea/no vomiting    Complications: none    Airway Patency: patent    Respiratory: unassisted, spontaneous ventilation    Cardiovascular: stable and blood pressure at baseline    Hydration: euvolemic

## 2017-06-28 NOTE — H&P
Pre-Procedure H and P Addendum    Patient seen and examined.  History and exam unchanged from prior history and physical.      Procedure: EUS +/- ERCP  Indication: Suspected choledocholithiasis  ASA Class: III  Airway: normal  Neck Mobility: full range of motion  Mallampatti score: per anesthesia  Anesthesia Plan: General    The impression and plan was discussed in detail with the patient. All questions have been answered and the patient voices understanding of our plan at this point. The risk of the procedure was discussed in detail which includes but not limited to bleeding, infection, perforation in some cases requiring surgery with its spectrum of complications.

## 2017-06-28 NOTE — NURSING TRANSFER
Nursing Transfer Note      6/28/2017     Transfer 1012    Transfer via stretcher    Transfer with iv pole. o2 tank    Transported by tech x2    Medicines sent:na    Chart send with patient: yes    Patient reassessed at: 6/28 @ 7303

## 2017-06-28 NOTE — NURSING
Platelets started at 1139, endo notified at start of administration. Pt to be sent to endo with platelets infusing.

## 2017-06-28 NOTE — PLAN OF CARE
Problem: Physical Therapy Goal  Goal: Physical Therapy Goal  Goals to be met by: 17     Patient will increase functional independence with mobility by performin. Supine to sit with Stand-by Assistance - not met  2. Sit to supine with Stand-by Assistance - not met  3. Sit to stand transfer with Stand-by Assistance - not met  4. Bed to chair transfer with Stand-by Assistance using LRAD - not met  5. Gait  x 100 feet with Contact Guard Assistance using LRAD - Met   Update: Gait x 200ft Christal with rolling walker - not met  6. Ascend/descend 5 stair with right Handrails Moderate Assistance - not met  7. Lower extremity exercise program x 15 reps per handout, with independence - not met       Goals remain appropriate. Continue with plan of care.

## 2017-06-28 NOTE — ANESTHESIA POSTPROCEDURE EVALUATION
"Anesthesia Post Evaluation    Patient: Leif Nino    Procedure(s) Performed: Procedure(s) (LRB):  ERCP (N/A)  ULTRASOUND-ENDOSCOPIC-UPPER (N/A)    Final Anesthesia Type: general  Patient location during evaluation: PACU  Patient participation: Yes- Able to Participate  Level of consciousness: awake and alert  Post-procedure vital signs: reviewed and stable  Pain management: adequate  Airway patency: patent  PONV status at discharge: No PONV  Anesthetic complications: no      Cardiovascular status: hemodynamically stable  Respiratory status: unassisted  Hydration status: euvolemic  Follow-up not needed.        Visit Vitals  BP (!) 162/81   Pulse 67   Temp 36 °C (96.8 °F) (Axillary)   Resp 12   Ht 6' 2" (1.88 m)   Wt 126.6 kg (279 lb)   SpO2 96%   BMI 35.82 kg/m²       Pain/Dmitry Score: Pain Assessment Performed: Yes (6/28/2017  2:45 PM)  Presence of Pain: non-verbal indicators absent (6/28/2017  2:45 PM)  Pain Rating Prior to Med Admin: 7 (6/28/2017  8:19 AM)  Pain Rating Post Med Admin: 4 (6/28/2017  9:00 AM)      "

## 2017-06-28 NOTE — TRANSFER OF CARE
"Anesthesia Transfer of Care Note    Patient: Leif Nino    Procedure(s) Performed: Procedure(s) (LRB):  ERCP (N/A)  ULTRASOUND-ENDOSCOPIC-UPPER (N/A)    Patient location: PACU    Anesthesia Type: general    Transport from OR: Transported from OR on 6-10 L/min O2 by face mask with adequate spontaneous ventilation    Post pain: adequate analgesia    Post assessment: no apparent anesthetic complications and tolerated procedure well    Post vital signs: stable    Level of consciousness: awake, alert and oriented    Nausea/Vomiting: no nausea/vomiting    Complications: none    Transfer of care protocol was followed      Last vitals:   Visit Vitals  BP (!) 150/95   Pulse 69   Temp 36.3 °C (97.4 °F) (Oral)   Resp 18   Ht 6' 2" (1.88 m)   Wt 126.6 kg (279 lb)   SpO2 98%   BMI 35.82 kg/m²     "

## 2017-06-28 NOTE — PATIENT INSTRUCTIONS
Discharge Summary/Instructions for after an ERCP  Patient Name: Leif Nino  Patient MRN: [id number]  Patient YOB: 1970 Wednesday, June 28, 2017  Swapnil Esquivel MD  1.  Do Not eat or drink anything for 1 hour.  Try sips of water first.  If   tolerated, resume your regular diet or one recommended by your physician.  2.  Do not drive, operate machinery, make critical decisions, or do   activities that require coordination or balance for 24 hours.  3.  You may experience a sore throat for 24 to 48 hours.  You may use throat   lozenges or gargle with warm salt water to relieve the discomfort.  4.  Because air was put into your stomach during the procedure, you may   experience some belching.  5.  Go directly to the emergency room if you notice any of the following:   Chills and/or fever over 101 F   Persistent vomiting or vomiting with blood   Severe abdominal pain, other than gas cramps   Severe chest pain   Black, tarry stools  Your doctor recommends these additional instructions:  Watch for symptoms of pancreatitis, bleeding, perforation and cholangitis.   Your clinical course will be observed.  If you have any questions or problems, please call your physician.  EMERGENCY PHONE NUMBER: [phone]  LAB RESULTS: [phone]  Swapnil Esquivel MD  6/28/2017 2:37:27 PM  This report has been verified and signed electronically.

## 2017-06-28 NOTE — PATIENT INSTRUCTIONS
Discharge Summary/Instructions for after an Upper EUS  Patient Name: Leif Nino  Patient MRN: [id number]  Patient YOB: 1970 Wednesday, June 28, 2017  Swapnil Esquivel MD  1.  Do Not eat or drink anything for 1 hour.  Try sips of water first.  If   tolerated, resume your regular diet or one recommended by your physician.  2.  Do not drive, operate machinery, make critical decisions, or do   activities that require coordination or balance for 24 hours.  3.  You may experience a sore throat for 24 to 48 hours.  You may use throat   lozenges or gargle with warm salt water to relieve the discomfort.  4.  Because air was put into your stomach during the procedure, you may   experience some belching.  5.  Go directly to the emergency room if you notice any of the following:   Chills and/or fever over 101 F   Persistent vomiting or vomiting with blood   Severe abdominal pain, other than gas cramps   Severe chest pain   Black, tarry stools  Your doctor recommends these additional instructions:  Your physician has recommended an ERCP today.  If you have any questions on the above instructions, call the GI Lab and ask   for an endoscopy nurse.  If you have any problems, please call your physician.  EMERGENCY PHONE NUMBER: [phone]  LAB RESULTS: [phone]  Swapnil Esquivel MD  6/28/2017 1:47:27 PM  This report has been verified and signed electronically.

## 2017-06-28 NOTE — PLAN OF CARE
Problem: Patient Care Overview  Goal: Plan of Care Review  Outcome: Ongoing (interventions implemented as appropriate)  Remains AOx4, VSS, c/o pain throughout shift to RUQ requiring pain medication - reported moderate relief. 1 unit of FFP and platelets each given this shift. NPO since MN for ERCP in AM. No acute events. Will continue to monitor and assess.

## 2017-06-28 NOTE — DISCHARGE INSTRUCTIONS
ERCP  (Endoscopic Retrograde Cholangiopancreatography)     The endoscope moves from the mouth, through the upper digestive tract, to the common bile duct opening.          A balloon at the tip of a catheter opens above the stone. The stone is pulled out of the duct and leaves your body through stool.       ERCP stands for endoscopic retrograde cholangiopancreatography. This procedure is used to view the biliary and pancreatic ducts.  It is used to evaluate diseases that affect the ducts and to help locate and treat blockages that may be present.  How do I get ready for ERCP?  Talk to your doctor about any health problems or allergies you have.  Ask your doctor about the risks of ERCP. These include pancreatitis, infection, bleeding, and tearing the bowel.  Be sure your doctor knows about all medicines you take. You may be told to stop taking some or all of them before the test. This includes:  All prescription medicines  Over-the-counter medicines that don't need a prescription   Any street drugs you may use   Herbs, vitamins, kelp, seaweed, cough syrups, and other supplements  You may be asked to take antibiotics ahead of time.  Avoid blood-thinning medicines for 1 week before ERCP.  Do not eat or drink for 8 to 12 hours before ERCP.  Have someone ready to take you home.  What happens during the procedure?  You may be given medicine through an IV to help you relax.  Your throat is numbed.  A thin tube (endoscope) is placed into your throat. It is advanced from the throat through the upper digestive tract, to the common bile duct opening. The endoscope lets the doctor see the common bile and pancreatic ducts on a video screen.  A cut may be made where the common bile duct opens to the duodenum to make it easier to remove stones.  As blockages are located and removed, X-rays are taken.  Contrast dye is injected through a catheter to make the duct show up better on the X-rays.  An imaging technique that uses X-rays  to obtain real-time moving images of internal organs is called fluoroscopy. Fluoroscopy is used to watch and guide progress of the procedure.   In some cases, a plastic tube (stent) is placed to hold the ducts open. This stent may be replaced or removed in 6 to 8 weeks. Or it may be left to fall out on its own and be passed in the stool.  What happens after ERCP?  Your doctor may discuss the test results right away or a return visit may be scheduled. You may go home the same day or spend the night in the hospital. Follow these tips:  You can return to a normal routine the day after the ERCP.  If a cut was made in the duct, avoid blood-thinning medicines such as aspirin for 5 to 7 days.  Call your doctor right away if you have a fever or abdominal pain. These may be signs of an infection or torn bowel.   Date Last Reviewed: 6/19/2015  © 2184-9228 [employer]. [address] [place] [address]. All rights reserved. This information is not intended as a substitute for professional medical care. Always follow your healthcare professional's instructions.        Endoscopic Ultrasound (EUS)    An endoscopic ultrasound (EUS) is a test to look at the inside of your gastrointestinal (GI) tract. It's commonly used to look for cancers or growths in the esophagus, stomach, pancreas, liver, and rectum. It can help to stage cancer (see how advanced a cancer is). EUS may also be used to help diagnose certain diseases or to drain cysts or abscesses.  What is EUS?  EUS shows both ultrasound images and live video of the GI tract. During the test, a flexible tube called an endoscope (scope) is used. At the end of the scope is a tiny video camera and light. The video camera sends live images to a monitor. The scope also contains a very small ultrasound device. This uses sound waves to create images and send them to a monitor.  A needle is passed through the scope. The needle can be used take a small sample of  tissue for testing. This is called a biopsy. The needle can be used to take a sample of fluid. This is called fine-needle aspiration (FNA).  Risks and possible complications of EUS  Risks and possible complications include the following:  · Bleeding  · Infection  · A perforation (hole) in the digestive tract   · Risks of sedation or anesthesia   Before the test  Be prepared prior to the test:  · Tell your healthcare provider what medicine you take. This includes vitamins, herbs, and over-the-counter medicine. It also includes any blood thinners, such as warfarin, clopidogrel, ibuprofen, or daily aspirin. Ask your healthcare provider if you need to stop taking some or all of them before the test.  · You may be prescribed antibiotics to take before or after the test. This depends on the area being studied and what is done during the test. These medicines help prevent infection.  · Carefully follow the instructions for preparing for the test to make sure results are accurate. Instructions may include:  ¨ If youre having an EUS of the upper GI tract (esophagus, stomach, duodenum, pancreas, liver):  § Do not eat or drink for 6 hours before the test.  ¨ If youre having an EUS of the lower GI tract (rectum):  § Before the test, do bowel prep as instructed to clean your rectum of stool. This may involve a clear liquid diet and using a laxative (liquid or pills) the night before the test. Or it may mean doing one or more enemas the morning of the test.  § Do not eat or drink for 6 hours before the test.  · Be sure to arrive on time at the facility. Bring your identification and health insurance card. Leave valuables at home. If you have them, bring X-rays or other test results with you.  Let the healthcare provider know  For your safety, tell the healthcare provider if you:  · Take insulin. Your dose may need to be changed on the day of your test.  · Are allergic to latex.  · Have any other allergies.  · Are taking blood  thinners.   During the test  An endoscopic ultrasound usually takes place in a hospital. The procedure itself may take 1 to 2 hours. You will likely go home soon afterward. During the test:  · You lie on your left side on an exam table.  · An intravenous (IV) line will be put into a vein in your arm or hand. This line supplies fluids and medicines. To keep you comfortable during the test, you will be given a sedative medicine. This medicine prevents discomfort and will make you sleepy.  · If you are having an EUS of the upper GI tract, local anesthetic may be sprayed in your throat. This will help you be more comfortable as the healthcare provider inserts the scope. The healthcare provider then gently puts the flexible scope into your mouth or nose and down your throat.  · If youre having an EUS of the lower GI tract, the healthcare provider gently puts the flexible scope into your anus.  · During the test, the scope sends live video and ultrasound images from inside your body to nearby monitors. These are used to examine your GI tract. Specialized procedures, such as drainage, are done as needed.  · The healthcare provider may discuss the results with you soon after the test. Biopsy results take several  days.  · In most cases, you can go home within a few hours of the test. When you leave the facility, have an adult family member or friend drive you, even if you don't feel that sleepy.  After the test  Here is what to expect after the test:  · You may feel tired from the sedative. This should wear off by the end of the day.  · If you had an upper digestive endoscopy, your throat may feel sore for a day or two. Over-the-counter sore throat lozenges and spray should help.  · You can eat and drink normally as soon as the test is done.  When to call the healthcare provider  Call your healthcare provider if you notice any of the following:  · Fever of 100.4°F (38.0°C) or higher, or as advised by your healthcare  provider  · Shortness of breath  · Vomiting blood, blood in stool, or black stools  · Coughing or hoarse voice that wont go away   Date Last Reviewed: 7/1/2016  © 2170-7367 [employer]. [address] [place] [address], [place] [address]. All rights reserved. This information is not intended as a substitute for professional medical care. Always follow your healthcare professional's instructions.

## 2017-06-28 NOTE — PLAN OF CARE
Problem: Patient Care Overview  Goal: Plan of Care Review  Outcome: Ongoing (interventions implemented as appropriate)  Pt aaox4 through shift, AVSS on 2L NC. Pt c/o generalized, abdominal and BLE pain through shift, prn oxycodone and dilaudid administered. BLE and left arm elevated when pt laying in bed. NPO through shift for ERCP done today. BG monitored, no insulin required due to NPO. Pt remained free from falls/injury. Continuous ancef infusing to R PICC line @ 20.8ml/hr. Bed in lowest position, wheels locked, call light and personal belongings within reach, avasys camera monitoring for pt safety due. WCTM.

## 2017-06-28 NOTE — SUBJECTIVE & OBJECTIVE
Interval History: Patient seen and examined at bedside. CT scan and MRI Of the abdomen revealed choledocholithiasis. ERCP/EUS tomorrow.     Review of Systems   Constitutional: Negative for chills, diaphoresis and fever.   Respiratory: Negative for cough and shortness of breath.    Cardiovascular: Positive for leg swelling. Negative for chest pain.   Gastrointestinal: Positive for abdominal distention and abdominal pain. Negative for diarrhea, nausea and vomiting.   Genitourinary: Negative for dysuria, frequency and hematuria.   Musculoskeletal: Positive for arthralgias. Negative for back pain and myalgias.        + lower extremity pain   Skin: Positive for wound. Negative for color change and rash.   Neurological: Positive for weakness. Negative for dizziness, numbness and headaches.   Psychiatric/Behavioral: Negative for agitation.     Objective:     Vital Signs (Most Recent):  Temp: 97.6 °F (36.4 °C) (06/27/17 2004)  Pulse: 65 (06/27/17 2004)  Resp: 18 (06/27/17 2004)  BP: (!) 154/81 (06/27/17 2004)  SpO2: 97 % (06/27/17 2004) Vital Signs (24h Range):  Temp:  [97.6 °F (36.4 °C)-98.1 °F (36.7 °C)] 97.6 °F (36.4 °C)  Pulse:  [65-88] 65  Resp:  [17-20] 18  SpO2:  [91 %-100 %] 97 %  BP: (153-174)/(79-95) 154/81     Weight: 126.8 kg (279 lb 8.7 oz)  Body mass index is 35.89 kg/m².    Intake/Output Summary (Last 24 hours) at 06/27/17 2209  Last data filed at 06/27/17 2019   Gross per 24 hour   Intake             1360 ml   Output             1235 ml   Net              125 ml      Physical Exam   Constitutional: He is oriented to person, place, and time. He appears well-developed and well-nourished. He appears distressed (2/2 pain).   Cardiovascular: Normal rate and regular rhythm.    No murmur heard.  Pulmonary/Chest: Effort normal and breath sounds normal. No respiratory distress.   Abdominal: Soft. He exhibits distension. There is tenderness (diffuse).   Musculoskeletal: Normal range of motion. He exhibits edema (BLE).    Neurological: He is alert and oriented to person, place, and time.   Skin: Skin is warm and dry. Rash (erythematous non pruritic rash to lower abdomen) noted.   Dressings to bilateral feet with scant drainage. Venous changes noted to bilateral lower extremities; no erythema.    Psychiatric: He has a normal mood and affect. His behavior is normal.       Significant Labs:   CBC:   Recent Labs  Lab 06/26/17 0447 06/27/17  0336   WBC 3.41* 3.25*   HGB 7.4* 7.1*   HCT 22.7* 21.4*   PLT 65* 55*     CMP:   Recent Labs  Lab 06/26/17 0447 06/27/17  0336   * 134*   K 4.4 4.3    104   CO2 21* 21*   * 162*   BUN 32* 36*   CREATININE 1.4 1.4   CALCIUM 9.4 9.0   PROT 7.4 7.2   ALBUMIN 4.5 4.4   BILITOT 2.3* 2.3*   ALKPHOS 90 83   AST 39 40   ALT 11 12   ANIONGAP 9 9   EGFRNONAA >60.0 >60.0       Significant Imaging: I have reviewed all pertinent imaging results/findings within the past 24 hours.

## 2017-06-28 NOTE — PROGRESS NOTES
[facility] Hospital Medicine  Progress Note    Patient Name: Leif Nino  MRN: [id number]  Patient Class: IP- Inpatient   Admission Date: 6/16/2017  Length of Stay: 12 days  Attending Physician: Hema Ferrara MD  Primary Care Provider: [facility] Medicine Team: [facility] MED A Hema Ferrara MD    Subjective:     Principal Problem:Bilateral cellulitis of lower leg    HPI:  Mr. Nino is a 45 yo WM w/ h/o HCV cirrhosis & poorly controlled DM complicated by BL diabetic ulceration s/p 6/1 R 4th toe debridement who presents c/o generalized weakness w/ associated worsening BL LE edema.  He reports that 5d ago he began having dysuria.  Two day afterwards he woke up noticing his chronic BL LE edema was significantly worse with an associated erythematous, painful rash.  Later that day he began developing dyspnea on exertion that progressively worsened to the point where he is now dyspneic at rest.  Patient denies any vomiting but has mild, persistant nausea that has worsened acutely over the last 3 days.  He reports no fevers, chills, abdominal pain, worsening abdominal distention, diarrhea.  Patient states he has had no productive cough or wheezes.    Hospital Course:  No notes on file    Interval History: Patient seen and examined at bedside. No acute events overnight. ERCP with EUS today after transfusion of ffp and plts.     Review of Systems   Constitutional: Negative for chills, diaphoresis and fever.   Respiratory: Negative for cough and shortness of breath.    Cardiovascular: Positive for leg swelling. Negative for chest pain.   Gastrointestinal: Positive for abdominal distention and abdominal pain. Negative for diarrhea, nausea and vomiting.   Genitourinary: Negative for dysuria, frequency and hematuria.   Musculoskeletal: Positive for arthralgias. Negative for back pain and myalgias.        + lower extremity pain   Skin: Positive for wound. Negative for color change  and rash.   Neurological: Positive for weakness. Negative for dizziness, numbness and headaches.   Psychiatric/Behavioral: Negative for agitation.     Objective:     Vital Signs (Most Recent):  Temp: 97.4 °F (36.3 °C) (06/28/17 1301)  Pulse: 69 (06/28/17 1301)  Resp: 18 (06/28/17 1301)  BP: (!) 150/95 (06/28/17 1301)  SpO2: 98 % (06/28/17 1301) Vital Signs (24h Range):  Temp:  [97.4 °F (36.3 °C)-98.1 °F (36.7 °C)] 97.4 °F (36.3 °C)  Pulse:  [] 69  Resp:  [18-22] 18  SpO2:  [93 %-100 %] 98 %  BP: (139-178)/(70-95) 150/95     Weight: 126.6 kg (279 lb)  Body mass index is 35.82 kg/m².    Intake/Output Summary (Last 24 hours) at 06/28/17 1444  Last data filed at 06/28/17 1415   Gross per 24 hour   Intake          1330.83 ml   Output             1070 ml   Net           260.83 ml      Physical Exam   Constitutional: He is oriented to person, place, and time. He appears well-developed and well-nourished. He appears distressed (2/2 pain).   Cardiovascular: Normal rate and regular rhythm.    No murmur heard.  Pulmonary/Chest: Effort normal and breath sounds normal. No respiratory distress.   Abdominal: Soft. He exhibits distension. There is tenderness (diffuse).   Musculoskeletal: Normal range of motion. He exhibits edema (BLE).   Neurological: He is alert and oriented to person, place, and time.   Skin: Skin is warm and dry. Rash (erythematous non pruritic rash to lower abdomen) noted.   Dressings to bilateral feet with scant drainage. Venous changes noted to bilateral lower extremities; no erythema.    Psychiatric: He has a normal mood and affect. His behavior is normal.       Significant Labs:   CBC:   Recent Labs  Lab 06/27/17  0336 06/28/17  0442   WBC 3.25* 3.06*   HGB 7.1* 7.1*   HCT 21.4* 21.4*   PLT 55* 65*     CMP:   Recent Labs  Lab 06/27/17  0336 06/28/17  0442   * 134*   K 4.3 4.3    104   CO2 21* 19*   * 116*   BUN 36* 41*   CREATININE 1.4 1.6*   CALCIUM 9.0 9.5   PROT 7.2 7.3   ALBUMIN  4.4 4.5   BILITOT 2.3* 2.1*   ALKPHOS 83 82   AST 40 48*   ALT 12 14   ANIONGAP 9 11   EGFRNONAA >60.0 51.6*       Significant Imaging: I have reviewed all pertinent imaging results/findings within the past 24 hours.    Assessment/Plan:   Bilateral lower extremity cellulitis with venous stasis due to streptococcus   #4th toe osteomyelitis  - s/p bone biopsy on 06/24  - restarted on IV Ceftriaxone and Vancomycin by ID  - will probably need 6 weeks of antibiotics through PICC Line   - PICC line placed, confirmed by CXR  - follow up Bone Biopsy results. Preliminary results show Staph Aureus     #Choledocholithiasis  - ERCP/EUS on 06/28/17  Complete removal was accomplished by biliary sphincterotomy and   balloon extraction.  A biliary sphincterotomy was performed    # Diabetic type 2 foot ulcers B/L  - podiatry consult, appreciate recs  - X rays B/ L feet: no osteomyelitis     # Decompensated Hep C cirrhosis with ascites  MELD-Na score: 20 at 6/28/2017  4:42 AM  MELD score: 18 at 6/28/2017  4:42 AM  Calculated from:  Serum Creatinine: 1.6 mg/dL at 6/28/2017  4:42 AM  Serum Sodium: 134 mmol/L at 6/28/2017  4:42 AM  Total Bilirubin: 2.1 mg/dL at 6/28/2017  4:42 AM  INR(ratio): 1.5 at 6/28/2017  4:42 AM  Age: 46 years  - will set up for outpatient follow up     # CKD stage 3 due to uncontrolled DM 2  - seems to be new baseline at 1.5     # DM2 with nephropathy  - Hba1c of 8.2 recently  - levemir 20 units at night and 8 of novolog with meals     # Anemia of CKD stage 3  - stable at 1.4        # Hypoalbuminemia due to moderate protein fernando malnutrition  - PAB 3, glucerna     # Hypomagnesemia   - replaced, resolved        VTE Risk Mitigation         Ordered     Place BASILIO hose  Until discontinued      06/24/17 1443     Low Risk of VTE  Once      06/16/17 5093          Hema Ferrara MD  Department of Hospital Medicine   [facility]

## 2017-06-28 NOTE — SUBJECTIVE & OBJECTIVE
Interval History: Patient seen and examined at bedside. No acute events overnight. ERCP with EUS today after transfusion of ffp and plts.     Review of Systems   Constitutional: Negative for chills, diaphoresis and fever.   Respiratory: Negative for cough and shortness of breath.    Cardiovascular: Positive for leg swelling. Negative for chest pain.   Gastrointestinal: Positive for abdominal distention and abdominal pain. Negative for diarrhea, nausea and vomiting.   Genitourinary: Negative for dysuria, frequency and hematuria.   Musculoskeletal: Positive for arthralgias. Negative for back pain and myalgias.        + lower extremity pain   Skin: Positive for wound. Negative for color change and rash.   Neurological: Positive for weakness. Negative for dizziness, numbness and headaches.   Psychiatric/Behavioral: Negative for agitation.     Objective:     Vital Signs (Most Recent):  Temp: 97.4 °F (36.3 °C) (06/28/17 1301)  Pulse: 69 (06/28/17 1301)  Resp: 18 (06/28/17 1301)  BP: (!) 150/95 (06/28/17 1301)  SpO2: 98 % (06/28/17 1301) Vital Signs (24h Range):  Temp:  [97.4 °F (36.3 °C)-98.1 °F (36.7 °C)] 97.4 °F (36.3 °C)  Pulse:  [] 69  Resp:  [18-22] 18  SpO2:  [93 %-100 %] 98 %  BP: (139-178)/(70-95) 150/95     Weight: 126.6 kg (279 lb)  Body mass index is 35.82 kg/m².    Intake/Output Summary (Last 24 hours) at 06/28/17 1444  Last data filed at 06/28/17 1415   Gross per 24 hour   Intake          1330.83 ml   Output             1070 ml   Net           260.83 ml      Physical Exam   Constitutional: He is oriented to person, place, and time. He appears well-developed and well-nourished. He appears distressed (2/2 pain).   Cardiovascular: Normal rate and regular rhythm.    No murmur heard.  Pulmonary/Chest: Effort normal and breath sounds normal. No respiratory distress.   Abdominal: Soft. He exhibits distension. There is tenderness (diffuse).   Musculoskeletal: Normal range of motion. He exhibits edema (BLE).    Neurological: He is alert and oriented to person, place, and time.   Skin: Skin is warm and dry. Rash (erythematous non pruritic rash to lower abdomen) noted.   Dressings to bilateral feet with scant drainage. Venous changes noted to bilateral lower extremities; no erythema.    Psychiatric: He has a normal mood and affect. His behavior is normal.       Significant Labs:   CBC:   Recent Labs  Lab 06/27/17  0336 06/28/17  0442   WBC 3.25* 3.06*   HGB 7.1* 7.1*   HCT 21.4* 21.4*   PLT 55* 65*     CMP:   Recent Labs  Lab 06/27/17  0336 06/28/17  0442   * 134*   K 4.3 4.3    104   CO2 21* 19*   * 116*   BUN 36* 41*   CREATININE 1.4 1.6*   CALCIUM 9.0 9.5   PROT 7.2 7.3   ALBUMIN 4.4 4.5   BILITOT 2.3* 2.1*   ALKPHOS 83 82   AST 40 48*   ALT 12 14   ANIONGAP 9 11   EGFRNONAA >60.0 51.6*       Significant Imaging: I have reviewed all pertinent imaging results/findings within the past 24 hours.

## 2017-06-29 PROBLEM — M86.9 OSTEOMYELITIS DUE TO STAPHYLOCOCCUS AUREUS: Status: ACTIVE | Noted: 2017-06-01

## 2017-06-29 PROBLEM — A49.01 OSTEOMYELITIS DUE TO STAPHYLOCOCCUS AUREUS: Status: ACTIVE | Noted: 2017-06-01

## 2017-06-29 LAB
ABO + RH BLD: NORMAL
ALBUMIN SERPL BCP-MCNC: 4.4 G/DL
ALP SERPL-CCNC: 115 U/L
ALT SERPL W/O P-5'-P-CCNC: 17 U/L
ANION GAP SERPL CALC-SCNC: 11 MMOL/L
ANISOCYTOSIS BLD QL SMEAR: SLIGHT
AST SERPL-CCNC: 72 U/L
BASOPHILS # BLD AUTO: 0.04 K/UL
BASOPHILS NFR BLD: 1.4 %
BILIRUB SERPL-MCNC: 2.8 MG/DL
BLD GP AB SCN CELLS X3 SERPL QL: NORMAL
BLD PROD TYP BPU: NORMAL
BLOOD UNIT EXPIRATION DATE: NORMAL
BLOOD UNIT TYPE CODE: 6200
BLOOD UNIT TYPE: NORMAL
BUN SERPL-MCNC: 43 MG/DL
CALCIUM SERPL-MCNC: 9.2 MG/DL
CHLORIDE SERPL-SCNC: 105 MMOL/L
CO2 SERPL-SCNC: 18 MMOL/L
CODING SYSTEM: NORMAL
CREAT SERPL-MCNC: 1.8 MG/DL
DIFFERENTIAL METHOD: ABNORMAL
DISPENSE STATUS: NORMAL
EOSINOPHIL # BLD AUTO: 0.1 K/UL
EOSINOPHIL NFR BLD: 3.6 %
ERYTHROCYTE [DISTWIDTH] IN BLOOD BY AUTOMATED COUNT: 18.1 %
EST. GFR  (AFRICAN AMERICAN): 51.8 ML/MIN/1.73 M^2
EST. GFR  (NON AFRICAN AMERICAN): 44.8 ML/MIN/1.73 M^2
GLUCOSE SERPL-MCNC: 142 MG/DL
HCT VFR BLD AUTO: 21 %
HGB BLD-MCNC: 6.8 G/DL
HYPOCHROMIA BLD QL SMEAR: ABNORMAL
INR PPP: 1.8
LYMPHOCYTES # BLD AUTO: 0.4 K/UL
LYMPHOCYTES NFR BLD: 15.6 %
MAGNESIUM SERPL-MCNC: 2 MG/DL
MCH RBC QN AUTO: 28.3 PG
MCHC RBC AUTO-ENTMCNC: 32.4 %
MCV RBC AUTO: 88 FL
MONOCYTES # BLD AUTO: 0.4 K/UL
MONOCYTES NFR BLD: 13.4 %
NEUTROPHILS # BLD AUTO: 1.8 K/UL
NEUTROPHILS NFR BLD: 66 %
OVALOCYTES BLD QL SMEAR: ABNORMAL
PHOSPHATE SERPL-MCNC: 4.2 MG/DL
PLATELET # BLD AUTO: 66 K/UL
PMV BLD AUTO: 10.7 FL
POCT GLUCOSE: 114 MG/DL (ref 70–110)
POCT GLUCOSE: 159 MG/DL (ref 70–110)
POCT GLUCOSE: 169 MG/DL (ref 70–110)
POCT GLUCOSE: 172 MG/DL (ref 70–110)
POIKILOCYTOSIS BLD QL SMEAR: SLIGHT
POLYCHROMASIA BLD QL SMEAR: ABNORMAL
POTASSIUM SERPL-SCNC: 4.6 MMOL/L
PROT SERPL-MCNC: 7 G/DL
PROTHROMBIN TIME: 17.7 SEC
RBC # BLD AUTO: 2.4 M/UL
SODIUM SERPL-SCNC: 134 MMOL/L
TRANS ERYTHROCYTES VOL PATIENT: NORMAL ML
WBC # BLD AUTO: 2.76 K/UL

## 2017-06-29 PROCEDURE — 94760 N-INVAS EAR/PLS OXIMETRY 1: CPT

## 2017-06-29 PROCEDURE — 25000003 PHARM REV CODE 250: Performed by: HOSPITALIST

## 2017-06-29 PROCEDURE — 25000242 PHARM REV CODE 250 ALT 637 W/ HCPCS: Performed by: NURSE PRACTITIONER

## 2017-06-29 PROCEDURE — P9021 RED BLOOD CELLS UNIT: HCPCS

## 2017-06-29 PROCEDURE — 83735 ASSAY OF MAGNESIUM: CPT

## 2017-06-29 PROCEDURE — 36430 TRANSFUSION BLD/BLD COMPNT: CPT

## 2017-06-29 PROCEDURE — 86920 COMPATIBILITY TEST SPIN: CPT

## 2017-06-29 PROCEDURE — 85610 PROTHROMBIN TIME: CPT

## 2017-06-29 PROCEDURE — 94640 AIRWAY INHALATION TREATMENT: CPT

## 2017-06-29 PROCEDURE — 80053 COMPREHEN METABOLIC PANEL: CPT

## 2017-06-29 PROCEDURE — 63600175 PHARM REV CODE 636 W HCPCS: Performed by: HOSPITALIST

## 2017-06-29 PROCEDURE — 86901 BLOOD TYPING SEROLOGIC RH(D): CPT

## 2017-06-29 PROCEDURE — 20600001 HC STEP DOWN PRIVATE ROOM

## 2017-06-29 PROCEDURE — 84100 ASSAY OF PHOSPHORUS: CPT

## 2017-06-29 PROCEDURE — 36415 COLL VENOUS BLD VENIPUNCTURE: CPT

## 2017-06-29 PROCEDURE — 25000003 PHARM REV CODE 250: Performed by: PHYSICIAN ASSISTANT

## 2017-06-29 PROCEDURE — 85025 COMPLETE CBC W/AUTO DIFF WBC: CPT

## 2017-06-29 PROCEDURE — P9047 ALBUMIN (HUMAN), 25%, 50ML: HCPCS | Performed by: HOSPITALIST

## 2017-06-29 PROCEDURE — 63600175 PHARM REV CODE 636 W HCPCS: Performed by: PHYSICIAN ASSISTANT

## 2017-06-29 PROCEDURE — 97530 THERAPEUTIC ACTIVITIES: CPT

## 2017-06-29 PROCEDURE — 99233 SBSQ HOSP IP/OBS HIGH 50: CPT | Mod: ,,, | Performed by: HOSPITALIST

## 2017-06-29 PROCEDURE — 97110 THERAPEUTIC EXERCISES: CPT

## 2017-06-29 PROCEDURE — 97116 GAIT TRAINING THERAPY: CPT

## 2017-06-29 PROCEDURE — 86900 BLOOD TYPING SEROLOGIC ABO: CPT

## 2017-06-29 RX ADMIN — INSULIN ASPART 8 UNITS: 100 INJECTION, SOLUTION INTRAVENOUS; SUBCUTANEOUS at 01:06

## 2017-06-29 RX ADMIN — HYDROMORPHONE HYDROCHLORIDE 1 MG: 1 INJECTION, SOLUTION INTRAMUSCULAR; INTRAVENOUS; SUBCUTANEOUS at 10:06

## 2017-06-29 RX ADMIN — QUETIAPINE FUMARATE 100 MG: 25 TABLET ORAL at 10:06

## 2017-06-29 RX ADMIN — Medication 10 ML: at 11:06

## 2017-06-29 RX ADMIN — DICYCLOMINE HYDROCHLORIDE 20 MG: 20 TABLET ORAL at 05:06

## 2017-06-29 RX ADMIN — CARVEDILOL 12.5 MG: 12.5 TABLET, FILM COATED ORAL at 05:06

## 2017-06-29 RX ADMIN — OXYCODONE HYDROCHLORIDE 15 MG: 5 TABLET ORAL at 04:06

## 2017-06-29 RX ADMIN — DEXTROSE 6 G: 5 SOLUTION INTRAVENOUS at 10:06

## 2017-06-29 RX ADMIN — INSULIN ASPART 8 UNITS: 100 INJECTION, SOLUTION INTRAVENOUS; SUBCUTANEOUS at 09:06

## 2017-06-29 RX ADMIN — ASPIRIN 81 MG: 81 TABLET, COATED ORAL at 09:06

## 2017-06-29 RX ADMIN — INSULIN ASPART 1 UNITS: 100 INJECTION, SOLUTION INTRAVENOUS; SUBCUTANEOUS at 10:06

## 2017-06-29 RX ADMIN — INSULIN ASPART 2 UNITS: 100 INJECTION, SOLUTION INTRAVENOUS; SUBCUTANEOUS at 06:06

## 2017-06-29 RX ADMIN — Medication 10 ML: at 05:06

## 2017-06-29 RX ADMIN — HYDROMORPHONE HYDROCHLORIDE 1 MG: 1 INJECTION, SOLUTION INTRAMUSCULAR; INTRAVENOUS; SUBCUTANEOUS at 01:06

## 2017-06-29 RX ADMIN — HYDROMORPHONE HYDROCHLORIDE 1 MG: 1 INJECTION, SOLUTION INTRAMUSCULAR; INTRAVENOUS; SUBCUTANEOUS at 09:06

## 2017-06-29 RX ADMIN — INSULIN ASPART 8 UNITS: 100 INJECTION, SOLUTION INTRAVENOUS; SUBCUTANEOUS at 06:06

## 2017-06-29 RX ADMIN — OXYCODONE HYDROCHLORIDE 15 MG: 5 TABLET ORAL at 11:06

## 2017-06-29 RX ADMIN — DICYCLOMINE HYDROCHLORIDE 20 MG: 20 TABLET ORAL at 01:06

## 2017-06-29 RX ADMIN — INSULIN ASPART 2 UNITS: 100 INJECTION, SOLUTION INTRAVENOUS; SUBCUTANEOUS at 01:06

## 2017-06-29 RX ADMIN — DICYCLOMINE HYDROCHLORIDE 20 MG: 20 TABLET ORAL at 10:06

## 2017-06-29 RX ADMIN — METRONIDAZOLE 500 MG: 500 INJECTION, SOLUTION INTRAVENOUS at 01:06

## 2017-06-29 RX ADMIN — HYDROMORPHONE HYDROCHLORIDE 1 MG: 1 INJECTION, SOLUTION INTRAMUSCULAR; INTRAVENOUS; SUBCUTANEOUS at 06:06

## 2017-06-29 RX ADMIN — METRONIDAZOLE 500 MG: 500 INJECTION, SOLUTION INTRAVENOUS at 10:06

## 2017-06-29 RX ADMIN — ALBUMIN (HUMAN) 25 G: 12.5 SOLUTION INTRAVENOUS at 05:06

## 2017-06-29 RX ADMIN — INSULIN DETEMIR 10 UNITS: 100 INJECTION, SOLUTION SUBCUTANEOUS at 10:06

## 2017-06-29 RX ADMIN — CARVEDILOL 12.5 MG: 12.5 TABLET, FILM COATED ORAL at 08:06

## 2017-06-29 RX ADMIN — ATORVASTATIN CALCIUM 40 MG: 20 TABLET, FILM COATED ORAL at 09:06

## 2017-06-29 RX ADMIN — IPRATROPIUM BROMIDE AND ALBUTEROL SULFATE 3 ML: .5; 3 SOLUTION RESPIRATORY (INHALATION) at 04:06

## 2017-06-29 RX ADMIN — ALBUMIN (HUMAN) 25 G: 12.5 SOLUTION INTRAVENOUS at 11:06

## 2017-06-29 RX ADMIN — PHYTONADIONE 10 MG: 10 INJECTION, EMULSION INTRAMUSCULAR; INTRAVENOUS; SUBCUTANEOUS at 09:06

## 2017-06-29 RX ADMIN — METRONIDAZOLE 500 MG: 500 INJECTION, SOLUTION INTRAVENOUS at 04:06

## 2017-06-29 NOTE — PLAN OF CARE
Problem: Physical Therapy Goal  Goal: Physical Therapy Goal  Goals to be met by: 17     Patient will increase functional independence with mobility by performin. Supine to sit with Stand-by Assistance - not met  2. Sit to supine with Stand-by Assistance - met  3. Sit to stand transfer with Stand-by Assistance - not met  4. Bed to chair transfer with Stand-by Assistance using LRAD - not met  5. Gait  x 100 feet with Contact Guard Assistance using LRAD - Met   Update: Gait x 200ft Christ[place] with rolling walker - not met  6. Ascend/descend 5 stair with right Handrails Moderate Assistance - not met  7. Lower extremity exercise program x 15 reps per handout, with independence - not met       Outcome: Ongoing (interventions implemented as appropriate)  Goals assessed. Appropriate to improve functional mobility.    Roger Sahu III, DPT  2017

## 2017-06-29 NOTE — PLAN OF CARE
Problem: Occupational Therapy Goal  Goal: Occupational Therapy Goal  Goals to be met by: 6/26/17    Patient will increase functional independence with ADLs by performing:    UE Dressing with Set-up Assistance.  LE Dressing with Minimal Assistance.  Grooming while standing at sink with Supervision.  Toileting from toilet with Supervision for hygiene and clothing management.   Supine to sit with [place].  Stand pivot transfers with Modified [place].  Toilet transfer to toilet with Modified [place].     Outcome: Ongoing (interventions implemented as appropriate)  Con't with POC.    NARINDER Yates

## 2017-06-29 NOTE — PT/OT/SLP PROGRESS
"Occupational Therapy  Treatment    Leif Nino   MRN: [id number]   Admitting Diagnosis: Bilateral cellulitis of lower leg    OT Date of Treatment: 06/29/17   OT Start Time: 1117  OT Stop Time: 1136  OT Total Time (min): 19 min    Billable Minutes:  Therapeutic Exercise 19    General Precautions: Standard, fall  Orthopedic Precautions:    Braces:           Subjective:  Communicated with RN prior to session.  "I just got up with PT - my legs hurt too much to get up again."  Pain/Comfort  Pain Rating 1: 7/10  Location - Side 1: Bilateral  Location - Orientation 1: generalized  Location 1: leg    Objective:  Patient found with: oxygen     Functional Mobility:  Bed Mobility:       Transfers:   Sit <> Stand Assistance: Activity did not occur    Functional Ambulation: Did not occur.    Activities of Daily Living: Pt refused EOB this session 2/2 7/10 LE pain and having just gotten up with PT. Only agreeable to supine therex.      Therapeutic Activities and Exercises:  From supine, performed BUE therex with 3lb dowel and yellow theraband including elbow/shld flexion/extension, 2 x 15 reps.    AM-PAC 6 CLICK ADL   How much help from another person does this patient currently need?   1 = Unable, Total/Dependent Assistance  2 = A lot, Maximum/Moderate Assistance  3 = A little, Minimum/Contact Guard/Supervision  4 = None, Modified [place]/Independent    Putting on and taking off regular lower body clothing? : 2  Bathing (including washing, rinsing, drying)?: 2  Toileting, which includes using toilet, bedpan, or urinal? : 2  Putting on and taking off regular upper body clothing?: 3  Taking care of personal grooming such as brushing teeth?: 3  Eating meals?: 4  Total Score: 16     AM-PAC Raw Score CMS "G-Code Modifier Level of Impairment Assistance   6 % Total / Unable   7 - 8 CM 80 - 100% Maximal Assist   9-13 CL 60 - 80% Moderate Assist   14 - 19 CK 40 - 60% Moderate Assist   20 - 22 CJ 20 - 40% Minimal Assist   23 " CI 1-20% SBA / CGA   24 CH 0% Independent/ Mod I       Patient left supine with all lines intact and call button in reach    ASSESSMENT:  Leif Nino is a 46 y.o. male with a medical diagnosis of Bilateral cellulitis of lower leg and presents with c/o 7/10 pain and fatigue this session which limited pt's participation in OT session to only supine UE therex. Pt continues to require OT skilled services due to decreased (I) in ADLs, functional mobility & t/fs as well as decreased overall strength, ROM endurance and balance.     Rehab identified problem list/impairments: Rehab identified problem list/impairments: weakness, impaired endurance, impaired self care skills, pain, decreased safety awareness, decreased upper extremity function, decreased lower extremity function, gait instability, impaired functional mobilty, impaired balance, edema    Rehab potential is fair.    Activity tolerance: Poor    Discharge recommendations: Discharge Facility/Level Of Care Needs: nursing facility, skilled     Barriers to discharge: Barriers to Discharge: Decreased caregiver support, Inaccessible home environment    Equipment recommendations: walker, rolling     GOALS:    Occupational Therapy Goals        Problem: Occupational Therapy Goal    Goal Priority Disciplines Outcome Interventions   Occupational Therapy Goal     OT, PT/OT Ongoing (interventions implemented as appropriate)    Description:  Goals to be met by: 6/26/17    Patient will increase functional independence with ADLs by performing:    UE Dressing with Set-up Assistance.  LE Dressing with Minimal Assistance.  Grooming while standing at sink with Supervision.  Toileting from toilet with Supervision for hygiene and clothing management.   Supine to sit with [place].  Stand pivot transfers with Modified [place].  Toilet transfer to toilet with Modified [place].                      Plan:  Patient to be seen 3 x/week to address the above listed problems via  self-care/home management, community/work re-entry, therapeutic activities, therapeutic exercises  Plan of Care expires: 07/19/17  Plan of Care reviewed with: patient         NARINDER Yates  06/29/2017

## 2017-06-29 NOTE — PROGRESS NOTES
[facility] Hospital Medicine  Progress Note    Patient Name: Leif Nino  MRN: [id number]  Patient Class: IP- Inpatient   Admission Date: 6/16/2017  Length of Stay: 13 days  Attending Physician: Hema Ferrara MD  Primary Care Provider: [facility] Medicine Team: [facility] [place] Hema Ferrara MD    Subjective:     Principal Problem:Bilateral cellulitis of lower leg    HPI:  Mr. Nino is a 45 yo WM w/ h/o HCV cirrhosis & poorly controlled DM complicated by BL diabetic ulceration s/p 6/1 R 4th toe debridement who presents c/o generalized weakness w/ associated worsening BL LE edema.  He reports that 5d ago he began having dysuria.  Two day afterwards he woke up noticing his chronic BL LE edema was significantly worse with an associated erythematous, painful rash.  Later that day he began developing dyspnea on exertion that progressively worsened to the point where he is now dyspneic at rest.  Patient denies any vomiting but has mild, persistant nausea that has worsened acutely over the last 3 days.  He reports no fevers, chills, abdominal pain, worsening abdominal distention, diarrhea.  Patient states he has had no productive cough or wheezes.    Hospital Course:  No notes on file    Interval History: Patient seen and examined at bedside. Patient reports swelling has improved, changed to cefazolin infusion.     Review of Systems   Constitutional: Negative for chills, diaphoresis and fever.   Respiratory: Negative for cough and shortness of breath.    Cardiovascular: Positive for leg swelling. Negative for chest pain.   Gastrointestinal: Positive for abdominal distention and abdominal pain. Negative for diarrhea, nausea and vomiting.   Genitourinary: Negative for dysuria, frequency and hematuria.   Musculoskeletal: Positive for arthralgias. Negative for back pain and myalgias.        + lower extremity pain   Skin: Positive for wound. Negative for color change and rash.    Neurological: Positive for weakness. Negative for dizziness, numbness and headaches.   Psychiatric/Behavioral: Negative for agitation.     Objective:     Vital Signs (Most Recent):  Temp: 98 °F (36.7 °C) (06/29/17 1619)  Pulse: 71 (06/29/17 1619)  Resp: 18 (06/29/17 1619)  BP: 137/82 (06/29/17 1619)  SpO2: (!) 94 % (06/29/17 1619) Vital Signs (24h Range):  Temp:  [97.7 °F (36.5 °C)-98.9 °F (37.2 °C)] 98 °F (36.7 °C)  Pulse:  [67-92] 71  Resp:  [16-18] 18  SpO2:  [92 %-97 %] 94 %  BP: (133-152)/(70-83) 137/82     Weight: 126.6 kg (279 lb)  Body mass index is 35.82 kg/m².    Intake/Output Summary (Last 24 hours) at 06/29/17 1838  Last data filed at 06/29/17 1744   Gross per 24 hour   Intake             2718 ml   Output             1570 ml   Net             1148 ml      Physical Exam   Constitutional: He is oriented to person, place, and time. He appears well-developed and well-nourished. He appears distressed (2/2 pain).   Cardiovascular: Normal rate and regular rhythm.    No murmur heard.  Pulmonary/Chest: Effort normal and breath sounds normal. No respiratory distress.   Abdominal: Soft. He exhibits distension. There is tenderness (diffuse).   Musculoskeletal: Normal range of motion. He exhibits edema (BLE).   Neurological: He is alert and oriented to person, place, and time.   Skin: Skin is warm and dry. Rash (erythematous non pruritic rash to lower abdomen) noted.   Dressings to bilateral feet with scant drainage. Venous changes noted to bilateral lower extremities; no erythema.    Psychiatric: He has a normal mood and affect. His behavior is normal.       Significant Labs:   CBC:   Recent Labs  Lab 06/28/17 0442 06/29/17 0426   WBC 3.06* 2.76*   HGB 7.1* 6.8*   HCT 21.4* 21.0*   PLT 65* 66*     CMP:   Recent Labs  Lab 06/28/17 0442 06/29/17 0426   * 134*   K 4.3 4.6    105   CO2 19* 18*   * 142*   BUN 41* 43*   CREATININE 1.6* 1.8*   CALCIUM 9.5 9.2   PROT 7.3 7.0   ALBUMIN 4.5 4.4    BILITOT 2.1* 2.8*   ALKPHOS 82 115   AST 48* 72*   ALT 14 17   ANIONGAP 11 11   EGFRNONAA 51.6* 44.8*       Significant Imaging: I have reviewed all pertinent imaging results/findings within the past 24 hours.    Assessment/Plan:   #Bilateral lower extremity cellulitis with venous stasis due to streptococcus   #4th toe osteomyelitis  - s/p bone biopsy on 06/24  - IV Ceftriaxone and Vancomycin discontinued by ID  - started on IV cefazolin IV infusion   - will probably need 6 weeks of antibiotics through PICC Line   - PICC line placed, confirmed by CXR  - follow up Bone Biopsy results. Preliminary results show Staph Aureus      #Choledocholithiasis  - ERCP/EUS on 06/28/17  Complete removal was accomplished by biliary sphincterotomy and   balloon extraction.  A biliary sphincterotomy was performed     # Diabetic type 2 foot ulcers B/L  - podiatry consult, appreciate recs  - X rays B/ L feet: no osteomyelitis     # Decompensated Hep C cirrhosis with ascites  MELD-Na score: 20 at 6/28/2017  4:42 AM  MELD score: 18 at 6/28/2017  4:42 AM  Calculated from:  Serum Creatinine: 1.6 mg/dL at 6/28/2017  4:42 AM  Serum Sodium: 134 mmol/L at 6/28/2017  4:42 AM  Total Bilirubin: 2.1 mg/dL at 6/28/2017  4:42 AM  INR(ratio): 1.5 at 6/28/2017  4:42 AM  Age: 46 years  - will set up for outpatient follow up     # CKD stage 3 due to uncontrolled DM 2  - seems to be new baseline at 1.5     # DM2 with nephropathy  - Hba1c of 8.2 recently  - levemir 20 units at night and 8 of novolog with meals     # Anemia of CKD stage 3  - stable at 1.4        # Hypoalbuminemia due to moderate protein fernando malnutrition  - PAB 3, glucerna     # Hypomagnesemia   - replaced, resolved         VTE Risk Mitigation         Ordered     Place BASILIO hose  Until discontinued      06/24/17 1443     Low Risk of VTE  Once      06/16/17 2243          Hema Ferrara MD  Department of Hospital Medicine   [facility]

## 2017-06-29 NOTE — SUBJECTIVE & OBJECTIVE
Interval History: Patient seen and examined at bedside. Patient reports swelling has improved, changed to cefazolin infusion.     Review of Systems   Constitutional: Negative for chills, diaphoresis and fever.   Respiratory: Negative for cough and shortness of breath.    Cardiovascular: Positive for leg swelling. Negative for chest pain.   Gastrointestinal: Positive for abdominal distention and abdominal pain. Negative for diarrhea, nausea and vomiting.   Genitourinary: Negative for dysuria, frequency and hematuria.   Musculoskeletal: Positive for arthralgias. Negative for back pain and myalgias.        + lower extremity pain   Skin: Positive for wound. Negative for color change and rash.   Neurological: Positive for weakness. Negative for dizziness, numbness and headaches.   Psychiatric/Behavioral: Negative for agitation.     Objective:     Vital Signs (Most Recent):  Temp: 98 °F (36.7 °C) (06/29/17 1619)  Pulse: 71 (06/29/17 1619)  Resp: 18 (06/29/17 1619)  BP: 137/82 (06/29/17 1619)  SpO2: (!) 94 % (06/29/17 1619) Vital Signs (24h Range):  Temp:  [97.7 °F (36.5 °C)-98.9 °F (37.2 °C)] 98 °F (36.7 °C)  Pulse:  [67-92] 71  Resp:  [16-18] 18  SpO2:  [92 %-97 %] 94 %  BP: (133-152)/(70-83) 137/82     Weight: 126.6 kg (279 lb)  Body mass index is 35.82 kg/m².    Intake/Output Summary (Last 24 hours) at 06/29/17 1838  Last data filed at 06/29/17 1744   Gross per 24 hour   Intake             2718 ml   Output             1570 ml   Net             1148 ml      Physical Exam   Constitutional: He is oriented to person, place, and time. He appears well-developed and well-nourished. He appears distressed (2/2 pain).   Cardiovascular: Normal rate and regular rhythm.    No murmur heard.  Pulmonary/Chest: Effort normal and breath sounds normal. No respiratory distress.   Abdominal: Soft. He exhibits distension. There is tenderness (diffuse).   Musculoskeletal: Normal range of motion. He exhibits edema (BLE).   Neurological: He is  alert and oriented to person, place, and time.   Skin: Skin is warm and dry. Rash (erythematous non pruritic rash to lower abdomen) noted.   Dressings to bilateral feet with scant drainage. Venous changes noted to bilateral lower extremities; no erythema.    Psychiatric: He has a normal mood and affect. His behavior is normal.       Significant Labs:   CBC:   Recent Labs  Lab 06/28/17 0442 06/29/17  0426   WBC 3.06* 2.76*   HGB 7.1* 6.8*   HCT 21.4* 21.0*   PLT 65* 66*     CMP:   Recent Labs  Lab 06/28/17 0442 06/29/17  0426   * 134*   K 4.3 4.6    105   CO2 19* 18*   * 142*   BUN 41* 43*   CREATININE 1.6* 1.8*   CALCIUM 9.5 9.2   PROT 7.3 7.0   ALBUMIN 4.5 4.4   BILITOT 2.1* 2.8*   ALKPHOS 82 115   AST 48* 72*   ALT 14 17   ANIONGAP 11 11   EGFRNONAA 51.6* 44.8*       Significant Imaging: I have reviewed all pertinent imaging results/findings within the past 24 hours.

## 2017-06-29 NOTE — PLAN OF CARE
Problem: Patient Care Overview  Goal: Plan of Care Review  Outcome: Ongoing (interventions implemented as appropriate)  AOx4, VSS, NAD. RT treatments given PRN. C/O pain throughout the shift - relieved by PRN medications. Dressing changes performed to BLE per  orders. ABX therapy continues - Continuous Ancef @ 20.8mL/hr. Education provided with pain medication regimen, informing pt that PO medication must be taken prior to dilaudid. Pt became aggravated and angry, but later complied. Aseptic technique maintained to PICC line. Pt very tearful and restless this shift. Camera remains present at bedside. Will continue to monitor and assess.

## 2017-06-29 NOTE — PLAN OF CARE
Problem: Patient Care Overview  Goal: Plan of Care Review  Outcome: Ongoing (interventions implemented as appropriate)  Plan of care reviewed with patient and family. Accuchecks monitored before meals. Patient ambulated with PT in hallway today. 1 unit of blood currently being given. All questions answered. Will continue to monitor.

## 2017-06-29 NOTE — PROGRESS NOTES
[facility] Follow-up Note:    Patient seen and examined, states that his abdominal pain is decreasing today, he still has pain in his feet, body pain, and other chronic pain issues that he has had pre-procedure, but the abdominal pain that had started prior to the ERCP is improving.  He ate a diabetic diet for breakfast and lunch with no issues.  He states overall that he is starting to feel better.    Physical Exam   Constitutional: He is oriented to person, place, and time. He appears well-developed and well-nourished.   HENT:   Head: Normocephalic and atraumatic.   Eyes: Scleral icterus is present.   Neck: Normal range of motion. Neck supple.   Pulmonary/Chest: Effort normal and breath sounds normal.   Abdominal: Soft. Normal appearance.   Mild right upper quadrant pain on deep palpation, no rebound tenderness or guarding   Neurological: He is alert and oriented to person, place, and time.   Skin: Skin is warm and dry.     Psychiatric: He has a normal mood and affect. His behavior is normal.       S/p EUS and ERCP on 6/29/17:    ERCP  Impression:  - The major papilla appeared normal.                        - A filling defect consistent with a stone was seen                         on the cholangiogram.                        - Choledocholithiasis was found. Complete removal                         was accomplished by biliary sphincterotomy and                         balloon extraction.                        - A biliary sphincterotomy was performed.                        - The biliary tree was swept.  Recommendation:                           - Watch for pancreatitis, bleeding, perforation,                         and cholangitis.                        - Observe patient's clinical course.      Thank you for allowing us to participate in the care of Leif Nino. Please do not hesitate to call us with questions.    Paolo Rosales  Gastroenterology Fellow (PGY 5)

## 2017-06-29 NOTE — PT/OT/SLP PROGRESS
"Physical Therapy  Treatment    Leif Nino   MRN: [id number]   Admitting Diagnosis: Bilateral cellulitis of lower leg    PT Received On: 06/29/17  PT Start Time: 1015     PT Stop Time: 1047    PT Total Time (min): 32 min       Billable Minutes:  Gait [id number] and Therapeutic Activity 17    Treatment Type: Treatment  PT/PTA: PT     PTA Visit Number: 1       General Precautions: Standard, fall  Orthopedic Precautions:  (weightbearing with darco shoes BLE)   Braces:  (Bilateral darco shoes)    Do you have any cultural, spiritual, Roman Catholic conflicts, given your current situation?: None stated     Subjective:  Communicated with RN prior to session.  Patient agreeable to PT session after urination. Patient assisted to stand with rolling walker placed in front of patient for safety. Patient requested therapist leave during urination but patient educated on importance of safety with static stand and need for therapist presence. States "i'm fine, get out of here" and attempted to push therapist away but additional education was provided for safety during standing and patient agreeable.    Pain/Comfort  Pain Rating 1: 8/10  Location - Side 1: Bilateral  Location - Orientation 1: generalized  Location 1: abdomen (abdomen and generalized BLE to bilateral feet)  Pain Addressed 1: Reposition, Distraction  Pain Rating Post-Intervention 1: 8/10 (no increase in pain post session)    Objective:   Patient found with: oxygen, peripheral IV (darco shoes BLE)    Functional Mobility:  Bed Mobility:   Rolling/Turning Right: Minimum assistance  Scooting/Bridging: Supervision  Supine to Sit: Minimum Assistance  Sit to Supine: Modified Independent    Transfers:  Sit <> Stand Assistance: Supervision  Sit <> Stand Assistive Device: Rolling Walker  Bed <> Chair Technique: Stand Pivot  Bed <> Chair Assistance: Contact Guard Assistance  Bed <> Chair Assistive Device: Rolling Walker    Gait:   Gait Distance: 75ft   Assistance 1: Contact Guard " Assistance  Gait Assistive Device: Rolling walker  Gait Pattern: 3-point gait  Gait Deviation(s):  (decreased weightshift over RLE secondary to pain. Signficantly forward flexed trunk over rolling walker)    Stairs:  Did not occur secondary to decreased balance during level ground ambulation.    Balance:   Static Sit: FAIR-: Maintains without assist but inconsistent   Dynamic Sit: GOOD-: Maintains balance through MODERATE excursions of active trunk movement,     Static Stand: POOR+: Needs MINIMAL assist to maintain  Dynamic stand: FAIR: Needs CONTACT GUARD during gait     Therapeutic Activities and Exercises:  Patient educated on role of PT/POC. Kathe for bed mobility. Darco shoes donned for OOB activity. Patient static stand x 2min during urination with CGA for safety. Ambulation with rolling walker 40 ft (75 ft total) with moderate SOB and requested to return to room. Patient returned to supine position Christal. White board updated: safe to transfer with RN staff    AM-PAC 6 CLICK MOBILITY  How much help from another person does this patient currently need?   1 = Unable, Total/Dependent Assistance  2 = A lot, Maximum/Moderate Assistance  3 = A little, Minimum/Contact Guard/Supervision  4 = None, Modified Carlotta/Independent    Turning over in bed (including adjusting bedclothes, sheets and blankets)?: 3  Sitting down on and standing up from a chair with arms (e.g., wheelchair, bedside commode, etc.): 3  Moving from lying on back to sitting on the side of the bed?: 3  Moving to and from a bed to a chair (including a wheelchair)?: 3  Need to walk in hospital room?: 3  Climbing 3-5 steps with a railing?: 2  Total Score: 17    AM-PAC Raw Score CMS G-Code Modifier Level of Impairment Assistance   6 % Total / Unable   7 - 9 CM 80 - 100% Maximal Assist   10 - 14 CL 60 - 80% Moderate Assist   15 - 19 CK 40 - 60% Moderate Assist   20 - 22 CJ 20 - 40% Minimal Assist   23 CI 1-20% SBA / CGA   24 CH 0% Independent/  Mod I     Patient left supine with all lines intact, call button in reach and RN notified.    Assessment:  Leif Nino is a 46 y.o. male with a medical diagnosis of Bilateral cellulitis of lower leg and presents with significant generalized trunk and lower extremity pain and decreased tolerance to activity limiting functional mobilty. Patient with increased agitation this session during pericare but agreeable to CGA during static stand. To benefit from continued skilled acute intervention to address deficits prior to transition to SNF to improve safety with transfers and overall functional mobility.    Rehab identified problem list/impairments: Rehab identified problem list/impairments: weakness, impaired endurance, impaired sensation, impaired self care skills, impaired functional mobilty, gait instability, impaired balance, decreased coordination, decreased lower extremity function, pain, decreased safety awareness    Rehab potential is good.    Activity tolerance: Fair    Discharge recommendations: Discharge Facility/Level Of Care Needs: nursing facility, skilled     Barriers to discharge: Barriers to Discharge: Decreased caregiver support, Inaccessible home environment    Equipment recommendations: Equipment Needed After Discharge: walker, rolling     GOALS:    Physical Therapy Goals        Problem: Physical Therapy Goal    Goal Priority Disciplines Outcome Goal Variances Interventions   Physical Therapy Goal     PT/OT, PT Ongoing (interventions implemented as appropriate)     Description:  Goals to be met by: 17     Patient will increase functional independence with mobility by performin. Supine to sit with Stand-by Assistance - not met  2. Sit to supine with Stand-by Assistance - met  3. Sit to stand transfer with Stand-by Assistance - not met  4. Bed to chair transfer with Stand-by Assistance using LRAD - not met  5. Gait  x 100 feet with Contact Guard Assistance using LRAD - Met   Update: Gait  x 200ft Christal with rolling walker - not met  6. Ascend/descend 5 stair with right Handrails Moderate Assistance - not met  7. Lower extremity exercise program x 15 reps per handout, with independence - not met                         PLAN:    Patient to be seen 4 x/week  to address the above listed problems via gait training, therapeutic activities, therapeutic exercises  Plan of Care expires: 07/19/17  Plan of Care reviewed with: patient         Roger Sahu III, PT  06/29/2017

## 2017-06-30 LAB
ALBUMIN SERPL BCP-MCNC: 4.6 G/DL
ALP SERPL-CCNC: 125 U/L
ALT SERPL W/O P-5'-P-CCNC: 14 U/L
ANION GAP SERPL CALC-SCNC: 11 MMOL/L
APTT BLDCRRT: 45.2 SEC
AST SERPL-CCNC: 78 U/L
BACTERIA BLD CULT: NORMAL
BASOPHILS # BLD AUTO: 0.03 K/UL
BASOPHILS # BLD AUTO: 0.03 K/UL
BASOPHILS NFR BLD: 1 %
BASOPHILS NFR BLD: 1.1 %
BILIRUB SERPL-MCNC: 3 MG/DL
BUN SERPL-MCNC: 46 MG/DL
CALCIUM SERPL-MCNC: 9.2 MG/DL
CHLORIDE SERPL-SCNC: 104 MMOL/L
CO2 SERPL-SCNC: 20 MMOL/L
CREAT SERPL-MCNC: 1.8 MG/DL
D DIMER PPP IA.FEU-MCNC: 3.84 MG/L FEU
DIFFERENTIAL METHOD: ABNORMAL
DIFFERENTIAL METHOD: ABNORMAL
EOSINOPHIL # BLD AUTO: 0.1 K/UL
EOSINOPHIL # BLD AUTO: 0.1 K/UL
EOSINOPHIL NFR BLD: 3.6 %
EOSINOPHIL NFR BLD: 4.3 %
ERYTHROCYTE [DISTWIDTH] IN BLOOD BY AUTOMATED COUNT: 17.9 %
ERYTHROCYTE [DISTWIDTH] IN BLOOD BY AUTOMATED COUNT: 18 %
EST. GFR  (AFRICAN AMERICAN): 51.8 ML/MIN/1.73 M^2
EST. GFR  (NON AFRICAN AMERICAN): 44.8 ML/MIN/1.73 M^2
GLUCOSE SERPL-MCNC: 154 MG/DL
HCT VFR BLD AUTO: 22.5 %
HCT VFR BLD AUTO: 22.7 %
HGB BLD-MCNC: 7.4 G/DL
HGB BLD-MCNC: 7.6 G/DL
INR PPP: 1.9
LYMPHOCYTES # BLD AUTO: 0.3 K/UL
LYMPHOCYTES # BLD AUTO: 0.5 K/UL
LYMPHOCYTES NFR BLD: 12.3 %
LYMPHOCYTES NFR BLD: 15 %
MAGNESIUM SERPL-MCNC: 1.9 MG/DL
MCH RBC QN AUTO: 28.7 PG
MCH RBC QN AUTO: 29.2 PG
MCHC RBC AUTO-ENTMCNC: 32.9 %
MCHC RBC AUTO-ENTMCNC: 33.5 %
MCV RBC AUTO: 87 FL
MCV RBC AUTO: 87 FL
MONOCYTES # BLD AUTO: 0.3 K/UL
MONOCYTES # BLD AUTO: 0.3 K/UL
MONOCYTES NFR BLD: 11 %
MONOCYTES NFR BLD: 11.6 %
NEUTROPHILS # BLD AUTO: 2 K/UL
NEUTROPHILS # BLD AUTO: 2.1 K/UL
NEUTROPHILS NFR BLD: 68.4 %
NEUTROPHILS NFR BLD: 71.4 %
PHOSPHATE SERPL-MCNC: 4.1 MG/DL
PLATELET # BLD AUTO: 57 K/UL
PLATELET # BLD AUTO: 60 K/UL
PMV BLD AUTO: 10 FL
PMV BLD AUTO: 9.7 FL
POCT GLUCOSE: 148 MG/DL (ref 70–110)
POCT GLUCOSE: 155 MG/DL (ref 70–110)
POCT GLUCOSE: 165 MG/DL (ref 70–110)
POCT GLUCOSE: 226 MG/DL (ref 70–110)
POTASSIUM SERPL-SCNC: 4.5 MMOL/L
PROT SERPL-MCNC: 7.1 G/DL
PROTHROMBIN TIME: 18.7 SEC
RBC # BLD AUTO: 2.58 M/UL
RBC # BLD AUTO: 2.6 M/UL
SODIUM SERPL-SCNC: 135 MMOL/L
WBC # BLD AUTO: 2.76 K/UL
WBC # BLD AUTO: 3.01 K/UL

## 2017-06-30 PROCEDURE — 25000003 PHARM REV CODE 250: Performed by: PHYSICIAN ASSISTANT

## 2017-06-30 PROCEDURE — 85610 PROTHROMBIN TIME: CPT

## 2017-06-30 PROCEDURE — 94640 AIRWAY INHALATION TREATMENT: CPT

## 2017-06-30 PROCEDURE — 85379 FIBRIN DEGRADATION QUANT: CPT

## 2017-06-30 PROCEDURE — 36415 COLL VENOUS BLD VENIPUNCTURE: CPT

## 2017-06-30 PROCEDURE — 25000003 PHARM REV CODE 250: Performed by: HOSPITALIST

## 2017-06-30 PROCEDURE — 80053 COMPREHEN METABOLIC PANEL: CPT

## 2017-06-30 PROCEDURE — 25000242 PHARM REV CODE 250 ALT 637 W/ HCPCS: Performed by: NURSE PRACTITIONER

## 2017-06-30 PROCEDURE — 99233 SBSQ HOSP IP/OBS HIGH 50: CPT | Mod: ,,, | Performed by: HOSPITALIST

## 2017-06-30 PROCEDURE — 63600175 PHARM REV CODE 636 W HCPCS: Performed by: PHYSICIAN ASSISTANT

## 2017-06-30 PROCEDURE — 83735 ASSAY OF MAGNESIUM: CPT

## 2017-06-30 PROCEDURE — 20600001 HC STEP DOWN PRIVATE ROOM

## 2017-06-30 PROCEDURE — P9047 ALBUMIN (HUMAN), 25%, 50ML: HCPCS | Performed by: HOSPITALIST

## 2017-06-30 PROCEDURE — 63600175 PHARM REV CODE 636 W HCPCS: Performed by: HOSPITALIST

## 2017-06-30 PROCEDURE — 85025 COMPLETE CBC W/AUTO DIFF WBC: CPT | Mod: 91

## 2017-06-30 PROCEDURE — 84100 ASSAY OF PHOSPHORUS: CPT

## 2017-06-30 PROCEDURE — 85730 THROMBOPLASTIN TIME PARTIAL: CPT

## 2017-06-30 RX ORDER — HEPARIN SODIUM,PORCINE/D5W 25000/250
17 INTRAVENOUS SOLUTION INTRAVENOUS CONTINUOUS
Status: DISCONTINUED | OUTPATIENT
Start: 2017-06-30 | End: 2017-06-30

## 2017-06-30 RX ADMIN — METRONIDAZOLE 500 MG: 500 INJECTION, SOLUTION INTRAVENOUS at 04:06

## 2017-06-30 RX ADMIN — HYDROMORPHONE HYDROCHLORIDE 1 MG: 1 INJECTION, SOLUTION INTRAMUSCULAR; INTRAVENOUS; SUBCUTANEOUS at 07:06

## 2017-06-30 RX ADMIN — Medication 10 ML: at 12:06

## 2017-06-30 RX ADMIN — OXYCODONE HYDROCHLORIDE 15 MG: 5 TABLET ORAL at 08:06

## 2017-06-30 RX ADMIN — INSULIN ASPART 2 UNITS: 100 INJECTION, SOLUTION INTRAVENOUS; SUBCUTANEOUS at 07:06

## 2017-06-30 RX ADMIN — ALBUMIN (HUMAN) 25 G: 12.5 SOLUTION INTRAVENOUS at 01:06

## 2017-06-30 RX ADMIN — INSULIN DETEMIR 10 UNITS: 100 INJECTION, SOLUTION SUBCUTANEOUS at 10:06

## 2017-06-30 RX ADMIN — INSULIN ASPART 2 UNITS: 100 INJECTION, SOLUTION INTRAVENOUS; SUBCUTANEOUS at 10:06

## 2017-06-30 RX ADMIN — HYDROMORPHONE HYDROCHLORIDE 1 MG: 1 INJECTION, SOLUTION INTRAMUSCULAR; INTRAVENOUS; SUBCUTANEOUS at 11:06

## 2017-06-30 RX ADMIN — INSULIN ASPART 8 UNITS: 100 INJECTION, SOLUTION INTRAVENOUS; SUBCUTANEOUS at 06:06

## 2017-06-30 RX ADMIN — HYDROMORPHONE HYDROCHLORIDE 1 MG: 1 INJECTION, SOLUTION INTRAMUSCULAR; INTRAVENOUS; SUBCUTANEOUS at 03:06

## 2017-06-30 RX ADMIN — ASPIRIN 81 MG: 81 TABLET, COATED ORAL at 08:06

## 2017-06-30 RX ADMIN — INSULIN ASPART 8 UNITS: 100 INJECTION, SOLUTION INTRAVENOUS; SUBCUTANEOUS at 07:06

## 2017-06-30 RX ADMIN — Medication 10 ML: at 11:06

## 2017-06-30 RX ADMIN — INSULIN ASPART 2 UNITS: 100 INJECTION, SOLUTION INTRAVENOUS; SUBCUTANEOUS at 01:06

## 2017-06-30 RX ADMIN — DICYCLOMINE HYDROCHLORIDE 20 MG: 20 TABLET ORAL at 03:06

## 2017-06-30 RX ADMIN — CARVEDILOL 12.5 MG: 12.5 TABLET, FILM COATED ORAL at 08:06

## 2017-06-30 RX ADMIN — Medication 10 ML: at 05:06

## 2017-06-30 RX ADMIN — Medication 10 ML: at 06:06

## 2017-06-30 RX ADMIN — HEPARIN SODIUM AND DEXTROSE 17 UNITS/KG/HR: 10000; 5 INJECTION INTRAVENOUS at 04:06

## 2017-06-30 RX ADMIN — METRONIDAZOLE 500 MG: 500 INJECTION, SOLUTION INTRAVENOUS at 01:06

## 2017-06-30 RX ADMIN — IPRATROPIUM BROMIDE AND ALBUTEROL SULFATE 3 ML: .5; 3 SOLUTION RESPIRATORY (INHALATION) at 09:06

## 2017-06-30 RX ADMIN — ALBUMIN (HUMAN) 25 G: 12.5 SOLUTION INTRAVENOUS at 05:06

## 2017-06-30 RX ADMIN — DICYCLOMINE HYDROCHLORIDE 20 MG: 20 TABLET ORAL at 05:06

## 2017-06-30 RX ADMIN — INSULIN ASPART 8 UNITS: 100 INJECTION, SOLUTION INTRAVENOUS; SUBCUTANEOUS at 01:06

## 2017-06-30 RX ADMIN — ATORVASTATIN CALCIUM 40 MG: 20 TABLET, FILM COATED ORAL at 08:06

## 2017-06-30 RX ADMIN — QUETIAPINE FUMARATE 100 MG: 25 TABLET ORAL at 10:06

## 2017-06-30 RX ADMIN — DICYCLOMINE HYDROCHLORIDE 20 MG: 20 TABLET ORAL at 10:06

## 2017-06-30 RX ADMIN — OXYCODONE HYDROCHLORIDE 15 MG: 5 TABLET ORAL at 06:06

## 2017-06-30 RX ADMIN — DEXTROSE 6 G: 5 SOLUTION INTRAVENOUS at 09:06

## 2017-06-30 RX ADMIN — IPRATROPIUM BROMIDE AND ALBUTEROL SULFATE 3 ML: .5; 3 SOLUTION RESPIRATORY (INHALATION) at 04:06

## 2017-06-30 RX ADMIN — HYDROMORPHONE HYDROCHLORIDE 1 MG: 1 INJECTION, SOLUTION INTRAMUSCULAR; INTRAVENOUS; SUBCUTANEOUS at 09:06

## 2017-06-30 RX ADMIN — CARVEDILOL 12.5 MG: 12.5 TABLET, FILM COATED ORAL at 06:06

## 2017-06-30 RX ADMIN — HYDROMORPHONE HYDROCHLORIDE 1 MG: 1 INJECTION, SOLUTION INTRAMUSCULAR; INTRAVENOUS; SUBCUTANEOUS at 04:06

## 2017-06-30 NOTE — PLAN OF CARE
"Problem: Patient Care Overview  Goal: Plan of Care Review  Outcome: Ongoing (interventions implemented as appropriate)  AOx4, VSS, NAD. C/O pain consistently throughout the shift. Pt refuse administration of oxycodone. Pain regimen explained to pt. Pt stated "the pill don't even work." Remains accuchecks ACHS - coverage given. ABX therapy continued (Ancef @ 20.8ml/hr continuously). No acute events.       "

## 2017-06-30 NOTE — PROGRESS NOTES
Progress Note  Podiatry        SUBJECTIVE     History of Present Illness:  Leif Nino is a 46 y.o. male  has a past medical history of Ascites of liver; Chronic hepatitis C without hepatic coma; Chronic liver failure without hepatic coma; Diabetic foot ulcer; DM (diabetes mellitus), type 2 with peripheral vascular complications; Encounter for blood transfusion; Essential hypertension; Gallstones; Hepatic encephalopathy; Hepatitis C; and Pancreatitis.  Admitted for BLE swellin, cellulitis. Consulted for B/L LE wounds. Pt. Recently discharged from [facility] on 6/5/17.  Reports wounds to B/L feet started two months ago reports applying silver to wounds daily. Reports never seeing a podiatrist before.  During recent admission from 5/30/17-6/5/17 MRI ordered for RLE revealing early osteo, ID consulted recommended discontinuing abx as wound not actuley infected. Vascular surgery consulted for diminished pulse B/L LE recommended no intervention at this time with f/u in clinic. Since discharge pt. Has not followed up in podiatry clinic and friend at home continues to change his dressings daily applying silver. Reports increased swelling and erythema in the past couple of days. Today patient lethargic with pain upon palpation B/L LE. Having chronic SOB that is intermittently worse, particularly when he does any activity such as getting up or answering phone. Had nursing come in to address his nasal oxygen flow per his request and she said she would have respiratory come back and give him a treatment. He calmed his breathing prior to the procedure, but I stayed with him for a long time unti lthat was the case. He actually fell asleep during some of the procedure and began jumping a lot, but once he woke up his foot stopped jumping.     Interval History:  6/23/17: Pt. Seen bedside. No bandages intact reports they fell off.     6/24/17: Patient was seen at bedside with shortness of breath after getting back in bed. Denies an  increase in pedal pain, no acute pedal changes since yesterday.     6/30/17: Patient was seen bedside in NAD, dressings to bilateral feet intact. Denies any acute pedal changes since last week. Ambulates in darco shoe bilateral.    Scheduled Meds:   albumin human 25%  25 g Intravenous Q6H    aspirin  81 mg Oral Daily    atorvastatin  40 mg Oral Daily    carvedilol  12.5 mg Oral BID WM    ceFAZolin(ANCEF) in D5W 500 mL CONTINUOUS INFUSION  6 g Intravenous Q24H    dicyclomine  20 mg Oral TID    insulin aspart  8 Units Subcutaneous TIDWM    insulin detemir  10 Units Subcutaneous QHS    metronidazole  500 mg Intravenous Q8H    quetiapine  100 mg Oral QHS    sodium chloride 0.9%  10 mL Intravenous Q6H     Continuous Infusions:   PRN Meds:sodium chloride, acetaminophen, albuterol-ipratropium 2.5mg-0.5mg/3mL, cadexomer iodine, dextrose 50%, dextrose 50%, glucagon (human recombinant), glucose, glucose, guaifenesin 100 mg/5 ml, HYDROmorphone, insulin aspart, ondansetron, oxycodone, senna-docusate 8.6-50 mg, Flushing PICC Protocol **AND** sodium chloride 0.9% **AND** sodium chloride 0.9%    Review of patient's allergies indicates:   Allergen Reactions    Shellfish containing products Hives     Shortness of breath        Past Medical History:   Diagnosis Date    Ascites of liver 5/30/2017    Chronic hepatitis C without hepatic coma     Chronic liver failure without hepatic coma 5/30/2017    Diabetic foot ulcer     right foot    DM (diabetes mellitus), type 2 with peripheral vascular complications     Encounter for blood transfusion     Essential hypertension     Gallstones     Hepatic encephalopathy 5/30/2017    Hepatitis C     Pancreatitis      Past Surgical History:   Procedure Laterality Date    BACK SURGERY       History reviewed. No pertinent family history.  Social History   Substance Use Topics    Smoking status: Current Every Day Smoker     Packs/day: 0.25     Types: Cigarettes    Smokeless  tobacco: Never Used    Alcohol use No       Review of Systems:  Constitutional: no fever or chills  Respiratory: positive for dyspnea on exertion and shortnes of breath  Cardiovascular: no chest pain or palpitations  Gastrointestinal: no nausea or vomiting, tolerating diet  Integument/Breast: history of prior ulcerations  Musculoskeletal: positive for arthralgias  Neurological: history of numbness or paresthesias in the feet    OBJECTIVE     Vital Signs (Most Recent):  Temp: 97.8 °F (36.6 °C) (06/30/17 0700)  Pulse: 66 (06/30/17 0700)  Resp: 18 (06/30/17 0700)  BP: (!) 156/80 (06/30/17 0700)  SpO2: (!) 93 % (06/30/17 0700)  Body mass index is 35.82 kg/m².    Vital Signs Range (Last 24H):  Temp:  [97.7 °F (36.5 °C)-98.7 °F (37.1 °C)]   Pulse:  [66-79]   Resp:  [16-20]   BP: (133-188)/(75-98)   SpO2:  [92 %-97 %]     Physical Exam:    General: Pt. is well-developed, obese, appears stated age, alert and oriented x 3.      Vascular: Distal DP/PT pulses diminished  b/l. CRT < 3 sec to tips of toes. Edema noted to b/l LE. vericosities noted to b/l LEs.     Dermatologic:     Right foot:  Ulcer Location: Plantar right hallux   Measurements: 0.6x0.5x0.2  Periwound: Intact hyperkeratotic  Drainage: none.  Malodor: None.  Base:  Granular.  Signs of infection: None.     Wound 3 : Left plantar foot ulceration   Measurement: 0.8cmx0.7cmx0.1cm  Base: stable eschar   Periwound skin: hyperkeratosis  Drainage:none  Erythema: mild  Probe: none, no bone exposed    Wound 1: Right 4th toe ulceration   Measurement: 0.5cmx0.4cmx0.1cm  Base: red granular   Periwound skin: intact  Drainage: none  Erythema: none  Probe: None, does not probe to bone      Musculoskeletal: Pain upon ROM DF/PF B/L . Pain to bilateral calf with compression.    Neurological: Light touch, proprioception, and sharp/dull sensation are all decreased bilaterally. Protective threshold with the [place]-Wienstein monofilament is decreased bilaterally.     Left plantar foot  6/30/17      Right hallux 6/30/17          right 4th toe ulceration 6/30/17:               Laboratory:  CBC:     Recent Labs  Lab 06/30/17  0536   WBC 3.01*   RBC 2.60*   HGB 7.6*   HCT 22.7*   PLT 57*   MCV 87   MCH 29.2   MCHC 33.5     BMP:     Recent Labs  Lab 06/30/17  0536   *   *   K 4.5      CO2 20*   BUN 46*   CREATININE 1.8*   CALCIUM 9.2   MG 1.9       Diagnostic Results:  Xray:  No definite osteomyelitis identified. Abnormality at the right third metatarsal head is likely from remote trauma . Small metal foreign body in right plantar soft tissues. Diffuse soft tissue swelling in the feet with more prominent swelling of the right first and fourth toes.    MRI: Right foot  1. Abnormal edema with heterogeneous T1 marrow hypointensity involving the middle and distal phalanges of the fourth toe with adjacent dorsal soft tissue ulceration concerning for possible osteomyelitis.    2. Subcutaneous edema about the dorsum of the foot. Edema of the plantar foot musculature which may represent neuropathic myositis in this patient with history of diabetes.    3. Mild marrow edema within the cuboid and medial hallux sesamoid which may relate to altered biomechanics. Chronic fracture deformity of the third metatarsal head.      ASSESSMENT/PLAN     Assessment:  -DM II with peripheral neuropathy  -Diabetic ulcers of bilateral feet, appear stable.  -MRI with right fourth toe osteomyelitis middle and distal phalanx.   - Venous Stasis  -PAD      Plan:  -Ulcerations evaluated, cleansed with sterile saline and gauze, covered with iodosorb, gauze and wrapped in kerlix and ace.  -Bone culture of right fourth toe resulted in no growth, ID recommends treating as osteomyelitis with growth from surrounding wound culture results of MSSA with 6 weeks cefazolin. Appreciate their recommendations.   -Nursing orders in for daily dressing changes iodosorb, kerlix,ACE up leg.   -Heel protector boots  B/L LE when lying in  bed.   Podiatry will follow while inpatient    Weightbearing Status: WBAT B/L   Offloading Device: Darco shoe.     Denilson Dawson DPM PGY-3  Pager [phone]

## 2017-06-30 NOTE — PLAN OF CARE
D/C Plan: LTAC placement still in progress, pt will need iv abx, therapy and wound care.  Insurance authorization still pending. [facility] and [facility] both still willing to accept pt for admission once cleared by insurance.

## 2017-06-30 NOTE — SUBJECTIVE & OBJECTIVE
Interval History: Patient seen and examined at bedside. Complaining of acute onset left arm pain with associated erythrema and tenderness, US upper extremities ordered STAT.     Review of Systems   Constitutional: Negative for chills, diaphoresis and fever.   Respiratory: Negative for cough and shortness of breath.    Cardiovascular: Positive for leg swelling. Negative for chest pain.   Gastrointestinal: Positive for abdominal distention and abdominal pain. Negative for diarrhea, nausea and vomiting.   Genitourinary: Negative for dysuria, frequency and hematuria.   Musculoskeletal: Positive for arthralgias. Negative for back pain and myalgias.        + lower extremity pain   Skin: Positive for wound. Negative for color change and rash.   Neurological: Positive for weakness. Negative for dizziness, numbness and headaches.   Psychiatric/Behavioral: Negative for agitation.     Objective:     Vital Signs (Most Recent):  Temp: 97.9 °F (36.6 °C) (06/30/17 1100)  Pulse: 73 (06/30/17 1100)  Resp: 18 (06/30/17 1100)  BP: 135/69 (06/30/17 1100)  SpO2: (!) 93 % (06/30/17 1100) Vital Signs (24h Range):  Temp:  [97.7 °F (36.5 °C)-98.7 °F (37.1 °C)] 97.9 °F (36.6 °C)  Pulse:  [66-79] 73  Resp:  [17-20] 18  SpO2:  [92 %-97 %] 93 %  BP: (133-188)/(69-98) 135/69     Weight: 126.6 kg (279 lb)  Body mass index is 35.82 kg/m².    Intake/Output Summary (Last 24 hours) at 06/30/17 1516  Last data filed at 06/30/17 0700   Gross per 24 hour   Intake          2392.58 ml   Output             1075 ml   Net          1317.58 ml      Physical Exam   Constitutional: He is oriented to person, place, and time. He appears well-developed and well-nourished. He appears distressed (2/2 pain).   Cardiovascular: Normal rate and regular rhythm.    No murmur heard.  Pulmonary/Chest: Effort normal and breath sounds normal. No respiratory distress.   Abdominal: Soft. He exhibits distension. There is tenderness (diffuse).   Musculoskeletal: Normal range of  motion. He exhibits edema (BLE).   Neurological: He is alert and oriented to person, place, and time.   Skin: Skin is warm and dry. Rash (erythematous non pruritic rash to lower abdomen) noted.   Dressings to bilateral feet with scant drainage. Venous changes noted to bilateral lower extremities; no erythema.    Psychiatric: He has a normal mood and affect. His behavior is normal.       Significant Labs:   CBC:   Recent Labs  Lab 06/29/17  0426 06/30/17  0536   WBC 2.76* 3.01*   HGB 6.8* 7.6*   HCT 21.0* 22.7*   PLT 66* 57*     CMP:   Recent Labs  Lab 06/29/17  0426 06/30/17  0536   * 135*   K 4.6 4.5    104   CO2 18* 20*   * 154*   BUN 43* 46*   CREATININE 1.8* 1.8*   CALCIUM 9.2 9.2   PROT 7.0 7.1   ALBUMIN 4.4 4.6   BILITOT 2.8* 3.0*   ALKPHOS 115 125   AST 72* 78*   ALT 17 14   ANIONGAP 11 11   EGFRNONAA 44.8* 44.8*       Significant Imaging: I have reviewed all pertinent imaging results/findings within the past 24 hours.

## 2017-06-30 NOTE — PROGRESS NOTES
[facility] Hospital Medicine  Progress Note    Patient Name: Leif Nino  MRN: [id number]  Patient Class: IP- Inpatient   Admission Date: 6/16/2017  Length of Stay: 14 days  Attending Physician: Hema Ferrara MD  Primary Care Provider: [facility] Medicine Team: [facility] [facility]P MED A Hema Ferrara MD    Subjective:     Principal Problem:Bilateral cellulitis of lower leg    HPI:  Mr. Nino is a 45 yo WM w/ h/o HCV cirrhosis & poorly controlled DM complicated by BL diabetic ulceration s/p 6/1 R 4th toe debridement who presents c/o generalized weakness w/ associated worsening BL LE edema.  He reports that 5d ago he began having dysuria.  Two day afterwards he woke up noticing his chronic BL LE edema was significantly worse with an associated erythematous, painful rash.  Later that day he began developing dyspnea on exertion that progressively worsened to the point where he is now dyspneic at rest.  Patient denies any vomiting but has mild, persistant nausea that has worsened acutely over the last 3 days.  He reports no fevers, chills, abdominal pain, worsening abdominal distention, diarrhea.  Patient states he has had no productive cough or wheezes.    Hospital Course:  No notes on file    Interval History: Patient seen and examined at bedside. Complaining of acute onset left arm pain with associated erythrema and tenderness, US upper extremities ordered STAT.     Review of Systems   Constitutional: Negative for chills, diaphoresis and fever.   Respiratory: Negative for cough and shortness of breath.    Cardiovascular: Positive for leg swelling. Negative for chest pain.   Gastrointestinal: Positive for abdominal distention and abdominal pain. Negative for diarrhea, nausea and vomiting.   Genitourinary: Negative for dysuria, frequency and hematuria.   Musculoskeletal: Positive for arthralgias. Negative for back pain and myalgias.        + lower extremity pain   Skin: Positive  for wound. Negative for color change and rash.   Neurological: Positive for weakness. Negative for dizziness, numbness and headaches.   Psychiatric/Behavioral: Negative for agitation.     Objective:     Vital Signs (Most Recent):  Temp: 97.9 °F (36.6 °C) (06/30/17 1100)  Pulse: 73 (06/30/17 1100)  Resp: 18 (06/30/17 1100)  BP: 135/69 (06/30/17 1100)  SpO2: (!) 93 % (06/30/17 1100) Vital Signs (24h Range):  Temp:  [97.7 °F (36.5 °C)-98.7 °F (37.1 °C)] 97.9 °F (36.6 °C)  Pulse:  [66-79] 73  Resp:  [17-20] 18  SpO2:  [92 %-97 %] 93 %  BP: (133-188)/(69-98) 135/69     Weight: 126.6 kg (279 lb)  Body mass index is 35.82 kg/m².    Intake/Output Summary (Last 24 hours) at 06/30/17 1516  Last data filed at 06/30/17 0700   Gross per 24 hour   Intake          2392.58 ml   Output             1075 ml   Net          1317.58 ml      Physical Exam   Constitutional: He is oriented to person, place, and time. He appears well-developed and well-nourished. He appears distressed (2/2 pain).   Cardiovascular: Normal rate and regular rhythm.    No murmur heard.  Pulmonary/Chest: Effort normal and breath sounds normal. No respiratory distress.   Abdominal: Soft. He exhibits distension. There is tenderness (diffuse).   Musculoskeletal: Normal range of motion. He exhibits edema (BLE).   Neurological: He is alert and oriented to person, place, and time.   Skin: Skin is warm and dry. Rash (erythematous non pruritic rash to lower abdomen) noted.   Dressings to bilateral feet with scant drainage. Venous changes noted to bilateral lower extremities; no erythema.    Psychiatric: He has a normal mood and affect. His behavior is normal.       Significant Labs:   CBC:   Recent Labs  Lab 06/29/17  0426 06/30/17  0536   WBC 2.76* 3.01*   HGB 6.8* 7.6*   HCT 21.0* 22.7*   PLT 66* 57*     CMP:   Recent Labs  Lab 06/29/17  0426 06/30/17  0536   * 135*   K 4.6 4.5    104   CO2 18* 20*   * 154*   BUN 43* 46*   CREATININE 1.8* 1.8*    CALCIUM 9.2 9.2   PROT 7.0 7.1   ALBUMIN 4.4 4.6   BILITOT 2.8* 3.0*   ALKPHOS 115 125   AST 72* 78*   ALT 17 14   ANIONGAP 11 11   EGFRNONAA 44.8* 44.8*       Significant Imaging: I have reviewed all pertinent imaging results/findings within the past 24 hours.    Assessment/Plan:   #Bilateral lower extremity cellulitis with venous stasis due to streptococcus   #4th toe osteomyelitis  - s/p bone biopsy on 06/24  - IV Ceftriaxone and Vancomycin discontinued by ID  - started on IV cefazolin IV infusion   - will probably need 6 weeks of antibiotics through PICC Line   - PICC line placed, confirmed by CXR  - follow up Bone Biopsy results. Preliminary results show Staph Aureus      #Choledocholithiasis  - ERCP/EUS on 06/28/17  Complete removal was accomplished by biliary sphincterotomy and   balloon extraction.  A biliary sphincterotomy was performed     # Diabetic type 2 foot ulcers B/L  - podiatry consult, appreciate recs  - X rays B/ L feet: no osteomyelitis     # Decompensated Hep C cirrhosis with ascites  MELD-Na score: 24 at 6/30/2017  5:36 AM  MELD score: 23 at 6/30/2017  5:36 AM  Calculated from:  Serum Creatinine: 1.8 mg/dL at 6/30/2017  5:36 AM  Serum Sodium: 135 mmol/L at 6/30/2017  5:36 AM  Total Bilirubin: 3.0 mg/dL at 6/30/2017  5:36 AM  INR(ratio): 1.9 at 6/30/2017  5:36 AM  Age: 46 years  - will set up for outpatient follow up     # CKD stage 3 due to uncontrolled DM 2  - seems to be new baseline at 1.5     # DM2 with nephropathy  - Hba1c of 8.2 recently  - levemir 20 units at night and 8 of novolog with meals     # Anemia of CKD stage 3  - stable at 1.4        # Hypoalbuminemia due to moderate protein fernando malnutrition  - PAB 3, glucerna     # Hypomagnesemia   - replaced, resolved      VTE Risk Mitigation         Ordered     Place BASILIO hose  Until discontinued      06/24/17 1443     Low Risk of VTE  Once      06/16/17 6563          Hema Ferrara MD  Department of Hospital Medicine   [facility]  [place]

## 2017-07-01 LAB
ALBUMIN SERPL BCP-MCNC: 4.2 G/DL
ALP SERPL-CCNC: 117 U/L
ALT SERPL W/O P-5'-P-CCNC: 9 U/L
ANION GAP SERPL CALC-SCNC: 12 MMOL/L
ANISOCYTOSIS BLD QL SMEAR: SLIGHT
AST SERPL-CCNC: 75 U/L
BASOPHILS # BLD AUTO: 0.02 K/UL
BASOPHILS # BLD AUTO: 0.03 K/UL
BASOPHILS # BLD AUTO: 0.05 K/UL
BASOPHILS NFR BLD: 0.6 %
BASOPHILS NFR BLD: 1 %
BASOPHILS NFR BLD: 1.4 %
BILIRUB SERPL-MCNC: 2.6 MG/DL
BUN SERPL-MCNC: 48 MG/DL
BURR CELLS BLD QL SMEAR: ABNORMAL
CALCIUM SERPL-MCNC: 9.2 MG/DL
CHLORIDE SERPL-SCNC: 104 MMOL/L
CO2 SERPL-SCNC: 18 MMOL/L
CREAT SERPL-MCNC: 1.8 MG/DL
DIFFERENTIAL METHOD: ABNORMAL
EOSINOPHIL # BLD AUTO: 0.2 K/UL
EOSINOPHIL NFR BLD: 4.3 %
EOSINOPHIL NFR BLD: 4.7 %
EOSINOPHIL NFR BLD: 4.8 %
ERYTHROCYTE [DISTWIDTH] IN BLOOD BY AUTOMATED COUNT: 17.9 %
ERYTHROCYTE [DISTWIDTH] IN BLOOD BY AUTOMATED COUNT: 18 %
ERYTHROCYTE [DISTWIDTH] IN BLOOD BY AUTOMATED COUNT: 18.1 %
EST. GFR  (AFRICAN AMERICAN): 51.8 ML/MIN/1.73 M^2
EST. GFR  (NON AFRICAN AMERICAN): 44.8 ML/MIN/1.73 M^2
GLUCOSE SERPL-MCNC: 95 MG/DL
HCT VFR BLD AUTO: 23.4 %
HCT VFR BLD AUTO: 23.9 %
HCT VFR BLD AUTO: 24.3 %
HGB BLD-MCNC: 7.8 G/DL
HGB BLD-MCNC: 7.8 G/DL
HGB BLD-MCNC: 8.1 G/DL
HYPOCHROMIA BLD QL SMEAR: ABNORMAL
INR PPP: 1.9
LYMPHOCYTES # BLD AUTO: 0.4 K/UL
LYMPHOCYTES # BLD AUTO: 0.4 K/UL
LYMPHOCYTES # BLD AUTO: 0.5 K/UL
LYMPHOCYTES NFR BLD: 12.3 %
LYMPHOCYTES NFR BLD: 12.3 %
LYMPHOCYTES NFR BLD: 16.8 %
MAGNESIUM SERPL-MCNC: 2 MG/DL
MCH RBC QN AUTO: 28.7 PG
MCH RBC QN AUTO: 28.9 PG
MCH RBC QN AUTO: 29.1 PG
MCHC RBC AUTO-ENTMCNC: 32.6 %
MCHC RBC AUTO-ENTMCNC: 33.3 %
MCHC RBC AUTO-ENTMCNC: 33.3 %
MCV RBC AUTO: 87 FL
MCV RBC AUTO: 87 FL
MCV RBC AUTO: 88 FL
MONOCYTES # BLD AUTO: 0.5 K/UL
MONOCYTES NFR BLD: 14.2 %
MONOCYTES NFR BLD: 14.6 %
MONOCYTES NFR BLD: 14.6 %
NEUTROPHILS # BLD AUTO: 2 K/UL
NEUTROPHILS # BLD AUTO: 2.2 K/UL
NEUTROPHILS # BLD AUTO: 2.3 K/UL
NEUTROPHILS NFR BLD: 62.8 %
NEUTROPHILS NFR BLD: 67.1 %
NEUTROPHILS NFR BLD: 68.2 %
OVALOCYTES BLD QL SMEAR: ABNORMAL
PHOSPHATE SERPL-MCNC: 4.2 MG/DL
PLATELET # BLD AUTO: 59 K/UL
PLATELET # BLD AUTO: 63 K/UL
PLATELET # BLD AUTO: 97 K/UL
PMV BLD AUTO: 10.2 FL
PMV BLD AUTO: 9.9 FL
PMV BLD AUTO: ABNORMAL FL
POCT GLUCOSE: 130 MG/DL (ref 70–110)
POCT GLUCOSE: 146 MG/DL (ref 70–110)
POCT GLUCOSE: 186 MG/DL (ref 70–110)
POCT GLUCOSE: 213 MG/DL (ref 70–110)
POIKILOCYTOSIS BLD QL SMEAR: SLIGHT
POLYCHROMASIA BLD QL SMEAR: ABNORMAL
POTASSIUM SERPL-SCNC: 4.6 MMOL/L
PROT SERPL-MCNC: 6.8 G/DL
PROTHROMBIN TIME: 19.5 SEC
RBC # BLD AUTO: 2.68 M/UL
RBC # BLD AUTO: 2.72 M/UL
RBC # BLD AUTO: 2.8 M/UL
SODIUM SERPL-SCNC: 134 MMOL/L
WBC # BLD AUTO: 3.15 K/UL
WBC # BLD AUTO: 3.18 K/UL
WBC # BLD AUTO: 3.49 K/UL

## 2017-07-01 PROCEDURE — 63600175 PHARM REV CODE 636 W HCPCS: Performed by: HOSPITALIST

## 2017-07-01 PROCEDURE — 99233 SBSQ HOSP IP/OBS HIGH 50: CPT | Mod: ,,, | Performed by: HOSPITALIST

## 2017-07-01 PROCEDURE — 84100 ASSAY OF PHOSPHORUS: CPT

## 2017-07-01 PROCEDURE — 85610 PROTHROMBIN TIME: CPT

## 2017-07-01 PROCEDURE — 36415 COLL VENOUS BLD VENIPUNCTURE: CPT

## 2017-07-01 PROCEDURE — 25000003 PHARM REV CODE 250: Performed by: HOSPITALIST

## 2017-07-01 PROCEDURE — 83735 ASSAY OF MAGNESIUM: CPT

## 2017-07-01 PROCEDURE — 80053 COMPREHEN METABOLIC PANEL: CPT

## 2017-07-01 PROCEDURE — 63600175 PHARM REV CODE 636 W HCPCS: Performed by: PHYSICIAN ASSISTANT

## 2017-07-01 PROCEDURE — 85025 COMPLETE CBC W/AUTO DIFF WBC: CPT | Mod: 91

## 2017-07-01 PROCEDURE — 20600001 HC STEP DOWN PRIVATE ROOM

## 2017-07-01 PROCEDURE — 25000003 PHARM REV CODE 250: Performed by: PHYSICIAN ASSISTANT

## 2017-07-01 RX ADMIN — INSULIN ASPART 8 UNITS: 100 INJECTION, SOLUTION INTRAVENOUS; SUBCUTANEOUS at 04:07

## 2017-07-01 RX ADMIN — OXYCODONE HYDROCHLORIDE 15 MG: 5 TABLET ORAL at 04:07

## 2017-07-01 RX ADMIN — INSULIN DETEMIR 10 UNITS: 100 INJECTION, SOLUTION SUBCUTANEOUS at 09:07

## 2017-07-01 RX ADMIN — INSULIN ASPART 4 UNITS: 100 INJECTION, SOLUTION INTRAVENOUS; SUBCUTANEOUS at 11:07

## 2017-07-01 RX ADMIN — HYDROMORPHONE HYDROCHLORIDE 1 MG: 1 INJECTION, SOLUTION INTRAMUSCULAR; INTRAVENOUS; SUBCUTANEOUS at 05:07

## 2017-07-01 RX ADMIN — Medication 10 ML: at 05:07

## 2017-07-01 RX ADMIN — INSULIN ASPART 1 UNITS: 100 INJECTION, SOLUTION INTRAVENOUS; SUBCUTANEOUS at 10:07

## 2017-07-01 RX ADMIN — QUETIAPINE FUMARATE 100 MG: 25 TABLET ORAL at 09:07

## 2017-07-01 RX ADMIN — DICYCLOMINE HYDROCHLORIDE 20 MG: 20 TABLET ORAL at 05:07

## 2017-07-01 RX ADMIN — HYDROMORPHONE HYDROCHLORIDE 1 MG: 1 INJECTION, SOLUTION INTRAMUSCULAR; INTRAVENOUS; SUBCUTANEOUS at 09:07

## 2017-07-01 RX ADMIN — CARVEDILOL 12.5 MG: 12.5 TABLET, FILM COATED ORAL at 08:07

## 2017-07-01 RX ADMIN — CARVEDILOL 12.5 MG: 12.5 TABLET, FILM COATED ORAL at 05:07

## 2017-07-01 RX ADMIN — HYDROMORPHONE HYDROCHLORIDE 1 MG: 1 INJECTION, SOLUTION INTRAMUSCULAR; INTRAVENOUS; SUBCUTANEOUS at 01:07

## 2017-07-01 RX ADMIN — DEXTROSE 6 G: 5 SOLUTION INTRAVENOUS at 09:07

## 2017-07-01 RX ADMIN — OXYCODONE HYDROCHLORIDE 15 MG: 5 TABLET ORAL at 10:07

## 2017-07-01 RX ADMIN — ATORVASTATIN CALCIUM 40 MG: 20 TABLET, FILM COATED ORAL at 08:07

## 2017-07-01 RX ADMIN — HYDROMORPHONE HYDROCHLORIDE 1 MG: 1 INJECTION, SOLUTION INTRAMUSCULAR; INTRAVENOUS; SUBCUTANEOUS at 10:07

## 2017-07-01 RX ADMIN — Medication 10 ML: at 11:07

## 2017-07-01 RX ADMIN — INSULIN ASPART 8 UNITS: 100 INJECTION, SOLUTION INTRAVENOUS; SUBCUTANEOUS at 08:07

## 2017-07-01 RX ADMIN — ASPIRIN 81 MG: 81 TABLET, COATED ORAL at 08:07

## 2017-07-01 RX ADMIN — INSULIN ASPART 8 UNITS: 100 INJECTION, SOLUTION INTRAVENOUS; SUBCUTANEOUS at 11:07

## 2017-07-01 RX ADMIN — OXYCODONE HYDROCHLORIDE 15 MG: 5 TABLET ORAL at 03:07

## 2017-07-01 RX ADMIN — DICYCLOMINE HYDROCHLORIDE 20 MG: 20 TABLET ORAL at 02:07

## 2017-07-01 RX ADMIN — DICYCLOMINE HYDROCHLORIDE 20 MG: 20 TABLET ORAL at 10:07

## 2017-07-01 NOTE — PLAN OF CARE
Problem: Patient Care Overview  Goal: Plan of Care Review  Outcome: Ongoing (interventions implemented as appropriate)  AOx4, VSS, C/O pain continuously throughout shift. Pt would actively fall asleep sitting on side of the bed and continue to request pain medication. Educated pt on pain regimen. ABX therapy maintained. Remains on continuous Ancef @ 20.8mL/hr. Will continue to monitor and assess.

## 2017-07-01 NOTE — PLAN OF CARE
Problem: Patient Care Overview  Goal: Plan of Care Review  Outcome: Ongoing (interventions implemented as appropriate)  AOx4, VSS, C/O pain continuously throughout shift. Educated pt on pain regimen. ABX therapy maintained. Remains on continuous Ancef @ 20.8mL/hr. Will continue to monitor and assess.

## 2017-07-02 LAB
ALBUMIN SERPL BCP-MCNC: 4.1 G/DL
ALP SERPL-CCNC: 114 U/L
ALT SERPL W/O P-5'-P-CCNC: 6 U/L
ANION GAP SERPL CALC-SCNC: 9 MMOL/L
AST SERPL-CCNC: 61 U/L
BASOPHILS # BLD AUTO: 0.03 K/UL
BASOPHILS # BLD AUTO: 0.04 K/UL
BASOPHILS # BLD AUTO: 0.05 K/UL
BASOPHILS NFR BLD: 1 %
BASOPHILS NFR BLD: 1.1 %
BASOPHILS NFR BLD: 1.6 %
BILIRUB SERPL-MCNC: 2.4 MG/DL
BUN SERPL-MCNC: 51 MG/DL
CALCIUM SERPL-MCNC: 9 MG/DL
CHLORIDE SERPL-SCNC: 105 MMOL/L
CO2 SERPL-SCNC: 20 MMOL/L
CREAT SERPL-MCNC: 1.7 MG/DL
DIFFERENTIAL METHOD: ABNORMAL
EOSINOPHIL # BLD AUTO: 0.1 K/UL
EOSINOPHIL # BLD AUTO: 0.1 K/UL
EOSINOPHIL # BLD AUTO: 0.2 K/UL
EOSINOPHIL NFR BLD: 4.3 %
EOSINOPHIL NFR BLD: 4.4 %
EOSINOPHIL NFR BLD: 5.1 %
ERYTHROCYTE [DISTWIDTH] IN BLOOD BY AUTOMATED COUNT: 18.1 %
ERYTHROCYTE [DISTWIDTH] IN BLOOD BY AUTOMATED COUNT: 18.1 %
ERYTHROCYTE [DISTWIDTH] IN BLOOD BY AUTOMATED COUNT: 18.2 %
EST. GFR  (AFRICAN AMERICAN): 55.5 ML/MIN/1.73 M^2
EST. GFR  (NON AFRICAN AMERICAN): 48 ML/MIN/1.73 M^2
GLUCOSE SERPL-MCNC: 121 MG/DL
HCT VFR BLD AUTO: 23.6 %
HCT VFR BLD AUTO: 24 %
HCT VFR BLD AUTO: 24.9 %
HGB BLD-MCNC: 7.9 G/DL
HGB BLD-MCNC: 8 G/DL
HGB BLD-MCNC: 8.2 G/DL
INR PPP: 1.9
LYMPHOCYTES # BLD AUTO: 0.4 K/UL
LYMPHOCYTES # BLD AUTO: 0.4 K/UL
LYMPHOCYTES # BLD AUTO: 0.6 K/UL
LYMPHOCYTES NFR BLD: 13 %
LYMPHOCYTES NFR BLD: 14.4 %
LYMPHOCYTES NFR BLD: 15.1 %
MAGNESIUM SERPL-MCNC: 2 MG/DL
MCH RBC QN AUTO: 28.7 PG
MCH RBC QN AUTO: 29 PG
MCH RBC QN AUTO: 29.3 PG
MCHC RBC AUTO-ENTMCNC: 32.9 %
MCHC RBC AUTO-ENTMCNC: 33.3 %
MCHC RBC AUTO-ENTMCNC: 33.5 %
MCV RBC AUTO: 86 FL
MCV RBC AUTO: 88 FL
MCV RBC AUTO: 88 FL
MONOCYTES # BLD AUTO: 0.4 K/UL
MONOCYTES # BLD AUTO: 0.5 K/UL
MONOCYTES # BLD AUTO: 0.6 K/UL
MONOCYTES NFR BLD: 13.4 %
MONOCYTES NFR BLD: 15.2 %
MONOCYTES NFR BLD: 17.3 %
NEUTROPHILS # BLD AUTO: 2 K/UL
NEUTROPHILS # BLD AUTO: 2.1 K/UL
NEUTROPHILS # BLD AUTO: 2.3 K/UL
NEUTROPHILS NFR BLD: 61.1 %
NEUTROPHILS NFR BLD: 65.5 %
NEUTROPHILS NFR BLD: 66.6 %
PHOSPHATE SERPL-MCNC: 3.9 MG/DL
PLATELET # BLD AUTO: 53 K/UL
PLATELET # BLD AUTO: 56 K/UL
PLATELET # BLD AUTO: 58 K/UL
PMV BLD AUTO: 10 FL
PMV BLD AUTO: 9.4 FL
PMV BLD AUTO: 9.5 FL
POCT GLUCOSE: 136 MG/DL (ref 70–110)
POCT GLUCOSE: 164 MG/DL (ref 70–110)
POTASSIUM SERPL-SCNC: 4.5 MMOL/L
PROT SERPL-MCNC: 6.6 G/DL
PROTHROMBIN TIME: 18.9 SEC
RBC # BLD AUTO: 2.73 M/UL
RBC # BLD AUTO: 2.75 M/UL
RBC # BLD AUTO: 2.83 M/UL
SODIUM SERPL-SCNC: 134 MMOL/L
WBC # BLD AUTO: 2.99 K/UL
WBC # BLD AUTO: 3.15 K/UL
WBC # BLD AUTO: 3.7 K/UL

## 2017-07-02 PROCEDURE — 84100 ASSAY OF PHOSPHORUS: CPT

## 2017-07-02 PROCEDURE — 20600001 HC STEP DOWN PRIVATE ROOM

## 2017-07-02 PROCEDURE — 94640 AIRWAY INHALATION TREATMENT: CPT

## 2017-07-02 PROCEDURE — 80053 COMPREHEN METABOLIC PANEL: CPT

## 2017-07-02 PROCEDURE — 63600175 PHARM REV CODE 636 W HCPCS: Performed by: PHYSICIAN ASSISTANT

## 2017-07-02 PROCEDURE — 36415 COLL VENOUS BLD VENIPUNCTURE: CPT

## 2017-07-02 PROCEDURE — 99233 SBSQ HOSP IP/OBS HIGH 50: CPT | Mod: ,,, | Performed by: HOSPITALIST

## 2017-07-02 PROCEDURE — 85025 COMPLETE CBC W/AUTO DIFF WBC: CPT | Mod: 91

## 2017-07-02 PROCEDURE — 25000003 PHARM REV CODE 250: Performed by: PHYSICIAN ASSISTANT

## 2017-07-02 PROCEDURE — 25000242 PHARM REV CODE 250 ALT 637 W/ HCPCS: Performed by: NURSE PRACTITIONER

## 2017-07-02 PROCEDURE — 25000003 PHARM REV CODE 250: Performed by: HOSPITALIST

## 2017-07-02 PROCEDURE — 63600175 PHARM REV CODE 636 W HCPCS: Performed by: HOSPITALIST

## 2017-07-02 PROCEDURE — 85610 PROTHROMBIN TIME: CPT

## 2017-07-02 PROCEDURE — 83735 ASSAY OF MAGNESIUM: CPT

## 2017-07-02 RX ADMIN — INSULIN ASPART 8 UNITS: 100 INJECTION, SOLUTION INTRAVENOUS; SUBCUTANEOUS at 12:07

## 2017-07-02 RX ADMIN — CARVEDILOL 12.5 MG: 12.5 TABLET, FILM COATED ORAL at 09:07

## 2017-07-02 RX ADMIN — HYDROMORPHONE HYDROCHLORIDE 1 MG: 1 INJECTION, SOLUTION INTRAMUSCULAR; INTRAVENOUS; SUBCUTANEOUS at 09:07

## 2017-07-02 RX ADMIN — HYDROMORPHONE HYDROCHLORIDE 1 MG: 1 INJECTION, SOLUTION INTRAMUSCULAR; INTRAVENOUS; SUBCUTANEOUS at 02:07

## 2017-07-02 RX ADMIN — OXYCODONE HYDROCHLORIDE 15 MG: 5 TABLET ORAL at 09:07

## 2017-07-02 RX ADMIN — HYDROMORPHONE HYDROCHLORIDE 1 MG: 1 INJECTION, SOLUTION INTRAMUSCULAR; INTRAVENOUS; SUBCUTANEOUS at 06:07

## 2017-07-02 RX ADMIN — OXYCODONE HYDROCHLORIDE 15 MG: 5 TABLET ORAL at 05:07

## 2017-07-02 RX ADMIN — IPRATROPIUM BROMIDE AND ALBUTEROL SULFATE 3 ML: .5; 3 SOLUTION RESPIRATORY (INHALATION) at 01:07

## 2017-07-02 RX ADMIN — INSULIN DETEMIR 10 UNITS: 100 INJECTION, SOLUTION SUBCUTANEOUS at 09:07

## 2017-07-02 RX ADMIN — DICYCLOMINE HYDROCHLORIDE 20 MG: 20 TABLET ORAL at 05:07

## 2017-07-02 RX ADMIN — Medication 10 ML: at 05:07

## 2017-07-02 RX ADMIN — INSULIN ASPART 1 UNITS: 100 INJECTION, SOLUTION INTRAVENOUS; SUBCUTANEOUS at 09:07

## 2017-07-02 RX ADMIN — CARVEDILOL 12.5 MG: 12.5 TABLET, FILM COATED ORAL at 05:07

## 2017-07-02 RX ADMIN — SODIUM CHLORIDE, PRESERVATIVE FREE 10 ML: 5 INJECTION INTRAVENOUS at 12:07

## 2017-07-02 RX ADMIN — Medication 10 ML: at 12:07

## 2017-07-02 RX ADMIN — Medication 10 ML: at 11:07

## 2017-07-02 RX ADMIN — OXYCODONE HYDROCHLORIDE 15 MG: 5 TABLET ORAL at 12:07

## 2017-07-02 RX ADMIN — DICYCLOMINE HYDROCHLORIDE 20 MG: 20 TABLET ORAL at 02:07

## 2017-07-02 RX ADMIN — INSULIN ASPART 8 UNITS: 100 INJECTION, SOLUTION INTRAVENOUS; SUBCUTANEOUS at 09:07

## 2017-07-02 RX ADMIN — ASPIRIN 81 MG: 81 TABLET, COATED ORAL at 09:07

## 2017-07-02 RX ADMIN — HYDROMORPHONE HYDROCHLORIDE 1 MG: 1 INJECTION, SOLUTION INTRAMUSCULAR; INTRAVENOUS; SUBCUTANEOUS at 11:07

## 2017-07-02 RX ADMIN — DICYCLOMINE HYDROCHLORIDE 20 MG: 20 TABLET ORAL at 09:07

## 2017-07-02 RX ADMIN — QUETIAPINE FUMARATE 100 MG: 25 TABLET ORAL at 09:07

## 2017-07-02 RX ADMIN — SODIUM CHLORIDE, PRESERVATIVE FREE 10 ML: 5 INJECTION INTRAVENOUS at 09:07

## 2017-07-02 RX ADMIN — ATORVASTATIN CALCIUM 40 MG: 20 TABLET, FILM COATED ORAL at 09:07

## 2017-07-02 RX ADMIN — DEXTROSE 6 G: 5 SOLUTION INTRAVENOUS at 09:07

## 2017-07-02 RX ADMIN — INSULIN ASPART 2 UNITS: 100 INJECTION, SOLUTION INTRAVENOUS; SUBCUTANEOUS at 04:07

## 2017-07-02 RX ADMIN — INSULIN ASPART 8 UNITS: 100 INJECTION, SOLUTION INTRAVENOUS; SUBCUTANEOUS at 04:07

## 2017-07-02 RX ADMIN — INSULIN ASPART 2 UNITS: 100 INJECTION, SOLUTION INTRAVENOUS; SUBCUTANEOUS at 12:07

## 2017-07-02 NOTE — SUBJECTIVE & OBJECTIVE
Interval History: Patient seen and examined at bedside. Reports diffuse rash in the trunk and in the left arm. Dermatology consulted.     Review of Systems   Constitutional: Negative for chills, diaphoresis and fever.   Respiratory: Negative for cough and shortness of breath.    Cardiovascular: Positive for leg swelling. Negative for chest pain.   Gastrointestinal: Positive for abdominal distention and abdominal pain. Negative for diarrhea, nausea and vomiting.   Genitourinary: Negative for dysuria, frequency and hematuria.   Musculoskeletal: Positive for arthralgias. Negative for back pain and myalgias.        + lower extremity pain   Skin: Positive for wound. Negative for color change and rash.   Neurological: Positive for weakness. Negative for dizziness, numbness and headaches.   Psychiatric/Behavioral: Negative for agitation.     Objective:     Vital Signs (Most Recent):  Temp: 98.5 °F (36.9 °C) (07/02/17 1212)  Pulse: 68 (07/02/17 1313)  Resp: 16 (07/02/17 1313)  BP: (!) 142/69 (07/02/17 1212)  SpO2: 96 % (07/02/17 1313) Vital Signs (24h Range):  Temp:  [97.5 °F (36.4 °C)-98.7 °F (37.1 °C)] 98.5 °F (36.9 °C)  Pulse:  [68-74] 68  Resp:  [16-21] 16  SpO2:  [95 %-96 %] 96 %  BP: (140-173)/(69-93) 142/69     Weight: 126.6 kg (279 lb)  Body mass index is 35.82 kg/m².    Intake/Output Summary (Last 24 hours) at 07/02/17 7537  Last data filed at 07/02/17 1710   Gross per 24 hour   Intake              300 ml   Output              325 ml   Net              -25 ml      Physical Exam   Constitutional: He is oriented to person, place, and time. He appears well-developed and well-nourished. He appears distressed (2/2 pain).   Cardiovascular: Normal rate and regular rhythm.    No murmur heard.  Pulmonary/Chest: Effort normal and breath sounds normal. No respiratory distress.   Abdominal: Soft. He exhibits distension. There is tenderness (diffuse).   Musculoskeletal: Normal range of motion. He exhibits edema (BLE).    Neurological: He is alert and oriented to person, place, and time.   Skin: Skin is warm and dry. Rash (erythematous non pruritic rash to lower abdomen) noted.   Dressings to bilateral feet with scant drainage. Venous changes noted to bilateral lower extremities; no erythema.    Psychiatric: He has a normal mood and affect. His behavior is normal.       Significant Labs:   CBC:   Recent Labs  Lab 07/01/17  2351 07/02/17  0859 07/02/17  1606   WBC 3.15* 3.70* 2.99*   HGB 8.0* 7.9* 8.2*   HCT 24.0* 23.6* 24.9*   PLT 53* 58* 56*     CMP:   Recent Labs  Lab 07/01/17  0436 07/02/17  0555   * 134*   K 4.6 4.5    105   CO2 18* 20*   GLU 95 121*   BUN 48* 51*   CREATININE 1.8* 1.7*   CALCIUM 9.2 9.0   PROT 6.8 6.6   ALBUMIN 4.2 4.1   BILITOT 2.6* 2.4*   ALKPHOS 117 114   AST 75* 61*   ALT 9* 6*   ANIONGAP 12 9   EGFRNONAA 44.8* 48.0*       Significant Imaging: I have reviewed all pertinent imaging results/findings within the past 24 hours.

## 2017-07-02 NOTE — SUBJECTIVE & OBJECTIVE
Interval History: Patient seen and examined at bedside. Complains of arm swelling.     Review of Systems   Constitutional: Negative for chills, diaphoresis and fever.   Respiratory: Negative for cough and shortness of breath.    Cardiovascular: Positive for leg swelling. Negative for chest pain.   Gastrointestinal: Positive for abdominal distention and abdominal pain. Negative for diarrhea, nausea and vomiting.   Genitourinary: Negative for dysuria, frequency and hematuria.   Musculoskeletal: Positive for arthralgias. Negative for back pain and myalgias.        + lower extremity pain   Skin: Positive for wound. Negative for color change and rash.   Neurological: Positive for weakness. Negative for dizziness, numbness and headaches.   Psychiatric/Behavioral: Negative for agitation.     Objective:     Vital Signs (Most Recent):  Temp: 97.5 °F (36.4 °C) (07/01/17 2008)  Pulse: 72 (07/01/17 2008)  Resp: 20 (07/01/17 2008)  BP: (!) 173/93 (07/01/17 2008)  SpO2: 96 % (07/01/17 2008) Vital Signs (24h Range):  Temp:  [97.5 °F (36.4 °C)-98.5 °F (36.9 °C)] 97.5 °F (36.4 °C)  Pulse:  [67-77] 72  Resp:  [18-20] 20  SpO2:  [94 %-98 %] 96 %  BP: (146-178)/(75-93) 173/93     Weight: 126.6 kg (279 lb)  Body mass index is 35.82 kg/m².    Intake/Output Summary (Last 24 hours) at 07/01/17 2120  Last data filed at 07/01/17 1700   Gross per 24 hour   Intake             2148 ml   Output             1300 ml   Net              848 ml      Physical Exam   Constitutional: He is oriented to person, place, and time. He appears well-developed and well-nourished. He appears distressed (2/2 pain).   Cardiovascular: Normal rate and regular rhythm.    No murmur heard.  Pulmonary/Chest: Effort normal and breath sounds normal. No respiratory distress.   Abdominal: Soft. He exhibits distension. There is tenderness (diffuse).   Musculoskeletal: Normal range of motion. He exhibits edema (BLE).   Neurological: He is alert and oriented to person, place,  and time.   Skin: Skin is warm and dry. Rash (erythematous non pruritic rash to lower abdomen) noted.   Dressings to bilateral feet with scant drainage. Venous changes noted to bilateral lower extremities; no erythema.    Psychiatric: He has a normal mood and affect. His behavior is normal.       Significant Labs:   CBC:   Recent Labs  Lab 07/01/17  0059 07/01/17  0934 07/01/17  1558   WBC 3.18* 3.15* 3.49*   HGB 7.8* 7.8* 8.1*   HCT 23.9* 23.4* 24.3*   PLT 59* 97* 63*     CMP:   Recent Labs  Lab 06/30/17  0536 07/01/17  0436   * 134*   K 4.5 4.6    104   CO2 20* 18*   * 95   BUN 46* 48*   CREATININE 1.8* 1.8*   CALCIUM 9.2 9.2   PROT 7.1 6.8   ALBUMIN 4.6 4.2   BILITOT 3.0* 2.6*   ALKPHOS 125 117   AST 78* 75*   ALT 14 9*   ANIONGAP 11 12   EGFRNONAA 44.8* 44.8*       Significant Imaging: I have reviewed all pertinent imaging results/findings within the past 24 hours.

## 2017-07-02 NOTE — PROGRESS NOTES
[facility] Hospital Medicine  Progress Note    Patient Name: Leif Nino  MRN: [id number]  Patient Class: IP- Inpatient   Admission Date: 6/16/2017  Length of Stay: 15 days  Attending Physician: Hema Ferrara MD  Primary Care Provider: [facility] Medicine Team: [facility]OSP MED A Hema Ferrara MD    Subjective:     Principal Problem:Bilateral cellulitis of lower leg    HPI:  Mr. Nino is a 47 yo WM w/ h/o HCV cirrhosis & poorly controlled DM complicated by BL diabetic ulceration s/p 6/1 R 4th toe debridement who presents c/o generalized weakness w/ associated worsening BL LE edema.  He reports that 5d ago he began having dysuria.  Two day afterwards he woke up noticing his chronic BL LE edema was significantly worse with an associated erythematous, painful rash.  Later that day he began developing dyspnea on exertion that progressively worsened to the point where he is now dyspneic at rest.  Patient denies any vomiting but has mild, persistant nausea that has worsened acutely over the last 3 days.  He reports no fevers, chills, abdominal pain, worsening abdominal distention, diarrhea.  Patient states he has had no productive cough or wheezes.    Hospital Course:  No notes on file    Interval History: Patient seen and examined at bedside. Complains of arm swelling.     Review of Systems   Constitutional: Negative for chills, diaphoresis and fever.   Respiratory: Negative for cough and shortness of breath.    Cardiovascular: Positive for leg swelling. Negative for chest pain.   Gastrointestinal: Positive for abdominal distention and abdominal pain. Negative for diarrhea, nausea and vomiting.   Genitourinary: Negative for dysuria, frequency and hematuria.   Musculoskeletal: Positive for arthralgias. Negative for back pain and myalgias.        + lower extremity pain   Skin: Positive for wound. Negative for color change and rash.   Neurological: Positive for weakness.  Negative for dizziness, numbness and headaches.   Psychiatric/Behavioral: Negative for agitation.     Objective:     Vital Signs (Most Recent):  Temp: 97.5 °F (36.4 °C) (07/01/17 2008)  Pulse: 72 (07/01/17 2008)  Resp: 20 (07/01/17 2008)  BP: (!) 173/93 (07/01/17 2008)  SpO2: 96 % (07/01/17 2008) Vital Signs (24h Range):  Temp:  [97.5 °F (36.4 °C)-98.5 °F (36.9 °C)] 97.5 °F (36.4 °C)  Pulse:  [67-77] 72  Resp:  [18-20] 20  SpO2:  [94 %-98 %] 96 %  BP: (146-178)/(75-93) 173/93     Weight: 126.6 kg (279 lb)  Body mass index is 35.82 kg/m².    Intake/Output Summary (Last 24 hours) at 07/01/17 2120  Last data filed at 07/01/17 1700   Gross per 24 hour   Intake             2148 ml   Output             1300 ml   Net              848 ml      Physical Exam   Constitutional: He is oriented to person, place, and time. He appears well-developed and well-nourished. He appears distressed (2/2 pain).   Cardiovascular: Normal rate and regular rhythm.    No murmur heard.  Pulmonary/Chest: Effort normal and breath sounds normal. No respiratory distress.   Abdominal: Soft. He exhibits distension. There is tenderness (diffuse).   Musculoskeletal: Normal range of motion. He exhibits edema (BLE).   Neurological: He is alert and oriented to person, place, and time.   Skin: Skin is warm and dry. Rash (erythematous non pruritic rash to lower abdomen) noted.   Dressings to bilateral feet with scant drainage. Venous changes noted to bilateral lower extremities; no erythema.    Psychiatric: He has a normal mood and affect. His behavior is normal.       Significant Labs:   CBC:   Recent Labs  Lab 07/01/17  0059 07/01/17  0934 07/01/17  1558   WBC 3.18* 3.15* 3.49*   HGB 7.8* 7.8* 8.1*   HCT 23.9* 23.4* 24.3*   PLT 59* 97* 63*     CMP:   Recent Labs  Lab 06/30/17  0536 07/01/17  0436   * 134*   K 4.5 4.6    104   CO2 20* 18*   * 95   BUN 46* 48*   CREATININE 1.8* 1.8*   CALCIUM 9.2 9.2   PROT 7.1 6.8   ALBUMIN 4.6 4.2    BILITOT 3.0* 2.6*   ALKPHOS 125 117   AST 78* 75*   ALT 14 9*   ANIONGAP 11 12   EGFRNONAA 44.8* 44.8*       Significant Imaging: I have reviewed all pertinent imaging results/findings within the past 24 hours.    Assessment/Plan:   #Bilateral lower extremity cellulitis with venous stasis due to streptococcus   #4th toe osteomyelitis  - s/p bone biopsy on 06/24  - IV Ceftriaxone and Vancomycin discontinued by ID  - on IV cefazolin continuous infusion   - will probably need 6 weeks of antibiotics through PICC Line   - PICC line placed, confirmed by CXR  - follow up Bone Biopsy results. Preliminary results show Staph Aureus      #Choledocholithiasis  - ERCP/EUS on 06/28/17  Complete removal was accomplished by biliary sphincterotomy and   balloon extraction.  A biliary sphincterotomy was performed     # Diabetic type 2 foot ulcers B/L  - podiatry consult, appreciate recs  - X rays B/ L feet: no osteomyelitis     # Decompensated Hep C cirrhosis with ascites  MELD-Na score: 25 at 7/1/2017  4:36 AM  MELD score: 23 at 7/1/2017  4:36 AM  Calculated from:  Serum Creatinine: 1.8 mg/dL at 7/1/2017  4:36 AM  Serum Sodium: 134 mmol/L at 7/1/2017  4:36 AM  Total Bilirubin: 2.6 mg/dL at 7/1/2017  4:36 AM  INR(ratio): 1.9 at 7/1/2017  4:36 AM  Age: 46 years  - will set up for outpatient follow up     # CKD stage 3 due to uncontrolled DM 2  - seems to be new baseline at 1.5     # DM2 with nephropathy  - Hba1c of 8.2 recently  - levemir 20 units at night and 8 of novolog with meals     # Anemia of CKD stage 3  - stable at 1.4        # Hypoalbuminemia due to moderate protein fernando malnutrition  - PAB 3, glucerna     # Hypomagnesemia   - replaced, resolved          VTE Risk Mitigation         Ordered     Place BASILIO hose  Until discontinued      06/24/17 1443     Low Risk of VTE  Once      06/16/17 1503          Hema Ferrara MD  Department of Hospital Medicine   [facility]

## 2017-07-02 NOTE — PROGRESS NOTES
[facility] Hospital Medicine  Progress Note    Patient Name: Leif Nino  MRN: [id number]  Patient Class: IP- Inpatient   Admission Date: 6/16/2017  Length of Stay: 16 days  Attending Physician: Hema Ferrara MD  Primary Care Provider: Primary Doctor [facility] Medicine Team: [facility] [place] [facility] MED A Hema Ferrara MD    Subjective:     Principal Problem:Bilateral cellulitis of lower leg    HPI:  Mr. Nino is a 45 yo WM w/ h/o HCV cirrhosis & poorly controlled DM complicated by BL diabetic ulceration s/p 6/1 R 4th toe debridement who presents c/o generalized weakness w/ associated worsening BL LE edema.  He reports that 5d ago he began having dysuria.  Two day afterwards he woke up noticing his chronic BL LE edema was significantly worse with an associated erythematous, painful rash.  Later that day he began developing dyspnea on exertion that progressively worsened to the point where he is now dyspneic at rest.  Patient denies any vomiting but has mild, persistant nausea that has worsened acutely over the last 3 days.  He reports no fevers, chills, abdominal pain, worsening abdominal distention, diarrhea.  Patient states he has had no productive cough or wheezes.    Hospital Course:  No notes on file    Interval History: Patient seen and examined at bedside. Reports diffuse rash in the trunk and in the left arm. Dermatology consulted.     Review of Systems   Constitutional: Negative for chills, diaphoresis and fever.   Respiratory: Negative for cough and shortness of breath.    Cardiovascular: Positive for leg swelling. Negative for chest pain.   Gastrointestinal: Positive for abdominal distention and abdominal pain. Negative for diarrhea, nausea and vomiting.   Genitourinary: Negative for dysuria, frequency and hematuria.   Musculoskeletal: Positive for arthralgias. Negative for back pain and myalgias.        + lower extremity pain   Skin: Positive for wound. Negative for color change and  rash.   Neurological: Positive for weakness. Negative for dizziness, numbness and headaches.   Psychiatric/Behavioral: Negative for agitation.     Objective:     Vital Signs (Most Recent):  Temp: 98.5 °F (36.9 °C) (07/02/17 1212)  Pulse: 68 (07/02/17 1313)  Resp: 16 (07/02/17 1313)  BP: (!) 142/69 (07/02/17 1212)  SpO2: 96 % (07/02/17 1313) Vital Signs (24h Range):  Temp:  [97.5 °F (36.4 °C)-98.7 °F (37.1 °C)] 98.5 °F (36.9 °C)  Pulse:  [68-74] 68  Resp:  [16-21] 16  SpO2:  [95 %-96 %] 96 %  BP: (140-173)/(69-93) 142/69     Weight: 126.6 kg (279 lb)  Body mass index is 35.82 kg/m².    Intake/Output Summary (Last 24 hours) at 07/02/17 1747  Last data filed at 07/02/17 1710   Gross per 24 hour   Intake              300 ml   Output              325 ml   Net              -25 ml      Physical Exam   Constitutional: He is oriented to person, place, and time. He appears well-developed and well-nourished. He appears distressed (2/2 pain).   Cardiovascular: Normal rate and regular rhythm.    No murmur heard.  Pulmonary/Chest: Effort normal and breath sounds normal. No respiratory distress.   Abdominal: Soft. He exhibits distension. There is tenderness (diffuse).   Musculoskeletal: Normal range of motion. He exhibits edema (BLE).   Neurological: He is alert and oriented to person, place, and time.   Skin: Skin is warm and dry. Rash (erythematous non pruritic rash to lower abdomen) noted.   Dressings to bilateral feet with scant drainage. Venous changes noted to bilateral lower extremities; no erythema.    Psychiatric: He has a normal mood and affect. His behavior is normal.       Significant Labs:   CBC:   Recent Labs  Lab 07/01/17  2351 07/02/17  0859 07/02/17  1606   WBC 3.15* 3.70* 2.99*   HGB 8.0* 7.9* 8.2*   HCT 24.0* 23.6* 24.9*   PLT 53* 58* 56*     CMP:   Recent Labs  Lab 07/01/17  0436 07/02/17  0555   * 134*   K 4.6 4.5    105   CO2 18* 20*   GLU 95 121*   BUN 48* 51*   CREATININE 1.8* 1.7*   CALCIUM 9.2  9.0   PROT 6.8 6.6   ALBUMIN 4.2 4.1   BILITOT 2.6* 2.4*   ALKPHOS 117 114   AST 75* 61*   ALT 9* 6*   ANIONGAP 12 9   EGFRNONAA 44.8* 48.0*       Significant Imaging: I have reviewed all pertinent imaging results/findings within the past 24 hours.    Assessment/Plan:   #Bilateral lower extremity cellulitis with venous stasis due to streptococcus   #4th toe osteomyelitis  - s/p bone biopsy on 06/24  - IV Ceftriaxone and Vancomycin discontinued by ID  - on IV cefazolin continuous infusion   - will probably need 6 weeks of antibiotics through PICC Line   - PICC line placed, confirmed by CXR  - follow up Bone Biopsy results. Preliminary results show Staph Aureus      #Choledocholithiasis  - ERCP/EUS on 06/28/17  Complete removal was accomplished by biliary sphincterotomy and   balloon extraction.  A biliary sphincterotomy was performed     # Diabetic type 2 foot ulcers B/L  - podiatry consult, appreciate recs  - X rays B/ L feet: no osteomyelitis     # Decompensated Hep C cirrhosis with ascites  MELD-Na score: 24 at 7/2/2017  5:55 AM  MELD score: 22 at 7/2/2017  5:55 AM  Calculated from:  Serum Creatinine: 1.7 mg/dL at 7/2/2017  5:55 AM  Serum Sodium: 134 mmol/L at 7/2/2017  5:55 AM  Total Bilirubin: 2.4 mg/dL at 7/2/2017  5:55 AM  INR(ratio): 1.9 at 7/2/2017  5:55 AM  Age: 46 years  - will set up for outpatient follow up     # CKD stage 3 due to uncontrolled DM 2  - seems to be new baseline at 1.5     # DM2 with nephropathy  - Hba1c of 8.2 recently  - levemir 20 units at night and 8 of novolog with meals     # Anemia of CKD stage 3  - stable at 1.4        # Hypoalbuminemia due to moderate protein fernando malnutrition  - PAB 3, glucerna     # Hypomagnesemia   - replaced, resolved        VTE Risk Mitigation         Ordered     Place sequential compression device  Until discontinued      07/01/17 2126     Place BASILIO hose  Until discontinued      06/24/17 1443     Low Risk of VTE  Once      06/16/17 2243          Hema  MD Josias  Department of Hospital Medicine   [facility]

## 2017-07-03 LAB
ALBUMIN SERPL BCP-MCNC: 4.2 G/DL
ALP SERPL-CCNC: 118 U/L
ALT SERPL W/O P-5'-P-CCNC: 6 U/L
ANION GAP SERPL CALC-SCNC: 10 MMOL/L
ANISOCYTOSIS BLD QL SMEAR: SLIGHT
AST SERPL-CCNC: 57 U/L
BASO STIPL BLD QL SMEAR: ABNORMAL
BASOPHILS # BLD AUTO: 0.03 K/UL
BASOPHILS # BLD AUTO: 0.03 K/UL
BASOPHILS # BLD AUTO: ABNORMAL K/UL
BASOPHILS NFR BLD: 1 %
BASOPHILS NFR BLD: 1.2 %
BASOPHILS NFR BLD: 2 %
BILIRUB SERPL-MCNC: 2.3 MG/DL
BUN SERPL-MCNC: 55 MG/DL
CALCIUM SERPL-MCNC: 9.3 MG/DL
CHLORIDE SERPL-SCNC: 106 MMOL/L
CO2 SERPL-SCNC: 18 MMOL/L
CREAT SERPL-MCNC: 1.8 MG/DL
CRP SERPL-MCNC: 18.6 MG/L
DIFFERENTIAL METHOD: ABNORMAL
EOSINOPHIL # BLD AUTO: 0.1 K/UL
EOSINOPHIL # BLD AUTO: 0.2 K/UL
EOSINOPHIL # BLD AUTO: ABNORMAL K/UL
EOSINOPHIL NFR BLD: 2 %
EOSINOPHIL NFR BLD: 5 %
EOSINOPHIL NFR BLD: 5 %
ERYTHROCYTE [DISTWIDTH] IN BLOOD BY AUTOMATED COUNT: 18.1 %
ERYTHROCYTE [DISTWIDTH] IN BLOOD BY AUTOMATED COUNT: 18.2 %
ERYTHROCYTE [DISTWIDTH] IN BLOOD BY AUTOMATED COUNT: 18.2 %
EST. GFR  (AFRICAN AMERICAN): 51.8 ML/MIN/1.73 M^2
EST. GFR  (NON AFRICAN AMERICAN): 44.8 ML/MIN/1.73 M^2
GLUCOSE SERPL-MCNC: 124 MG/DL
HCT VFR BLD AUTO: 22.2 %
HCT VFR BLD AUTO: 23.1 %
HCT VFR BLD AUTO: 23.1 %
HGB BLD-MCNC: 7.4 G/DL
HGB BLD-MCNC: 7.7 G/DL
HGB BLD-MCNC: 7.7 G/DL
HYPOCHROMIA BLD QL SMEAR: ABNORMAL
INR PPP: 1.7
LYMPHOCYTES # BLD AUTO: 0.4 K/UL
LYMPHOCYTES # BLD AUTO: 0.5 K/UL
LYMPHOCYTES # BLD AUTO: ABNORMAL K/UL
LYMPHOCYTES NFR BLD: 14 %
LYMPHOCYTES NFR BLD: 15.1 %
LYMPHOCYTES NFR BLD: 17.8 %
MAGNESIUM SERPL-MCNC: 2.1 MG/DL
MCH RBC QN AUTO: 28.6 PG
MCH RBC QN AUTO: 28.6 PG
MCH RBC QN AUTO: 28.8 PG
MCHC RBC AUTO-ENTMCNC: 33.3 %
MCV RBC AUTO: 86 FL
MCV RBC AUTO: 86 FL
MCV RBC AUTO: 87 FL
MONOCYTES # BLD AUTO: 0.4 K/UL
MONOCYTES # BLD AUTO: 0.4 K/UL
MONOCYTES # BLD AUTO: ABNORMAL K/UL
MONOCYTES NFR BLD: 12.9 %
MONOCYTES NFR BLD: 13 %
MONOCYTES NFR BLD: 13.5 %
NEUTROPHILS # BLD AUTO: 1.6 K/UL
NEUTROPHILS # BLD AUTO: 1.9 K/UL
NEUTROPHILS NFR BLD: 62.3 %
NEUTROPHILS NFR BLD: 63.3 %
NEUTROPHILS NFR BLD: 68 %
NEUTS BAND NFR BLD MANUAL: 1 %
PHOSPHATE SERPL-MCNC: 3.9 MG/DL
PLATELET # BLD AUTO: 51 K/UL
PLATELET # BLD AUTO: 54 K/UL
PLATELET # BLD AUTO: 59 K/UL
PLATELET BLD QL SMEAR: ABNORMAL
PMV BLD AUTO: 10.1 FL
PMV BLD AUTO: 10.9 FL
PMV BLD AUTO: 9.5 FL
POCT GLUCOSE: 137 MG/DL (ref 70–110)
POCT GLUCOSE: 145 MG/DL (ref 70–110)
POCT GLUCOSE: 188 MG/DL (ref 70–110)
POCT GLUCOSE: 189 MG/DL (ref 70–110)
POLYCHROMASIA BLD QL SMEAR: ABNORMAL
POTASSIUM SERPL-SCNC: 4.5 MMOL/L
PROT SERPL-MCNC: 6.9 G/DL
PROTHROMBIN TIME: 17.6 SEC
RBC # BLD AUTO: 2.59 M/UL
RBC # BLD AUTO: 2.67 M/UL
RBC # BLD AUTO: 2.69 M/UL
SODIUM SERPL-SCNC: 134 MMOL/L
WBC # BLD AUTO: 2.59 K/UL
WBC # BLD AUTO: 3.03 K/UL
WBC # BLD AUTO: 3.35 K/UL

## 2017-07-03 PROCEDURE — 85610 PROTHROMBIN TIME: CPT

## 2017-07-03 PROCEDURE — 85025 COMPLETE CBC W/AUTO DIFF WBC: CPT

## 2017-07-03 PROCEDURE — 85027 COMPLETE CBC AUTOMATED: CPT

## 2017-07-03 PROCEDURE — 97116 GAIT TRAINING THERAPY: CPT

## 2017-07-03 PROCEDURE — 85007 BL SMEAR W/DIFF WBC COUNT: CPT

## 2017-07-03 PROCEDURE — 36415 COLL VENOUS BLD VENIPUNCTURE: CPT

## 2017-07-03 PROCEDURE — 99233 SBSQ HOSP IP/OBS HIGH 50: CPT | Mod: ,,, | Performed by: HOSPITALIST

## 2017-07-03 PROCEDURE — 25000242 PHARM REV CODE 250 ALT 637 W/ HCPCS: Performed by: NURSE PRACTITIONER

## 2017-07-03 PROCEDURE — 20600001 HC STEP DOWN PRIVATE ROOM

## 2017-07-03 PROCEDURE — 97530 THERAPEUTIC ACTIVITIES: CPT

## 2017-07-03 PROCEDURE — 84100 ASSAY OF PHOSPHORUS: CPT

## 2017-07-03 PROCEDURE — 86140 C-REACTIVE PROTEIN: CPT

## 2017-07-03 PROCEDURE — 25000003 PHARM REV CODE 250: Performed by: HOSPITALIST

## 2017-07-03 PROCEDURE — 63600175 PHARM REV CODE 636 W HCPCS: Performed by: HOSPITALIST

## 2017-07-03 PROCEDURE — 97110 THERAPEUTIC EXERCISES: CPT

## 2017-07-03 PROCEDURE — 25000003 PHARM REV CODE 250: Performed by: PHYSICIAN ASSISTANT

## 2017-07-03 PROCEDURE — 63600175 PHARM REV CODE 636 W HCPCS: Performed by: PHYSICIAN ASSISTANT

## 2017-07-03 PROCEDURE — 94640 AIRWAY INHALATION TREATMENT: CPT

## 2017-07-03 PROCEDURE — 94761 N-INVAS EAR/PLS OXIMETRY MLT: CPT

## 2017-07-03 PROCEDURE — 83735 ASSAY OF MAGNESIUM: CPT

## 2017-07-03 PROCEDURE — 80053 COMPREHEN METABOLIC PANEL: CPT

## 2017-07-03 RX ORDER — SODIUM BICARBONATE 650 MG/1
650 TABLET ORAL 3 TIMES DAILY
Status: DISCONTINUED | OUTPATIENT
Start: 2017-07-03 | End: 2017-07-07 | Stop reason: HOSPADM

## 2017-07-03 RX ORDER — SODIUM CHLORIDE 9 MG/ML
INJECTION, SOLUTION INTRAVENOUS CONTINUOUS
Status: DISCONTINUED | OUTPATIENT
Start: 2017-07-03 | End: 2017-07-05

## 2017-07-03 RX ADMIN — HYDROMORPHONE HYDROCHLORIDE 1 MG: 1 INJECTION, SOLUTION INTRAMUSCULAR; INTRAVENOUS; SUBCUTANEOUS at 07:07

## 2017-07-03 RX ADMIN — DICYCLOMINE HYDROCHLORIDE 20 MG: 20 TABLET ORAL at 06:07

## 2017-07-03 RX ADMIN — QUETIAPINE FUMARATE 100 MG: 25 TABLET ORAL at 10:07

## 2017-07-03 RX ADMIN — DEXTROSE 6 G: 5 SOLUTION INTRAVENOUS at 10:07

## 2017-07-03 RX ADMIN — ATORVASTATIN CALCIUM 40 MG: 20 TABLET, FILM COATED ORAL at 08:07

## 2017-07-03 RX ADMIN — SODIUM BICARBONATE 650 MG: 650 TABLET ORAL at 02:07

## 2017-07-03 RX ADMIN — INSULIN ASPART 2 UNITS: 100 INJECTION, SOLUTION INTRAVENOUS; SUBCUTANEOUS at 11:07

## 2017-07-03 RX ADMIN — HYDROMORPHONE HYDROCHLORIDE 1 MG: 1 INJECTION, SOLUTION INTRAMUSCULAR; INTRAVENOUS; SUBCUTANEOUS at 11:07

## 2017-07-03 RX ADMIN — ASPIRIN 81 MG: 81 TABLET, COATED ORAL at 08:07

## 2017-07-03 RX ADMIN — INSULIN ASPART 8 UNITS: 100 INJECTION, SOLUTION INTRAVENOUS; SUBCUTANEOUS at 12:07

## 2017-07-03 RX ADMIN — IPRATROPIUM BROMIDE AND ALBUTEROL SULFATE 3 ML: .5; 3 SOLUTION RESPIRATORY (INHALATION) at 08:07

## 2017-07-03 RX ADMIN — HYDROMORPHONE HYDROCHLORIDE 1 MG: 1 INJECTION, SOLUTION INTRAMUSCULAR; INTRAVENOUS; SUBCUTANEOUS at 03:07

## 2017-07-03 RX ADMIN — OXYCODONE HYDROCHLORIDE 15 MG: 5 TABLET ORAL at 10:07

## 2017-07-03 RX ADMIN — SODIUM BICARBONATE 650 MG: 650 TABLET ORAL at 10:07

## 2017-07-03 RX ADMIN — Medication 10 ML: at 06:07

## 2017-07-03 RX ADMIN — INSULIN ASPART 8 UNITS: 100 INJECTION, SOLUTION INTRAVENOUS; SUBCUTANEOUS at 07:07

## 2017-07-03 RX ADMIN — CARVEDILOL 12.5 MG: 12.5 TABLET, FILM COATED ORAL at 08:07

## 2017-07-03 RX ADMIN — INSULIN DETEMIR 10 UNITS: 100 INJECTION, SOLUTION SUBCUTANEOUS at 10:07

## 2017-07-03 RX ADMIN — HYDROMORPHONE HYDROCHLORIDE 1 MG: 1 INJECTION, SOLUTION INTRAMUSCULAR; INTRAVENOUS; SUBCUTANEOUS at 08:07

## 2017-07-03 RX ADMIN — CARVEDILOL 12.5 MG: 12.5 TABLET, FILM COATED ORAL at 07:07

## 2017-07-03 RX ADMIN — Medication 10 ML: at 12:07

## 2017-07-03 RX ADMIN — DICYCLOMINE HYDROCHLORIDE 20 MG: 20 TABLET ORAL at 02:07

## 2017-07-03 RX ADMIN — SODIUM CHLORIDE: 0.9 INJECTION, SOLUTION INTRAVENOUS at 07:07

## 2017-07-03 RX ADMIN — HYDROMORPHONE HYDROCHLORIDE 1 MG: 1 INJECTION, SOLUTION INTRAMUSCULAR; INTRAVENOUS; SUBCUTANEOUS at 02:07

## 2017-07-03 RX ADMIN — DICYCLOMINE HYDROCHLORIDE 20 MG: 20 TABLET ORAL at 10:07

## 2017-07-03 NOTE — PROGRESS NOTES
[facility] Hospital Medicine  Progress Note    Patient Name: Leif Nino  MRN: [id number]  Patient Class: IP- Inpatient   Admission Date: 6/16/2017  Length of Stay: 17 days  Attending Physician: Hema Ferrara MD  Primary Care Provider: Primary Doctor [facility] Medicine Team: [facility] [place] [facility] MED A Hema Ferrara MD    Subjective:     Principal Problem:Bilateral cellulitis of lower leg    HPI:  Mr. Nino is a 47 yo WM w/ h/o HCV cirrhosis & poorly controlled DM complicated by BL diabetic ulceration s/p 6/1 R 4th toe debridement who presents c/o generalized weakness w/ associated worsening BL LE edema.  He reports that 5d ago he began having dysuria.  Two day afterwards he woke up noticing his chronic BL LE edema was significantly worse with an associated erythematous, painful rash.  Later that day he began developing dyspnea on exertion that progressively worsened to the point where he is now dyspneic at rest.  Patient denies any vomiting but has mild, persistant nausea that has worsened acutely over the last 3 days.  He reports no fevers, chills, abdominal pain, worsening abdominal distention, diarrhea.  Patient states he has had no productive cough or wheezes.    Hospital Course:  No notes on file    Interval History: Patient with persistent left upper extremity swelling and erythremia. US doppler negative.     Review of Systems   Constitutional: Negative for chills, diaphoresis and fever.   Respiratory: Negative for cough and shortness of breath.    Cardiovascular: Positive for leg swelling. Negative for chest pain.   Gastrointestinal: Positive for abdominal distention and abdominal pain. Negative for diarrhea, nausea and vomiting.   Genitourinary: Negative for dysuria, frequency and hematuria.   Musculoskeletal: Positive for arthralgias. Negative for back pain and myalgias.        + lower extremity pain   Skin: Positive for wound. Negative for color change and rash.   Neurological:  Positive for weakness. Negative for dizziness, numbness and headaches.   Psychiatric/Behavioral: Negative for agitation.     Objective:     Vital Signs (Most Recent):  Temp: 98 °F (36.7 °C) (07/03/17 1500)  Pulse: 76 (07/03/17 1500)  Resp: 20 (07/03/17 1500)  BP: (!) 154/76 (07/03/17 1500)  SpO2: 95 % (07/03/17 1500) Vital Signs (24h Range):  Temp:  [97.4 °F (36.3 °C)-98.3 °F (36.8 °C)] 98 °F (36.7 °C)  Pulse:  [72-91] 76  Resp:  [18-22] 20  SpO2:  [95 %-97 %] 95 %  BP: (140-194)/(73-89) 154/76     Weight: 126.6 kg (279 lb)  Body mass index is 35.82 kg/m².    Intake/Output Summary (Last 24 hours) at 07/03/17 1624  Last data filed at 07/03/17 1438   Gross per 24 hour   Intake              500 ml   Output             1476 ml   Net             -976 ml      Physical Exam   Constitutional: He is oriented to person, place, and time. He appears well-developed and well-nourished. He appears distressed (2/2 pain).   Cardiovascular: Normal rate and regular rhythm.    No murmur heard.  Pulmonary/Chest: Effort normal and breath sounds normal. No respiratory distress.   Abdominal: Soft. He exhibits distension. There is tenderness (diffuse).   Musculoskeletal: Normal range of motion. He exhibits edema (BLE).   Neurological: He is alert and oriented to person, place, and time.   Skin: Skin is warm and dry. Rash (erythematous non pruritic rash to lower abdomen) noted.   Dressings to bilateral feet with scant drainage. Venous changes noted to bilateral lower extremities; no erythema.    Psychiatric: He has a normal mood and affect. His behavior is normal.       Significant Labs:   CBC:   Recent Labs  Lab 07/02/17  1606 07/03/17  0005 07/03/17  0818   WBC 2.99* 3.35* 3.03*   HGB 8.2* 7.7* 7.7*   HCT 24.9* 23.1* 23.1*   PLT 56* 54* 59*     CMP:   Recent Labs  Lab 07/02/17  0555 07/03/17  0701   * 134*   K 4.5 4.5    106   CO2 20* 18*   * 124*   BUN 51* 55*   CREATININE 1.7* 1.8*   CALCIUM 9.0 9.3   PROT 6.6 6.9    ALBUMIN 4.1 4.2   BILITOT 2.4* 2.3*   ALKPHOS 114 118   AST 61* 57*   ALT 6* 6*   ANIONGAP 9 10   EGFRNONAA 48.0* 44.8*       Significant Imaging: U/S: I have reviewed all pertinent results/findings within the past 24 hours and my personal findings are:  upper extremity US negative for DVT     Assessment/Plan:   #Bilateral lower extremity cellulitis with venous stasis due to streptococcus   #4th toe osteomyelitis  - s/p bone biopsy on 06/24  - IV Ceftriaxone and Vancomycin discontinued by ID  - on IV cefazolin continuous infusion   - will probably need 6 weeks of antibiotics through PICC Line   - PICC line placed, confirmed by CXR  - follow up Bone Biopsy results. Preliminary results show Staph Aureus     Upper extremity swelling  - Upper extremity US negative for DVT x 2  - seen by Dermatology  - will perform CRP and ESR, Xray of the left shoulder     #Choledocholithiasis  - ERCP/EUS on 06/28/17  Complete removal was accomplished by biliary sphincterotomy and   balloon extraction.  A biliary sphincterotomy was performed     # Diabetic type 2 foot ulcers B/L  - podiatry consult, appreciate recs  - X rays B/ L feet: no osteomyelitis     # Decompensated Hep C cirrhosis with ascites  MELD-Na score: 24 at 7/2/2017  5:55 AM  MELD score: 22 at 7/2/2017  5:55 AM  Calculated from:  Serum Creatinine: 1.7 mg/dL at 7/2/2017  5:55 AM  Serum Sodium: 134 mmol/L at 7/2/2017  5:55 AM  Total Bilirubin: 2.4 mg/dL at 7/2/2017  5:55 AM  INR(ratio): 1.9 at 7/2/2017  5:55 AM  Age: 46 years  - will set up for outpatient follow up     # CKD stage 3 due to uncontrolled DM 2  - seems to be new baseline at 1.5     # DM2 with nephropathy  - Hba1c of 8.2 recently  - levemir 20 units at night and 8 of novolog with meals     # Anemia of CKD stage 3  - stable at 1.4        # Hypoalbuminemia due to moderate protein fernando malnutrition  - PAB 3, glucerna     # Hypomagnesemia   - replaced, resolved        VTE Risk Mitigation         Ordered     Place  sequential compression device  Until discontinued      07/01/17 2126     Place BASILIO hose  Until discontinued      06/24/17 1443     Low Risk of VTE  Once      06/16/17 7912          Hema Ferrara MD  Department of Hospital Medicine   [facility]

## 2017-07-03 NOTE — PLAN OF CARE
07/03/17 1841   Right Care Assessment   Can the patient answer the patient profile reliably? Yes, cognitively intact   How often would a person be available to care for the patient? Whenever needed   Describe the patient's ability to walk at the present time. Major restrictions/daily assistance from another person   How does the patient rate their overall health at the present time? Fair   Number of comorbid conditions (as recorded on the chart) Four

## 2017-07-03 NOTE — PLAN OF CARE
Problem: Patient Care Overview  Goal: Plan of Care Review  Outcome: Ongoing (interventions implemented as appropriate)  Plan of care reviewed with pt.  Understanding verbalized.  Afebrile.  VSS.  Pt alert and oriented x 4.  Pain relieved with prn pain medications.  Continuous Ancef gtt infusing at 20.8 ml/hr.  Dressings to bilateral lower extremities.  Blood glucose monitored.  Corrective and long acting insulin administered.  Camera set up in room for safety.  Fall precautions in place.  Bed in lowest position.  Call light and bedside table within reach.  Safety maintained throughout shift.

## 2017-07-03 NOTE — SUBJECTIVE & OBJECTIVE
Interval History: Patient with persistent left upper extremity swelling and erythremia. US doppler negative.     Review of Systems   Constitutional: Negative for chills, diaphoresis and fever.   Respiratory: Negative for cough and shortness of breath.    Cardiovascular: Positive for leg swelling. Negative for chest pain.   Gastrointestinal: Positive for abdominal distention and abdominal pain. Negative for diarrhea, nausea and vomiting.   Genitourinary: Negative for dysuria, frequency and hematuria.   Musculoskeletal: Positive for arthralgias. Negative for back pain and myalgias.        + lower extremity pain   Skin: Positive for wound. Negative for color change and rash.   Neurological: Positive for weakness. Negative for dizziness, numbness and headaches.   Psychiatric/Behavioral: Negative for agitation.     Objective:     Vital Signs (Most Recent):  Temp: 98 °F (36.7 °C) (07/03/17 1500)  Pulse: 76 (07/03/17 1500)  Resp: 20 (07/03/17 1500)  BP: (!) 154/76 (07/03/17 1500)  SpO2: 95 % (07/03/17 1500) Vital Signs (24h Range):  Temp:  [97.4 °F (36.3 °C)-98.3 °F (36.8 °C)] 98 °F (36.7 °C)  Pulse:  [72-91] 76  Resp:  [18-22] 20  SpO2:  [95 %-97 %] 95 %  BP: (140-194)/(73-89) 154/76     Weight: 126.6 kg (279 lb)  Body mass index is 35.82 kg/m².    Intake/Output Summary (Last 24 hours) at 07/03/17 1624  Last data filed at 07/03/17 1438   Gross per 24 hour   Intake              500 ml   Output             1476 ml   Net             -976 ml      Physical Exam   Constitutional: He is oriented to person, place, and time. He appears well-developed and well-nourished. He appears distressed (2/2 pain).   Cardiovascular: Normal rate and regular rhythm.    No murmur heard.  Pulmonary/Chest: Effort normal and breath sounds normal. No respiratory distress.   Abdominal: Soft. He exhibits distension. There is tenderness (diffuse).   Musculoskeletal: Normal range of motion. He exhibits edema (BLE).   Neurological: He is alert and  oriented to person, place, and time.   Skin: Skin is warm and dry. Rash (erythematous non pruritic rash to lower abdomen) noted.   Dressings to bilateral feet with scant drainage. Venous changes noted to bilateral lower extremities; no erythema.    Psychiatric: He has a normal mood and affect. His behavior is normal.       Significant Labs:   CBC:   Recent Labs  Lab 07/02/17  1606 07/03/17  0005 07/03/17  0818   WBC 2.99* 3.35* 3.03*   HGB 8.2* 7.7* 7.7*   HCT 24.9* 23.1* 23.1*   PLT 56* 54* 59*     CMP:   Recent Labs  Lab 07/02/17  0555 07/03/17  0701   * 134*   K 4.5 4.5    106   CO2 20* 18*   * 124*   BUN 51* 55*   CREATININE 1.7* 1.8*   CALCIUM 9.0 9.3   PROT 6.6 6.9   ALBUMIN 4.1 4.2   BILITOT 2.4* 2.3*   ALKPHOS 114 118   AST 61* 57*   ALT 6* 6*   ANIONGAP 9 10   EGFRNONAA 48.0* 44.8*       Significant Imaging: U/S: I have reviewed all pertinent results/findings within the past 24 hours and my personal findings are:  upper extremity US negative for DVT

## 2017-07-03 NOTE — PLAN OF CARE
Problem: Physical Therapy Goal  Goal: Physical Therapy Goal  Goals to be met by: 7/10/17     Patient will increase functional independence with mobility by performin. Supine to sit with Stand-by Assistance - not met  2. Sit to supine with Stand-by Assistance - met  3. Sit to stand transfer with Stand-by Assistance -  Met  Revised goal: sit to stand with RW with mod independent-not met  4. Bed to chair transfer with Stand-by Assistance using LRAD - not met  5. Gait  x 100 feet with Contact Guard Assistance using LRAD - Met   Update: Gait x 200ft Christal with rolling walker - not met  6. Ascend/descend 5 stair with right Handrails Moderate Assistance - not met  7. Lower extremity exercise program x 15 reps per handout, with independence - not met        Outcome: Ongoing (interventions implemented as appropriate)  Pt's goals revised as needed and pt will continue to benefit from skilled PT services to work towards improved functional mobility including: bed mobility, transfers, up/down steps, and gait.   Ceci Lopez, PT  7/3/2017

## 2017-07-03 NOTE — PT/OT/SLP PROGRESS
"Physical Therapy  Treatment    Leif Nino   MRN: [id number]   Admitting Diagnosis: Bilateral cellulitis of lower leg    PT Received On: 07/03/17  PT Start Time: 1000     PT Stop Time: 1040    PT Total Time (min): 40 min       Billable Minutes:  Gait [id number], Therapeutic Activity 15 and Therapeutic Exercise 10    Treatment Type: Treatment  PT/PTA: PT     PTA Visit Number: 1       General Precautions: Standard, fall  Orthopedic Precautions:  (weight bearing in B Darco shoes)   Braces:  (B darco shoes)    Do you have any cultural, spiritual, Synagogue conflicts, given your current situation?: None stated     Subjective:  Communicated with nurse prior to session.  Pt c/o SOB and pain in his abdomen upon sitting  Pain/Comfort  Pain Rating 1: 8/10  Location - Orientation 1: generalized  Location 1: abdomen  Pain Addressed 1: Pre-medicate for activity, Reposition, Cessation of Activity, Nurse notified (Nurse states pt had received pain meds prior to PT arrival; Nurse present during treatment to give pt additional pain meds)  Pain Rating Post-Intervention 1: 9/10 ("9 1/2/10")    Objective:   Patient found with: oxygen, peripheral IV (darco shoes for B LE; pt had removed oxygen prior to PT arrival)    Functional Mobility:  Bed Mobility:   Supine to Sit: Contact Guard Assistance (pt elevated the HOB prior to coming to sit to assist ; PT educated pt to have bed flat due to pt has a regular bed at home)  Sit to Supine:  (pt remained sitting on the EOB after treatment)    Transfers:  Sit <> Stand Assistance: Supervision (2 trials from bed with RW in front)  Sit <> Stand Assistive Device: Rolling Walker    Gait:   Gait Distance: 30ft with RW with wide LIAT, decreased step length, decreased knee flex during swing phase, and at slow pace. Pt SOB and had to rest in standing leaning against the wall after ~15 ft.  Assistance 1: Contact Guard Assistance  Gait Assistive Device: Rolling walker  Gait Deviation(s): decreased marianne, " decreased toe-to-floor clearance, forward lean  Pt c/o dizziness after gait, BP assess and nurse notified  Balance:   Static Sit: FAIR+: Able to take MINIMAL challenges from all directions  Dynamic Sit: FAIR+: Maintains balance through MINIMAL excursions of active trunk motion  Static Stand: FAIR: Maintains without assist but unable to take challenges  Dynamic stand: FAIR: Needs CONTACT GUARD during gait     Therapeutic Activities and Exercises:  Pt performed B LE exs in sitting x 10 reps: knee ext. Pt's BP on his forearm in sitting 211/101 and on L upper arm BP in standing 175/85    AM-PAC 6 CLICK MOBILITY  How much help from another person does this patient currently need?   1 = Unable, Total/Dependent Assistance  2 = A lot, Maximum/Moderate Assistance  3 = A little, Minimum/Contact Guard/Supervision  4 = None, Modified [place]/Independent    Turning over in bed (including adjusting bedclothes, sheets and blankets)?: 3  Sitting down on and standing up from a chair with arms (e.g., wheelchair, bedside commode, etc.): 3  Moving from lying on back to sitting on the side of the bed?: 3  Moving to and from a bed to a chair (including a wheelchair)?: 3  Need to walk in hospital room?: 3  Climbing 3-5 steps with a railing?: 2  Total Score: 17    AM-PAC Raw Score CMS G-Code Modifier Level of Impairment Assistance   6 % Total / Unable   7 - 9 CM 80 - 100% Maximal Assist   10 - 14 CL 60 - 80% Moderate Assist   15 - 19 CK 40 - 60% Moderate Assist   20 - 22 CJ 20 - 40% Minimal Assist   23 CI 1-20% SBA / CGA   24 CH 0% Independent/ Mod I     Patient left seated on the EOB with all lines intact, call button in reach and nurse notified.    Assessment:  Leif Nino is a 46 y.o. male with a medical diagnosis of Bilateral cellulitis of lower leg and presents with difficulty with functional mobility due to edema, decreased ROM, SOB, weakness, and pain. Pt limited with gait distance today due to SOB.    Rehab  identified problem list/impairments: Rehab identified problem list/impairments: weakness, impaired endurance, gait instability, impaired balance, impaired self care skills, impaired functional mobilty, pain, decreased safety awareness, decreased lower extremity function, decreased upper extremity function, edema, impaired skin, impaired joint extensibility, impaired cardiopulmonary response to activity    Rehab potential is good.    Activity tolerance: Fair limited by edema and SOB    Discharge recommendations: Discharge Facility/Level Of Care Needs: nursing facility, skilled     Barriers to discharge: Barriers to Discharge: Inaccessible home environment, Decreased caregiver support (lives with friends and has 5 CAMERON)    Equipment recommendations: Equipment Needed After Discharge: walker, rolling (bariatric RW)     GOALS:    Physical Therapy Goals        Problem: Physical Therapy Goal    Goal Priority Disciplines Outcome Goal Variances Interventions   Physical Therapy Goal     PT/OT, PT Ongoing (interventions implemented as appropriate)     Description:  Goals to be met by: 7/10/17     Patient will increase functional independence with mobility by performin. Supine to sit with Stand-by Assistance - not met  2. Sit to supine with Stand-by Assistance - met  3. Sit to stand transfer with Stand-by Assistance -  Met  Revised goal: sit to stand with RW with mod independent-not met  4. Bed to chair transfer with Stand-by Assistance using LRAD - not met  5. Gait  x 100 feet with Contact Guard Assistance using LRAD - Met   Update: Gait x 200ft Christal with rolling walker - not met  6. Ascend/descend 5 stair with right Handrails Moderate Assistance - not met  7. Lower extremity exercise program x 15 reps per handout, with independence - not met                      PLAN:    Patient to be seen 4 x/week  to address the above listed problems via gait training, therapeutic activities, therapeutic exercises  Plan of Care  expires: 07/19/17  Plan of Care reviewed with: patient    Ceci DENNEY John, PT  07/03/2017

## 2017-07-03 NOTE — CONSULTS
Dermatology Inpatient Consult Note:  7/3/2017  10:02 AM    Reason for consult: Pain and rash on left arm     HPI: 46 y.o. yo male presenting with h/o HCV cirrhosis, CKD, & poorly controlled DM complicated by BL diabetic ulceration s/p 6/1 R 4th toe debridement. Patient was admitted for bilateral LE cellulitis with venous stasis,  4th toe osteomyelitis s/p bone biopsy growing staph aureus on 06/24. Pt was treated with IV Ceftriaxone and Vancomycin (on 6/16) which was later switched to IV cefazolin after bone biopsy resulted (on 6/24).   Pt developed pain in the left upper extremity x 2 days, it was noticed that he has eruption on the left arm. Pain in worse upon moving the arm, mainly surrounding the antecubital area, eruption itself is not tender. H/o IV line on the left arm one month ago.        Scheduled Meds:   aspirin  81 mg Oral Daily    atorvastatin  40 mg Oral Daily    carvedilol  12.5 mg Oral BID WM    ceFAZolin(ANCEF) in D5W 500 mL CONTINUOUS INFUSION  6 g Intravenous Q24H    dicyclomine  20 mg Oral TID    insulin aspart  8 Units Subcutaneous TIDWM    insulin detemir  10 Units Subcutaneous QHS    quetiapine  100 mg Oral QHS    sodium bicarbonate  650 mg Oral TID    sodium chloride 0.9%  10 mL Intravenous Q6H     Continuous Infusions:   PRN Meds:.sodium chloride, acetaminophen, albuterol-ipratropium 2.5mg-0.5mg/3mL, cadexomer iodine, dextrose 50%, dextrose 50%, glucagon (human recombinant), glucose, glucose, guaifenesin 100 mg/5 ml, HYDROmorphone, insulin aspart, ondansetron, oxycodone, senna-docusate 8.6-50 mg, Flushing PICC Protocol **AND** sodium chloride 0.9% **AND** sodium chloride 0.9%    Review of patient's allergies indicates:   Allergen Reactions    Shellfish containing products Hives     Shortness of breath       Past Medical History:   Diagnosis Date    Ascites of liver 5/30/2017    Chronic hepatitis C without hepatic coma     Chronic liver failure without hepatic coma 5/30/2017     Diabetic foot ulcer     right foot    DM (diabetes mellitus), type 2 with peripheral vascular complications     Encounter for blood transfusion     Essential hypertension     Gallstones     Hepatic encephalopathy 5/30/2017    Hepatitis C     Pancreatitis        Past Surgical History:   Procedure Laterality Date    BACK SURGERY         Social History     Social History    Marital status: Single     Spouse name: N/A    Number of children: N/A    Years of education: N/A     Occupational History    Not on file.     Social History Main Topics    Smoking status: Current Every Day Smoker     Packs/day: 0.25     Types: Cigarettes    Smokeless tobacco: Never Used    Alcohol use No    Drug use: No    Sexual activity: Not on file     Other Topics Concern    Not on file     Social History Narrative    No narrative on file       History reviewed. No pertinent family history.    Review of Systems:  Skin: pruritus of bilateral LE x 2 months, burning and pain of left arm x 2 days no h/o blisters or history of keloidal scarring    Physical Exam:  Vitals:    07/03/17 0834   BP:    Pulse: 76   Resp: 18   Temp:      General: NAD, WDWN.  Skin:  - Scalp: No concerning lesions   - Face: No concerning lesions   - Neck: No concerning lesions   - Chest: No concerning lesions   - Back: No concerning lesions   - Abdomen: Few non-palpable petechiae   - Genitalia/buttocks: defereed  - RUE: few non-palpable petechiae   - LUE: swelling of the forearm, tenderness on flection of the left arm, few non-palpable petechiae on left upper arm   - RLE: lichenified hyperpigmented plaques on bilateral shins                     Assessment and Plan:    47 yo M with PMH of Cirrhosis, Dm, and osteomyelitis, developed pain and burning on left arm x 2 days     - Doppler US negative for DVT   - concerned of DVT of the left arm, recommend investigation with CT angiogram if possible.   - Pt has a h/o peripheral IV placed on 6/28 on left arm for  induction for ERCP   - Recommend elevation of the left arm and warm compresses to the area.         Above plan discussed with Dr. Shetty. Please call with any questions/concerns, or changes to this patient's skin.      Cheryl Bethea MD MPH  PGY2  Pager [phone]

## 2017-07-04 LAB
ALBUMIN SERPL BCP-MCNC: 4 G/DL
ALP SERPL-CCNC: 112 U/L
ALT SERPL W/O P-5'-P-CCNC: <5 U/L
ANION GAP SERPL CALC-SCNC: 10 MMOL/L
ANISOCYTOSIS BLD QL SMEAR: SLIGHT
AST SERPL-CCNC: 50 U/L
BASO STIPL BLD QL SMEAR: ABNORMAL
BASOPHILS # BLD AUTO: 0.02 K/UL
BASOPHILS # BLD AUTO: 0.03 K/UL
BASOPHILS # BLD AUTO: 0.04 K/UL
BASOPHILS NFR BLD: 0.7 %
BASOPHILS NFR BLD: 1 %
BASOPHILS NFR BLD: 1.4 %
BILIRUB SERPL-MCNC: 2.1 MG/DL
BUN SERPL-MCNC: 52 MG/DL
CALCIUM SERPL-MCNC: 9 MG/DL
CHLORIDE SERPL-SCNC: 108 MMOL/L
CO2 SERPL-SCNC: 17 MMOL/L
CREAT SERPL-MCNC: 1.7 MG/DL
D DIMER PPP IA.FEU-MCNC: 4.11 MG/L FEU
DIFFERENTIAL METHOD: ABNORMAL
EOSINOPHIL # BLD AUTO: 0.1 K/UL
EOSINOPHIL # BLD AUTO: 0.1 K/UL
EOSINOPHIL # BLD AUTO: 0.2 K/UL
EOSINOPHIL NFR BLD: 4 %
EOSINOPHIL NFR BLD: 4.7 %
EOSINOPHIL NFR BLD: 5.2 %
ERYTHROCYTE [DISTWIDTH] IN BLOOD BY AUTOMATED COUNT: 18.3 %
ERYTHROCYTE [DISTWIDTH] IN BLOOD BY AUTOMATED COUNT: 18.4 %
ERYTHROCYTE [DISTWIDTH] IN BLOOD BY AUTOMATED COUNT: 18.6 %
EST. GFR  (AFRICAN AMERICAN): 55.5 ML/MIN/1.73 M^2
EST. GFR  (NON AFRICAN AMERICAN): 48 ML/MIN/1.73 M^2
GLUCOSE SERPL-MCNC: 177 MG/DL
HCT VFR BLD AUTO: 22.4 %
HCT VFR BLD AUTO: 23 %
HCT VFR BLD AUTO: 23.8 %
HGB BLD-MCNC: 7.4 G/DL
HGB BLD-MCNC: 7.6 G/DL
HGB BLD-MCNC: 7.9 G/DL
INR PPP: 1.7
LYMPHOCYTES # BLD AUTO: 0.4 K/UL
LYMPHOCYTES # BLD AUTO: 0.5 K/UL
LYMPHOCYTES # BLD AUTO: 0.5 K/UL
LYMPHOCYTES NFR BLD: 15 %
LYMPHOCYTES NFR BLD: 16.3 %
LYMPHOCYTES NFR BLD: 18.3 %
MAGNESIUM SERPL-MCNC: 2.1 MG/DL
MCH RBC QN AUTO: 28.4 PG
MCH RBC QN AUTO: 28.9 PG
MCH RBC QN AUTO: 29.6 PG
MCHC RBC AUTO-ENTMCNC: 33 %
MCHC RBC AUTO-ENTMCNC: 33 %
MCHC RBC AUTO-ENTMCNC: 33.2 %
MCV RBC AUTO: 86 FL
MCV RBC AUTO: 88 FL
MCV RBC AUTO: 89 FL
MONOCYTES # BLD AUTO: 0.4 K/UL
MONOCYTES # BLD AUTO: 0.4 K/UL
MONOCYTES # BLD AUTO: 0.5 K/UL
MONOCYTES NFR BLD: 13 %
MONOCYTES NFR BLD: 14.9 %
MONOCYTES NFR BLD: 18.6 %
NEUTROPHILS # BLD AUTO: 1.7 K/UL
NEUTROPHILS # BLD AUTO: 1.7 K/UL
NEUTROPHILS # BLD AUTO: 1.8 K/UL
NEUTROPHILS NFR BLD: 60.3 %
NEUTROPHILS NFR BLD: 60.6 %
NEUTROPHILS NFR BLD: 62.8 %
PHOSPHATE SERPL-MCNC: 3.9 MG/DL
PLATELET # BLD AUTO: 50 K/UL
PLATELET # BLD AUTO: 50 K/UL
PLATELET # BLD AUTO: 53 K/UL
PLATELET BLD QL SMEAR: ABNORMAL
PMV BLD AUTO: 10 FL
PMV BLD AUTO: 10.3 FL
PMV BLD AUTO: 9.8 FL
POCT GLUCOSE: 138 MG/DL (ref 70–110)
POCT GLUCOSE: 154 MG/DL (ref 70–110)
POCT GLUCOSE: 159 MG/DL (ref 70–110)
POCT GLUCOSE: 172 MG/DL (ref 70–110)
POCT GLUCOSE: 224 MG/DL (ref 70–110)
POCT GLUCOSE: 235 MG/DL (ref 70–110)
POTASSIUM SERPL-SCNC: 4.7 MMOL/L
PROT SERPL-MCNC: 6.7 G/DL
PROTHROMBIN TIME: 17.2 SEC
RBC # BLD AUTO: 2.56 M/UL
RBC # BLD AUTO: 2.67 M/UL
RBC # BLD AUTO: 2.68 M/UL
SODIUM SERPL-SCNC: 135 MMOL/L
WBC # BLD AUTO: 2.74 K/UL
WBC # BLD AUTO: 2.76 K/UL
WBC # BLD AUTO: 2.89 K/UL

## 2017-07-04 PROCEDURE — 94640 AIRWAY INHALATION TREATMENT: CPT

## 2017-07-04 PROCEDURE — 99233 SBSQ HOSP IP/OBS HIGH 50: CPT | Mod: ,,, | Performed by: HOSPITALIST

## 2017-07-04 PROCEDURE — 25000003 PHARM REV CODE 250: Performed by: HOSPITALIST

## 2017-07-04 PROCEDURE — 25000003 PHARM REV CODE 250: Performed by: NURSE PRACTITIONER

## 2017-07-04 PROCEDURE — 85025 COMPLETE CBC W/AUTO DIFF WBC: CPT

## 2017-07-04 PROCEDURE — 83735 ASSAY OF MAGNESIUM: CPT

## 2017-07-04 PROCEDURE — 63600175 PHARM REV CODE 636 W HCPCS: Performed by: NURSE PRACTITIONER

## 2017-07-04 PROCEDURE — 20600001 HC STEP DOWN PRIVATE ROOM

## 2017-07-04 PROCEDURE — 27000221 HC OXYGEN, UP TO 24 HOURS

## 2017-07-04 PROCEDURE — 85379 FIBRIN DEGRADATION QUANT: CPT

## 2017-07-04 PROCEDURE — 85610 PROTHROMBIN TIME: CPT

## 2017-07-04 PROCEDURE — 25000242 PHARM REV CODE 250 ALT 637 W/ HCPCS: Performed by: HOSPITALIST

## 2017-07-04 PROCEDURE — 63600175 PHARM REV CODE 636 W HCPCS: Performed by: HOSPITALIST

## 2017-07-04 PROCEDURE — 84100 ASSAY OF PHOSPHORUS: CPT

## 2017-07-04 PROCEDURE — 80053 COMPREHEN METABOLIC PANEL: CPT

## 2017-07-04 PROCEDURE — 36415 COLL VENOUS BLD VENIPUNCTURE: CPT

## 2017-07-04 RX ORDER — FUROSEMIDE 10 MG/ML
80 INJECTION INTRAMUSCULAR; INTRAVENOUS ONCE
Status: COMPLETED | OUTPATIENT
Start: 2017-07-04 | End: 2017-07-04

## 2017-07-04 RX ORDER — IPRATROPIUM BROMIDE AND ALBUTEROL SULFATE 2.5; .5 MG/3ML; MG/3ML
3 SOLUTION RESPIRATORY (INHALATION)
Status: DISCONTINUED | OUTPATIENT
Start: 2017-07-04 | End: 2017-07-06

## 2017-07-04 RX ORDER — FUROSEMIDE 10 MG/ML
80 INJECTION INTRAMUSCULAR; INTRAVENOUS 3 TIMES DAILY
Status: DISCONTINUED | OUTPATIENT
Start: 2017-07-04 | End: 2017-07-05

## 2017-07-04 RX ORDER — HYDROMORPHONE HYDROCHLORIDE 1 MG/ML
1 INJECTION, SOLUTION INTRAMUSCULAR; INTRAVENOUS; SUBCUTANEOUS ONCE
Status: COMPLETED | OUTPATIENT
Start: 2017-07-04 | End: 2017-07-04

## 2017-07-04 RX ADMIN — HYDROMORPHONE HYDROCHLORIDE 1 MG: 1 INJECTION, SOLUTION INTRAMUSCULAR; INTRAVENOUS; SUBCUTANEOUS at 02:07

## 2017-07-04 RX ADMIN — INSULIN ASPART 8 UNITS: 100 INJECTION, SOLUTION INTRAVENOUS; SUBCUTANEOUS at 08:07

## 2017-07-04 RX ADMIN — OXYCODONE HYDROCHLORIDE 15 MG: 5 TABLET ORAL at 05:07

## 2017-07-04 RX ADMIN — DICYCLOMINE HYDROCHLORIDE 20 MG: 20 TABLET ORAL at 01:07

## 2017-07-04 RX ADMIN — INSULIN ASPART 8 UNITS: 100 INJECTION, SOLUTION INTRAVENOUS; SUBCUTANEOUS at 06:07

## 2017-07-04 RX ADMIN — INSULIN ASPART 2 UNITS: 100 INJECTION, SOLUTION INTRAVENOUS; SUBCUTANEOUS at 08:07

## 2017-07-04 RX ADMIN — INSULIN ASPART 2 UNITS: 100 INJECTION, SOLUTION INTRAVENOUS; SUBCUTANEOUS at 01:07

## 2017-07-04 RX ADMIN — INSULIN ASPART 8 UNITS: 100 INJECTION, SOLUTION INTRAVENOUS; SUBCUTANEOUS at 01:07

## 2017-07-04 RX ADMIN — INSULIN ASPART 2 UNITS: 100 INJECTION, SOLUTION INTRAVENOUS; SUBCUTANEOUS at 09:07

## 2017-07-04 RX ADMIN — HYDROMORPHONE HYDROCHLORIDE 1 MG: 1 INJECTION, SOLUTION INTRAMUSCULAR; INTRAVENOUS; SUBCUTANEOUS at 04:07

## 2017-07-04 RX ADMIN — SODIUM BICARBONATE 650 MG: 650 TABLET ORAL at 09:07

## 2017-07-04 RX ADMIN — SODIUM CHLORIDE: 0.9 INJECTION, SOLUTION INTRAVENOUS at 10:07

## 2017-07-04 RX ADMIN — VANCOMYCIN HYDROCHLORIDE 1000 MG: 1 INJECTION, POWDER, LYOPHILIZED, FOR SOLUTION INTRAVENOUS at 07:07

## 2017-07-04 RX ADMIN — QUETIAPINE FUMARATE 100 MG: 25 TABLET ORAL at 09:07

## 2017-07-04 RX ADMIN — SODIUM BICARBONATE 650 MG: 650 TABLET ORAL at 05:07

## 2017-07-04 RX ADMIN — IPRATROPIUM BROMIDE AND ALBUTEROL SULFATE 3 ML: .5; 3 SOLUTION RESPIRATORY (INHALATION) at 03:07

## 2017-07-04 RX ADMIN — OXYCODONE HYDROCHLORIDE 15 MG: 5 TABLET ORAL at 10:07

## 2017-07-04 RX ADMIN — HYDROMORPHONE HYDROCHLORIDE 1 MG: 1 INJECTION, SOLUTION INTRAMUSCULAR; INTRAVENOUS; SUBCUTANEOUS at 08:07

## 2017-07-04 RX ADMIN — INSULIN ASPART 2 UNITS: 100 INJECTION, SOLUTION INTRAVENOUS; SUBCUTANEOUS at 06:07

## 2017-07-04 RX ADMIN — ASPIRIN 81 MG: 81 TABLET, COATED ORAL at 08:07

## 2017-07-04 RX ADMIN — ATORVASTATIN CALCIUM 40 MG: 20 TABLET, FILM COATED ORAL at 08:07

## 2017-07-04 RX ADMIN — CARVEDILOL 12.5 MG: 12.5 TABLET, FILM COATED ORAL at 05:07

## 2017-07-04 RX ADMIN — IPRATROPIUM BROMIDE AND ALBUTEROL SULFATE 3 ML: .5; 3 SOLUTION RESPIRATORY (INHALATION) at 08:07

## 2017-07-04 RX ADMIN — HYDROMORPHONE HYDROCHLORIDE 1 MG: 1 INJECTION, SOLUTION INTRAMUSCULAR; INTRAVENOUS; SUBCUTANEOUS at 07:07

## 2017-07-04 RX ADMIN — CARVEDILOL 12.5 MG: 12.5 TABLET, FILM COATED ORAL at 08:07

## 2017-07-04 RX ADMIN — DICYCLOMINE HYDROCHLORIDE 20 MG: 20 TABLET ORAL at 05:07

## 2017-07-04 RX ADMIN — HYDROMORPHONE HYDROCHLORIDE 1 MG: 1 INJECTION, SOLUTION INTRAMUSCULAR; INTRAVENOUS; SUBCUTANEOUS at 01:07

## 2017-07-04 RX ADMIN — SODIUM BICARBONATE 650 MG: 650 TABLET ORAL at 01:07

## 2017-07-04 RX ADMIN — FUROSEMIDE 80 MG: 10 INJECTION, SOLUTION INTRAVENOUS at 09:07

## 2017-07-04 RX ADMIN — INSULIN DETEMIR 10 UNITS: 100 INJECTION, SOLUTION SUBCUTANEOUS at 09:07

## 2017-07-04 RX ADMIN — DEXTROSE 6 G: 5 SOLUTION INTRAVENOUS at 05:07

## 2017-07-04 RX ADMIN — HYDROMORPHONE HYDROCHLORIDE 1 MG: 1 INJECTION, SOLUTION INTRAMUSCULAR; INTRAVENOUS; SUBCUTANEOUS at 11:07

## 2017-07-04 RX ADMIN — DICYCLOMINE HYDROCHLORIDE 20 MG: 20 TABLET ORAL at 09:07

## 2017-07-04 RX ADMIN — FUROSEMIDE 80 MG: 10 INJECTION, SOLUTION INTRAVENOUS at 02:07

## 2017-07-04 NOTE — PLAN OF CARE
Problem: Patient Care Overview  Goal: Plan of Care Review  No acute changes overnight. Pt AAOx4 and cooperative throughout shift. C/o pain noted from pts abdomen down to both BLE. Pain relieved with PRN regimen. Dressings to BLE changed on shift. PICC dressing also changed. Continues on Ancef @ 20.8 ml/hr. Remained afebrile throughout shift. NS infusing at 75 ml/hr. Hypertensive with vitals but noted to come down with relaxation. Pt still with perfuse edema to scrotum and BLE. No other issues noted. Will monitor.

## 2017-07-04 NOTE — PLAN OF CARE
Problem: Patient Care Overview  Goal: Plan of Care Review  Outcome: Ongoing (interventions implemented as appropriate)  Pt aaox4 through shift, up with 1 assist, AVSS on RA. Pt places 2L NC on at times when feels SOB for comfort. Pt c/o pain throughout shift. Pain managed with prn oxy 15mg q6 and dilaudid q4. accuchecks achs maintained, mealtime and correction insulin administered as ordered. Chest xray done today at bedside. Lasix order added today per Dr. Blunt. IV ancef continues to infuse @ 20.8ml/hr continuously for cellulitis. Dressings to BLE CDI to be changed by night RN. Bed in lowest position, wheels locked, call light and personal belongings within reach. WCTM.

## 2017-07-05 PROBLEM — R60.1 ANASARCA: Status: ACTIVE | Noted: 2017-07-05

## 2017-07-05 PROBLEM — D61.818 PANCYTOPENIA: Status: ACTIVE | Noted: 2017-07-05

## 2017-07-05 LAB
ALBUMIN SERPL BCP-MCNC: 3.9 G/DL
ALP SERPL-CCNC: 111 U/L
ALT SERPL W/O P-5'-P-CCNC: <5 U/L
ANION GAP SERPL CALC-SCNC: 7 MMOL/L
ANISOCYTOSIS BLD QL SMEAR: SLIGHT
ANISOCYTOSIS BLD QL SMEAR: SLIGHT
AST SERPL-CCNC: 48 U/L
BASO STIPL BLD QL SMEAR: ABNORMAL
BASOPHILS # BLD AUTO: 0.02 K/UL
BASOPHILS # BLD AUTO: 0.07 K/UL
BASOPHILS # BLD AUTO: ABNORMAL K/UL
BASOPHILS NFR BLD: 0.7 %
BASOPHILS NFR BLD: 1 %
BASOPHILS NFR BLD: 2.6 %
BILIRUB SERPL-MCNC: 2 MG/DL
BUN SERPL-MCNC: 53 MG/DL
BURR CELLS BLD QL SMEAR: ABNORMAL
CALCIUM SERPL-MCNC: 8.8 MG/DL
CHLORIDE SERPL-SCNC: 108 MMOL/L
CO2 SERPL-SCNC: 21 MMOL/L
CREAT SERPL-MCNC: 1.7 MG/DL
CRP SERPL-MCNC: 15.6 MG/L
DIFFERENTIAL METHOD: ABNORMAL
EOSINOPHIL # BLD AUTO: 0.1 K/UL
EOSINOPHIL # BLD AUTO: 0.2 K/UL
EOSINOPHIL # BLD AUTO: ABNORMAL K/UL
EOSINOPHIL NFR BLD: 4.4 %
EOSINOPHIL NFR BLD: 5 %
EOSINOPHIL NFR BLD: 5.5 %
ERYTHROCYTE [DISTWIDTH] IN BLOOD BY AUTOMATED COUNT: 18.4 %
ERYTHROCYTE [DISTWIDTH] IN BLOOD BY AUTOMATED COUNT: 18.4 %
ERYTHROCYTE [DISTWIDTH] IN BLOOD BY AUTOMATED COUNT: 18.5 %
ERYTHROCYTE [SEDIMENTATION RATE] IN BLOOD BY WESTERGREN METHOD: 2 MM/HR
EST. GFR  (AFRICAN AMERICAN): 55.5 ML/MIN/1.73 M^2
EST. GFR  (NON AFRICAN AMERICAN): 48 ML/MIN/1.73 M^2
GLUCOSE SERPL-MCNC: 163 MG/DL
HCT VFR BLD AUTO: 23.1 %
HCT VFR BLD AUTO: 23.5 %
HCT VFR BLD AUTO: 23.8 %
HGB BLD-MCNC: 7.6 G/DL
HGB BLD-MCNC: 7.7 G/DL
HGB BLD-MCNC: 7.8 G/DL
HYPOCHROMIA BLD QL SMEAR: ABNORMAL
HYPOCHROMIA BLD QL SMEAR: ABNORMAL
INR PPP: 1.7
LYMPHOCYTES # BLD AUTO: 0.5 K/UL
LYMPHOCYTES # BLD AUTO: 0.5 K/UL
LYMPHOCYTES # BLD AUTO: ABNORMAL K/UL
LYMPHOCYTES NFR BLD: 14 %
LYMPHOCYTES NFR BLD: 17.3 %
LYMPHOCYTES NFR BLD: 17.5 %
MAGNESIUM SERPL-MCNC: 1.9 MG/DL
MCH RBC QN AUTO: 28.6 PG
MCH RBC QN AUTO: 28.8 PG
MCH RBC QN AUTO: 28.9 PG
MCHC RBC AUTO-ENTMCNC: 32.8 %
MCHC RBC AUTO-ENTMCNC: 32.8 %
MCHC RBC AUTO-ENTMCNC: 32.9 %
MCV RBC AUTO: 87 FL
MCV RBC AUTO: 88 FL
MCV RBC AUTO: 88 FL
MONOCYTES # BLD AUTO: 0.4 K/UL
MONOCYTES # BLD AUTO: 0.4 K/UL
MONOCYTES # BLD AUTO: ABNORMAL K/UL
MONOCYTES NFR BLD: 11 %
MONOCYTES NFR BLD: 14.7 %
MONOCYTES NFR BLD: 15.3 %
NEUTROPHILS # BLD AUTO: 1.6 K/UL
NEUTROPHILS # BLD AUTO: 1.7 K/UL
NEUTROPHILS NFR BLD: 60.2 %
NEUTROPHILS NFR BLD: 61.1 %
NEUTROPHILS NFR BLD: 69 %
OVALOCYTES BLD QL SMEAR: ABNORMAL
OVALOCYTES BLD QL SMEAR: ABNORMAL
PHOSPHATE SERPL-MCNC: 3.6 MG/DL
PLATELET # BLD AUTO: 41 K/UL
PLATELET # BLD AUTO: 50 K/UL
PLATELET # BLD AUTO: 52 K/UL
PLATELET BLD QL SMEAR: ABNORMAL
PLATELET BLD QL SMEAR: ABNORMAL
PMV BLD AUTO: 10 FL
PMV BLD AUTO: 10.1 FL
PMV BLD AUTO: 10.8 FL
POCT GLUCOSE: 114 MG/DL (ref 70–110)
POCT GLUCOSE: 148 MG/DL (ref 70–110)
POCT GLUCOSE: 201 MG/DL (ref 70–110)
POIKILOCYTOSIS BLD QL SMEAR: SLIGHT
POIKILOCYTOSIS BLD QL SMEAR: SLIGHT
POLYCHROMASIA BLD QL SMEAR: ABNORMAL
POTASSIUM SERPL-SCNC: 4.4 MMOL/L
PROT SERPL-MCNC: 6.8 G/DL
PROTHROMBIN TIME: 17.1 SEC
RBC # BLD AUTO: 2.64 M/UL
RBC # BLD AUTO: 2.69 M/UL
RBC # BLD AUTO: 2.7 M/UL
SODIUM SERPL-SCNC: 136 MMOL/L
VANCOMYCIN SERPL-MCNC: 10.8 UG/ML
WBC # BLD AUTO: 2.72 K/UL
WBC # BLD AUTO: 2.74 K/UL
WBC # BLD AUTO: 2.74 K/UL

## 2017-07-05 PROCEDURE — 99233 SBSQ HOSP IP/OBS HIGH 50: CPT | Mod: ,,, | Performed by: HOSPITALIST

## 2017-07-05 PROCEDURE — 25000242 PHARM REV CODE 250 ALT 637 W/ HCPCS: Performed by: HOSPITALIST

## 2017-07-05 PROCEDURE — 86140 C-REACTIVE PROTEIN: CPT

## 2017-07-05 PROCEDURE — 97116 GAIT TRAINING THERAPY: CPT

## 2017-07-05 PROCEDURE — 80202 ASSAY OF VANCOMYCIN: CPT

## 2017-07-05 PROCEDURE — 85025 COMPLETE CBC W/AUTO DIFF WBC: CPT | Mod: 91

## 2017-07-05 PROCEDURE — 94760 N-INVAS EAR/PLS OXIMETRY 1: CPT

## 2017-07-05 PROCEDURE — 80053 COMPREHEN METABOLIC PANEL: CPT

## 2017-07-05 PROCEDURE — 97112 NEUROMUSCULAR REEDUCATION: CPT

## 2017-07-05 PROCEDURE — 25000003 PHARM REV CODE 250: Performed by: NURSE PRACTITIONER

## 2017-07-05 PROCEDURE — 85651 RBC SED RATE NONAUTOMATED: CPT

## 2017-07-05 PROCEDURE — 20600001 HC STEP DOWN PRIVATE ROOM

## 2017-07-05 PROCEDURE — 63600175 PHARM REV CODE 636 W HCPCS: Performed by: NURSE PRACTITIONER

## 2017-07-05 PROCEDURE — 27000221 HC OXYGEN, UP TO 24 HOURS

## 2017-07-05 PROCEDURE — 85610 PROTHROMBIN TIME: CPT

## 2017-07-05 PROCEDURE — 97530 THERAPEUTIC ACTIVITIES: CPT

## 2017-07-05 PROCEDURE — 84100 ASSAY OF PHOSPHORUS: CPT

## 2017-07-05 PROCEDURE — 25000003 PHARM REV CODE 250: Performed by: HOSPITALIST

## 2017-07-05 PROCEDURE — 36415 COLL VENOUS BLD VENIPUNCTURE: CPT

## 2017-07-05 PROCEDURE — 85007 BL SMEAR W/DIFF WBC COUNT: CPT

## 2017-07-05 PROCEDURE — 83735 ASSAY OF MAGNESIUM: CPT

## 2017-07-05 PROCEDURE — 94640 AIRWAY INHALATION TREATMENT: CPT

## 2017-07-05 PROCEDURE — 85027 COMPLETE CBC AUTOMATED: CPT

## 2017-07-05 PROCEDURE — 63600175 PHARM REV CODE 636 W HCPCS: Performed by: HOSPITALIST

## 2017-07-05 RX ADMIN — HYDROMORPHONE HYDROCHLORIDE 1 MG: 1 INJECTION, SOLUTION INTRAMUSCULAR; INTRAVENOUS; SUBCUTANEOUS at 09:07

## 2017-07-05 RX ADMIN — HYDROMORPHONE HYDROCHLORIDE 1 MG: 1 INJECTION, SOLUTION INTRAMUSCULAR; INTRAVENOUS; SUBCUTANEOUS at 11:07

## 2017-07-05 RX ADMIN — OXYCODONE HYDROCHLORIDE 15 MG: 5 TABLET ORAL at 04:07

## 2017-07-05 RX ADMIN — IPRATROPIUM BROMIDE AND ALBUTEROL SULFATE 3 ML: .5; 3 SOLUTION RESPIRATORY (INHALATION) at 04:07

## 2017-07-05 RX ADMIN — DICYCLOMINE HYDROCHLORIDE 20 MG: 20 TABLET ORAL at 01:07

## 2017-07-05 RX ADMIN — INSULIN ASPART 8 UNITS: 100 INJECTION, SOLUTION INTRAVENOUS; SUBCUTANEOUS at 01:07

## 2017-07-05 RX ADMIN — DEXTROSE 6 G: 5 SOLUTION INTRAVENOUS at 05:07

## 2017-07-05 RX ADMIN — IPRATROPIUM BROMIDE AND ALBUTEROL SULFATE 3 ML: .5; 3 SOLUTION RESPIRATORY (INHALATION) at 07:07

## 2017-07-05 RX ADMIN — VANCOMYCIN HYDROCHLORIDE 1250 MG: 100 INJECTION, POWDER, LYOPHILIZED, FOR SOLUTION INTRAVENOUS at 04:07

## 2017-07-05 RX ADMIN — DICYCLOMINE HYDROCHLORIDE 20 MG: 20 TABLET ORAL at 11:07

## 2017-07-05 RX ADMIN — ATORVASTATIN CALCIUM 40 MG: 20 TABLET, FILM COATED ORAL at 08:07

## 2017-07-05 RX ADMIN — OXYCODONE HYDROCHLORIDE 15 MG: 5 TABLET ORAL at 12:07

## 2017-07-05 RX ADMIN — ASPIRIN 81 MG: 81 TABLET, COATED ORAL at 08:07

## 2017-07-05 RX ADMIN — SODIUM BICARBONATE 650 MG: 650 TABLET ORAL at 05:07

## 2017-07-05 RX ADMIN — IPRATROPIUM BROMIDE AND ALBUTEROL SULFATE 3 ML: .5; 3 SOLUTION RESPIRATORY (INHALATION) at 03:07

## 2017-07-05 RX ADMIN — HYDROMORPHONE HYDROCHLORIDE 1 MG: 1 INJECTION, SOLUTION INTRAMUSCULAR; INTRAVENOUS; SUBCUTANEOUS at 06:07

## 2017-07-05 RX ADMIN — HYDROMORPHONE HYDROCHLORIDE 1 MG: 1 INJECTION, SOLUTION INTRAMUSCULAR; INTRAVENOUS; SUBCUTANEOUS at 01:07

## 2017-07-05 RX ADMIN — SODIUM BICARBONATE 650 MG: 650 TABLET ORAL at 11:07

## 2017-07-05 RX ADMIN — GUAIFENESIN 200 MG: 100 SOLUTION ORAL at 05:07

## 2017-07-05 RX ADMIN — QUETIAPINE FUMARATE 100 MG: 25 TABLET ORAL at 11:07

## 2017-07-05 RX ADMIN — CARVEDILOL 12.5 MG: 12.5 TABLET, FILM COATED ORAL at 08:07

## 2017-07-05 RX ADMIN — OXYCODONE HYDROCHLORIDE 15 MG: 5 TABLET ORAL at 08:07

## 2017-07-05 RX ADMIN — INSULIN ASPART 8 UNITS: 100 INJECTION, SOLUTION INTRAVENOUS; SUBCUTANEOUS at 08:07

## 2017-07-05 RX ADMIN — INSULIN ASPART 8 UNITS: 100 INJECTION, SOLUTION INTRAVENOUS; SUBCUTANEOUS at 06:07

## 2017-07-05 RX ADMIN — CARVEDILOL 12.5 MG: 12.5 TABLET, FILM COATED ORAL at 04:07

## 2017-07-05 RX ADMIN — IPRATROPIUM BROMIDE AND ALBUTEROL SULFATE 3 ML: .5; 3 SOLUTION RESPIRATORY (INHALATION) at 01:07

## 2017-07-05 RX ADMIN — FUROSEMIDE 10 MG/HR: 10 INJECTION, SOLUTION INTRAMUSCULAR; INTRAVENOUS at 12:07

## 2017-07-05 RX ADMIN — FUROSEMIDE 80 MG: 10 INJECTION, SOLUTION INTRAVENOUS at 05:07

## 2017-07-05 RX ADMIN — HYDROMORPHONE HYDROCHLORIDE 1 MG: 1 INJECTION, SOLUTION INTRAMUSCULAR; INTRAVENOUS; SUBCUTANEOUS at 05:07

## 2017-07-05 RX ADMIN — INSULIN ASPART 4 UNITS: 100 INJECTION, SOLUTION INTRAVENOUS; SUBCUTANEOUS at 06:07

## 2017-07-05 RX ADMIN — IPRATROPIUM BROMIDE AND ALBUTEROL SULFATE 3 ML: .5; 3 SOLUTION RESPIRATORY (INHALATION) at 08:07

## 2017-07-05 RX ADMIN — DICYCLOMINE HYDROCHLORIDE 20 MG: 20 TABLET ORAL at 05:07

## 2017-07-05 RX ADMIN — SODIUM BICARBONATE 650 MG: 650 TABLET ORAL at 01:07

## 2017-07-05 NOTE — PLAN OF CARE
Problem: Patient Care Overview  Goal: Plan of Care Review  Outcome: Ongoing (interventions implemented as appropriate)  Pt aaox4 through shift, up with 1 assist, AVSS on RA. Pt c/o generalized pain throughout shift, although most pain in left arm, legs and abdomen. Pain managed with prn oxy and dilaudid as ordered. accuchecks maintained achs, insulin administered as ordered. IV ancef infusing at 20.8ml/hr continuously. BLE foot dressings CDI to be changed tonight. pt worked with PT today. Pt receiving breathing txs for wheezing and SOB. Continuous lasix gtt and IV vancomycin added today. Strict I&Os and daily weight orders added today. Bed in lowest position, wheels locked, call light and personal belongings within reach, avasys camera in room to monitor for pt safety. WCTM.

## 2017-07-05 NOTE — PROGRESS NOTES
[facility] Hospital Medicine  Progress Note    Patient Name: Leif Nino  MRN: [id number]  Patient Class: IP- Inpatient   Admission Date: 6/16/2017  Length of Stay: 19 days  Attending Physician: Bandar Blunt MD  Primary Care Provider: Primary Doctor [facility] Medicine Team: [facility] [place] [facility] A Bandar Blunt MD    Subjective:     Principal Problem:Bilateral cellulitis of lower leg    HPI:  Mr. Nino is a 47 yo WM w/ h/o HCV cirrhosis & poorly controlled DM complicated by BL diabetic ulceration s/p 6/1 R 4th toe debridement who presents c/o generalized weakness w/ associated worsening BL LE edema.  He reports that 5d ago he began having dysuria.  Two day afterwards he woke up noticing his chronic BL LE edema was significantly worse with an associated erythematous, painful rash.  Later that day he began developing dyspnea on exertion that progressively worsened to the point where he is now dyspneic at rest.  Patient denies any vomiting but has mild, persistant nausea that has worsened acutely over the last 3 days.  He reports no fevers, chills, abdominal pain, worsening abdominal distention, diarrhea.  Patient states he has had no productive cough or wheezes.    Hospital Course:  No notes on file    Interval History: patient is complaining of diffuse swelling and abdominal pain and tenderness with erythema on his abdomen; also having some SOB and wheezing; CXR ordered and nebs scheduled      Review of Systems   Constitutional: Negative for chills, diaphoresis and fever.   Respiratory: Negative for cough and shortness of breath.    Cardiovascular: Positive for leg swelling. Negative for chest pain.   Gastrointestinal: Positive for abdominal distention and abdominal pain. Negative for diarrhea, nausea and vomiting.   Genitourinary: Negative for dysuria, frequency and hematuria.   Musculoskeletal: Positive for arthralgias. Negative for back pain and myalgias.        + lower extremity pain    Skin: Positive for wound. Negative for color change and rash.   Neurological: Positive for weakness. Negative for dizziness, numbness and headaches.   Psychiatric/Behavioral: Negative for agitation.     Objective:     Vital Signs (Most Recent):  Temp: 97.2 °F (36.2 °C) (07/05/17 0737)  Pulse: 82 (07/05/17 0826)  Resp: (!) 24 (07/05/17 0826)  BP: (!) 158/83 (07/05/17 0737)  SpO2: (!) 92 % (07/05/17 0826) Vital Signs (24h Range):  Temp:  [97.2 °F (36.2 °C)-98.7 °F (37.1 °C)] 97.2 °F (36.2 °C)  Pulse:  [66-82] 82  Resp:  [18-24] 24  SpO2:  [92 %-99 %] 92 %  BP: (151-178)/(78-96) 158/83     Weight: 126.6 kg (279 lb)  Body mass index is 35.82 kg/m².    Intake/Output Summary (Last 24 hours) at 07/05/17 1109  Last data filed at 07/05/17 0930   Gross per 24 hour   Intake              950 ml   Output                0 ml   Net              950 ml      Physical Exam   Constitutional: He is oriented to person, place, and time. He appears well-developed and well-nourished. He appears distressed (2/2 pain).   Cardiovascular: Normal rate and regular rhythm.    No murmur heard.  Pulmonary/Chest: Effort normal and breath sounds normal. No respiratory distress.   Abdominal: Soft. He exhibits distension. There is tenderness (diffuse).   Musculoskeletal: Normal range of motion. He exhibits edema (BLE).   Neurological: He is alert and oriented to person, place, and time.   Skin: Skin is warm and dry. Rash (erythematous non pruritic rash to lower abdomen) noted.   Dressings to bilateral feet with scant drainage. Venous changes noted to bilateral lower extremities; no erythema.    Psychiatric: He has a normal mood and affect. His behavior is normal.       Significant Labs:   CBC:     Recent Labs  Lab 07/04/17  1541 07/05/17  0051 07/05/17  0845   WBC 2.74* 2.74* 2.72*   HGB 7.4* 7.7* 7.8*   HCT 22.4* 23.5* 23.8*   PLT 50* 50* 52*     CMP:     Recent Labs  Lab 07/04/17  0624 07/05/17  0412   * 136   K 4.7 4.4    108   CO2 17*  21*   * 163*   BUN 52* 53*   CREATININE 1.7* 1.7*   CALCIUM 9.0 8.8   PROT 6.7 6.8   ALBUMIN 4.0 3.9   BILITOT 2.1* 2.0*   ALKPHOS 112 111   AST 50* 48*   ALT <5* <5*   ANIONGAP 10 7*   EGFRNONAA 48.0* 48.0*       Significant Imaging: U/S: I have reviewed all pertinent results/findings within the past 24 hours and my personal findings are:  upper extremity US negative for DVT     Assessment/Plan:      #Bilateral lower extremity cellulitis with venous stasis due to streptococcus   #4th toe osteomyelitis  - s/p bone biopsy on 06/24  - on IV cefazolin continuous infusion, estimated end date 8/5/17  - will probably need 6 weeks of antibiotics through PICC Line   - PICC line placed, confirmed by CXR  - follow up Bone Biopsy results. Preliminary results show Staph Aureus        # Anasarca   - started on lasix 80 mg IV TID today;     Upper extremity swelling  - Upper extremity US negative for DVT x 2  - seen by Dermatology  - will perform CRP and ESR, Xray of the left shoulder, negative for fracture      #Choledocholithiasis  - ERCP/EUS on 06/28/17  Complete removal was accomplished by biliary sphincterotomy and   balloon extraction.  A biliary sphincterotomy was performed     # Diabetic type 2 foot ulcers B/L  - podiatry consult, appreciate recs  - X rays B/ L feet: no osteomyelitis     # Decompensated Hep C cirrhosis with ascites  MELD-Na score: 24 at 7/2/2017  5:55 AM  MELD score: 22 at 7/2/2017  5:55 AM  Calculated from:  Serum Creatinine: 1.7 mg/dL at 7/2/2017  5:55 AM  Serum Sodium: 134 mmol/L at 7/2/2017  5:55 AM  Total Bilirubin: 2.4 mg/dL at 7/2/2017  5:55 AM  INR(ratio): 1.9 at 7/2/2017  5:55 AM  Age: 46 years  - will set up for outpatient follow up     # CKD stage 3 due to uncontrolled DM 2  - seems to be new baseline at 1.7     # DM2 with nephropathy  - Hba1c of 8.2 recently  - levemir 20 units at night and 8 of novolog with meals     # Anemia of CKD stage 3  # Pancytopenia   - monitor         #  Hypoalbuminemia due to moderate protein fernando malnutrition  - PAB 3, glucerna     # Hypomagnesemia   - replaced, resolved           Discharge disposition - LTAC once approved, likely end of this week      VTE Risk Mitigation         Ordered     Place sequential compression device  Until discontinued      07/01/17 2126     Place BASILIO hose  Until discontinued      06/24/17 1443     Low Risk of VTE  Once      06/16/17 2243          Bandar Blunt MD  Department of Hospital Medicine   [facility]

## 2017-07-05 NOTE — PLAN OF CARE
Problem: Physical Therapy Goal  Goal: Physical Therapy Goal  Goals to be met by: 7/10/17     Patient will increase functional independence with mobility by performin. Supine to sit with Stand-by Assistance - not met  2. Sit to supine with Stand-by Assistance - met  3. Sit to stand transfer with Stand-by Assistance -  Met  Revised goal: sit to stand with RW with mod independent-not met  4. Bed to chair transfer with Stand-by Assistance using LRAD - not met  5. Gait  x 100 feet with Contact Guard Assistance using LRAD - Met   Update: Gait x 200ft Christal with rolling walker - not met  6. Ascend/descend 5 stair with right Handrails Moderate Assistance - not met  7. Lower extremity exercise program x 15 reps per handout, with independence - not met         Outcome: Ongoing (interventions implemented as appropriate)  Continue with above goals

## 2017-07-05 NOTE — SUBJECTIVE & OBJECTIVE
Interval History: patient states that he has been urinating quite a bit; feels much better today however still has over ten pounds of fluid on him; plan to start on lasix drip today      Review of Systems   Constitutional: Negative for chills, diaphoresis and fever.   Respiratory: Negative for cough and shortness of breath.    Cardiovascular: Positive for leg swelling. Negative for chest pain.   Gastrointestinal: Positive for abdominal distention and abdominal pain. Negative for diarrhea, nausea and vomiting.   Genitourinary: Negative for dysuria, frequency and hematuria.   Musculoskeletal: Positive for arthralgias. Negative for back pain and myalgias.        + lower extremity pain   Skin: Positive for wound. Negative for color change and rash.   Neurological: Positive for weakness. Negative for dizziness, numbness and headaches.   Psychiatric/Behavioral: Negative for agitation.     Objective:     Vital Signs (Most Recent):  Temp: 98 °F (36.7 °C) (07/05/17 1113)  Pulse: 76 (07/05/17 1113)  Resp: 20 (07/05/17 1113)  BP: (!) 150/80 (07/05/17 1113)  SpO2: 95 % (07/05/17 1113) Vital Signs (24h Range):  Temp:  [97.2 °F (36.2 °C)-98.7 °F (37.1 °C)] 98 °F (36.7 °C)  Pulse:  [66-82] 76  Resp:  [18-24] 20  SpO2:  [92 %-99 %] 95 %  BP: (150-178)/(78-96) 150/80     Weight: 126.6 kg (279 lb)  Body mass index is 35.82 kg/m².    Intake/Output Summary (Last 24 hours) at 07/05/17 1122  Last data filed at 07/05/17 0930   Gross per 24 hour   Intake              950 ml   Output                0 ml   Net              950 ml      Physical Exam   Constitutional: He is oriented to person, place, and time. He appears well-developed and well-nourished. He appears distressed (2/2 pain).   Cardiovascular: Normal rate and regular rhythm.    No murmur heard.  Pulmonary/Chest: Effort normal and breath sounds normal. No respiratory distress.   Abdominal: Soft. He exhibits distension. There is tenderness (diffuse).   Musculoskeletal: Normal range  of motion. He exhibits edema (BLE).   Neurological: He is alert and oriented to person, place, and time.   Skin: Skin is warm and dry. Rash (erythematous non pruritic rash to lower abdomen) noted.   Dressings to bilateral feet with scant drainage. Venous changes noted to bilateral lower extremities; no erythema.    Psychiatric: He has a normal mood and affect. His behavior is normal.       Significant Labs:   CBC:     Recent Labs  Lab 07/04/17  1541 07/05/17  0051 07/05/17  0845   WBC 2.74* 2.74* 2.72*   HGB 7.4* 7.7* 7.8*   HCT 22.4* 23.5* 23.8*   PLT 50* 50* 52*     CMP:     Recent Labs  Lab 07/04/17  0624 07/05/17  0412   * 136   K 4.7 4.4    108   CO2 17* 21*   * 163*   BUN 52* 53*   CREATININE 1.7* 1.7*   CALCIUM 9.0 8.8   PROT 6.7 6.8   ALBUMIN 4.0 3.9   BILITOT 2.1* 2.0*   ALKPHOS 112 111   AST 50* 48*   ALT <5* <5*   ANIONGAP 10 7*   EGFRNONAA 48.0* 48.0*       Significant Imaging: U/S: I have reviewed all pertinent results/findings within the past 24 hours and my personal findings are:  upper extremity US negative for DVT

## 2017-07-05 NOTE — PROGRESS NOTES
[facility] Hospital Medicine  Progress Note    Patient Name: Leif Nino  MRN: [id number]  Patient Class: IP- Inpatient   Admission Date: 6/16/2017  Length of Stay: 19 days  Attending Physician: Bandar Blunt MD  Primary Care Provider: Primary Doctor [facility] Medicine Team: [facility] Bandar Blunt MD    Subjective:     Principal Problem:Bilateral cellulitis of lower leg    HPI:  Mr. Nino is a 47 yo WM w/ h/o HCV cirrhosis & poorly controlled DM complicated by BL diabetic ulceration s/p 6/1 R 4th toe debridement who presents c/o generalized weakness w/ associated worsening BL LE edema.  He reports that 5d ago he began having dysuria.  Two day afterwards he woke up noticing his chronic BL LE edema was significantly worse with an associated erythematous, painful rash.  Later that day he began developing dyspnea on exertion that progressively worsened to the point where he is now dyspneic at rest.  Patient denies any vomiting but has mild, persistant nausea that has worsened acutely over the last 3 days.  He reports no fevers, chills, abdominal pain, worsening abdominal distention, diarrhea.  Patient states he has had no productive cough or wheezes.    Hospital Course:  No notes on file    Interval History: patient states that he has been urinating quite a bit; feels much better today however still has over ten pounds of fluid on him; plan to start on lasix drip today      Review of Systems   Constitutional: Negative for chills, diaphoresis and fever.   Respiratory: Negative for cough and shortness of breath.    Cardiovascular: Positive for leg swelling. Negative for chest pain.   Gastrointestinal: Positive for abdominal distention and abdominal pain. Negative for diarrhea, nausea and vomiting.   Genitourinary: Negative for dysuria, frequency and hematuria.   Musculoskeletal: Positive for arthralgias. Negative for back pain and myalgias.        + lower extremity pain   Skin:  Positive for wound. Negative for color change and rash.   Neurological: Positive for weakness. Negative for dizziness, numbness and headaches.   Psychiatric/Behavioral: Negative for agitation.     Objective:     Vital Signs (Most Recent):  Temp: 98 °F (36.7 °C) (07/05/17 1113)  Pulse: 76 (07/05/17 1113)  Resp: 20 (07/05/17 1113)  BP: (!) 150/80 (07/05/17 1113)  SpO2: 95 % (07/05/17 1113) Vital Signs (24h Range):  Temp:  [97.2 °F (36.2 °C)-98.7 °F (37.1 °C)] 98 °F (36.7 °C)  Pulse:  [66-82] 76  Resp:  [18-24] 20  SpO2:  [92 %-99 %] 95 %  BP: (150-178)/(78-96) 150/80     Weight: 126.6 kg (279 lb)  Body mass index is 35.82 kg/m².    Intake/Output Summary (Last 24 hours) at 07/05/17 1122  Last data filed at 07/05/17 0930   Gross per 24 hour   Intake              950 ml   Output                0 ml   Net              950 ml      Physical Exam   Constitutional: He is oriented to person, place, and time. He appears well-developed and well-nourished. He appears distressed (2/2 pain).   Cardiovascular: Normal rate and regular rhythm.    No murmur heard.  Pulmonary/Chest: Effort normal and breath sounds normal. No respiratory distress.   Abdominal: Soft. He exhibits distension. There is tenderness (diffuse).   Musculoskeletal: Normal range of motion. He exhibits edema (BLE).   Neurological: He is alert and oriented to person, place, and time.   Skin: Skin is warm and dry. Rash (erythematous non pruritic rash to lower abdomen) noted.   Dressings to bilateral feet with scant drainage. Venous changes noted to bilateral lower extremities; no erythema.    Psychiatric: He has a normal mood and affect. His behavior is normal.       Significant Labs:   CBC:     Recent Labs  Lab 07/04/17  1541 07/05/17  0051 07/05/17  0845   WBC 2.74* 2.74* 2.72*   HGB 7.4* 7.7* 7.8*   HCT 22.4* 23.5* 23.8*   PLT 50* 50* 52*     CMP:     Recent Labs  Lab 07/04/17  0624 07/05/17  0412   * 136   K 4.7 4.4    108   CO2 17* 21*   * 163*    BUN 52* 53*   CREATININE 1.7* 1.7*   CALCIUM 9.0 8.8   PROT 6.7 6.8   ALBUMIN 4.0 3.9   BILITOT 2.1* 2.0*   ALKPHOS 112 111   AST 50* 48*   ALT <5* <5*   ANIONGAP 10 7*   EGFRNONAA 48.0* 48.0*       Significant Imaging: U/S: I have reviewed all pertinent results/findings within the past 24 hours and my personal findings are:  upper extremity US negative for DVT     Assessment/Plan:      #Bilateral lower extremity cellulitis with venous stasis due to streptococcus   #4th toe osteomyelitis  - s/p bone biopsy on 06/24  - on IV cefazolin continuous infusion, estimated end date 8/5/17  - will probably need 6 weeks of antibiotics through PICC Line   - PICC line placed, confirmed by CXR  - follow up Bone Biopsy results. Preliminary results show Staph Aureus       # Abdominal Wall Cellulitis  - started on Vanc today, redose today        # Anasarca   - switching to lasix drip today 10 mg/hr, strict I/O and daily weights; improved     Upper extremity swelling  - Upper extremity US negative for DVT x 2  - seen by Dermatology  -  Xray of the left shoulder, negative for fracture      #Choledocholithiasis  - ERCP/EUS on 06/28/17  Complete removal was accomplished by biliary sphincterotomy and   balloon extraction.  A biliary sphincterotomy was performed     # Diabetic type 2 foot ulcers B/L  - podiatry consult, appreciate recs  - X rays B/ L feet: no osteomyelitis     # Decompensated Hep C cirrhosis with ascites  MELD-Na score: 21 at 7/5/2017  4:12 AM  MELD score: 20 at 7/5/2017  4:12 AM  Calculated from:  Serum Creatinine: 1.7 mg/dL at 7/5/2017  4:12 AM  Serum Sodium: 136 mmol/L at 7/5/2017  4:12 AM  Total Bilirubin: 2.0 mg/dL at 7/5/2017  4:12 AM  INR(ratio): 1.7 at 7/5/2017  4:12 AM  Age: 46 years    - will set up for outpatient follow up     # CKD stage 3 due to uncontrolled DM 2  - seems to be new baseline at 1.7     # DM2 with nephropathy  - Hba1c of 8.2 recently  - levemir 20 units at night and 8 of novolog with  meals     # Anemia of CKD stage 3  # Pancytopenia   - monitor         # Hypoalbuminemia due to moderate protein fernando malnutrition  - PAB 3, glucerna     # Hypomagnesemia   - replaced, resolved            Discharge disposition - LTAC once approved, likely end of this week        VTE Risk Mitigation         Ordered     Place sequential compression device  Until discontinued      07/01/17 2126     Place BASILIO hose  Until discontinued      06/24/17 1443     Low Risk of VTE  Once      06/16/17 2243          Bandar Blunt MD  Department of Hospital Medicine   [facility]

## 2017-07-05 NOTE — PT/OT/SLP PROGRESS
"Physical Therapy  Treatment    Leif Nino   MRN: [id number]   Admitting Diagnosis: Bilateral cellulitis of lower leg    PT Received On: 07/05/17  PT Start Time: 0941     PT Stop Time: 1019    PT Total Time (min): 38 min       Billable Minutes:  Gait [id number], Therapeutic Activity 10 and Neuromuscular Re-education 8    Treatment Type: Treatment  PT/PTA: PT     PTA Visit Number: 0       General Precautions: Standard, fall  Orthopedic Precautions:  (MICHAEL darco boots)   Braces:  (MICHAEL Darco boots)    Do you have any cultural, spiritual, Yazidi conflicts, given your current situation?: None stated     Subjective:  Communicated with nursingGisela prior to session.  "I am all ready" at end of session pt tearful stating that he feels "Useless" Encouraged pt to continue to work on getting stronger to increase independence with functional mobility.    Pain/Comfort  Location - Side 1: Bilateral  Location 1: abdomen  Pain Addressed 1: Pre-medicate for activity, Reposition, Distraction    Objective:   Patient found with: oxygen, peripheral IV    Functional Mobility:  Bed Mobility:   Supine to Sit:  (Sitting EOB when therapy arrived)  Sit to Supine: Moderate Assistance (Mod A with MICHAEL LE into bed)    Transfers:  Sit <> Stand Assistance: Contact Guard Assistance  Sit <> Stand Assistive Device: Rolling Walker    Gait:   Gait Distance: 30 ft with RW with very WIDE LIAT. decreased step length, decreased knee flexion in swing phase, Pt SOB at 15 feet needing standing rest break to catch breath.  Assistance 1: Contact Guard Assistance  Gait Assistive Device: Rolling walker (Bariatric walker)  Gait Pattern: 3-point gait  Gait Deviation(s): decreased weight-shifting ability, increased stride width, decreased marianne, increased time in double stance, decreased velocity of limb motion, decreased toe-to-floor clearance, decreased step length, decreased stride length    Stairs:  Unsafe to attempt    Balance:   Static Sit: FAIR+: Able to " take MINIMAL challenges from all directions  Dynamic Sit: FAIR+: Maintains balance through MINIMAL excursions of active trunk motion  Static Stand: FAIR: Maintains without assist but unable to take challenges  Dynamic stand: FAIR: Needs CONTACT GUARD during gait       Therapeutic Activities and Exercises:  Sit<>stand Transfer training with RW with cues to push up from surface of bed rather than pulling on RW (bariatric)  Pt initially reluctant  But willing once bed raised. (Pt reports his bed at home is on the floor)  Gait training 15'x 2 with standing rest break. Cues for safety and walker management.   Pt grunting with all activities.  Standing balance with CCGA with pt using bucket for urinal. Pt expressing frustration with this task and bed mobility.  Sit to supine with assist with Both LE into bed.    AM-PAC 6 CLICK MOBILITY  How much help from another person does this patient currently need?   1 = Unable, Total/Dependent Assistance  2 = A lot, Maximum/Moderate Assistance  3 = A little, Minimum/Contact Guard/Supervision  4 = None, Modified [place]/Independent    Turning over in bed (including adjusting bedclothes, sheets and blankets)?: 3  Sitting down on and standing up from a chair with arms (e.g., wheelchair, bedside commode, etc.): 3  Moving from lying on back to sitting on the side of the bed?: 3  Moving to and from a bed to a chair (including a wheelchair)?: 3  Need to walk in hospital room?: 3  Climbing 3-5 steps with a railing?: 2  Total Score: 17    AM-PAC Raw Score CMS G-Code Modifier Level of Impairment Assistance   6 % Total / Unable   7 - 9 CM 80 - 100% Maximal Assist   10 - 14 CL 60 - 80% Moderate Assist   15 - 19 CK 40 - 60% Moderate Assist   20 - 22 CJ 20 - 40% Minimal Assist   23 CI 1-20% SBA / CGA   24 CH 0% Independent/ Mod I     Patient left supine with all lines intact, call button in reach, nursing  notified and all needs in reach.    Assessment:  Leif Nino is a 46 y.o.  "male with a medical diagnosis of Bilateral cellulitis of lower leg and presents with impaired mobility, decreased activity tolerance, decreased safety and indepedence with bed mobility, needing asssit into bed, needing assist and cues for safety with sit<>stand transfers and high level surface. Pt limited in both distance and safety of gait with pt SOB at 15" and very wide of support, unable to place both LE into walker (Bariatric). Pt will need to be at higher level with safety, independence, and endurance of gait and functional mobility as pt home alone majority of day. Pt would not be able to get out of bed at home at this time.    Rehab identified problem list/impairments: Rehab identified problem list/impairments: weakness, edema, gait instability, decreased lower extremity function, impaired balance, impaired endurance, impaired sensation, impaired self care skills, impaired functional mobilty, decreased coordination, decreased safety awareness, decreased ROM, impaired cardiopulmonary response to activity    Rehab potential is good.    Activity tolerance: Fair    Discharge recommendations: Discharge Facility/Level Of Care Needs: nursing facility, skilled     Barriers to discharge: Barriers to Discharge: Inaccessible home environment, Decreased caregiver support    Equipment recommendations: Equipment Needed After Discharge: walker, standard, other (see comments) (Bariatric walker)     GOALS:    Physical Therapy Goals        Problem: Physical Therapy Goal    Goal Priority Disciplines Outcome Goal Variances Interventions   Physical Therapy Goal     PT/OT, PT Ongoing (interventions implemented as appropriate)     Description:  Goals to be met by: 7/10/17     Patient will increase functional independence with mobility by performin. Supine to sit with Stand-by Assistance - not met  2. Sit to supine with Stand-by Assistance - met  3. Sit to stand transfer with Stand-by Assistance -  Met  Revised goal: sit to " stand with RW with mod independent-not met  4. Bed to chair transfer with Stand-by Assistance using LRAD - not met  5. Gait  x 100 feet with Contact Guard Assistance using LRAD - Met   Update: Gait x 200ft Christal with rolling walker - not met  6. Ascend/descend 5 stair with right Handrails Moderate Assistance - not met  7. Lower extremity exercise program x 15 reps per handout, with independence - not met                          PLAN:    Patient to be seen 4 x/week  to address the above listed problems via gait training, therapeutic activities, therapeutic exercises, neuromuscular re-education  Plan of Care expires: 07/19/17  Plan of Care reviewed with: patient         Jennifer CHAPA Baldomero, PT  07/05/2017

## 2017-07-05 NOTE — PLAN OF CARE
According to attending MD, pt is medically stable to transfer to LTAC.   [facility] have both accepted pt for admission and now awaiting authorization from pt's insurance. SW confirmed with both Kyra with [facility] and Bisi with [facility] that they are still trying to get insurance authorization   SW later met with pt face to face to discuss possibly discharge for today and made him aware that both [facility] which is located in [place], [place] and [facility] which is located in [place], [place] were both trying to obtain authorization for LTAC placement and this SW wanted to know which was his preference. Pt decided on [facility] in [place], [place] due to the proximity.     D/C to [facility] is pending insurance authorization

## 2017-07-05 NOTE — SUBJECTIVE & OBJECTIVE
Interval History: patient is complaining of diffuse swelling and abdominal pain and tenderness with erythema on his abdomen; also having some SOB and wheezing; CXR ordered and nebs scheduled      Review of Systems   Constitutional: Negative for chills, diaphoresis and fever.   Respiratory: Negative for cough and shortness of breath.    Cardiovascular: Positive for leg swelling. Negative for chest pain.   Gastrointestinal: Positive for abdominal distention and abdominal pain. Negative for diarrhea, nausea and vomiting.   Genitourinary: Negative for dysuria, frequency and hematuria.   Musculoskeletal: Positive for arthralgias. Negative for back pain and myalgias.        + lower extremity pain   Skin: Positive for wound. Negative for color change and rash.   Neurological: Positive for weakness. Negative for dizziness, numbness and headaches.   Psychiatric/Behavioral: Negative for agitation.     Objective:     Vital Signs (Most Recent):  Temp: 97.2 °F (36.2 °C) (07/05/17 0737)  Pulse: 82 (07/05/17 0826)  Resp: (!) 24 (07/05/17 0826)  BP: (!) 158/83 (07/05/17 0737)  SpO2: (!) 92 % (07/05/17 0826) Vital Signs (24h Range):  Temp:  [97.2 °F (36.2 °C)-98.7 °F (37.1 °C)] 97.2 °F (36.2 °C)  Pulse:  [66-82] 82  Resp:  [18-24] 24  SpO2:  [92 %-99 %] 92 %  BP: (151-178)/(78-96) 158/83     Weight: 126.6 kg (279 lb)  Body mass index is 35.82 kg/m².    Intake/Output Summary (Last 24 hours) at 07/05/17 1109  Last data filed at 07/05/17 0930   Gross per 24 hour   Intake              950 ml   Output                0 ml   Net              950 ml      Physical Exam   Constitutional: He is oriented to person, place, and time. He appears well-developed and well-nourished. He appears distressed (2/2 pain).   Cardiovascular: Normal rate and regular rhythm.    No murmur heard.  Pulmonary/Chest: Effort normal and breath sounds normal. No respiratory distress.   Abdominal: Soft. He exhibits distension. There is tenderness (diffuse).    Musculoskeletal: Normal range of motion. He exhibits edema (BLE).   Neurological: He is alert and oriented to person, place, and time.   Skin: Skin is warm and dry. Rash (erythematous non pruritic rash to lower abdomen) noted.   Dressings to bilateral feet with scant drainage. Venous changes noted to bilateral lower extremities; no erythema.    Psychiatric: He has a normal mood and affect. His behavior is normal.       Significant Labs:   CBC:     Recent Labs  Lab 07/04/17  1541 07/05/17  0051 07/05/17  0845   WBC 2.74* 2.74* 2.72*   HGB 7.4* 7.7* 7.8*   HCT 22.4* 23.5* 23.8*   PLT 50* 50* 52*     CMP:     Recent Labs  Lab 07/04/17  0624 07/05/17  0412   * 136   K 4.7 4.4    108   CO2 17* 21*   * 163*   BUN 52* 53*   CREATININE 1.7* 1.7*   CALCIUM 9.0 8.8   PROT 6.7 6.8   ALBUMIN 4.0 3.9   BILITOT 2.1* 2.0*   ALKPHOS 112 111   AST 50* 48*   ALT <5* <5*   ANIONGAP 10 7*   EGFRNONAA 48.0* 48.0*       Significant Imaging: U/S: I have reviewed all pertinent results/findings within the past 24 hours and my personal findings are:  upper extremity US negative for DVT

## 2017-07-05 NOTE — PLAN OF CARE
Problem: Patient Care Overview  Goal: Plan of Care Review  Outcome: Ongoing (interventions implemented as appropriate)  During shift patient A and O x 4 and cooperative with care. PRN pain medications administered with good effect, patient resting comfortably post administration. Patient continues with elevated BP, all other vitals stable during shift. Patient voiding with assist of 1. Unable to void into urinal due to scrotal edema, voiding onto bed pads instead. No BM during shift. Wound care performed, BLE dressings changed during shift. [employer] camera in use throughout shift. Patient free from falls/injury.

## 2017-07-06 LAB
ALBUMIN SERPL BCP-MCNC: 3.9 G/DL
ALP SERPL-CCNC: 107 U/L
ALT SERPL W/O P-5'-P-CCNC: <5 U/L
ANION GAP SERPL CALC-SCNC: 13 MMOL/L
ANISOCYTOSIS BLD QL SMEAR: SLIGHT
ANISOCYTOSIS BLD QL SMEAR: SLIGHT
AST SERPL-CCNC: 52 U/L
BASOPHILS # BLD AUTO: 0.02 K/UL
BASOPHILS # BLD AUTO: 0.02 K/UL
BASOPHILS # BLD AUTO: 0.05 K/UL
BASOPHILS # BLD AUTO: ABNORMAL K/UL
BASOPHILS NFR BLD: 0 %
BASOPHILS NFR BLD: 0.7 %
BASOPHILS NFR BLD: 0.7 %
BASOPHILS NFR BLD: 1.6 %
BILIRUB SERPL-MCNC: 1.9 MG/DL
BUN SERPL-MCNC: 56 MG/DL
BURR CELLS BLD QL SMEAR: ABNORMAL
CALCIUM SERPL-MCNC: 9.1 MG/DL
CHLORIDE SERPL-SCNC: 105 MMOL/L
CO2 SERPL-SCNC: 17 MMOL/L
CREAT SERPL-MCNC: 1.8 MG/DL
DIFFERENTIAL METHOD: ABNORMAL
EOSINOPHIL # BLD AUTO: 0.1 K/UL
EOSINOPHIL # BLD AUTO: 0.2 K/UL
EOSINOPHIL # BLD AUTO: 0.2 K/UL
EOSINOPHIL # BLD AUTO: ABNORMAL K/UL
EOSINOPHIL NFR BLD: 4 %
EOSINOPHIL NFR BLD: 4.3 %
EOSINOPHIL NFR BLD: 5.1 %
EOSINOPHIL NFR BLD: 5.7 %
ERYTHROCYTE [DISTWIDTH] IN BLOOD BY AUTOMATED COUNT: 18.5 %
ERYTHROCYTE [DISTWIDTH] IN BLOOD BY AUTOMATED COUNT: 18.5 %
ERYTHROCYTE [DISTWIDTH] IN BLOOD BY AUTOMATED COUNT: 18.7 %
ERYTHROCYTE [DISTWIDTH] IN BLOOD BY AUTOMATED COUNT: 18.7 %
EST. GFR  (AFRICAN AMERICAN): 51.8 ML/MIN/1.73 M^2
EST. GFR  (NON AFRICAN AMERICAN): 44.8 ML/MIN/1.73 M^2
GLUCOSE SERPL-MCNC: 187 MG/DL
HCT VFR BLD AUTO: 23.6 %
HCT VFR BLD AUTO: 23.9 %
HCT VFR BLD AUTO: 24.1 %
HCT VFR BLD AUTO: 24.3 %
HGB BLD-MCNC: 7.6 G/DL
HGB BLD-MCNC: 7.8 G/DL
HGB BLD-MCNC: 7.9 G/DL
HGB BLD-MCNC: 8 G/DL
HYPOCHROMIA BLD QL SMEAR: ABNORMAL
HYPOCHROMIA BLD QL SMEAR: ABNORMAL
INR PPP: 1.7
LYMPHOCYTES # BLD AUTO: 0.5 K/UL
LYMPHOCYTES # BLD AUTO: ABNORMAL K/UL
LYMPHOCYTES NFR BLD: 17 %
LYMPHOCYTES NFR BLD: 17.2 %
LYMPHOCYTES NFR BLD: 17.3 %
LYMPHOCYTES NFR BLD: 26 %
MAGNESIUM SERPL-MCNC: 1.8 MG/DL
MCH RBC QN AUTO: 28.8 PG
MCH RBC QN AUTO: 28.9 PG
MCH RBC QN AUTO: 29 PG
MCH RBC QN AUTO: 29.1 PG
MCHC RBC AUTO-ENTMCNC: 32.2 %
MCHC RBC AUTO-ENTMCNC: 32.6 %
MCHC RBC AUTO-ENTMCNC: 32.8 %
MCHC RBC AUTO-ENTMCNC: 32.9 %
MCV RBC AUTO: 88 FL
MCV RBC AUTO: 89 FL
MONOCYTES # BLD AUTO: 0.3 K/UL
MONOCYTES # BLD AUTO: 0.4 K/UL
MONOCYTES # BLD AUTO: 0.4 K/UL
MONOCYTES # BLD AUTO: ABNORMAL K/UL
MONOCYTES NFR BLD: 11.2 %
MONOCYTES NFR BLD: 13.6 %
MONOCYTES NFR BLD: 13.8 %
MONOCYTES NFR BLD: 2 %
NEUTROPHILS # BLD AUTO: 1.7 K/UL
NEUTROPHILS # BLD AUTO: 1.8 K/UL
NEUTROPHILS # BLD AUTO: 1.9 K/UL
NEUTROPHILS NFR BLD: 61.6 %
NEUTROPHILS NFR BLD: 62.8 %
NEUTROPHILS NFR BLD: 66.4 %
NEUTROPHILS NFR BLD: 67 %
NEUTS BAND NFR BLD MANUAL: 1 %
OVALOCYTES BLD QL SMEAR: ABNORMAL
OVALOCYTES BLD QL SMEAR: ABNORMAL
PHOSPHATE SERPL-MCNC: 3.7 MG/DL
PLATELET # BLD AUTO: 4 K/UL
PLATELET # BLD AUTO: 44 K/UL
PLATELET # BLD AUTO: 51 K/UL
PLATELET # BLD AUTO: 55 K/UL
PLATELET BLD QL SMEAR: ABNORMAL
PLATELET BLD QL SMEAR: ABNORMAL
PMV BLD AUTO: 10.3 FL
PMV BLD AUTO: 10.5 FL
PMV BLD AUTO: 8.8 FL
PMV BLD AUTO: ABNORMAL FL
POCT GLUCOSE: 159 MG/DL (ref 70–110)
POCT GLUCOSE: 186 MG/DL (ref 70–110)
POCT GLUCOSE: 192 MG/DL (ref 70–110)
POCT GLUCOSE: 198 MG/DL (ref 70–110)
POCT GLUCOSE: 199 MG/DL (ref 70–110)
POIKILOCYTOSIS BLD QL SMEAR: SLIGHT
POIKILOCYTOSIS BLD QL SMEAR: SLIGHT
POLYCHROMASIA BLD QL SMEAR: ABNORMAL
POTASSIUM SERPL-SCNC: 4.3 MMOL/L
PROT SERPL-MCNC: 6.6 G/DL
PROTHROMBIN TIME: 17 SEC
RBC # BLD AUTO: 2.64 M/UL
RBC # BLD AUTO: 2.7 M/UL
RBC # BLD AUTO: 2.72 M/UL
RBC # BLD AUTO: 2.75 M/UL
SODIUM SERPL-SCNC: 135 MMOL/L
WBC # BLD AUTO: 1.29 K/UL
WBC # BLD AUTO: 2.76 K/UL
WBC # BLD AUTO: 2.79 K/UL
WBC # BLD AUTO: 3.12 K/UL

## 2017-07-06 PROCEDURE — 85610 PROTHROMBIN TIME: CPT

## 2017-07-06 PROCEDURE — 27000221 HC OXYGEN, UP TO 24 HOURS

## 2017-07-06 PROCEDURE — 85025 COMPLETE CBC W/AUTO DIFF WBC: CPT | Mod: 91

## 2017-07-06 PROCEDURE — 94760 N-INVAS EAR/PLS OXIMETRY 1: CPT

## 2017-07-06 PROCEDURE — 80053 COMPREHEN METABOLIC PANEL: CPT

## 2017-07-06 PROCEDURE — 25000242 PHARM REV CODE 250 ALT 637 W/ HCPCS: Performed by: HOSPITALIST

## 2017-07-06 PROCEDURE — 25000003 PHARM REV CODE 250: Performed by: HOSPITALIST

## 2017-07-06 PROCEDURE — 63600175 PHARM REV CODE 636 W HCPCS: Performed by: NURSE PRACTITIONER

## 2017-07-06 PROCEDURE — 20600001 HC STEP DOWN PRIVATE ROOM

## 2017-07-06 PROCEDURE — 84100 ASSAY OF PHOSPHORUS: CPT

## 2017-07-06 PROCEDURE — 25000003 PHARM REV CODE 250: Performed by: NURSE PRACTITIONER

## 2017-07-06 PROCEDURE — 36415 COLL VENOUS BLD VENIPUNCTURE: CPT

## 2017-07-06 PROCEDURE — 85007 BL SMEAR W/DIFF WBC COUNT: CPT

## 2017-07-06 PROCEDURE — 63600175 PHARM REV CODE 636 W HCPCS: Performed by: HOSPITALIST

## 2017-07-06 PROCEDURE — 97803 MED NUTRITION INDIV SUBSEQ: CPT

## 2017-07-06 PROCEDURE — 83735 ASSAY OF MAGNESIUM: CPT

## 2017-07-06 PROCEDURE — 99233 SBSQ HOSP IP/OBS HIGH 50: CPT | Mod: ,,, | Performed by: HOSPITALIST

## 2017-07-06 PROCEDURE — 94640 AIRWAY INHALATION TREATMENT: CPT

## 2017-07-06 PROCEDURE — 25000003 PHARM REV CODE 250: Performed by: PODIATRIST

## 2017-07-06 PROCEDURE — 85027 COMPLETE CBC AUTOMATED: CPT

## 2017-07-06 RX ORDER — IPRATROPIUM BROMIDE AND ALBUTEROL SULFATE 2.5; .5 MG/3ML; MG/3ML
3 SOLUTION RESPIRATORY (INHALATION) EVERY 4 HOURS PRN
Status: DISCONTINUED | OUTPATIENT
Start: 2017-07-06 | End: 2017-07-07 | Stop reason: HOSPADM

## 2017-07-06 RX ORDER — SPIRONOLACTONE 100 MG/1
100 TABLET, FILM COATED ORAL ONCE
Status: COMPLETED | OUTPATIENT
Start: 2017-07-06 | End: 2017-07-06

## 2017-07-06 RX ORDER — IPRATROPIUM BROMIDE AND ALBUTEROL SULFATE 2.5; .5 MG/3ML; MG/3ML
3 SOLUTION RESPIRATORY (INHALATION) EVERY 4 HOURS
Qty: 1 ML | Refills: 0
Start: 2017-07-06 | End: 2018-04-03

## 2017-07-06 RX ORDER — CARVEDILOL 25 MG/1
25 TABLET ORAL 2 TIMES DAILY WITH MEALS
Status: DISCONTINUED | OUTPATIENT
Start: 2017-07-06 | End: 2017-07-07 | Stop reason: HOSPADM

## 2017-07-06 RX ORDER — OXYCODONE HYDROCHLORIDE 15 MG/1
15 TABLET ORAL EVERY 6 HOURS PRN
Qty: 30 TABLET | Refills: 0
Start: 2017-07-06 | End: 2018-04-03

## 2017-07-06 RX ORDER — HYDROMORPHONE HYDROCHLORIDE 1 MG/ML
1 INJECTION, SOLUTION INTRAMUSCULAR; INTRAVENOUS; SUBCUTANEOUS ONCE
Status: COMPLETED | OUTPATIENT
Start: 2017-07-06 | End: 2017-07-06

## 2017-07-06 RX ORDER — SODIUM BICARBONATE 650 MG/1
650 TABLET ORAL 3 TIMES DAILY
Start: 2017-07-06 | End: 2018-04-03

## 2017-07-06 RX ORDER — CARVEDILOL 25 MG/1
25 TABLET ORAL 2 TIMES DAILY WITH MEALS
Qty: 60 TABLET | Refills: 3
Start: 2017-07-06 | End: 2018-07-06

## 2017-07-06 RX ORDER — INSULIN ASPART 100 [IU]/ML
8 INJECTION, SOLUTION INTRAVENOUS; SUBCUTANEOUS 3 TIMES DAILY
Qty: 1 BOX | Refills: 0 | Status: SHIPPED | OUTPATIENT
Start: 2017-07-06 | End: 2018-04-03

## 2017-07-06 RX ADMIN — CARVEDILOL 12.5 MG: 12.5 TABLET, FILM COATED ORAL at 08:07

## 2017-07-06 RX ADMIN — DICYCLOMINE HYDROCHLORIDE 20 MG: 20 TABLET ORAL at 01:07

## 2017-07-06 RX ADMIN — IPRATROPIUM BROMIDE AND ALBUTEROL SULFATE 3 ML: .5; 3 SOLUTION RESPIRATORY (INHALATION) at 07:07

## 2017-07-06 RX ADMIN — INSULIN DETEMIR 10 UNITS: 100 INJECTION, SOLUTION SUBCUTANEOUS at 08:07

## 2017-07-06 RX ADMIN — SODIUM BICARBONATE 650 MG: 650 TABLET ORAL at 08:07

## 2017-07-06 RX ADMIN — HYDROMORPHONE HYDROCHLORIDE 1 MG: 1 INJECTION, SOLUTION INTRAMUSCULAR; INTRAVENOUS; SUBCUTANEOUS at 03:07

## 2017-07-06 RX ADMIN — INSULIN ASPART 8 UNITS: 100 INJECTION, SOLUTION INTRAVENOUS; SUBCUTANEOUS at 08:07

## 2017-07-06 RX ADMIN — INSULIN DETEMIR 10 UNITS: 100 INJECTION, SOLUTION SUBCUTANEOUS at 12:07

## 2017-07-06 RX ADMIN — HYDROMORPHONE HYDROCHLORIDE 1 MG: 1 INJECTION, SOLUTION INTRAMUSCULAR; INTRAVENOUS; SUBCUTANEOUS at 04:07

## 2017-07-06 RX ADMIN — VANCOMYCIN HYDROCHLORIDE 1250 MG: 100 INJECTION, POWDER, LYOPHILIZED, FOR SOLUTION INTRAVENOUS at 03:07

## 2017-07-06 RX ADMIN — INSULIN ASPART 2 UNITS: 100 INJECTION, SOLUTION INTRAVENOUS; SUBCUTANEOUS at 12:07

## 2017-07-06 RX ADMIN — QUETIAPINE FUMARATE 100 MG: 25 TABLET ORAL at 08:07

## 2017-07-06 RX ADMIN — Medication 40 G: at 02:07

## 2017-07-06 RX ADMIN — ASPIRIN 81 MG: 81 TABLET, COATED ORAL at 09:07

## 2017-07-06 RX ADMIN — DICYCLOMINE HYDROCHLORIDE 20 MG: 20 TABLET ORAL at 06:07

## 2017-07-06 RX ADMIN — OXYCODONE HYDROCHLORIDE 15 MG: 5 TABLET ORAL at 02:07

## 2017-07-06 RX ADMIN — INSULIN ASPART 1 UNITS: 100 INJECTION, SOLUTION INTRAVENOUS; SUBCUTANEOUS at 08:07

## 2017-07-06 RX ADMIN — INSULIN ASPART 2 UNITS: 100 INJECTION, SOLUTION INTRAVENOUS; SUBCUTANEOUS at 05:07

## 2017-07-06 RX ADMIN — HYDROMORPHONE HYDROCHLORIDE 1 MG: 1 INJECTION, SOLUTION INTRAMUSCULAR; INTRAVENOUS; SUBCUTANEOUS at 11:07

## 2017-07-06 RX ADMIN — INSULIN ASPART 2 UNITS: 100 INJECTION, SOLUTION INTRAVENOUS; SUBCUTANEOUS at 08:07

## 2017-07-06 RX ADMIN — SODIUM BICARBONATE 650 MG: 650 TABLET ORAL at 01:07

## 2017-07-06 RX ADMIN — FUROSEMIDE 10 MG/HR: 10 INJECTION, SOLUTION INTRAMUSCULAR; INTRAVENOUS at 08:07

## 2017-07-06 RX ADMIN — OXYCODONE HYDROCHLORIDE 15 MG: 5 TABLET ORAL at 06:07

## 2017-07-06 RX ADMIN — CARVEDILOL 25 MG: 25 TABLET, FILM COATED ORAL at 05:07

## 2017-07-06 RX ADMIN — HYDROMORPHONE HYDROCHLORIDE 1 MG: 1 INJECTION, SOLUTION INTRAMUSCULAR; INTRAVENOUS; SUBCUTANEOUS at 08:07

## 2017-07-06 RX ADMIN — CHLOROTHIAZIDE SODIUM 500 MG: 500 INJECTION, POWDER, LYOPHILIZED, FOR SOLUTION INTRAVENOUS at 10:07

## 2017-07-06 RX ADMIN — INSULIN ASPART 8 UNITS: 100 INJECTION, SOLUTION INTRAVENOUS; SUBCUTANEOUS at 05:07

## 2017-07-06 RX ADMIN — SODIUM BICARBONATE 650 MG: 650 TABLET ORAL at 06:07

## 2017-07-06 RX ADMIN — DEXTROSE 6 G: 5 SOLUTION INTRAVENOUS at 03:07

## 2017-07-06 RX ADMIN — SPIRONOLACTONE 100 MG: 100 TABLET, FILM COATED ORAL at 04:07

## 2017-07-06 RX ADMIN — DICYCLOMINE HYDROCHLORIDE 20 MG: 20 TABLET ORAL at 08:07

## 2017-07-06 RX ADMIN — ATORVASTATIN CALCIUM 40 MG: 20 TABLET, FILM COATED ORAL at 09:07

## 2017-07-06 RX ADMIN — CHLOROTHIAZIDE SODIUM 500 MG: 500 INJECTION, POWDER, LYOPHILIZED, FOR SOLUTION INTRAVENOUS at 01:07

## 2017-07-06 RX ADMIN — INSULIN ASPART 1 UNITS: 100 INJECTION, SOLUTION INTRAVENOUS; SUBCUTANEOUS at 12:07

## 2017-07-06 RX ADMIN — OXYCODONE HYDROCHLORIDE 15 MG: 5 TABLET ORAL at 10:07

## 2017-07-06 RX ADMIN — IPRATROPIUM BROMIDE AND ALBUTEROL SULFATE 3 ML: .5; 3 SOLUTION RESPIRATORY (INHALATION) at 11:07

## 2017-07-06 RX ADMIN — INSULIN ASPART 8 UNITS: 100 INJECTION, SOLUTION INTRAVENOUS; SUBCUTANEOUS at 12:07

## 2017-07-06 NOTE — PLAN OF CARE
SW received call from [phone]. Who reported she had provided LTAC authorization to [facility] and that this SW did not communicate her pt's preference. This SW explained to Mary that referrals were sent out to all of the LTAC in network with Medicaid and this SW explained to the pt that he will go the facility that provides the best offer, as it has been done in the past. Mary stated that she would work on a singe case agreement with [facility] but could not guarantee that they would come to an agreement and if they don't come to an agreement on prices, pt may have to go to [facility].     SW updated both Kyra and Bisi about a possible delay (referring to above notation).

## 2017-07-06 NOTE — PROGRESS NOTES
"  [facility]  Adult Nutrition  Consult Note    SUMMARY     Recommendations    1. Continue current 2 gram sodium, 2200 kcal ADA diet (fluid restriction per MD).   2. RD to monitor & follow-up.    Goals: PO intake >50%  Nutrition Goal Status: goal met  Communication of RD Recs: reviewed with RN    Reason for Assessment    Reason for Assessment: RD follow-up  Diagnosis: other (see comments) (Cellulitis)  Relevent Medical History: DM   Interdisciplinary Rounds: did not attend     General Information Comments: Pt with good appetite, consuming 100% of meals. Pt not interested in diabetic diet education.    Nutrition Discharge Planning: Adequate PO intake.    Nutrition Prescription Ordered    Current Diet Order: 2 gram Na, 2200 kcal ADA  Nutrition Order Comments: 1500 mL FR    Nutrition Risk Screen     Nutrition Risk Screen: no indicators present    Nutrition/Diet History    Patient Reported Diet/Restrictions/Preferences: general     Factors Affecting Nutritional Intake: other (see comments) (None)    Labs/Tests/Procedures/Meds    Pertinent Labs Reviewed: reviewed, pertinent  Pertinent Labs Comments: BUN 53, GFR 55.5, Gluc 163, A1C 8.4  Pertinent Medications Reviewed: reviewed, pertinent  Pertinent Medications Comments: Lasix, Statin, Insulin    Physical Findings    Overall Physical Appearance: overweight  Oral/Mouth Cavity: WDL  Skin: intact    Anthropometrics    Height: 6' 2" (188 cm)  Weight Method: Stated  Weight: 126.6 kg (279 lb 1.6 oz)    Ideal Body Weight (IBW), Male: 190 lb  % Ideal Body Weight, Male (lb): 146.9 lb     BMI (Calculated): 35.9  BMI Grade: 35 - 39.9 - obesity - grade II    Estimated/Assessed Needs    Weight Used For Calorie Calculations: 126.6 kg (279 lb 1.6 oz)      Energy Need Method: [place], other (see comments) (2225 kcal/d)     Weight Used For Protein Calculations: 126.6 kg (279 lb 1.6 oz)  Protein Requirements: 101-114 g/d     Fluid Need Method: RDA Method, other " (see comments) (Per MD or 1 mL/kcal)    Assessment and Plan    No nutritional dx at this time.    Monitor and Evaluation    Food and Nutrient Intake: energy intake, food and beverage intake  Food and Nutrient Adminstration: diet order     Physical Activity and Function: nutrition-related ADLs and IADLs  Anthropometric Measurements: weight, weight change, body mass index  Biochemical Data, Medical Tests and Procedures: electrolyte and renal panel, gastrointestinal profile, glucose/endocrine profile, inflammatory profile, lipid profile  Nutrition-Focused Physical Findings: overall appearance    Nutrition Risk    Level of Risk: other (see comments) (1x/week)    Nutrition Follow-Up    RD Follow-up?: Yes

## 2017-07-06 NOTE — CONSULTS
PharmD Consult: Vanc dosing    45yo M with BL cellulitis caused by MSSA, now being treated also for abdominal wall cellulitis not improving on cefazolin thus started on vancomycin.    A/P:    1. Vancomycin 1g given on 7/4, random in AM was 10.8 mcg/mL. Started on 1250mg (10mg/kg) IV q12h yesterday. Goal 10-15 mcg/mL. Level due Friday Am at 0245.  2. Renal function stable around SCr 1.7, no labs back today. UOP good, 1.8L yesterday.     Will continue to follow.    Dianne Spencer, PharmD  [id number]

## 2017-07-06 NOTE — PLAN OF CARE
[facility]    FACILITY TRANSFER ORDERS      Patient Name: Leif Nino  YOB: 1970    PCP: Primary Doctor No   PCP Address: None  PCP Phone Number: None  PCP Fax: None    Encounter Date: 7/7/17    Admit to: [facility]    Vital Signs:  Routine    Diagnoses:   Active Hospital Problems    Diagnosis  POA    *Bilateral cellulitis of lower leg [L03.116, L03.115]  Yes    Anasarca [R60.1]  Yes    Pancytopenia [D61.818]  Yes    Cellulitis of left lower extremity [L03.116]  Yes    Cholelithiasis with choledocholithiasis [K80.70]  Yes    Osteomyelitis of right foot [M86.9]  No    Diabetic ulcer of right midfoot associated with type 2 diabetes mellitus, with fat layer exposed [E11.621, L97.412]  Yes    Anemia of chronic renal failure, stage 3 (moderate) [N18.3, D63.1]  Yes    Thrombocytopenia [D69.6]  Yes    Chronic kidney disease, stage 3 [N18.3]  Yes    Decompensated liver disease [K74.69]  Yes    Diabetic ulcer of toe of right foot associated with type 2 diabetes mellitus, with fat layer exposed [E11.621, L97.512]  Yes    Essential hypertension [I10]  Yes    Osteomyelitis due to Staphylococcus aureus [M86.9, A49.01]  No    Ascites of liver [R18.8]  Yes    Hypoalbuminemia [E88.09]  Yes    Hypomagnesemia [E83.42]  Yes      Resolved Hospital Problems    Diagnosis Date Resolved POA    Osteomyelitis of toe of right foot [M86.9] 06/24/2017 No       Allergies:  Review of patient's allergies indicates:   Allergen Reactions    Shellfish containing products Hives     Shortness of breath       Diet: diabetic diet: 1800 calorie 2L fluid restriction     Activities: Activity as tolerated    Nursing: routine     Labs: CBC, CMP, INR daily for 3 days and then upto facility      CONSULTS:    Physical Therapy to evaluate and treat.  and Occupational Therapy to evaluate and treat.   Consult Wound Care    MISCELLANEOUS CARE:  Diabetes Care:   SN to perform and educate Diabetic management  "with blood glucose monitoring:      Medications: Review discharge medications with patient and family and provide education.      Current Discharge Medication List      START taking these medications    Details   albuterol-ipratropium 2.5mg-0.5mg/3mL (DUO-NEB) 0.5 mg-3 mg(2.5 mg base)/3 mL nebulizer solution Take 3 mLs by nebulization every 4 (four) hours. Rescue  Qty: 1 mL, Refills: 0      insulin aspart (NOVOLOG) 100 unit/mL InPn pen Inject 8 Units into the skin 3 (three) times daily.  Qty: 1 Box, Refills: 0      insulin detemir (LEVEMIR FLEXTOUCH) 100 unit/mL (3 mL) SubQ InPn pen Inject 10 Units into the skin every evening.  Qty: 1 Box, Refills: 0      oxycodone (ROXICODONE) 15 MG Tab Take 1 tablet (15 mg total) by mouth every 6 (six) hours as needed.  Qty: 30 tablet, Refills: 0      sodium bicarbonate 650 MG tablet Take 1 tablet (650 mg total) by mouth 3 (three) times daily.         CONTINUE these medications which have CHANGED    Details   carvedilol (COREG) 25 MG tablet Take 1 tablet (25 mg total) by mouth 2 (two) times daily with meals.  Qty: 60 tablet, Refills: 3         CONTINUE these medications which have NOT CHANGED    Details   aspirin (ECOTRIN) 81 MG EC tablet Take 1 tablet (81 mg total) by mouth once daily.  Refills: 0      atorvastatin (LIPITOR) 40 MG tablet Take 1 tablet (40 mg total) by mouth once daily.  Qty: 30 tablet, Refills: 3      cadexomer iodine (IODOSORB) 0.9 % gel Apply topically daily as needed for Wound Care (to foot wounds).  Refills: 0      dicyclomine (BENTYL) 20 mg tablet Take 20 mg by mouth 3 (three) times daily.      pen needle, diabetic 32 gauge x 5/32" Ndle To use with Apidra.  Qty: 100 each, Refills: 3      quetiapine (SEROQUEL) 100 MG Tab Take 100 mg by mouth every evening.         STOP taking these medications       furosemide (LASIX) 40 MG tablet Comments:   Reason for Stopping:         INSULIN GLARGINE,HUM.REC.ANLOG (LANTUS SOLOSTAR SUBQ) Comments:   Reason for Stopping:  "        insulin glulisine (APIDRA) 100 unit/mL InPn pen Comments:   Reason for Stopping:              Cefazolin 6g IV continuous   Stop Date: 8/5/17    Lasix 10mg/hr ggt       _________________________________  Bandar Blunt MD  07/06/2017

## 2017-07-06 NOTE — PLAN OF CARE
LTAC orders sent via [facility] to [facility].   Bisi with [facility] verified that she received orders and is working with his insurance to gain letter of agreement. His insurance provide authorization to another LTAC.     Bisi will call  back with call report information.

## 2017-07-06 NOTE — NURSING
Pt ordered suspension sling for scrotum. Pt declining to wear at this time. Will continue to monitor.

## 2017-07-06 NOTE — PLAN OF CARE
BETSY received call from [facility] with [facility] reporting that they have received authorization from pt's insurance and he is good to transfer over, she provided call report information of [phone], going to room 7109.     BETSY provided attending nurse, Natalie with call report information and notified Dr. Blunt of needed d/c orders.     BETSY called [facility] at ext. [phone], option 2 spoke to dispatcher who arranged pt's transport by stretcher with 3:00pm .

## 2017-07-06 NOTE — PLAN OF CARE
Problem: Patient Care Overview  Goal: Plan of Care Review  Outcome: Ongoing (interventions implemented as appropriate)  During shift patient A and O x 4 and cooperative with care. PRN pain medications administered with good effect, patient resting comfortably post administration. Patient continues with elevated BP, all other vitals stable during shift. Patient voiding with assist of 1.  BM x 1 during shift. Wound care performed, BLE dressings changed during shift. [employer] camera in use throughout shift. Patient free from falls/injury.

## 2017-07-06 NOTE — NURSING
"Spoke to Dr. Blunt, made him aware that pt has a nosebleed, and pt had nosebleed this AM per NOC RN. MD states he was aware of pt's intermittent nosebleeds. States he can order Afrin for pt. Also discussed pt's IV med orders, states OK to stop Lasix gtt for vancomycin IV. Will continue to monitor.    1600- Pt states nosebleed "stopped a little while ago."  "

## 2017-07-06 NOTE — NURSING
Spoke to Dr. Blunt re: POC for pt. States OK to give dilaudid x1 for pt's c/o pain to abdomen and scrotum, states going forward, OK to continue medicating pt for pain based on last prn dose. States OK to give diuril in addition to lasix gtt. Will continue to monitor.

## 2017-07-06 NOTE — NURSING
"Spoke to BETSY Livingston, re: pt's DC. Updated pt re: his discharge. Pt became tearful, stating "why can't I just stay here." Reassured pt re: dc plans, called BETSY Livingston, who states she will be up to talk to pt. Will continue to monitor.  "

## 2017-07-07 VITALS
OXYGEN SATURATION: 96 % | DIASTOLIC BLOOD PRESSURE: 71 MMHG | WEIGHT: 315 LBS | TEMPERATURE: 98 F | SYSTOLIC BLOOD PRESSURE: 142 MMHG | BODY MASS INDEX: 40.43 KG/M2 | RESPIRATION RATE: 18 BRPM | HEART RATE: 69 BPM | HEIGHT: 74 IN

## 2017-07-07 LAB
ALBUMIN SERPL BCP-MCNC: 3.9 G/DL
ALP SERPL-CCNC: 106 U/L
ALT SERPL W/O P-5'-P-CCNC: <5 U/L
ANION GAP SERPL CALC-SCNC: 7 MMOL/L
AST SERPL-CCNC: 54 U/L
BASOPHILS # BLD AUTO: 0.03 K/UL
BASOPHILS NFR BLD: 1.2 %
BILIRUB SERPL-MCNC: 1.9 MG/DL
BUN SERPL-MCNC: 56 MG/DL
CALCIUM SERPL-MCNC: 9.1 MG/DL
CHLORIDE SERPL-SCNC: 104 MMOL/L
CO2 SERPL-SCNC: 25 MMOL/L
CREAT SERPL-MCNC: 1.8 MG/DL
DIFFERENTIAL METHOD: ABNORMAL
EOSINOPHIL # BLD AUTO: 0.2 K/UL
EOSINOPHIL NFR BLD: 6 %
ERYTHROCYTE [DISTWIDTH] IN BLOOD BY AUTOMATED COUNT: 18.7 %
EST. GFR  (AFRICAN AMERICAN): 51.8 ML/MIN/1.73 M^2
EST. GFR  (NON AFRICAN AMERICAN): 44.8 ML/MIN/1.73 M^2
GLUCOSE SERPL-MCNC: 181 MG/DL
HCT VFR BLD AUTO: 23.7 %
HGB BLD-MCNC: 7.8 G/DL
INR PPP: 1.7
LYMPHOCYTES # BLD AUTO: 0.5 K/UL
LYMPHOCYTES NFR BLD: 20.1 %
MAGNESIUM SERPL-MCNC: 1.6 MG/DL
MCH RBC QN AUTO: 28.9 PG
MCHC RBC AUTO-ENTMCNC: 32.9 %
MCV RBC AUTO: 88 FL
MONOCYTES # BLD AUTO: 0.3 K/UL
MONOCYTES NFR BLD: 10 %
NEUTROPHILS # BLD AUTO: 1.6 K/UL
NEUTROPHILS NFR BLD: 62.7 %
PHOSPHATE SERPL-MCNC: 4.2 MG/DL
PLATELET # BLD AUTO: 51 K/UL
PMV BLD AUTO: 9.9 FL
POCT GLUCOSE: 164 MG/DL (ref 70–110)
POCT GLUCOSE: 200 MG/DL (ref 70–110)
POCT GLUCOSE: 216 MG/DL (ref 70–110)
POTASSIUM SERPL-SCNC: 4.3 MMOL/L
PROT SERPL-MCNC: 6.9 G/DL
PROTHROMBIN TIME: 16.8 SEC
RBC # BLD AUTO: 2.7 M/UL
SODIUM SERPL-SCNC: 136 MMOL/L
VANCOMYCIN TROUGH SERPL-MCNC: 35.7 UG/ML
WBC # BLD AUTO: 2.49 K/UL

## 2017-07-07 PROCEDURE — 80053 COMPREHEN METABOLIC PANEL: CPT

## 2017-07-07 PROCEDURE — 99233 SBSQ HOSP IP/OBS HIGH 50: CPT | Mod: ,,, | Performed by: HOSPITALIST

## 2017-07-07 PROCEDURE — 85025 COMPLETE CBC W/AUTO DIFF WBC: CPT

## 2017-07-07 PROCEDURE — 25000003 PHARM REV CODE 250: Performed by: NURSE PRACTITIONER

## 2017-07-07 PROCEDURE — 63600175 PHARM REV CODE 636 W HCPCS: Performed by: HOSPITALIST

## 2017-07-07 PROCEDURE — 80202 ASSAY OF VANCOMYCIN: CPT

## 2017-07-07 PROCEDURE — 25000003 PHARM REV CODE 250: Performed by: HOSPITALIST

## 2017-07-07 PROCEDURE — 83735 ASSAY OF MAGNESIUM: CPT

## 2017-07-07 PROCEDURE — 85610 PROTHROMBIN TIME: CPT

## 2017-07-07 PROCEDURE — 36415 COLL VENOUS BLD VENIPUNCTURE: CPT

## 2017-07-07 PROCEDURE — 63600175 PHARM REV CODE 636 W HCPCS: Performed by: NURSE PRACTITIONER

## 2017-07-07 PROCEDURE — 84100 ASSAY OF PHOSPHORUS: CPT

## 2017-07-07 RX ADMIN — INSULIN ASPART 4 UNITS: 100 INJECTION, SOLUTION INTRAVENOUS; SUBCUTANEOUS at 07:07

## 2017-07-07 RX ADMIN — INSULIN ASPART 2 UNITS: 100 INJECTION, SOLUTION INTRAVENOUS; SUBCUTANEOUS at 12:07

## 2017-07-07 RX ADMIN — VANCOMYCIN HYDROCHLORIDE 1250 MG: 100 INJECTION, POWDER, LYOPHILIZED, FOR SOLUTION INTRAVENOUS at 03:07

## 2017-07-07 RX ADMIN — INSULIN ASPART 8 UNITS: 100 INJECTION, SOLUTION INTRAVENOUS; SUBCUTANEOUS at 12:07

## 2017-07-07 RX ADMIN — HYDROMORPHONE HYDROCHLORIDE 1 MG: 1 INJECTION, SOLUTION INTRAMUSCULAR; INTRAVENOUS; SUBCUTANEOUS at 10:07

## 2017-07-07 RX ADMIN — OXYCODONE HYDROCHLORIDE 15 MG: 5 TABLET ORAL at 08:07

## 2017-07-07 RX ADMIN — ATORVASTATIN CALCIUM 40 MG: 20 TABLET, FILM COATED ORAL at 08:07

## 2017-07-07 RX ADMIN — DICYCLOMINE HYDROCHLORIDE 20 MG: 20 TABLET ORAL at 05:07

## 2017-07-07 RX ADMIN — SODIUM BICARBONATE 650 MG: 650 TABLET ORAL at 05:07

## 2017-07-07 RX ADMIN — HYDROMORPHONE HYDROCHLORIDE 1 MG: 1 INJECTION, SOLUTION INTRAMUSCULAR; INTRAVENOUS; SUBCUTANEOUS at 05:07

## 2017-07-07 RX ADMIN — CHLOROTHIAZIDE SODIUM 500 MG: 500 INJECTION, POWDER, LYOPHILIZED, FOR SOLUTION INTRAVENOUS at 12:07

## 2017-07-07 RX ADMIN — DEXTROSE 6 G: 5 SOLUTION INTRAVENOUS at 03:07

## 2017-07-07 RX ADMIN — ASPIRIN 81 MG: 81 TABLET, COATED ORAL at 08:07

## 2017-07-07 RX ADMIN — HYDROMORPHONE HYDROCHLORIDE 1 MG: 1 INJECTION, SOLUTION INTRAMUSCULAR; INTRAVENOUS; SUBCUTANEOUS at 01:07

## 2017-07-07 RX ADMIN — CARVEDILOL 25 MG: 25 TABLET, FILM COATED ORAL at 08:07

## 2017-07-07 RX ADMIN — INSULIN ASPART 8 UNITS: 100 INJECTION, SOLUTION INTRAVENOUS; SUBCUTANEOUS at 07:07

## 2017-07-07 NOTE — PROGRESS NOTES
[facility] Hospital Medicine  Progress Note    Patient Name: Leif Nino  MRN: [id number]  Patient Class: IP- Inpatient   Admission Date: 6/16/2017  Length of Stay: 21 days  Attending Physician: Bandar Blunt MD  Primary Care Provider: [facility] Medicine Team: [facility]OSP MED A Bandar Blunt MD    Subjective:     Principal Problem:Bilateral cellulitis of lower leg    HPI:  Mr. Nino is a 45 yo WM w/ h/o HCV cirrhosis & poorly controlled DM complicated by BL diabetic ulceration s/p 6/1 R 4th toe debridement who presents c/o generalized weakness w/ associated worsening BL LE edema.  He reports that 5d ago he began having dysuria.  Two day afterwards he woke up noticing his chronic BL LE edema was significantly worse with an associated erythematous, painful rash.  Later that day he began developing dyspnea on exertion that progressively worsened to the point where he is now dyspneic at rest.  Patient denies any vomiting but has mild, persistant nausea that has worsened acutely over the last 3 days.  He reports no fevers, chills, abdominal pain, worsening abdominal distention, diarrhea.  Patient states he has had no productive cough or wheezes.    Hospital Course:  No notes on file    Interval History: patient put out over 5 L of UOP; doing well; has been accepted to LTAC      Review of Systems   Constitutional: Negative for chills, diaphoresis and fever.   Respiratory: Negative for cough and shortness of breath.    Cardiovascular: Positive for leg swelling. Negative for chest pain.   Gastrointestinal: Positive for abdominal distention and abdominal pain. Negative for diarrhea, nausea and vomiting.   Genitourinary: Negative for dysuria, frequency and hematuria.   Musculoskeletal: Positive for arthralgias. Negative for back pain and myalgias.        + lower extremity pain   Skin: Positive for wound. Negative for color change and rash.   Neurological: Positive for weakness.  Negative for dizziness, numbness and headaches.   Psychiatric/Behavioral: Negative for agitation.     Objective:     Vital Signs (Most Recent):  Temp: 98.6 °F (37 °C) (07/07/17 0753)  Pulse: 72 (07/07/17 0753)  Resp: 16 (07/07/17 0753)  BP: (!) 159/80 (07/07/17 0753)  SpO2: 95 % (07/07/17 0753) Vital Signs (24h Range):  Temp:  [97.4 °F (36.3 °C)-98.6 °F (37 °C)] 98.6 °F (37 °C)  Pulse:  [67-98] 72  Resp:  [16-20] 16  SpO2:  [92 %-99 %] 95 %  BP: (142-160)/(71-98) 159/80     Weight: (!) 147 kg (324 lb 1.2 oz)  Body mass index is 41.61 kg/m².    Intake/Output Summary (Last 24 hours) at 07/07/17 1047  Last data filed at 07/07/17 0500   Gross per 24 hour   Intake          1552.07 ml   Output             3875 ml   Net         -2322.93 ml      Physical Exam   Constitutional: He is oriented to person, place, and time. He appears well-developed and well-nourished. He appears distressed (2/2 pain).   Cardiovascular: Normal rate and regular rhythm.    No murmur heard.  Pulmonary/Chest: Effort normal and breath sounds normal. No respiratory distress.   Abdominal: Soft. He exhibits distension. There is tenderness (diffuse).   Musculoskeletal: Normal range of motion. He exhibits edema (BLE).   Neurological: He is alert and oriented to person, place, and time.   Skin: Skin is warm and dry. Rash (erythematous non pruritic rash to lower abdomen) noted.   Dressings to bilateral feet with scant drainage. Venous changes noted to bilateral lower extremities; no erythema.    Psychiatric: He has a normal mood and affect. His behavior is normal.       Significant Labs:   CBC:     Recent Labs  Lab 07/06/17  1602 07/06/17  1737 07/07/17  0430   WBC 1.29* 2.79* 2.49*   HGB 7.6* 7.8* 7.8*   HCT 23.6* 23.9* 23.7*   PLT 4* 44* 51*     CMP:     Recent Labs  Lab 07/06/17  0513 07/07/17  0430   * 136   K 4.3 4.3    104   CO2 17* 25   * 181*   BUN 56* 56*   CREATININE 1.8* 1.8*   CALCIUM 9.1 9.1   PROT 6.6 6.9   ALBUMIN 3.9 3.9    BILITOT 1.9* 1.9*   ALKPHOS 107 106   AST 52* 54*   ALT <5* <5*   ANIONGAP 13 7*   EGFRNONAA 44.8* 44.8*       Significant Imaging: U/S: I have reviewed all pertinent results/findings within the past 24 hours and my personal findings are:  upper extremity US negative for DVT     Assessment/Plan:      #Bilateral lower extremity cellulitis with venous stasis due to streptococcus   #4th toe osteomyelitis  - s/p bone biopsy on 06/24  - on IV cefazolin continuous infusion, estimated end date 8/5/17  - will probably need 6 weeks of antibiotics through PICC Line   - PICC line placed, confirmed by CXR  - follow up Bone Biopsy results. Preliminary results show Staph Aureus         # Abdominal Wall Cellulitis  - on Vanc today, redose today        # Anasarca   -  lasix drip 10 mg/hr, strict I/O and daily weights; improved, added diuril and aldactone; put out over 5L over night  - cont lasix drip      Upper extremity swelling  - Upper extremity US negative for DVT x 2  - seen by Dermatology  -  Xray of the left shoulder, negative for fracture      #Choledocholithiasis  - ERCP/EUS on 06/28/17  Complete removal was accomplished by biliary sphincterotomy and   balloon extraction.  A biliary sphincterotomy was performed     # Diabetic type 2 foot ulcers B/L  - podiatry consult, appreciate recs  - X rays B/ L feet: no osteomyelitis     # Decompensated Hep C cirrhosis with ascites  MELD-Na score: 21 at 7/7/2017  4:30 AM  MELD score: 20 at 7/7/2017  4:30 AM  Calculated from:  Serum Creatinine: 1.8 mg/dL at 7/7/2017  4:30 AM  Serum Sodium: 136 mmol/L at 7/7/2017  4:30 AM  Total Bilirubin: 1.9 mg/dL at 7/7/2017  4:30 AM  INR(ratio): 1.7 at 7/7/2017  4:30 AM  Age: 46 years       - will set up for outpatient follow up     # CKD stage 3 due to uncontrolled DM 2  - seems to be new baseline at 1.7     # DM2 with nephropathy  - Hba1c of 8.2 recently  - levemir 20 units at night and 8 of novolog with meals     # Anemia of CKD stage 3  #  Pancytopenia   - monitor         # Hypoalbuminemia due to moderate protein fernando malnutrition  - PAB 3, glucerna     # Hypomagnesemia   - replaced, resolved     # Renovascular HTN  - cont BP meds           Discharge disposition - LTAC today         VTE Risk Mitigation         Ordered     Place sequential compression device  Until discontinued      07/01/17 2126     Place BASILIO hose  Until discontinued      06/24/17 1443     Low Risk of VTE  Once      06/16/17 2243          Bandar Blunt MD  Department of Hospital Medicine   [facility]

## 2017-07-07 NOTE — PLAN OF CARE
BETSY received call from attending MD stating that pt's labs are ok today and ready to discharge pt to LTAC. BETSY updated Bisi with [facility],  she provided call report information of [phone], going to room 7109.     BETSY provided attending nurse,Daniela is aware to find call report information in this note and notified to update Dr. Blunt  LTAC orders    BETSY called [facility] at ext. [phone], took transport out of [facility] CALL Status

## 2017-07-07 NOTE — NURSING
Bilateral foot dsgs changed to foot. Rinsed w/ NS, iodosorb applied, gauze , & secured w/ kerlix plus ace wrap. Pt tolerated well. New dsgs CDI.

## 2017-07-07 NOTE — PLAN OF CARE
Problem: Patient Care Overview  Goal: Plan of Care Review  Outcome: Ongoing (interventions implemented as appropriate)  Plan of care reviewed with patient. AVSS. PT intermittently on 2L O2 per NC. AC/HS BG checks maintained, supplemental ins given per protocol. Complaints of pain treated with PRN pain medications. No bleeding during the shift. Lasix gtt infusing, IV abx administered as ordered. RASHAWN 7/7. WCTM

## 2017-07-07 NOTE — SUBJECTIVE & OBJECTIVE
Interval History: patient states that he has been urinating quite a bit; feels much better, was suppose to go to LTAC, however had some abnormal labs that needed to be repeated;       Review of Systems   Constitutional: Negative for chills, diaphoresis and fever.   Respiratory: Negative for cough and shortness of breath.    Cardiovascular: Positive for leg swelling. Negative for chest pain.   Gastrointestinal: Positive for abdominal distention and abdominal pain. Negative for diarrhea, nausea and vomiting.   Genitourinary: Negative for dysuria, frequency and hematuria.   Musculoskeletal: Positive for arthralgias. Negative for back pain and myalgias.        + lower extremity pain   Skin: Positive for wound. Negative for color change and rash.   Neurological: Positive for weakness. Negative for dizziness, numbness and headaches.   Psychiatric/Behavioral: Negative for agitation.     Objective:     Vital Signs (Most Recent):  Temp: 98.6 °F (37 °C) (07/07/17 0753)  Pulse: 72 (07/07/17 0753)  Resp: 16 (07/07/17 0753)  BP: (!) 159/80 (07/07/17 0753)  SpO2: 95 % (07/07/17 0753) Vital Signs (24h Range):  Temp:  [97.4 °F (36.3 °C)-98.6 °F (37 °C)] 98.6 °F (37 °C)  Pulse:  [67-98] 72  Resp:  [16-20] 16  SpO2:  [92 %-99 %] 95 %  BP: (142-160)/(71-98) 159/80     Weight: (!) 147 kg (324 lb 1.2 oz)  Body mass index is 41.61 kg/m².    Intake/Output Summary (Last 24 hours) at 07/07/17 1044  Last data filed at 07/07/17 0500   Gross per 24 hour   Intake          1552.07 ml   Output             3875 ml   Net         -2322.93 ml      Physical Exam   Constitutional: He is oriented to person, place, and time. He appears well-developed and well-nourished. He appears distressed (2/2 pain).   Cardiovascular: Normal rate and regular rhythm.    No murmur heard.  Pulmonary/Chest: Effort normal and breath sounds normal. No respiratory distress.   Abdominal: Soft. He exhibits distension. There is tenderness (diffuse).   Musculoskeletal: Normal  range of motion. He exhibits edema (BLE).   Neurological: He is alert and oriented to person, place, and time.   Skin: Skin is warm and dry. Rash (erythematous non pruritic rash to lower abdomen) noted.   Dressings to bilateral feet with scant drainage. Venous changes noted to bilateral lower extremities; no erythema.    Psychiatric: He has a normal mood and affect. His behavior is normal.       Significant Labs:   CBC:     Recent Labs  Lab 07/06/17  1602 07/06/17  1737 07/07/17  0430   WBC 1.29* 2.79* 2.49*   HGB 7.6* 7.8* 7.8*   HCT 23.6* 23.9* 23.7*   PLT 4* 44* 51*     CMP:     Recent Labs  Lab 07/06/17  0513 07/07/17  0430   * 136   K 4.3 4.3    104   CO2 17* 25   * 181*   BUN 56* 56*   CREATININE 1.8* 1.8*   CALCIUM 9.1 9.1   PROT 6.6 6.9   ALBUMIN 3.9 3.9   BILITOT 1.9* 1.9*   ALKPHOS 107 106   AST 52* 54*   ALT <5* <5*   ANIONGAP 13 7*   EGFRNONAA 44.8* 44.8*       Significant Imaging: U/S: I have reviewed all pertinent results/findings within the past 24 hours and my personal findings are:  upper extremity US negative for DVT

## 2017-07-07 NOTE — HOSPITAL COURSE
#Bilateral lower extremity cellulitis with venous stasis due to streptococcus   #4th toe osteomyelitis  - s/p bone biopsy on 06/24  - on IV cefazolin continuous infusion, estimated end date 8/5/17  - will probably need 6 weeks of antibiotics through PICC Line   - PICC line placed, confirmed by CXR  - follow up Bone Biopsy results. Preliminary results show Staph Aureus         # Abdominal Wall Cellulitis  - on Vanc today, redose today        # Anasarca   -  lasix drip 10 mg/hr, strict I/O and daily weights; improved, added diuril and aldactone; put out over 5L over night  - cont lasix drip      Upper extremity swelling  - Upper extremity US negative for DVT x 2  - seen by Dermatology  -  Xray of the left shoulder, negative for fracture      #Choledocholithiasis  - ERCP/EUS on 06/28/17  Complete removal was accomplished by biliary sphincterotomy and   balloon extraction.  A biliary sphincterotomy was performed     # Diabetic type 2 foot ulcers B/L  - podiatry consult, appreciate recs  - X rays B/ L feet: no osteomyelitis     # Decompensated Hep C cirrhosis with ascites  MELD-Na score: 21 at 7/7/2017  4:30 AM  MELD score: 20 at 7/7/2017  4:30 AM  Calculated from:  Serum Creatinine: 1.8 mg/dL at 7/7/2017  4:30 AM  Serum Sodium: 136 mmol/L at 7/7/2017  4:30 AM  Total Bilirubin: 1.9 mg/dL at 7/7/2017  4:30 AM  INR(ratio): 1.7 at 7/7/2017  4:30 AM  Age: 46 years        - will set up for outpatient follow up     # CKD stage 3 due to uncontrolled DM 2  - seems to be new baseline at 1.7     # DM2 with nephropathy  - Hba1c of 8.2 recently  - levemir 20 units at night and 8 of novolog with meals     # Anemia of CKD stage 3  # Pancytopenia   - monitor         # Hypoalbuminemia due to moderate protein fernando malnutrition  - PAB 3, glucerna     # Hypomagnesemia   - replaced, resolved      # Renovascular HTN  - cont BP meds           Discharge disposition - LTAC today

## 2017-07-07 NOTE — NURSING
Received notification of critical vanc trough of 35.7 from Bonnie in lab. Notified TOAN Cabral and instructed to hold next dose of vanc. Will continue to monitor.

## 2017-07-07 NOTE — SUBJECTIVE & OBJECTIVE
Interval History: patient put out over 5 L of UOP; doing well; has been accepted to LTAC      Review of Systems   Constitutional: Negative for chills, diaphoresis and fever.   Respiratory: Negative for cough and shortness of breath.    Cardiovascular: Positive for leg swelling. Negative for chest pain.   Gastrointestinal: Positive for abdominal distention and abdominal pain. Negative for diarrhea, nausea and vomiting.   Genitourinary: Negative for dysuria, frequency and hematuria.   Musculoskeletal: Positive for arthralgias. Negative for back pain and myalgias.        + lower extremity pain   Skin: Positive for wound. Negative for color change and rash.   Neurological: Positive for weakness. Negative for dizziness, numbness and headaches.   Psychiatric/Behavioral: Negative for agitation.     Objective:     Vital Signs (Most Recent):  Temp: 98.6 °F (37 °C) (07/07/17 0753)  Pulse: 72 (07/07/17 0753)  Resp: 16 (07/07/17 0753)  BP: (!) 159/80 (07/07/17 0753)  SpO2: 95 % (07/07/17 0753) Vital Signs (24h Range):  Temp:  [97.4 °F (36.3 °C)-98.6 °F (37 °C)] 98.6 °F (37 °C)  Pulse:  [67-98] 72  Resp:  [16-20] 16  SpO2:  [92 %-99 %] 95 %  BP: (142-160)/(71-98) 159/80     Weight: (!) 147 kg (324 lb 1.2 oz)  Body mass index is 41.61 kg/m².    Intake/Output Summary (Last 24 hours) at 07/07/17 1047  Last data filed at 07/07/17 0500   Gross per 24 hour   Intake          1552.07 ml   Output             3875 ml   Net         -2322.93 ml      Physical Exam   Constitutional: He is oriented to person, place, and time. He appears well-developed and well-nourished. He appears distressed (2/2 pain).   Cardiovascular: Normal rate and regular rhythm.    No murmur heard.  Pulmonary/Chest: Effort normal and breath sounds normal. No respiratory distress.   Abdominal: Soft. He exhibits distension. There is tenderness (diffuse).   Musculoskeletal: Normal range of motion. He exhibits edema (BLE).   Neurological: He is alert and oriented to  person, place, and time.   Skin: Skin is warm and dry. Rash (erythematous non pruritic rash to lower abdomen) noted.   Dressings to bilateral feet with scant drainage. Venous changes noted to bilateral lower extremities; no erythema.    Psychiatric: He has a normal mood and affect. His behavior is normal.       Significant Labs:   CBC:     Recent Labs  Lab 07/06/17  1602 07/06/17  1737 07/07/17  0430   WBC 1.29* 2.79* 2.49*   HGB 7.6* 7.8* 7.8*   HCT 23.6* 23.9* 23.7*   PLT 4* 44* 51*     CMP:     Recent Labs  Lab 07/06/17  0513 07/07/17  0430   * 136   K 4.3 4.3    104   CO2 17* 25   * 181*   BUN 56* 56*   CREATININE 1.8* 1.8*   CALCIUM 9.1 9.1   PROT 6.6 6.9   ALBUMIN 3.9 3.9   BILITOT 1.9* 1.9*   ALKPHOS 107 106   AST 52* 54*   ALT <5* <5*   ANIONGAP 13 7*   EGFRNONAA 44.8* 44.8*       Significant Imaging: U/S: I have reviewed all pertinent results/findings within the past 24 hours and my personal findings are:  upper extremity US negative for DVT

## 2017-07-07 NOTE — PLAN OF CARE
Problem: Patient Care Overview  Goal: Plan of Care Review  Outcome: Ongoing (interventions implemented as appropriate)  POC reviewed with pt, pt verbalizes understanding. Pt aaox4, c/o generalized pain, however pain primarily in abdomen and scrotum. Medicated per prn orders. Repositioned pt for comfort. Clustered care. HRR. Pt on 2L nasal cannula, pt removing at times. Sats mid-90s on RA. Pt JONES with audible wheezes. Encouraged rest periods and slow deep breaths. RT seeing pt. Pt tolerating diabetic, low Na+ diet, FR 1500ml. AC&HS CBGs. Pt voiding in urinal, LBM yesterday. Pt with severe edema. LUE elevated on pillow, bilateral LEs elevated. Assisting pt with repositioning as needed, encouraging frequent repositioning. Scrotal sling ordered, pt declined this shift. Bilateral foot dressings changed, CDI. R dual lumen PICC in place, CDI. Pt currently resting in bed, camera on, call bell within reach, will continue to monitor.

## 2017-07-07 NOTE — DISCHARGE SUMMARY
[facility] Hospital Medicine  Discharge Summary      Patient Name: Leif Nino  MRN: [id number]  Admission Date: 6/16/2017  Hospital Length of Stay: 21 days  Discharge Date and Time: 7/7/17  Attending Physician: Bandar Blunt MD   Discharging Provider: Bandar Blunt MD  Primary Care Provider: [facility] Doctor [facility] Medicine Team: [facility] [place] [facility] [place] Bandar Blunt MD    HPI:   Mr. Nino is a 45 yo WM w/ h/o HCV cirrhosis & poorly controlled DM complicated by BL diabetic ulceration s/p 6/1 R 4th toe debridement who presents c/o generalized weakness w/ associated worsening BL LE edema.  He reports that 5d ago he began having dysuria.  Two day afterwards he woke up noticing his chronic BL LE edema was significantly worse with an associated erythematous, painful rash.  Later that day he began developing dyspnea on exertion that progressively worsened to the point where he is now dyspneic at rest.  Patient denies any vomiting but has mild, persistant nausea that has worsened acutely over the last 3 days.  He reports no fevers, chills, abdominal pain, worsening abdominal distention, diarrhea.  Patient states he has had no productive cough or wheezes.    Procedure(s) (LRB):  ERCP (N/A)  ULTRASOUND-ENDOSCOPIC-UPPER (N/A)      Indwelling Lines/Drains at time of discharge:   Lines/Drains/Airways     Peripherally Inserted Central Catheter Line                 PICC Double Lumen 06/27/17 1457 right basilic 9 days              Hospital Course:   #Bilateral lower extremity cellulitis with venous stasis due to streptococcus   #4th toe osteomyelitis  - s/p bone biopsy on 06/24  - on IV cefazolin continuous infusion, estimated end date 8/5/17  - will probably need 6 weeks of antibiotics through PICC Line   - PICC line placed, confirmed by CXR  - follow up Bone Biopsy results. Preliminary results show Staph Aureus         # Abdominal Wall Cellulitis  - on Vanc today, redose today        # Anasarca   -  lasix  drip 10 mg/hr, strict I/O and daily weights; improved, added diuril and aldactone; put out over 5L over night  - cont lasix drip      Upper extremity swelling  - Upper extremity US negative for DVT x 2  - seen by Dermatology  -  Xray of the left shoulder, negative for fracture      #Choledocholithiasis  - ERCP/EUS on 06/28/17  Complete removal was accomplished by biliary sphincterotomy and   balloon extraction.  A biliary sphincterotomy was performed     # Diabetic type 2 foot ulcers B/L  - podiatry consult, appreciate recs  - X rays B/ L feet: no osteomyelitis     # Decompensated Hep C cirrhosis with ascites  MELD-Na score: 21 at 7/7/2017  4:30 AM  MELD score: 20 at 7/7/2017  4:30 AM  Calculated from:  Serum Creatinine: 1.8 mg/dL at 7/7/2017  4:30 AM  Serum Sodium: 136 mmol/L at 7/7/2017  4:30 AM  Total Bilirubin: 1.9 mg/dL at 7/7/2017  4:30 AM  INR(ratio): 1.7 at 7/7/2017  4:30 AM  Age: 46 years        - will set up for outpatient follow up     # CKD stage 3 due to uncontrolled DM 2  - seems to be new baseline at 1.7     # DM2 with nephropathy  - Hba1c of 8.2 recently  - levemir 20 units at night and 8 of novolog with meals     # Anemia of CKD stage 3  # Pancytopenia   - monitor         # Hypoalbuminemia due to moderate protein fernnado malnutrition  - PAB 3, glucerna     # Hypomagnesemia   - replaced, resolved      # Renovascular HTN  - cont BP meds           Discharge disposition - LTAC today     Consults:   Consults         Status Ordering Provider     [facility] Medicine PharmD Consult  Once     Provider:  (Not yet assigned)    Acknowledged ANA CARRINGTON     Inpatient consult to [facility]  Once     Provider:  (Not yet assigned)    Completed RICCI JOHN     Inpatient consult to Dermatology  Once     Provider:  (Not yet assigned)    Completed RICCI JOHN     Inpatient consult to Hepatology  Once     Provider:  (Not yet assigned)    Completed ANA CARRINGTON     Inpatient consult to  Infectious Diseases  Once     Provider:  (Not yet assigned)    Completed RICCI JOHN     Inpatient consult to Infectious Diseases  Once     Provider:  (Not yet assigned)    Completed MATTHEW ASTUDILLO     Inpatient consult to PICC team ([facility])  Once     Provider:  (Not yet assigned)    Completed RICCI JOHN     Inpatient consult to Podiatry  Once     Provider:  (Not yet assigned)    Completed ANA CARRINGTON     IP consult to dietary  Once     Provider:  (Not yet assigned)    Completed ANA CARRINGTON              Pending Diagnostic Studies:     Procedure Component Value Units Date/Time    IR Paracentesis with Imaging [[id number]]     Order Status:  Sent Lab Status:  No result         Final Active Diagnoses:    Diagnosis Date Noted POA    PRINCIPAL PROBLEM:  Bilateral cellulitis of lower leg [L03.116, L03.115] 06/16/2017 Yes    Anasarca [R60.1] 07/05/2017 Yes    Pancytopenia [D61.818] 07/05/2017 Yes    Cellulitis of left lower extremity [L03.116] 06/28/2017 Yes    Cholelithiasis with choledocholithiasis [K80.70] 06/27/2017 Yes    Osteomyelitis of right foot [M86.9] 06/25/2017 No    Diabetic ulcer of right midfoot associated with type 2 diabetes mellitus, with fat layer exposed [E11.621, L97.412] 06/17/2017 Yes    Anemia of chronic renal failure, stage 3 (moderate) [N18.3, D63.1] 06/17/2017 Yes    Thrombocytopenia [D69.6] 06/17/2017 Yes    Chronic kidney disease, stage 3 [N18.3] 06/17/2017 Yes    Decompensated liver disease [K74.69] 06/16/2017 Yes    Diabetic ulcer of toe of right foot associated with type 2 diabetes mellitus, with fat layer exposed [E11.621, L97.512] 06/01/2017 Yes    Essential hypertension [I10] 06/01/2017 Yes    Osteomyelitis due to Staphylococcus aureus [M86.9, A49.01] 06/01/2017 No    Ascites of liver [R18.8] 05/30/2017 Yes    Hypoalbuminemia [E88.09] 05/30/2017 Yes    Hypomagnesemia [E83.42] 05/30/2017 Yes      Problems Resolved During this Admission:    Diagnosis Date  "Noted Date Resolved POA    Osteomyelitis of toe of right foot [M86.9] 06/24/2017 06/24/2017 No      No new Assessment & Plan notes have been filed under this hospital service since the last note was generated.  Service: Hospital Medicine      Discharged Condition: good    Disposition: Home or Self Care    Follow Up: Hepatology in 4 weeks    Patient Instructions:     Diet general     Diet general       Medications:  Reconciled Home Medications:   Current Discharge Medication List      START taking these medications    Details   albuterol-ipratropium 2.5mg-0.5mg/3mL (DUO-NEB) 0.5 mg-3 mg(2.5 mg base)/3 mL nebulizer solution Take 3 mLs by nebulization every 4 (four) hours. Rescue  Qty: 1 mL, Refills: 0      insulin aspart (NOVOLOG) 100 unit/mL InPn pen Inject 8 Units into the skin 3 (three) times daily.  Qty: 1 Box, Refills: 0      insulin detemir (LEVEMIR FLEXTOUCH) 100 unit/mL (3 mL) SubQ InPn pen Inject 10 Units into the skin every evening.  Qty: 1 Box, Refills: 0      oxycodone (ROXICODONE) 15 MG Tab Take 1 tablet (15 mg total) by mouth every 6 (six) hours as needed.  Qty: 30 tablet, Refills: 0      sodium bicarbonate 650 MG tablet Take 1 tablet (650 mg total) by mouth 3 (three) times daily.         CONTINUE these medications which have CHANGED    Details   carvedilol (COREG) 25 MG tablet Take 1 tablet (25 mg total) by mouth 2 (two) times daily with meals.  Qty: 60 tablet, Refills: 3         CONTINUE these medications which have NOT CHANGED    Details   aspirin (ECOTRIN) 81 MG EC tablet Take 1 tablet (81 mg total) by mouth once daily.  Refills: 0      atorvastatin (LIPITOR) 40 MG tablet Take 1 tablet (40 mg total) by mouth once daily.  Qty: 30 tablet, Refills: 3      cadexomer iodine (IODOSORB) 0.9 % gel Apply topically daily as needed for Wound Care (to foot wounds).  Refills: 0      dicyclomine (BENTYL) 20 mg tablet Take 20 mg by mouth 3 (three) times daily.      pen needle, diabetic 32 gauge x 5/32" Ndle To use " with Apidra.  Qty: 100 each, Refills: 3      quetiapine (SEROQUEL) 100 MG Tab Take 100 mg by mouth every evening.         STOP taking these medications       furosemide (LASIX) 40 MG tablet Comments:   Reason for Stopping:         INSULIN GLARGINE,HUM.REC.ANLOG (LANTUS SOLOSTAR SUBQ) Comments:   Reason for Stopping:         insulin glulisine (APIDRA) 100 unit/mL InPn pen Comments:   Reason for Stopping:             Time spent on the discharge of patient: 33 minutes    Bandar Blunt MD  Department of Hospital Medicine  [facility]

## 2017-07-07 NOTE — CONSULTS
PharmD Consult: Vanc dosing    45yo M with BL cellulitis caused by MSSA, now being treated also for abdominal wall cellulitis not improving on cefazolin thus started on vancomycin.    A/P:    1. Vancomycin 1g given on 7/4, random in AM was 10.8 mcg/mL. Started on 1250mg (10mg/kg) IV q12h on 7/5. Goal 10-15 mcg/mL. Level drawn this morning, unfortunately it was drawn while the vancomycin was infusing so it is elevated at 35 mcg/mL. Informed nurse not to hold the next dose and we will get a level prior to the afternoon dose at 1500.  2. Renal function stable, SCr 1.8.    Will continue to follow.    Dianne Spencer, WojciechD  [id number]

## 2017-07-07 NOTE — PLAN OF CARE
D/C to [facility] cancelled yesterday due to abnormal labs. SW/CM will continue to follow and facilitate transfer to LTAC once medically stable. VALENCIA Duran RN/CM

## 2017-07-07 NOTE — PROGRESS NOTES
[facility] Hospital Medicine  Progress Note    Patient Name: Leif Nino  MRN: [id number]  Patient Class: IP- Inpatient   Admission Date: 6/16/2017  Length of Stay: 21 days  Attending Physician: Bandar Blunt MD  Primary Care Provider: [facility] Medicine Team: [facility] [place] [facility] MED A Bandar Blunt MD    Subjective:     Principal Problem:Bilateral cellulitis of lower leg    HPI:  Mr. Nino is a 47 yo WM w/ h/o HCV cirrhosis & poorly controlled DM complicated by BL diabetic ulceration s/p 6/1 R 4th toe debridement who presents c/o generalized weakness w/ associated worsening BL LE edema.  He reports that 5d ago he began having dysuria.  Two day afterwards he woke up noticing his chronic BL LE edema was significantly worse with an associated erythematous, painful rash.  Later that day he began developing dyspnea on exertion that progressively worsened to the point where he is now dyspneic at rest.  Patient denies any vomiting but has mild, persistant nausea that has worsened acutely over the last 3 days.  He reports no fevers, chills, abdominal pain, worsening abdominal distention, diarrhea.  Patient states he has had no productive cough or wheezes.    Hospital Course:  No notes on file    Interval History: patient states that he has been urinating quite a bit; feels much better, was suppose to go to LTAC, however had some abnormal labs that needed to be repeated;       Review of Systems   Constitutional: Negative for chills, diaphoresis and fever.   Respiratory: Negative for cough and shortness of breath.    Cardiovascular: Positive for leg swelling. Negative for chest pain.   Gastrointestinal: Positive for abdominal distention and abdominal pain. Negative for diarrhea, nausea and vomiting.   Genitourinary: Negative for dysuria, frequency and hematuria.   Musculoskeletal: Positive for arthralgias. Negative for back pain and myalgias.        + lower extremity pain   Skin: Positive  for wound. Negative for color change and rash.   Neurological: Positive for weakness. Negative for dizziness, numbness and headaches.   Psychiatric/Behavioral: Negative for agitation.     Objective:     Vital Signs (Most Recent):  Temp: 98.6 °F (37 °C) (07/07/17 0753)  Pulse: 72 (07/07/17 0753)  Resp: 16 (07/07/17 0753)  BP: (!) 159/80 (07/07/17 0753)  SpO2: 95 % (07/07/17 0753) Vital Signs (24h Range):  Temp:  [97.4 °F (36.3 °C)-98.6 °F (37 °C)] 98.6 °F (37 °C)  Pulse:  [67-98] 72  Resp:  [16-20] 16  SpO2:  [92 %-99 %] 95 %  BP: (142-160)/(71-98) 159/80     Weight: (!) 147 kg (324 lb 1.2 oz)  Body mass index is 41.61 kg/m².    Intake/Output Summary (Last 24 hours) at 07/07/17 1044  Last data filed at 07/07/17 0500   Gross per 24 hour   Intake          1552.07 ml   Output             3875 ml   Net         -2322.93 ml      Physical Exam   Constitutional: He is oriented to person, place, and time. He appears well-developed and well-nourished. He appears distressed (2/2 pain).   Cardiovascular: Normal rate and regular rhythm.    No murmur heard.  Pulmonary/Chest: Effort normal and breath sounds normal. No respiratory distress.   Abdominal: Soft. He exhibits distension. There is tenderness (diffuse).   Musculoskeletal: Normal range of motion. He exhibits edema (BLE).   Neurological: He is alert and oriented to person, place, and time.   Skin: Skin is warm and dry. Rash (erythematous non pruritic rash to lower abdomen) noted.   Dressings to bilateral feet with scant drainage. Venous changes noted to bilateral lower extremities; no erythema.    Psychiatric: He has a normal mood and affect. His behavior is normal.       Significant Labs:   CBC:     Recent Labs  Lab 07/06/17  1602 07/06/17  1737 07/07/17  0430   WBC 1.29* 2.79* 2.49*   HGB 7.6* 7.8* 7.8*   HCT 23.6* 23.9* 23.7*   PLT 4* 44* 51*     CMP:     Recent Labs  Lab 07/06/17  0513 07/07/17  0430   * 136   K 4.3 4.3    104   CO2 17* 25   * 181*    BUN 56* 56*   CREATININE 1.8* 1.8*   CALCIUM 9.1 9.1   PROT 6.6 6.9   ALBUMIN 3.9 3.9   BILITOT 1.9* 1.9*   ALKPHOS 107 106   AST 52* 54*   ALT <5* <5*   ANIONGAP 13 7*   EGFRNONAA 44.8* 44.8*       Significant Imaging: U/S: I have reviewed all pertinent results/findings within the past 24 hours and my personal findings are:  upper extremity US negative for DVT     Assessment/Plan:      #Bilateral lower extremity cellulitis with venous stasis due to streptococcus   #4th toe osteomyelitis  - s/p bone biopsy on 06/24  - on IV cefazolin continuous infusion, estimated end date 8/5/17  - will probably need 6 weeks of antibiotics through PICC Line   - PICC line placed, confirmed by CXR  - follow up Bone Biopsy results. Preliminary results show Staph Aureus         # Abdominal Wall Cellulitis  - on Vanc today, redose today        # Anasarca   - switching to lasix drip today 10 mg/hr, strict I/O and daily weights; improved, added diuril and aldactone today     Upper extremity swelling  - Upper extremity US negative for DVT x 2  - seen by Dermatology  -  Xray of the left shoulder, negative for fracture      #Choledocholithiasis  - ERCP/EUS on 06/28/17  Complete removal was accomplished by biliary sphincterotomy and   balloon extraction.  A biliary sphincterotomy was performed     # Diabetic type 2 foot ulcers B/L  - podiatry consult, appreciate recs  - X rays B/ L feet: no osteomyelitis     # Decompensated Hep C cirrhosis with ascites  MELD-Na score: 21 at 7/5/2017  4:12 AM  MELD score: 20 at 7/5/2017  4:12 AM  Calculated from:  Serum Creatinine: 1.7 mg/dL at 7/5/2017  4:12 AM  Serum Sodium: 136 mmol/L at 7/5/2017  4:12 AM  Total Bilirubin: 2.0 mg/dL at 7/5/2017  4:12 AM  INR(ratio): 1.7 at 7/5/2017  4:12 AM  Age: 46 years     - will set up for outpatient follow up     # CKD stage 3 due to uncontrolled DM 2  - seems to be new baseline at 1.7     # DM2 with nephropathy  - Hba1c of 8.2 recently  - levemir 20 units at night  and 8 of novolog with meals     # Anemia of CKD stage 3  # Pancytopenia   - monitor         # Hypoalbuminemia due to moderate protein fernando malnutrition  - PAB 3, glucerna     # Hypomagnesemia   - replaced, resolved            Discharge disposition - LTAC tomorrow          VTE Risk Mitigation         Ordered     Place sequential compression device  Until discontinued      07/01/17 2126     Place BASILIO hose  Until discontinued      06/24/17 1443     Low Risk of VTE  Once      06/16/17 2243          Bandar Blunt MD  Department of Hospital Medicine   [facility]

## 2017-07-12 NOTE — PT/OT/SLP DISCHARGE
Physical Therapy Discharge Summary    Leif Nino  MRN: [id number]   Bilateral cellulitis of lower leg   Patient Discharged from acute Physical Therapy on 2017.  Please refer to prior PT noted date on 2017 for functional status.     Assessment:   Patient appropriate for care in another setting.  GOALS:    Physical Therapy Goals        Problem: Physical Therapy Goal    Goal Priority Disciplines Outcome Goal Variances Interventions   Physical Therapy Goal     PT/OT, PT Ongoing (interventions implemented as appropriate)     Description:  Goals to be met by: 7/10/17     Patient will increase functional independence with mobility by performin. Supine to sit with Stand-by Assistance - not met  2. Sit to supine with Stand-by Assistance - met  3. Sit to stand transfer with Stand-by Assistance -  Met  Revised goal: sit to stand with RW with mod independent-not met  4. Bed to chair transfer with Stand-by Assistance using LRAD - not met  5. Gait  x 100 feet with Contact Guard Assistance using LRAD - Met   Update: Gait x 200ft Christal with rolling walker - not met  6. Ascend/descend 5 stair with right Handrails Moderate Assistance - not met  7. Lower extremity exercise program x 15 reps per handout, with independence - not met                        Reasons for Discontinuation of Therapy Services  Transfer to alternate level of care.      Plan:  Patient Discharged to: Long Term Acute Care.

## 2017-07-19 NOTE — PT/OT/SLP DISCHARGE
Occupational Therapy Discharge Summary    Leif Nino  MRN: [id number]   Bilateral cellulitis of lower leg   Patient Discharged from acute Occupational Therapy on 7/7/17.  Please refer to prior OT note dated on 6/29/17 for functional status.     Assessment:   Patient appropriate for care in another setting.  GOALS:    Occupational Therapy Goals        Problem: Occupational Therapy Goal    Goal Priority Disciplines Outcome Interventions   Occupational Therapy Goal     OT, PT/OT Ongoing (interventions implemented as appropriate)    Description:  Goals to be met by: 6/26/17    Patient will increase functional independence with ADLs by performing:    UE Dressing with Set-up Assistance.  LE Dressing with Minimal Assistance.  Grooming while standing at sink with Supervision.  Toileting from toilet with Supervision for hygiene and clothing management.   Supine to sit with [place].  Stand pivot transfers with Modified [place].  Toilet transfer to toilet with Modified [place].                    Reasons for Discontinuation of Therapy Services  Transfer to alternate level of care.      Plan:  Patient Discharged to: Long Term Acute Care.

## 2017-08-26 LAB
ACID FAST MOD KINY STN SPEC: NORMAL
MYCOBACTERIUM SPEC QL CULT: NORMAL

## 2018-03-13 ENCOUNTER — TELEPHONE (OUTPATIENT)
Dept: TRANSPLANT | Facility: CLINIC | Age: 48
End: 2018-03-13

## 2018-03-15 ENCOUNTER — DOCUMENTATION ONLY (OUTPATIENT)
Dept: TRANSPLANT | Facility: CLINIC | Age: 48
End: 2018-03-15

## 2018-03-15 ENCOUNTER — TELEPHONE (OUTPATIENT)
Dept: TRANSPLANT | Facility: CLINIC | Age: 48
End: 2018-03-15

## 2018-03-15 NOTE — TELEPHONE ENCOUNTER
Initial referral received via fax from Dr Denilson Alcaraz's office.   Patient with hep C cirrhosis   MELD 21  Referred for liver transplant for CONSULT .    Referral completed and forwarded to [facility].      Insurance: [employer]  Policy# [id number]  Contact # [phone]

## 2018-03-21 ENCOUNTER — TELEPHONE (OUTPATIENT)
Dept: TRANSPLANT | Facility: CLINIC | Age: 48
End: 2018-03-21

## 2018-03-21 NOTE — TELEPHONE ENCOUNTER
----- Message from Indio Newman sent at 3/21/2018  5:45 PM CDT -----  Called pt to [facility] an appt x2, but the line was busy. Called pt EC, but there was no answer. LVM for either pt, or EC to call back to [facility] an appt.

## 2018-03-27 ENCOUNTER — TELEPHONE (OUTPATIENT)
Dept: TRANSPLANT | Facility: CLINIC | Age: 48
End: 2018-03-27

## 2018-03-27 NOTE — TELEPHONE ENCOUNTER
----- Message from Indio Newman sent at 3/27/2018  4:13 PM CDT -----  Returned call to nursing home and they told me that the pt has been a been a resident there since sept 2017. Will update pt contact info and call tomorrow to ronal the pt and appt. ------------------------------------------------------------------------------------  Message   Received: Today   Message Contents   Rabia Casey sent to [facility] Referral [facility]  Caller: Miguelina with [facility] (Today,  2:26 PM)         Pt received a letter to schedule an appt     Contact number [phone] she will be there today until 3pm and return tomorrow at 9am   Thanks

## 2018-03-28 ENCOUNTER — TELEPHONE (OUTPATIENT)
Dept: TRANSPLANT | Facility: CLINIC | Age: 48
End: 2018-03-28

## 2018-03-28 NOTE — TELEPHONE ENCOUNTER
----- Message from Indio Newman sent at 3/28/2018 11:19 AM CDT -----  Called Miguelina with [facility]  [phone] and ronal the pt an appt for  4/24 @ 8:30AM/10AM. Will mail out appt slip in the mail.

## 2018-04-02 DIAGNOSIS — B18.2 CHRONIC HEPATITIS C WITH CIRRHOSIS: Primary | ICD-10-CM

## 2018-04-02 DIAGNOSIS — K74.60 CHRONIC HEPATITIS C WITH CIRRHOSIS: Primary | ICD-10-CM

## 2018-04-02 DIAGNOSIS — Z76.82 ORGAN TRANSPLANT CANDIDATE: ICD-10-CM

## 2018-04-03 ENCOUNTER — HOSPITAL ENCOUNTER (INPATIENT)
Facility: HOSPITAL | Age: 48
LOS: 1 days | Discharge: LEFT BEFORE DISCHARGE | DRG: 443 | End: 2018-04-03
Attending: EMERGENCY MEDICINE | Admitting: EMERGENCY MEDICINE
Payer: MEDICAID

## 2018-04-03 VITALS
SYSTOLIC BLOOD PRESSURE: 124 MMHG | HEIGHT: 74 IN | BODY MASS INDEX: 29.52 KG/M2 | RESPIRATION RATE: 11 BRPM | TEMPERATURE: 98 F | HEART RATE: 50 BPM | DIASTOLIC BLOOD PRESSURE: 75 MMHG | WEIGHT: 230 LBS | OXYGEN SATURATION: 100 %

## 2018-04-03 DIAGNOSIS — R41.82 ALTERED MENTAL STATUS: ICD-10-CM

## 2018-04-03 DIAGNOSIS — K76.82 HEPATIC ENCEPHALOPATHY: Primary | ICD-10-CM

## 2018-04-03 DIAGNOSIS — N18.9 CHRONIC KIDNEY DISEASE, UNSPECIFIED CKD STAGE: ICD-10-CM

## 2018-04-03 DIAGNOSIS — R53.1 WEAKNESS: ICD-10-CM

## 2018-04-03 LAB
ALBUMIN SERPL BCP-MCNC: 2.8 G/DL
ALP SERPL-CCNC: 82 U/L
ALT SERPL W/O P-5'-P-CCNC: 22 U/L
AMMONIA PLAS-SCNC: 81 UMOL/L
ANION GAP SERPL CALC-SCNC: 8 MMOL/L
AST SERPL-CCNC: 38 U/L
BACTERIA #/AREA URNS AUTO: ABNORMAL /HPF
BASOPHILS # BLD AUTO: 0.03 K/UL
BASOPHILS NFR BLD: 0.7 %
BILIRUB SERPL-MCNC: 1.1 MG/DL
BILIRUB UR QL STRIP: NEGATIVE
BUN SERPL-MCNC: 63 MG/DL
CALCIUM SERPL-MCNC: 9.1 MG/DL
CHLORIDE SERPL-SCNC: 100 MMOL/L
CLARITY UR REFRACT.AUTO: CLEAR
CO2 SERPL-SCNC: 23 MMOL/L
COLOR UR AUTO: YELLOW
CREAT SERPL-MCNC: 2.1 MG/DL
DIFFERENTIAL METHOD: ABNORMAL
EOSINOPHIL # BLD AUTO: 0.2 K/UL
EOSINOPHIL NFR BLD: 5.3 %
ERYTHROCYTE [DISTWIDTH] IN BLOOD BY AUTOMATED COUNT: 14.4 %
EST. GFR  (AFRICAN AMERICAN): 42.7 ML/MIN/1.73 M^2
EST. GFR  (NON AFRICAN AMERICAN): 36.9 ML/MIN/1.73 M^2
GLUCOSE SERPL-MCNC: 270 MG/DL
GLUCOSE UR QL STRIP: ABNORMAL
HCT VFR BLD AUTO: 27.7 %
HGB BLD-MCNC: 9.4 G/DL
HGB UR QL STRIP: ABNORMAL
IMM GRANULOCYTES # BLD AUTO: 0.03 K/UL
IMM GRANULOCYTES NFR BLD AUTO: 0.7 %
KETONES UR QL STRIP: NEGATIVE
LEUKOCYTE ESTERASE UR QL STRIP: NEGATIVE
LYMPHOCYTES # BLD AUTO: 1 K/UL
LYMPHOCYTES NFR BLD: 21.3 %
MCH RBC QN AUTO: 30.7 PG
MCHC RBC AUTO-ENTMCNC: 33.9 G/DL
MCV RBC AUTO: 91 FL
MICROSCOPIC COMMENT: ABNORMAL
MONOCYTES # BLD AUTO: 0.6 K/UL
MONOCYTES NFR BLD: 13.2 %
NEUTROPHILS # BLD AUTO: 2.7 K/UL
NEUTROPHILS NFR BLD: 58.8 %
NITRITE UR QL STRIP: NEGATIVE
NRBC BLD-RTO: 0 /100 WBC
PH UR STRIP: 7 [PH] (ref 5–8)
PLATELET # BLD AUTO: 43 K/UL
PMV BLD AUTO: 11.9 FL
POTASSIUM SERPL-SCNC: 4.6 MMOL/L
PROT SERPL-MCNC: 6.7 G/DL
PROT UR QL STRIP: NEGATIVE
RBC # BLD AUTO: 3.06 M/UL
RBC #/AREA URNS AUTO: 6 /HPF (ref 0–4)
SODIUM SERPL-SCNC: 131 MMOL/L
SP GR UR STRIP: 1 (ref 1–1.03)
SQUAMOUS #/AREA URNS AUTO: 0 /HPF
TROPONIN I SERPL DL<=0.01 NG/ML-MCNC: <0.006 NG/ML
URN SPEC COLLECT METH UR: ABNORMAL
UROBILINOGEN UR STRIP-ACNC: NEGATIVE EU/DL
WBC # BLD AUTO: 4.56 K/UL

## 2018-04-03 PROCEDURE — 84484 ASSAY OF TROPONIN QUANT: CPT | Mod: NTX

## 2018-04-03 PROCEDURE — 96360 HYDRATION IV INFUSION INIT: CPT | Mod: NTX

## 2018-04-03 PROCEDURE — 96361 HYDRATE IV INFUSION ADD-ON: CPT | Mod: NTX

## 2018-04-03 PROCEDURE — 93010 ELECTROCARDIOGRAM REPORT: CPT | Mod: NTX,,, | Performed by: INTERNAL MEDICINE

## 2018-04-03 PROCEDURE — 93005 ELECTROCARDIOGRAM TRACING: CPT | Mod: NTX

## 2018-04-03 PROCEDURE — 82140 ASSAY OF AMMONIA: CPT | Mod: NTX

## 2018-04-03 PROCEDURE — 25000003 PHARM REV CODE 250: Mod: NTX | Performed by: EMERGENCY MEDICINE

## 2018-04-03 PROCEDURE — 99285 EMERGENCY DEPT VISIT HI MDM: CPT | Mod: NTX,,, | Performed by: EMERGENCY MEDICINE

## 2018-04-03 PROCEDURE — 99285 EMERGENCY DEPT VISIT HI MDM: CPT | Mod: 25,NTX

## 2018-04-03 PROCEDURE — 80053 COMPREHEN METABOLIC PANEL: CPT | Mod: NTX

## 2018-04-03 PROCEDURE — 12000002 HC ACUTE/MED SURGE SEMI-PRIVATE ROOM: Mod: NTX

## 2018-04-03 PROCEDURE — 81001 URINALYSIS AUTO W/SCOPE: CPT | Mod: NTX

## 2018-04-03 PROCEDURE — 85025 COMPLETE CBC W/AUTO DIFF WBC: CPT | Mod: NTX

## 2018-04-03 PROCEDURE — 99222 1ST HOSP IP/OBS MODERATE 55: CPT | Mod: NTX,,, | Performed by: HOSPITALIST

## 2018-04-03 RX ORDER — LIDOCAINE 50 MG/G
1 PATCH TOPICAL DAILY
COMMUNITY

## 2018-04-03 RX ORDER — LORAZEPAM 1 MG/1
1 TABLET ORAL
Status: DISCONTINUED | OUTPATIENT
Start: 2018-04-03 | End: 2018-04-03

## 2018-04-03 RX ORDER — ESCITALOPRAM OXALATE 10 MG/1
10 TABLET ORAL DAILY
COMMUNITY

## 2018-04-03 RX ORDER — LACTULOSE 10 G/15ML
20 SOLUTION ORAL
Status: COMPLETED | OUTPATIENT
Start: 2018-04-03 | End: 2018-04-03

## 2018-04-03 RX ORDER — LACTULOSE 10 G/15ML
20 SOLUTION ORAL 3 TIMES DAILY
COMMUNITY

## 2018-04-03 RX ORDER — OXYCODONE HYDROCHLORIDE 5 MG/1
5 TABLET ORAL EVERY 6 HOURS PRN
Start: 2018-04-03

## 2018-04-03 RX ORDER — BENZTROPINE MESYLATE 2 MG/1
1 TABLET ORAL 2 TIMES DAILY
COMMUNITY

## 2018-04-03 RX ORDER — FUROSEMIDE 40 MG/1
40 TABLET ORAL 2 TIMES DAILY
COMMUNITY

## 2018-04-03 RX ORDER — CLONIDINE HYDROCHLORIDE 0.2 MG/1
0.2 TABLET ORAL 2 TIMES DAILY
COMMUNITY

## 2018-04-03 RX ORDER — ALBUTEROL SULFATE 0.83 MG/ML
2.5 SOLUTION RESPIRATORY (INHALATION)
COMMUNITY

## 2018-04-03 RX ORDER — LEVOTHYROXINE SODIUM 50 UG/1
50 TABLET ORAL
COMMUNITY

## 2018-04-03 RX ORDER — INSULIN GLARGINE 100 [IU]/ML
10 INJECTION, SOLUTION SUBCUTANEOUS NIGHTLY
COMMUNITY

## 2018-04-03 RX ORDER — SPIRONOLACTONE 25 MG/1
25 TABLET ORAL 2 TIMES DAILY
COMMUNITY

## 2018-04-03 RX ORDER — TALC
1 POWDER (GRAM) TOPICAL NIGHTLY
COMMUNITY

## 2018-04-03 RX ORDER — TEMAZEPAM 7.5 MG/1
15 CAPSULE ORAL NIGHTLY
COMMUNITY

## 2018-04-03 RX ADMIN — SODIUM CHLORIDE 1000 ML: 0.9 INJECTION, SOLUTION INTRAVENOUS at 11:04

## 2018-04-03 RX ADMIN — LACTULOSE 20 G: 20 SOLUTION ORAL at 01:04

## 2018-04-03 NOTE — ED NOTES
Admission Team at bedside; Pt requesting to leave AMA; Pt nursing home not wanting to reaccept patient when needs to be admitted.

## 2018-04-03 NOTE — ED NOTES
Pt refusing to keep all monitor leads on; Pt stating he is goig to try to leave facility and does not want to stay the night. Pt declining ordered Ativan. MD Teddy notified and aware. Education reperformed on necessity of pt admission into facility and education on current dx.

## 2018-04-03 NOTE — SIGNIFICANT EVENT
Came to evaluate pt @ approx 17:00 patient.  Patient stating he wanted to leave and did not want to be admitted.  Patient is oriented to person, place, birthdate, month date, year, president.      He states risks of leaving could include falls, blacking out again.  I reviewed AMA risks with patient - falls, aspiration, worsening mental status - coma.     Pt received 500cc IV fluids and one dose of lactulose, reports no BM since admission.     Discussion held with ED staff Dr. Espinal, also discussed with ED case management.  Dr. Espinal indicates his mental status improved since admission.     Full note to follow.     Case management to assist with transpotation back to nursing home    AMA paperwork completed, prior med rec updated.         Jay Strickland M.D.  Attending Physician  [facility] Medicine Dept.  Pager: [phone]  Spectralink -x [phone]

## 2018-04-03 NOTE — ED NOTES
Pt requesting to leave facility; Education given and performed on importance of pt staying at hospital and current Dx.

## 2018-04-03 NOTE — Clinical Note
Leif Nino should be admitted to       Pt alert, oriented, ambulatory per self, and stable at time of signing AMA form. Pt discharged to waiting room to wait for [facility] transportation.

## 2018-04-03 NOTE — ED PROVIDER NOTES
Encounter Date: 4/3/2018    SCRIBE #1 NOTE: I, Hugh Haskins, am scribing for, and in the presence of,  Dr. Espinal. I have scribed the following portions of the note - Other sections scribed: HPI, ROS, PE.     I, Dr. Jesse Espinal, personally performed the services described in this documentation. All medical record entries made by the scribe were at my direction and in my presence.  I have reviewed the chart and agree that the record reflects my personal performance and is accurate and complete.  Jesse Espinal MD.  2:47 PM 04/03/2018      History     Chief Complaint   Patient presents with    Epistaxis     from [place]; since early last night, pale in appearance      Time seen by provider: 11:22 AM    This is a 47 y.o. male with medical co-morbidities including diabetes mellitus type 2, hepatitis C and liver failure, who presents to the Emergency department with complaint of epistaxis. According to EMS, patient is staying at a nursing home, has had generalized weakness and malaise for the last 2 days, developed epistaxis last night.       The history is provided by the patient.     Review of patient's allergies indicates:   Allergen Reactions    Shellfish containing products Hives     Shortness of breath     Past Medical History:   Diagnosis Date    Ascites of liver 5/30/2017    Chronic hepatitis C without hepatic coma     Chronic liver failure without hepatic coma 5/30/2017    Diabetic foot ulcer     right foot    DM (diabetes mellitus), type 2 with peripheral vascular complications     Encounter for blood transfusion     Essential hypertension     Gallstones     Hepatic encephalopathy 5/30/2017    Hepatitis C     Pancreatitis      Past Surgical History:   Procedure Laterality Date    BACK SURGERY       History reviewed. No pertinent family history.  Social History   Substance Use Topics    Smoking status: Current Every Day Smoker     Packs/day: 0.25     Types: Cigarettes    Smokeless tobacco: Never Used     Alcohol use No     Review of Systems   Constitutional: Positive for fatigue. Negative for fever.   HENT: Positive for nosebleeds. Negative for sore throat.    Respiratory: Negative for shortness of breath.    Cardiovascular: Negative for chest pain.   Gastrointestinal: Negative for nausea.   Genitourinary: Negative for dysuria.   Musculoskeletal: Negative for back pain.   Skin: Negative for rash.   Neurological: Positive for weakness (generalized).   Hematological: Does not bruise/bleed easily.       Physical Exam     Initial Vitals [04/03/18 1047]   BP Pulse Resp Temp SpO2   128/80 66 17 97.7 °F (36.5 °C) 99 %      MAP       96         Physical Exam    Nursing note and vitals reviewed.  Constitutional: He appears well-developed and well-nourished.   HENT:   Head: Normocephalic and atraumatic.   Eyes: Conjunctivae and EOM are normal. Pupils are equal, round, and reactive to light.   Pale conjunctiva   Neck: Normal range of motion. Neck supple. No tracheal deviation present.   Cardiovascular: Normal rate, regular rhythm, normal heart sounds and intact distal pulses.   Pulmonary/Chest: Breath sounds normal. No respiratory distress. He has no wheezes. He has no rhonchi. He has no rales. He exhibits no tenderness.   Abdominal: Soft. Bowel sounds are normal. He exhibits no distension and no mass. There is no tenderness. There is no rebound and no guarding.   Musculoskeletal: Normal range of motion. He exhibits no edema or tenderness.   Neurological: He is alert and oriented to person, place, and time. He has normal strength and normal reflexes. He displays normal reflexes. No cranial nerve deficit or sensory deficit. GCS eye subscore is 4. GCS verbal subscore is 5. GCS motor subscore is 6.   Skin: Skin is warm and dry. Capillary refill takes less than 2 seconds.   Psychiatric: He has a normal mood and affect. His behavior is normal. Judgment and thought content normal.         ED Course   Procedures  Labs Reviewed   CBC  W/ AUTO DIFFERENTIAL - Abnormal; Notable for the following:        Result Value    RBC 3.06 (*)     Hemoglobin 9.4 (*)     Hematocrit 27.7 (*)     Platelets 43 (*)     Immature Granulocytes 0.7 (*)     All other components within normal limits   COMPREHENSIVE METABOLIC PANEL - Abnormal; Notable for the following:     Sodium 131 (*)     Glucose 270 (*)     BUN, Bld 63 (*)     Creatinine 2.1 (*)     Albumin 2.8 (*)     Total Bilirubin 1.1 (*)     eGFR if  42.7 (*)     eGFR if non  36.9 (*)     All other components within normal limits   AMMONIA - Abnormal; Notable for the following:     Ammonia 81 (*)     All other components within normal limits   URINALYSIS - Abnormal; Notable for the following:     Glucose, UA 2+ (*)     Occult Blood UA 1+ (*)     All other components within normal limits    Narrative:     1 CUP OF URINE   URINALYSIS MICROSCOPIC - Abnormal; Notable for the following:     RBC, UA 6 (*)     All other components within normal limits    Narrative:     1 CUP OF URINE   TROPONIN I     Imaging Results          X-Ray Chest AP Portable (Final result)  Result time 04/03/18 13:40:27    Final result by Jewel Mercer MD (04/03/18 13:40:27)                 Impression:      1. Findings suggesting congestion/edema, no convincing large focal consolidation, correlation advised.      Electronically signed by: Jewel Mercer MD  Date:    04/03/2018  Time:    13:40             Narrative:    EXAMINATION:  XR CHEST AP PORTABLE    CLINICAL HISTORY:  Altered mental status, unspecified    TECHNIQUE:  Single frontal view of the chest was performed.    COMPARISON:  07/04/2017    FINDINGS:  The cardiomediastinal silhouette is prominent, similar to the previous exam, somewhat magnified by technique.  There is no pleural effusion.  The trachea is midline.  The lungs are symmetrically expanded bilaterally with patchy increased interstitial and parenchymal attenuation bilaterally, suggesting  edema.  There is left basilar subsegmental atelectasis or scarring..  No large focal consolidation seen.  There is no pneumothorax.  The osseous structures are remarkable for degenerative change of the spine, mild..                               CT Head Without Contrast (Final result)  Result time 04/03/18 12:28:04    Final result by Peter Queen MD (04/03/18 12:28:04)                 Impression:      No acute intracranial abnormality identified.    Patchy hypoattenuation within the supratentorial white matter thought to reflect chronic microvascular ischemic changes.  These have slightly progressed since the prior exam and would be greater than expected for patient age.    Electronically signed by resident: Melecio Orellana  Date:    04/03/2018  Time:    11:46    Electronically signed by: Peter Queen MD  Date:    04/03/2018  Time:    12:28             Narrative:    EXAMINATION:  CT HEAD WITHOUT CONTRAST    CLINICAL HISTORY:  AMS;    TECHNIQUE:  Low dose axial CT images obtained throughout the head without intravenous contrast. Sagittal and coronal reconstructions were performed.    COMPARISON:  CT head 12/31/2012.    FINDINGS:  Intracranial compartment:    Ventricles and sulci are normal in size for age without evidence of hydrocephalus. No extra-axial blood or fluid collections.    Patchy hypoattenuation in the supratentorial white matter, thought to reflect chronic microvascular ischemic changes.  This would be slightly advanced for expected age.  No new parenchymal mass, hemorrhage, edema or major vascular distribution infarct.    Skull/extracranial contents (limited evaluation): Patchy mucosal thickening elsewhere in the paranasal sinuses.  Mastoids are clear.                                EKG Readings: (Independently Interpreted)   Sinus rhythm, normal axis, no ischemia.           Medical Decision Making:   History:   Old Medical Records: I decided to obtain old medical records.  Clinical Tests:   Lab  Tests: Ordered and Reviewed  Radiological Study: Ordered and Reviewed  Medical Tests: Ordered and Reviewed  ED Management:  1256. Patient will be admitted for hepatic encephalopathy. Patient is hemodynamically stable in the ED. He will be placed on telemetry.  The patient's mentation continued to improve during his stay in the ER, and after several hours he was a and O ×3 and speaking in complete and coherent sentences.  Patient was also able to appropriately ambulate throughout the ER.  He had artery been admitted, and during the process of evaluation by the hospitalist the patient stated that he was feeling better and would prefer to go home back to his skilled nursing facility.  He was able to verbalize understanding that there was a risk of worsening symptoms.  Verbalized understanding that if he continued to refuse to take his medicine a morbidity and even death could follow.  He told me specifically that he would be agreeable to taking his medicines at the nursing home, and will try to be a better tenant.  Both myself and the hospitalist felt like the patient was able at this point in time to make his own medical decisions because of his drastically improved mentation, but because we still felt like the patient met admission criteria decided to allow the patient to sign out AMA.  Should be noted that case management was able to assess the situation and lineup transportation back to the facility.                      Clinical Impression:   The primary encounter diagnosis was Hepatic encephalopathy. Diagnoses of Altered mental status, Weakness, and Chronic kidney disease, unspecified CKD stage were also pertinent to this visit.                           Saroj Espinal MD  04/03/18 [id number]       Saroj Espinal MD  04/03/18 9161

## 2018-04-03 NOTE — ED TRIAGE NOTES
Per EMS, nursing home pt stays at called EMS d/t pt being more lethargic than baseline, altered mental status, and a left nare nose bleeding which started late last night. Per EMS and pt roommate, has occurred before. Pt . Pt A&O x4 during triage. States he blacked out in bed; Denies any fall or injury, no obvious injury noted during triage. Pt states he was light headed and dizzy.

## 2018-04-04 NOTE — ED NOTES
Patient found wandering in hallway. States he signed out AMA. Verified with Dr. Espinal- states patient is waiting on ride from [facility]. Patient waiting for ride in ED lobby.

## 2018-04-04 NOTE — ED NOTES
Pt alert, oriented, ambulatory per self, and stable at time of signing AMA form. Pt discharged to waiting room to wait for [facility] transportation.

## 2018-04-04 NOTE — ED NOTES
Pt IV removed; Pt refusing discharge vitals; AMA form signed by patient with attending physician. Report called to HORTENSIA Salguero at pt nursing homr. [facility] transport set in place for pt transportation to facility.

## 2018-04-04 NOTE — H&P
[facility] Hospital Medicine  History & Physical    Patient Name: Leif iNno  MRN: [id number]  Admission Date: 4/3/2018  Attending Physician: Jay Strickland MD  Primary Care Provider: Primary Doctor [facility] Medicine Team: [facility] MED  Jay Strickland MD     Patient information was obtained from patient, past medical records and ER records.     Subjective:     Principal Problem:Hepatic encephalopathy    Chief Complaint:   Chief Complaint   Patient presents with    Epistaxis     from [place]; since early last night, pale in appearance         HPI: 46 y/o hx of HCV Cirrhosis, Hepatic encephalopathy, DM, presnts to ED from [facility] initially for epistaxis but also concerns for lethargy.  Per ED staff patient arrived somnolent, his epistaxis resolved but further workup in encephalopathy included metabolic panels, CT head, CXR and ammonia level.     Patient given a normal saline bolus in ED 1000cc and one dose of lactulose, no reported BM.     Prior to my eval of patient multiple nursing notes commenting on patient desire to leave, I spoke with Dr. Teddy VERDUZCO MD regarding this and patient had reported to agree to admission.  On my visit patient early in interview stated he did not want to be admitted to the hospital.     Discussed with patient for his reasons why, he felt that he came for epistaxis and issue was resolved patient also wants to leave for cigarette.   I discussed reasons for admission evaluation to evaluate for possible underlying causes that might exacerbate encephalopathy - metabolic changes(electrolyte, renal function), medications, no overt signs of GI bleeding, no worsening abdominal distension, head CT negative for trauma/acute process.     Patient oriented to person, place, time, month, year, date, ambulating in room and hallway, able to state that risks of leaving AMA wouuld include death, falls.  Also discussed with patient worsening encephalopathy  progressing to coma, aspiration events,     Discussed with ED MD Dr. Espinal who commented that mental status much improved since initial arrival evaluation, discussed with ED case management and patient felt to have capacity to make decision to leave AMA, Nursing called report to his nursing home and Case management arranged transportation back to nursing facility.     Past Medical History:   Diagnosis Date    Ascites of liver 5/30/2017    Chronic hepatitis C without hepatic coma     Chronic liver failure without hepatic coma 5/30/2017    Diabetic foot ulcer     right foot    DM (diabetes mellitus), type 2 with peripheral vascular complications     Encounter for blood transfusion     Essential hypertension     Gallstones     Hepatic encephalopathy 5/30/2017    Hepatitis C     Pancreatitis        Past Surgical History:   Procedure Laterality Date    BACK SURGERY         Review of patient's allergies indicates:   Allergen Reactions    Shellfish containing products Hives     Shortness of breath       No current facility-administered medications for this encounter.      Current Outpatient Prescriptions   Medication Sig Dispense Refill    albuterol (PROVENTIL) 2.5 mg /3 mL (0.083 %) nebulizer solution Take 2.5 mg by nebulization every 2 (two) hours as needed for Wheezing. Rescue      aspirin (ECOTRIN) 81 MG EC tablet Take 1 tablet (81 mg total) by mouth once daily.  0    atorvastatin (LIPITOR) 40 MG tablet Take 1 tablet (40 mg total) by mouth once daily. (Patient taking differently: Take 40 mg by mouth every evening. ) 30 tablet 3    benztropine (COGENTIN) 2 MG Tab Take 1 mg by mouth 2 (two) times daily.      cadexomer iodine (IODOSORB) 0.9 % gel Apply topically daily as needed for Wound Care (to foot wounds).  0    CALCIUM CARB/MAGNESIUM HYDROX (MYLANTA ORAL) Take 30 mLs by mouth every 4 (four) hours as needed (indigestion).      carvedilol (COREG) 25 MG tablet Take 1 tablet (25 mg total) by mouth 2  "(two) times daily with meals. 60 tablet 3    cloNIDine (CATAPRES) 0.2 MG tablet Take 0.2 mg by mouth 2 (two) times daily.      escitalopram oxalate (LEXAPRO) 10 MG tablet Take 10 mg by mouth once daily.      furosemide (LASIX) 40 MG tablet Take 40 mg by mouth 2 (two) times daily.      insulin glargine (LANTUS) 100 unit/mL injection Inject 10 Units into the skin every evening.      insulin regular 100 unit/mL Inj injection Inject into the skin 4 (four) times daily with meals and nightly. Sliding scale  200-250 = 4 units, 251-300 = 6 units, 301-350 = 8 units, 351-400 = 10 units, 401-450 - 12 units, >451 = 15 units      LACTOSE-REDUCED FOOD (ENSURE ORAL) Take 1 Can by mouth 3 (three) times daily before meals.      lactulose (CHRONULAC) 20 gram/30 mL Soln Take 20 g by mouth 3 (three) times daily.      levothyroxine (SYNTHROID) 50 MCG tablet Take 50 mcg by mouth before breakfast.      lidocaine (LIDODERM) 5 % Place 1 patch onto the skin once daily. Remove & Discard patch within 12 hours or as directed by MD      melatonin 3 mg Tab Take 1 tablet by mouth every evening.      quetiapine (SEROQUEL) 100 MG Tab Take 100 mg by mouth every evening.      rifAXIMin (XIFAXAN) 550 mg Tab Take 550 mg by mouth 2 (two) times daily.      spironolactone (ALDACTONE) 25 MG tablet Take 25 mg by mouth 2 (two) times daily.      temazepam (RESTORIL) 7.5 MG Cap Take 15 mg by mouth every evening.      oxyCODONE (ROXICODONE) 5 MG immediate release tablet Take 1 tablet (5 mg total) by mouth every 6 (six) hours as needed for Pain.      pen needle, diabetic 32 gauge x 5/32" Ndle To use with Apidra. 100 each 3       Family History     None        Social History Main Topics    Smoking status: Current Every Day Smoker     Packs/day: 0.25     Types: Cigarettes    Smokeless tobacco: Never Used    Alcohol use No    Drug use: No    Sexual activity: Not on file     Review of Systems   Constitutional: Negative for chills, diaphoresis and " fever.   Eyes: Negative for visual disturbance.   Respiratory: Negative for cough and shortness of breath.    Cardiovascular: Positive for leg swelling. Negative for chest pain.   Gastrointestinal: Negative for abdominal distention, abdominal pain, anal bleeding, blood in stool, nausea and vomiting.     Objective:     Vital Signs (Most Recent):  Temp: 97.7 °F (36.5 °C) (04/03/18 1047)  Pulse: (!) 50 (04/03/18 1538)  Resp: 11 (04/03/18 1538)  BP: 124/75 (04/03/18 1639)  SpO2: 100 % (04/03/18 1538) Vital Signs (24h Range):  Temp:  [97.7 °F (36.5 °C)] 97.7 °F (36.5 °C)  Pulse:  [48-66] 50  Resp:  [8-17] 11  SpO2:  [99 %-100 %] 100 %  BP: (116-146)/(59-80) 124/75     Weight: 104.3 kg (230 lb)  Body mass index is 29.53 kg/m².    Physical Exam   Constitutional: He is oriented to person, place, and time. No distress.   HENT:   Mouth/Throat: Oropharynx is clear and moist.   Eyes: Pupils are equal, round, and reactive to light. No scleral icterus.   Cardiovascular: Regular rhythm.    bradycardia   Abdominal: Soft. Bowel sounds are normal. There is no tenderness. There is no guarding.   Musculoskeletal:   Bilateral lower extremity edema, lower legs below the knee wrapped in ace banadages   Neurological: He is alert and oriented to person, place, and time. GCS eye subscore is 4. GCS verbal subscore is 5. GCS motor subscore is 6.   Asterixis present   Skin: Skin is warm and dry.         CRANIAL NERVES     CN III, IV, VI   Pupils are equal, round, and reactive to light.      Significant Labs:   CMP:   Recent Labs  Lab 04/03/18  1127   *   K 4.6      CO2 23   *   BUN 63*   CREATININE 2.1*   CALCIUM 9.1   PROT 6.7   ALBUMIN 2.8*   BILITOT 1.1*   ALKPHOS 82   AST 38   ALT 22   ANIONGAP 8   EGFRNONAA 36.9*     Lactic Acid: No results for input(s): LACTATE in the last 48 hours.  All pertinent labs within the past 24 hours have been reviewed.    Significant Imaging: I have reviewed all pertinent imaging  results/findings within the past 24 hours.    Assessment/Plan:     Active Diagnoses:    Diagnosis Date Noted POA    PRINCIPAL PROBLEM:  Hepatic encephalopathy [K72.90] 04/03/2018 Yes      Problems Resolved During this Admission:    Diagnosis Date Noted Date Resolved POA       Hepatic encephalopathy  -s/p 1Liter bolus and dose of lactulose   -Reviewed Nursing home medications and updated med reconciliation placed in computer, patient is on maximal therapy for HE with lactulose TID and Rifaximin however of note he is on chronic opiates, benzodiazepenes and seroquel that could contribute or cause sedation in the setting of a patient with chronic hepatic encephalopathy.   -as per discussion above in HPI , patient with improvement in mental status from arrival in ED and currently does not consent to inpatient admission.  AMA forms signed and transportation arranged back to [facility].         VTE Risk Mitigation     None            Jay Strickland MD  Department of Hospital Medicine   [facility]

## 2018-04-06 NOTE — PHYSICIAN QUERY
PT Name: Leif Nino  MR #: [id number]  Physician Query Form - CKD Clarification     Dana Hollins RN CDS    Contact Information: [phone]    This form is a permanent document in the medical record.     Query Date: April 6, 2018    By submitting this query, we are merely seeking further clarification of documentation. Please utilize your independent clinical judgment when addressing the question(s) below.    The Medical record contains the following:     Indicators   Supporting Clinical Findings   Location in Medical Record   X CKD or Chronic Kidney (Renal) Failure / Disease Diagnoses of Altered mental status, Weakness, and Chronic kidney disease, unspecified CKD stage were also pertinent to this vis   ED Prov Note 4/3     X BUN/Creatinine                          GFR BUN, Bld 63  Creatinine 2.1, eGFR  36.9      Lab 4/3    Dehydration      Nausea / Vomiting      Dialysis / CRRT      Medication     X Treatment sodium chloride 0.9% bolus 1,000 mL  :  Dose 1,000 mL  :  Intravenous  :  ED 1 Time MAR 4/3   X Other Chronic Conditions hx of HCV Cirrhosis, Hepatic encephalopathy  Current Every Day Smoker    H&P 04/03      Other       Provider, please further specify the stage of CKD.      [  ] Chronic Kidney Disease (CKD) (please specify stage* below)       [employer] Definitions  Stage Description  eGFR (mL/min)   [ X ]     III Moderately reduced kidney function 30-59     [  ] CKD on Chronic Hemodialysis (please specify stage*): __________  [  ] Other (please specify): ________________________  [  ] Clinically Undetermined    Please document in your progress notes daily for the duration of treatment until resolved and include in your discharge summary.

## 2018-04-17 ENCOUNTER — TELEPHONE (OUTPATIENT)
Dept: TRANSPLANT | Facility: CLINIC | Age: 48
End: 2018-04-17

## 2018-04-17 NOTE — TELEPHONE ENCOUNTER
"Patient lives at a nursing home the way it"s set up I need to speak with the nurse first because pt don't have phones in there room. Facility wasn't able to contact nurse Jessenia over the walkey talkey, they tried on several attempts, I will try later  "

## 2018-04-18 NOTE — PROGRESS NOTES
I spoke with patient and nurse Mariam in reference to pt H&P questions. Answers were provided for H&P questions.

## 2018-04-24 ENCOUNTER — OFFICE VISIT (OUTPATIENT)
Dept: TRANSPLANT | Facility: CLINIC | Age: 48
End: 2018-04-24
Payer: MEDICAID

## 2018-04-24 ENCOUNTER — LAB VISIT (OUTPATIENT)
Dept: LAB | Facility: HOSPITAL | Age: 48
End: 2018-04-24
Payer: MEDICAID

## 2018-04-24 VITALS
HEIGHT: 74 IN | SYSTOLIC BLOOD PRESSURE: 112 MMHG | DIASTOLIC BLOOD PRESSURE: 68 MMHG | WEIGHT: 234.38 LBS | OXYGEN SATURATION: 98 % | BODY MASS INDEX: 30.08 KG/M2 | TEMPERATURE: 98 F | HEART RATE: 56 BPM | RESPIRATION RATE: 16 BRPM

## 2018-04-24 DIAGNOSIS — E08.69 DIABETES MELLITUS DUE TO UNDERLYING CONDITION WITH OTHER SPECIFIED COMPLICATION, UNSPECIFIED WHETHER LONG TERM INSULIN USE: ICD-10-CM

## 2018-04-24 DIAGNOSIS — K74.60 CHRONIC HEPATITIS C WITH CIRRHOSIS: ICD-10-CM

## 2018-04-24 DIAGNOSIS — Z76.82 ORGAN TRANSPLANT CANDIDATE: ICD-10-CM

## 2018-04-24 DIAGNOSIS — N18.30 STAGE 3 CHRONIC KIDNEY DISEASE: ICD-10-CM

## 2018-04-24 DIAGNOSIS — R18.8 ASCITES OF LIVER: ICD-10-CM

## 2018-04-24 DIAGNOSIS — B18.2 CHRONIC HEPATITIS C WITH CIRRHOSIS: ICD-10-CM

## 2018-04-24 DIAGNOSIS — K76.82 HEPATIC ENCEPHALOPATHY: ICD-10-CM

## 2018-04-24 DIAGNOSIS — K74.69 DECOMPENSATED LIVER DISEASE: Primary | ICD-10-CM

## 2018-04-24 LAB
25(OH)D3+25(OH)D2 SERPL-MCNC: 11 NG/ML
A1 AG RBC QL: NORMAL
ABO + RH BLD: NORMAL
AFP SERPL-MCNC: 4.6 NG/ML
ALBUMIN SERPL BCP-MCNC: 2.6 G/DL
ALP SERPL-CCNC: 94 U/L
ALT SERPL W/O P-5'-P-CCNC: 19 U/L
AMPHET+METHAMPHET UR QL: NEGATIVE
ANION GAP SERPL CALC-SCNC: 7 MMOL/L
AST SERPL-CCNC: 38 U/L
BARBITURATES UR QL SCN>200 NG/ML: NEGATIVE
BASOPHILS # BLD AUTO: 0.02 K/UL
BASOPHILS NFR BLD: 0.5 %
BENZODIAZ UR QL SCN>200 NG/ML: NEGATIVE
BILIRUB DIRECT SERPL-MCNC: 0.5 MG/DL
BILIRUB SERPL-MCNC: 0.9 MG/DL
BILIRUB UR QL STRIP: NEGATIVE
BLD GP AB SCN CELLS X3 SERPL QL: NORMAL
BUN SERPL-MCNC: 60 MG/DL
BZE UR QL SCN: NEGATIVE
CALCIUM SERPL-MCNC: 8.8 MG/DL
CANNABINOIDS UR QL SCN: NEGATIVE
CHLORIDE SERPL-SCNC: 104 MMOL/L
CLARITY UR REFRACT.AUTO: CLEAR
CO2 SERPL-SCNC: 23 MMOL/L
COLOR UR AUTO: YELLOW
CREAT SERPL-MCNC: 1.8 MG/DL
CREAT UR-MCNC: 53 MG/DL
DIFFERENTIAL METHOD: ABNORMAL
EOSINOPHIL # BLD AUTO: 0.2 K/UL
EOSINOPHIL NFR BLD: 3.5 %
ERYTHROCYTE [DISTWIDTH] IN BLOOD BY AUTOMATED COUNT: 13.9 %
EST. GFR  (AFRICAN AMERICAN): 51.4 ML/MIN/1.73 M^2
EST. GFR  (NON AFRICAN AMERICAN): 44.5 ML/MIN/1.73 M^2
ETHANOL UR-MCNC: <10 MG/DL
GGT SERPL-CCNC: 32 U/L
GLUCOSE SERPL-MCNC: 265 MG/DL
GLUCOSE UR QL STRIP: ABNORMAL
HCT VFR BLD AUTO: 28.5 %
HGB BLD-MCNC: 9.7 G/DL
HGB UR QL STRIP: ABNORMAL
IMM GRANULOCYTES # BLD AUTO: 0.01 K/UL
IMM GRANULOCYTES NFR BLD AUTO: 0.2 %
INR PPP: 1.2
KETONES UR QL STRIP: NEGATIVE
LEUKOCYTE ESTERASE UR QL STRIP: NEGATIVE
LYMPHOCYTES # BLD AUTO: 0.9 K/UL
LYMPHOCYTES NFR BLD: 21.2 %
MCH RBC QN AUTO: 30.8 PG
MCHC RBC AUTO-ENTMCNC: 34 G/DL
MCV RBC AUTO: 91 FL
METHADONE UR QL SCN>300 NG/ML: NEGATIVE
MICROSCOPIC COMMENT: ABNORMAL
MONOCYTES # BLD AUTO: 0.5 K/UL
MONOCYTES NFR BLD: 10.5 %
NEUTROPHILS # BLD AUTO: 2.8 K/UL
NEUTROPHILS NFR BLD: 64.1 %
NITRITE UR QL STRIP: NEGATIVE
NON-SQ EPI CELLS #/AREA URNS AUTO: <1 /HPF
NRBC BLD-RTO: 0 /100 WBC
OPIATES UR QL SCN: NEGATIVE
PCP UR QL SCN>25 NG/ML: NEGATIVE
PH UR STRIP: 6 [PH] (ref 5–8)
PLATELET # BLD AUTO: 53 K/UL
PMV BLD AUTO: 12.6 FL
POTASSIUM SERPL-SCNC: 4.4 MMOL/L
PROT SERPL-MCNC: 6.5 G/DL
PROT UR QL STRIP: NEGATIVE
PROTHROMBIN TIME: 11.8 SEC
RBC # BLD AUTO: 3.15 M/UL
RBC #/AREA URNS AUTO: 10 /HPF (ref 0–4)
SODIUM SERPL-SCNC: 134 MMOL/L
SP GR UR STRIP: 1.01 (ref 1–1.03)
SQUAMOUS #/AREA URNS AUTO: 0 /HPF
TOXICOLOGY INFORMATION: NORMAL
URN SPEC COLLECT METH UR: ABNORMAL
UROBILINOGEN UR STRIP-ACNC: NEGATIVE EU/DL
WBC # BLD AUTO: 4.29 K/UL
WBC #/AREA URNS AUTO: 1 /HPF (ref 0–5)

## 2018-04-24 PROCEDURE — 82105 ALPHA-FETOPROTEIN SERUM: CPT | Mod: NTX

## 2018-04-24 PROCEDURE — 99214 OFFICE O/P EST MOD 30 MIN: CPT | Mod: PBBFAC,TXP,25 | Performed by: INTERNAL MEDICINE

## 2018-04-24 PROCEDURE — 86901 BLOOD TYPING SEROLOGIC RH(D): CPT | Mod: TXP

## 2018-04-24 PROCEDURE — 82248 BILIRUBIN DIRECT: CPT | Mod: TXP

## 2018-04-24 PROCEDURE — 85610 PROTHROMBIN TIME: CPT | Mod: NTX

## 2018-04-24 PROCEDURE — 84630 ASSAY OF ZINC: CPT | Mod: TXP

## 2018-04-24 PROCEDURE — 85025 COMPLETE CBC W/AUTO DIFF WBC: CPT | Mod: TXP

## 2018-04-24 PROCEDURE — 84590 ASSAY OF VITAMIN A: CPT | Mod: TXP

## 2018-04-24 PROCEDURE — 36415 COLL VENOUS BLD VENIPUNCTURE: CPT | Mod: TXP

## 2018-04-24 PROCEDURE — 80053 COMPREHEN METABOLIC PANEL: CPT | Mod: TXP

## 2018-04-24 PROCEDURE — 82977 ASSAY OF GGT: CPT | Mod: TXP

## 2018-04-24 PROCEDURE — 80307 DRUG TEST PRSMV CHEM ANLYZR: CPT | Mod: TXP

## 2018-04-24 PROCEDURE — 80321 ALCOHOLS BIOMARKERS 1OR 2: CPT | Mod: TXP

## 2018-04-24 PROCEDURE — 86905 BLOOD TYPING RBC ANTIGENS: CPT | Mod: TXP

## 2018-04-24 PROCEDURE — 81001 URINALYSIS AUTO W/SCOPE: CPT | Mod: TXP

## 2018-04-24 PROCEDURE — 99999 PR PBB SHADOW E&M-EST. PATIENT-LVL IV: CPT | Mod: PBBFAC,TXP,, | Performed by: INTERNAL MEDICINE

## 2018-04-24 PROCEDURE — 82306 VITAMIN D 25 HYDROXY: CPT | Mod: TXP

## 2018-04-24 PROCEDURE — 99205 OFFICE O/P NEW HI 60 MIN: CPT | Mod: S$PBB,,, | Performed by: INTERNAL MEDICINE

## 2018-04-24 RX ORDER — GABAPENTIN 100 MG/1
100 CAPSULE ORAL 2 TIMES DAILY
COMMUNITY

## 2018-04-24 NOTE — Clinical Note
Not a candidate for transplant due to lack of social support, non-compliance, lives at nursing home. Please return visit schedule in 3 months in Hepatology

## 2018-04-24 NOTE — LETTER
April 24, 2018        Denilson Alcaraz  [address] [place] [address] [address]  [address]  [facility]  [place] [address]  Phone: [phone]  Fax: [phone]             [facility] Liver Transplant  [address]  [facility] [address]  Phone: [phone]   Patient: Leif Nino   MR Number: [id number]   YOB: 1970   Date of Visit: 4/24/2018       Dear Dr. Denilson Alcaraz    Thank you for referring Leif Nino to me for evaluation. Attached you will find relevant portions of my assessment and plan of care.    Patient is not a suitable candidate for liver transplant due to the lack of social support.     If you have questions, please do not hesitate to call me. I look forward to following Leif Nino along with you.    Sincerely,    Kerline Woodward MD    Enclosure    If you would like to receive this communication electronically, please contact [email or web address] or [phone] to request [employer] access.    [employer] is a tool which provides read-only access to select patient information with whom you have a relationship. Its easy to use and provides real time access to review your patients record including encounter summaries, notes, results, and demographic information.    If you feel you have received this communication in error or would no longer like to receive these types of communications, please e-mail [email or web address]

## 2018-04-24 NOTE — PROGRESS NOTES
Transplant Hepatology (Transplant Evaluation) Consult Note    Referring provider: Saroj Espinal MD    Chief complaint:   Chief Complaint   Patient presents with    Liver Transplant Pre-evaluation    Cirrhosis       HPI:  Leif Nino is a 47 y.o. male that presents to Transplant Hepatology Clinic for consultation of had concerns including Liver Transplant Pre-evaluation and Cirrhosis..  He  is accompanied by a nursing home staff.    Background:  Patient is a poor historian.  He states that he is originally from [place]. Moved to [facility] after [place] in 2006 with his cousin to help [employer] projects, used to live in the tents in [facility].   He has been under  [facility] care for last 2 months.  Recent admission with hepatic encephalopathy.  He takes narcotics/Restoril/gabapentin.  He has uncontrolled diabetes - related complications diabetic leg wound - osteomyelitis. Also peripheral vessel disease.  He has no social support in [facility].  His brother Josué is in correction in [place], [place] .    He has chronic hepatitis C infection and cirrhosis.    6/28/17: EUS - There was abnormal echotexture in the visualized portion of the liver. This was characterized by a lobulated appearance. Many enlarged lymph nodes were visualized in the perigastric region, peripancreatic region and luis eduardo                         hepatis region. Two stones were visualized endosonographically in the common bile duct                ERCP - biliary sphincterotomy and balloon sweep for stone extraction    PMH: Uncontrolled diabetes on insulin, diabetic foot ulcers, osteomyelitis, peripheral vessel disease, hepatitis C cirrhosis, choledocholithiasis    Lab Results   Component Value Date    HGBA1C 8.4 (H) 06/17/2017       MELD-Na score: 17 at 4/24/2018  8:33 AM  MELD score: 14 at 4/24/2018  8:33 AM  Calculated from:  Serum Creatinine: 1.8 mg/dL at 4/24/2018  8:33 AM  Serum Sodium: 134 mmol/L at 4/24/2018  8:33 AM  Total Bilirubin: 0.9 mg/dL  (Rounded to 1) at 4/24/2018  8:33 AM  INR(ratio): 1.2 at 4/24/2018  8:33 AM  Age: 47 years    Liver disease:                   Hepatitis C    MELD-Na:                         17  Child-Padilla Class:             C    Transplant status:             not under evaluation    Cirrhosis is decompensated with:    Ascites:                                                      yes  Spontaneous bacterial peritonitis:              no  Hepatic Encephalopathy:                           yes  Portal bleeding:                                          no  Hepatocellular carcinoma:                          no    Hepatorenal syndrome:                              ?yes  Hyponatremia:                                            yes  Muscle wasting:                                          yes   Portal vein thrombosis:                               no      Screening:  Last EGD:  Last imaging study:       Patient Active Problem List   Diagnosis    Chronic liver failure without hepatic coma    Hypomagnesemia    Generalized abdominal pain    Elevated lipase    Hypoalbuminemia    Ascites of liver    Uncontrolled type 2 diabetes mellitus with diabetic polyneuropathy, with long-term current use of insulin    Chronic hepatitis C without hepatic coma    Hyponatremia    Diabetic ulcer of toe of right foot associated with type 2 diabetes mellitus, with fat layer exposed    Decreased pedal pulses    Atherosclerosis of artery of extremity with ulceration    Essential hypertension    Abscess of left foot    Osteomyelitis due to Staphylococcus aureus    DM (diabetes mellitus), type 2 with peripheral vascular complications    Decompensated liver disease    Bilateral cellulitis of lower leg    Diabetic ulcer of right midfoot associated with type 2 diabetes mellitus, with fat layer exposed    Anemia of chronic renal failure, stage 3 (moderate)    Thrombocytopenia    Chronic kidney disease, stage 3    Osteomyelitis of right foot     Cholelithiasis with choledocholithiasis    Cellulitis of left lower extremity    Anasarca    Pancytopenia    Hepatic encephalopathy       Past Medical History:   Diagnosis Date    Ascites of liver 5/30/2017    Chronic hepatitis C without hepatic coma     Chronic liver failure without hepatic coma 5/30/2017    Diabetic foot ulcer     right foot    Disorder of kidney and ureter     DM (diabetes mellitus), type 2 with peripheral vascular complications     Encounter for blood transfusion     Essential hypertension     Gallstones     Hepatic encephalopathy 5/30/2017    Hepatitis C     Hyperlipidemia     Pancreatitis     Pancytopenia     Stage 3 chronic kidney disease        Past Surgical History:   Procedure Laterality Date    BACK SURGERY         Family History   Problem Relation Age of Onset    Diabetes Mother     Hyperlipidemia Mother     Hypertension Father        Social History     Social History    Marital status: Single     Spouse name: N/A    Number of children: N/A    Years of education: N/A     Social History Main Topics    Smoking status: Current Every Day Smoker     Packs/day: 0.25     Types: Cigarettes    Smokeless tobacco: Never Used    Alcohol use No      Comment: patient started drinking when he was a teenager  and stopped in 2014 he doesen't remember what he drinked.    Drug use: No      Comment: doesent remember when he started or stopped    Sexual activity: Not Asked     Other Topics Concern    None     Social History Narrative    None       Current Outpatient Prescriptions   Medication Sig Dispense Refill    albuterol (PROVENTIL) 2.5 mg /3 mL (0.083 %) nebulizer solution Take 2.5 mg by nebulization every 2 (two) hours as needed for Wheezing. Rescue      aspirin (ECOTRIN) 81 MG EC tablet Take 1 tablet (81 mg total) by mouth once daily.  0    atorvastatin (LIPITOR) 40 MG tablet Take 1 tablet (40 mg total) by mouth once daily. (Patient taking differently: Take 40 mg by  "mouth every evening. ) 30 tablet 3    benztropine (COGENTIN) 2 MG Tab Take 1 mg by mouth 2 (two) times daily.      CALCIUM CARB/MAGNESIUM HYDROX (MYLANTA ORAL) Take 30 mLs by mouth every 4 (four) hours as needed (indigestion).      carvedilol (COREG) 25 MG tablet Take 1 tablet (25 mg total) by mouth 2 (two) times daily with meals. 60 tablet 3    cloNIDine (CATAPRES) 0.2 MG tablet Take 0.2 mg by mouth 2 (two) times daily.      escitalopram oxalate (LEXAPRO) 10 MG tablet Take 10 mg by mouth once daily.      furosemide (LASIX) 40 MG tablet Take 40 mg by mouth 2 (two) times daily.      gabapentin (NEURONTIN) 100 MG capsule Take 100 mg by mouth 2 (two) times daily.      insulin glargine (LANTUS) 100 unit/mL injection Inject 10 Units into the skin every evening.      insulin regular 100 unit/mL Inj injection Inject into the skin 4 (four) times daily with meals and nightly. Sliding scale  200-250 = 4 units, 251-300 = 6 units, 301-350 = 8 units, 351-400 = 10 units, 401-450 - 12 units, >451 = 15 units      LACTOSE-REDUCED FOOD (ENSURE ORAL) Take 1 Can by mouth 3 (three) times daily before meals.      lactulose (CHRONULAC) 20 gram/30 mL Soln Take 20 g by mouth 3 (three) times daily.      levothyroxine (SYNTHROID) 50 MCG tablet Take 50 mcg by mouth before breakfast.      lidocaine (LIDODERM) 5 % Place 1 patch onto the skin once daily. Remove & Discard patch within 12 hours or as directed by MD      melatonin 3 mg Tab Take 1 tablet by mouth every evening.      oxyCODONE (ROXICODONE) 5 MG immediate release tablet Take 1 tablet (5 mg total) by mouth every 6 (six) hours as needed for Pain.      pen needle, diabetic 32 gauge x 5/32" Ndle To use with Apidra. 100 each 3    quetiapine (SEROQUEL) 100 MG Tab Take 100 mg by mouth every evening.      rifAXIMin (XIFAXAN) 550 mg Tab Take 550 mg by mouth 2 (two) times daily.      spironolactone (ALDACTONE) 25 MG tablet Take 25 mg by mouth 2 (two) times daily.      " "temazepam (RESTORIL) 7.5 MG Cap Take 15 mg by mouth every evening.      cadexomer iodine (IODOSORB) 0.9 % gel Apply topically daily as needed for Wound Care (to foot wounds).  0     No current facility-administered medications for this visit.        Review of patient's allergies indicates:   Allergen Reactions    Shellfish containing products Hives     Shortness of breath       Review of Systems   Constitutional: Positive for malaise/fatigue. Negative for chills, fever and weight loss.   HENT: Negative for congestion, nosebleeds and sore throat.    Eyes: Negative for blurred vision, double vision and photophobia.   Respiratory: Negative for cough and shortness of breath.    Cardiovascular: Positive for leg swelling. Negative for chest pain, palpitations and orthopnea.   Gastrointestinal: Negative for abdominal pain, blood in stool, constipation, diarrhea, melena and vomiting.   Genitourinary: Negative for dysuria.   Musculoskeletal: Positive for back pain and joint pain. Negative for falls.   Skin: Negative for itching and rash.   Neurological: Positive for weakness. Negative for dizziness and tremors.   Endo/Heme/Allergies: Bruises/bleeds easily.   Psychiatric/Behavioral: Negative for depression and substance abuse. The patient is not nervous/anxious and does not have insomnia.        Vitals:    04/24/18 0945   BP: 112/68   Pulse: (!) 56   Resp: 16   Temp: 98 °F (36.7 °C)   TempSrc: Oral   SpO2: 98%   Weight: 106.3 kg (234 lb 5.6 oz)   Height: 6' 2" (1.88 m)       Physical Exam   Constitutional: He is oriented to person, place, and time. He appears well-developed. No distress.   Chronically ill-appearing. Malnourished. Temporal wasting.     HENT:   Head: Normocephalic and atraumatic.   Mouth/Throat: Oropharynx is clear and moist. No oropharyngeal exudate.   Eyes: Conjunctivae are normal. No scleral icterus.   Neck: Normal range of motion.   Cardiovascular: Normal rate, regular rhythm, normal heart sounds and " intact distal pulses.    Pulmonary/Chest: Effort normal and breath sounds normal. No respiratory distress. He has no wheezes. He has no rales.   Abdominal: Soft. Normal appearance and bowel sounds are normal. He exhibits distension (small ascites). He exhibits no shifting dullness, no pulsatile liver, no fluid wave and no ascites. There is no splenomegaly or hepatomegaly. There is no tenderness. No hernia.   Musculoskeletal: He exhibits edema.   Neurological: He is alert and oriented to person, place, and time. He is not disoriented.   Skin: Skin is warm and dry. No rash noted. He is not diaphoretic.   Palmar erythema. Scattered spider angiomata on upper chest     Psychiatric: He has a normal mood and affect. His behavior is normal. His mood appears not anxious. His affect is not inappropriate. He is not agitated. He is communicative. He is attentive.   Nursing note and vitals reviewed.      LABS: I personally reviewed pertinent laboratory findings.    Lab Results   Component Value Date    ALT 19 04/24/2018    AST 38 04/24/2018    GGT 32 04/24/2018    ALKPHOS 94 04/24/2018    BILITOT 0.9 04/24/2018       Lab Results   Component Value Date    WBC 4.29 04/24/2018    HGB 9.7 (L) 04/24/2018    HCT 28.5 (L) 04/24/2018    MCV 91 04/24/2018    PLT 53 (L) 04/24/2018       Lab Results   Component Value Date     (L) 04/24/2018    K 4.4 04/24/2018     04/24/2018    CO2 23 04/24/2018    BUN 60 (H) 04/24/2018    CREATININE 1.8 (H) 04/24/2018    CALCIUM 8.8 04/24/2018    ANIONGAP 7 (L) 04/24/2018    ESTGFRAFRICA 51.4 (A) 04/24/2018    EGFRNONAA 44.5 (A) 04/24/2018       Lab Results   Component Value Date    INR 1.2 04/24/2018    INR 1.7 (H) 07/07/2017    INR 1.7 (H) 07/06/2017       No results found for: SMOOTHMUSCAB, MITOAB  No results found for: IRON, TIBC, FERRITIN, SATURATEDIRO  Lab Results   Component Value Date    HEPBIGM Negative 05/30/2017    HEPBCAB Negative 05/30/2017    HEPCAB Positive (A) 05/30/2017     No  results found for: TSH  No results found for: INOCENCIA    No results found for: ABORH        Lab Results   Component Value Date    HGBA1C 8.4 (H) 06/17/2017     No results found for: CHOL  No results found for: HDL  No results found for: LDLCALC  No results found for: TRIG  No results found for: CHOLHDL        Imaging:   No results found for this or any previous visit.    I personally reviewed recent cardiology studies available on the chart and from outside medical records.    I personally reviewed recent imaging studies available on the chart and from outside medical records.        Assessment:  47 y.o. male presenting with     1. Decompensated liver disease    2. Chronic hepatitis C with cirrhosis    3. Organ transplant candidate    4. Hepatic encephalopathy    5. Ascites of liver        MELD-Na score: 17 at 4/24/2018  8:33 AM  MELD score: 14 at 4/24/2018  8:33 AM  Calculated from:  Serum Creatinine: 1.8 mg/dL at 4/24/2018  8:33 AM  Serum Sodium: 134 mmol/L at 4/24/2018  8:33 AM  Total Bilirubin: 0.9 mg/dL (Rounded to 1) at 4/24/2018  8:33 AM  INR(ratio): 1.2 at 4/24/2018  8:33 AM  Age: 47 years    Functional Status: 40% - Disabled: requires special care and assistance  Physical Capacity: Limited Mobility    Transplant Candidacy: Patient is a 47 y.o. male with end-stage liver disease secondary to Cirrhosis: Hepatitis C with MELD-Na: 17 and Child-Padilla Class:C  here for evaluation for possible OLT. Based on available information, patient is a NOT suitable liver transplant candidate. He has no social support. He lives in a nursing home. He has multiple medical comorbidities such as uncontrolled diabetes, osteomyelitis, diabetic leg ulcers, peripheral vascular disease.       Recommendation(s):  - start cipro 250 mg daily (renally dosed) for primary SBP prophylaxis  - continue diuretics  - continue lactulose and Xifaxan  - stop narcotics, gabapentin, benzodiazepines due to severe encephalopathy  - needs to see  Endocrinology and Nephrology  - will order ultrasound of the liver for liver cancer screening every 6 months  - needs to have upper endoscopy for variceal screening  - follow up in Hepatology in 3 months    I have sent communication to the referring physician and/or primary care provider.  A total of 60 minutes were spent face-to-face with the patient during this encounter and over half of that time was spent on counseling and coordination of care.  We discussed in depth the nature of the patient's liver disease, the management plan in details. I also educated the patient about lifestyle modifications which may improve hepatic steatosis, overweight/obesity, insulin resistance and high blood pressure issues. I have provided the patient with an opportunity to ask questions and have all questions answered to his satisfaction.       Kerline Woodward MD  Staff Physician  Hepatology and Liver Transplant  [facility]  [facility]

## 2018-04-24 NOTE — PATIENT INSTRUCTIONS
- start ciprofloxacin 250 mg daily for infection prophylaxis  - needs to follow up with PCP - Dr. Ghotra  - I recommend to stop all narcotics/sleep medicines/benzodiazipines  - He may not need to be on clonidine if blood pressure is good    - Use sliding scale for lactulose as follows:  Take 1 cup 30 mL at 8AM. If no stool by 12 noon, take 30 mL more of lactulose. If <2 stools by 3 PM, take 30 mL more of lactulose. Goal is 3-4 soft stools daily.       Unfortunately, Mr. Nino is not a suitable candidate for liver transplant due to the lack of social support and other medical comorbidities such as uncontrolled diabetes, diabetic leg wound/osteomyelitis, peripheral vascular disease.    Cirrhosis Counseling  - strict abstinence of alcohol use  - compliance with lactulose and rifaximin if you have developed hepatic encephalopathy (confusion due to high ammonia level)  - avoid non-steroidal anti-inflammatory drugs (NSAIDs) such as ibuprofen, Motrin, naprosyn, Alleve due to the risk of kidney damage  - can take acetaminophen (Tylenol), no more than 2000 mg per day  - low sodium (salt) 2 gram per day diet  - nutrition: 25-30kcal (calorie per body body weight in kilogram) per day  - no need to restrict protein in diet  - high protein diet: 1.2-1.5 gram/kg (protein per body weight in kilogram) per day to prevent muscle mass loss  - avoid fasting  - resistance exercises for muscle strength  - avoid raw seafoods due to the risk of fatal Vibrio vulnificus infection  - ultrasound or MRI of the liver every 6 months for liver cancer screening (you are at risk of developing liver cancer due to scar tissue in the liver)  - Upper endoscopy every 1-2 years to screen for varices in the stomach and foodpipe which can burst and cause fatal bleeding

## 2018-04-25 NOTE — PROGRESS NOTES
Patient to follow-up in Hepatology clinic in three months.  Hepatology staff aware.  Liver transplant episode closed.  Recall entered.  Appt card completed to schedule nephrology and endocrine consults.  Orders entered to schedule EGD.

## 2018-04-26 LAB — VIT A SERPL-MCNC: 20 UG/DL (ref 38–106)

## 2018-04-27 LAB — ZINC SERPL-MCNC: 67 UG/DL (ref 60–130)

## 2018-05-03 LAB — PHOSPHATIDYLETHANOL (PETH): NEGATIVE NG/ML

## 2018-06-18 ENCOUNTER — TELEPHONE (OUTPATIENT)
Dept: ENDOCRINOLOGY | Facility: CLINIC | Age: 48
End: 2018-06-18

## 2018-06-18 NOTE — TELEPHONE ENCOUNTER
Attempted to confirm appointment with patient but patient is not longer at [facility] and they would not provide another number, could not leave a message.

## 2018-06-28 ENCOUNTER — HOSPITAL ENCOUNTER (INPATIENT)
Facility: HOSPITAL | Age: 48
LOS: 1 days | Discharge: LEFT BEFORE DISCHARGE | DRG: 872 | End: 2018-06-28
Attending: EMERGENCY MEDICINE | Admitting: HOSPITALIST
Payer: MEDICAID

## 2018-06-28 VITALS
DIASTOLIC BLOOD PRESSURE: 64 MMHG | HEIGHT: 74 IN | SYSTOLIC BLOOD PRESSURE: 128 MMHG | TEMPERATURE: 100 F | BODY MASS INDEX: 29.52 KG/M2 | WEIGHT: 230 LBS | HEART RATE: 92 BPM | OXYGEN SATURATION: 97 % | RESPIRATION RATE: 18 BRPM

## 2018-06-28 DIAGNOSIS — R50.9 FEVER: ICD-10-CM

## 2018-06-28 DIAGNOSIS — A41.9 SEPSIS, DUE TO UNSPECIFIED ORGANISM: Primary | ICD-10-CM

## 2018-06-28 DIAGNOSIS — R11.0 NAUSEA: ICD-10-CM

## 2018-06-28 LAB
ALBUMIN SERPL BCP-MCNC: 3 G/DL
ALP SERPL-CCNC: 177 U/L
ALT SERPL W/O P-5'-P-CCNC: 28 U/L
ANION GAP SERPL CALC-SCNC: 11 MMOL/L
AST SERPL-CCNC: 57 U/L
BACTERIA #/AREA URNS HPF: ABNORMAL /HPF
BASOPHILS # BLD AUTO: 0.01 K/UL
BASOPHILS NFR BLD: 0.2 %
BILIRUB SERPL-MCNC: 1.3 MG/DL
BILIRUB UR QL STRIP: NEGATIVE
BUN SERPL-MCNC: 33 MG/DL
CALCIUM SERPL-MCNC: 9.3 MG/DL
CHLORIDE SERPL-SCNC: 104 MMOL/L
CLARITY UR: CLEAR
CO2 SERPL-SCNC: 21 MMOL/L
COLOR UR: YELLOW
CREAT SERPL-MCNC: 1.7 MG/DL
DIFFERENTIAL METHOD: ABNORMAL
EOSINOPHIL # BLD AUTO: 0.1 K/UL
EOSINOPHIL NFR BLD: 1 %
ERYTHROCYTE [DISTWIDTH] IN BLOOD BY AUTOMATED COUNT: 13.9 %
EST. GFR  (AFRICAN AMERICAN): 55 ML/MIN/1.73 M^2
EST. GFR  (NON AFRICAN AMERICAN): 48 ML/MIN/1.73 M^2
FLUAV AG SPEC QL IA: NEGATIVE
FLUBV AG SPEC QL IA: NEGATIVE
GLUCOSE SERPL-MCNC: 196 MG/DL
GLUCOSE UR QL STRIP: ABNORMAL
HCT VFR BLD AUTO: 33.3 %
HGB BLD-MCNC: 12 G/DL
HGB UR QL STRIP: ABNORMAL
HYALINE CASTS #/AREA URNS LPF: 0 /LPF
KETONES UR QL STRIP: NEGATIVE
LACTATE SERPL-SCNC: 2.3 MMOL/L
LEUKOCYTE ESTERASE UR QL STRIP: NEGATIVE
LYMPHOCYTES # BLD AUTO: 0.2 K/UL
LYMPHOCYTES NFR BLD: 4.2 %
MCH RBC QN AUTO: 30.4 PG
MCHC RBC AUTO-ENTMCNC: 36 G/DL
MCV RBC AUTO: 84 FL
MICROSCOPIC COMMENT: ABNORMAL
MONOCYTES # BLD AUTO: 0.3 K/UL
MONOCYTES NFR BLD: 5.8 %
NEUTROPHILS # BLD AUTO: 4.6 K/UL
NEUTROPHILS NFR BLD: 88.6 %
NITRITE UR QL STRIP: NEGATIVE
PH UR STRIP: 6 [PH] (ref 5–8)
PLATELET # BLD AUTO: 60 K/UL
PMV BLD AUTO: 11.7 FL
POCT GLUCOSE: 193 MG/DL (ref 70–110)
POTASSIUM SERPL-SCNC: 4.8 MMOL/L
PROT SERPL-MCNC: 7.4 G/DL
PROT UR QL STRIP: ABNORMAL
RBC # BLD AUTO: 3.95 M/UL
RBC #/AREA URNS HPF: 10 /HPF (ref 0–4)
SODIUM SERPL-SCNC: 136 MMOL/L
SP GR UR STRIP: 1.01 (ref 1–1.03)
SPECIMEN SOURCE: NORMAL
SQUAMOUS #/AREA URNS HPF: 5 /HPF
URN SPEC COLLECT METH UR: ABNORMAL
UROBILINOGEN UR STRIP-ACNC: NEGATIVE EU/DL
WBC # BLD AUTO: 5.2 K/UL
WBC #/AREA URNS HPF: 1 /HPF (ref 0–5)

## 2018-06-28 PROCEDURE — 87040 BLOOD CULTURE FOR BACTERIA: CPT | Mod: 59

## 2018-06-28 PROCEDURE — 87400 INFLUENZA A/B EACH AG IA: CPT | Mod: 59

## 2018-06-28 PROCEDURE — 96366 THER/PROPH/DIAG IV INF ADDON: CPT

## 2018-06-28 PROCEDURE — 96368 THER/DIAG CONCURRENT INF: CPT

## 2018-06-28 PROCEDURE — 87086 URINE CULTURE/COLONY COUNT: CPT

## 2018-06-28 PROCEDURE — 85025 COMPLETE CBC W/AUTO DIFF WBC: CPT

## 2018-06-28 PROCEDURE — 81000 URINALYSIS NONAUTO W/SCOPE: CPT

## 2018-06-28 PROCEDURE — 80053 COMPREHEN METABOLIC PANEL: CPT

## 2018-06-28 PROCEDURE — 96367 TX/PROPH/DG ADDL SEQ IV INF: CPT

## 2018-06-28 PROCEDURE — 99285 EMERGENCY DEPT VISIT HI MDM: CPT | Mod: 25

## 2018-06-28 PROCEDURE — 93010 ELECTROCARDIOGRAM REPORT: CPT | Mod: ,,, | Performed by: INTERNAL MEDICINE

## 2018-06-28 PROCEDURE — 83605 ASSAY OF LACTIC ACID: CPT

## 2018-06-28 PROCEDURE — 63600175 PHARM REV CODE 636 W HCPCS: Performed by: EMERGENCY MEDICINE

## 2018-06-28 PROCEDURE — 12000002 HC ACUTE/MED SURGE SEMI-PRIVATE ROOM

## 2018-06-28 PROCEDURE — 25000003 PHARM REV CODE 250: Performed by: EMERGENCY MEDICINE

## 2018-06-28 PROCEDURE — 96365 THER/PROPH/DIAG IV INF INIT: CPT

## 2018-06-28 RX ORDER — ONDANSETRON 2 MG/ML
4 INJECTION INTRAMUSCULAR; INTRAVENOUS EVERY 8 HOURS PRN
Status: CANCELLED | OUTPATIENT
Start: 2018-06-28

## 2018-06-28 RX ORDER — CIPROFLOXACIN 2 MG/ML
400 INJECTION, SOLUTION INTRAVENOUS
Status: COMPLETED | OUTPATIENT
Start: 2018-06-28 | End: 2018-06-28

## 2018-06-28 RX ORDER — CLONIDINE HYDROCHLORIDE 0.1 MG/1
0.2 TABLET ORAL 2 TIMES DAILY
Status: CANCELLED | OUTPATIENT
Start: 2018-06-28

## 2018-06-28 RX ORDER — OXYCODONE AND ACETAMINOPHEN 5; 325 MG/1; MG/1
1 TABLET ORAL
Status: COMPLETED | OUTPATIENT
Start: 2018-06-28 | End: 2018-06-28

## 2018-06-28 RX ORDER — MAG HYDROX/ALUMINUM HYD/SIMETH 200-200-20
30 SUSPENSION, ORAL (FINAL DOSE FORM) ORAL EVERY 4 HOURS PRN
Status: CANCELLED | OUTPATIENT
Start: 2018-06-28

## 2018-06-28 RX ORDER — INSULIN ASPART 100 [IU]/ML
1-10 INJECTION, SOLUTION INTRAVENOUS; SUBCUTANEOUS
Status: CANCELLED | OUTPATIENT
Start: 2018-06-28

## 2018-06-28 RX ORDER — LIDOCAINE 50 MG/G
1 PATCH TOPICAL DAILY
Status: CANCELLED | OUTPATIENT
Start: 2018-06-29

## 2018-06-28 RX ORDER — BENZTROPINE MESYLATE 1 MG/1
1 TABLET ORAL 2 TIMES DAILY
Status: CANCELLED | OUTPATIENT
Start: 2018-06-28

## 2018-06-28 RX ORDER — ASPIRIN 81 MG/1
81 TABLET ORAL DAILY
Status: CANCELLED | OUTPATIENT
Start: 2018-06-29

## 2018-06-28 RX ORDER — ESCITALOPRAM OXALATE 10 MG/1
10 TABLET ORAL DAILY
Status: CANCELLED | OUTPATIENT
Start: 2018-06-29

## 2018-06-28 RX ORDER — AMOXICILLIN AND CLAVULANATE POTASSIUM 875; 125 MG/1; MG/1
1 TABLET, FILM COATED ORAL 2 TIMES DAILY
Qty: 20 TABLET | Refills: 0 | Status: SHIPPED | OUTPATIENT
Start: 2018-06-28 | End: 2018-09-20

## 2018-06-28 RX ORDER — CLINDAMYCIN HYDROCHLORIDE 150 MG/1
300 CAPSULE ORAL 4 TIMES DAILY
Qty: 80 CAPSULE | Refills: 0 | Status: SHIPPED | OUTPATIENT
Start: 2018-06-28 | End: 2018-07-08

## 2018-06-28 RX ORDER — ZOLPIDEM TARTRATE 5 MG/1
5 TABLET ORAL NIGHTLY PRN
Status: CANCELLED | OUTPATIENT
Start: 2018-06-28

## 2018-06-28 RX ORDER — POLYETHYLENE GLYCOL 3350 17 G/17G
17 POWDER, FOR SOLUTION ORAL DAILY
Status: CANCELLED | OUTPATIENT
Start: 2018-06-29

## 2018-06-28 RX ORDER — LEVOTHYROXINE SODIUM 50 UG/1
50 TABLET ORAL
Status: CANCELLED | OUTPATIENT
Start: 2018-06-29

## 2018-06-28 RX ORDER — IBUPROFEN 600 MG/1
600 TABLET ORAL
Status: COMPLETED | OUTPATIENT
Start: 2018-06-28 | End: 2018-06-28

## 2018-06-28 RX ORDER — OXYCODONE HYDROCHLORIDE 5 MG/1
5 TABLET ORAL EVERY 6 HOURS PRN
Status: CANCELLED | OUTPATIENT
Start: 2018-06-28

## 2018-06-28 RX ORDER — GLUCAGON 1 MG
1 KIT INJECTION
Status: CANCELLED | OUTPATIENT
Start: 2018-06-28

## 2018-06-28 RX ORDER — CARVEDILOL 6.25 MG/1
25 TABLET ORAL 2 TIMES DAILY WITH MEALS
Status: CANCELLED | OUTPATIENT
Start: 2018-06-29

## 2018-06-28 RX ORDER — ATORVASTATIN CALCIUM 10 MG/1
40 TABLET, FILM COATED ORAL NIGHTLY
Status: CANCELLED | OUTPATIENT
Start: 2018-06-28

## 2018-06-28 RX ORDER — IBUPROFEN 200 MG
16 TABLET ORAL
Status: CANCELLED | OUTPATIENT
Start: 2018-06-28

## 2018-06-28 RX ORDER — GABAPENTIN 100 MG/1
100 CAPSULE ORAL 2 TIMES DAILY
Status: CANCELLED | OUTPATIENT
Start: 2018-06-28

## 2018-06-28 RX ORDER — IBUPROFEN 400 MG/1
400 TABLET ORAL EVERY 6 HOURS PRN
Status: CANCELLED | OUTPATIENT
Start: 2018-06-28

## 2018-06-28 RX ORDER — SPIRONOLACTONE 25 MG/1
25 TABLET ORAL 2 TIMES DAILY
Status: CANCELLED | OUTPATIENT
Start: 2018-06-28

## 2018-06-28 RX ORDER — IBUPROFEN 200 MG
24 TABLET ORAL
Status: CANCELLED | OUTPATIENT
Start: 2018-06-28

## 2018-06-28 RX ORDER — FUROSEMIDE 40 MG/1
40 TABLET ORAL 2 TIMES DAILY
Status: CANCELLED | OUTPATIENT
Start: 2018-06-28

## 2018-06-28 RX ORDER — LACTULOSE 10 G/15ML
20 SOLUTION ORAL 3 TIMES DAILY
Status: CANCELLED | OUTPATIENT
Start: 2018-06-28

## 2018-06-28 RX ORDER — ALBUTEROL SULFATE 2.5 MG/.5ML
2.5 SOLUTION RESPIRATORY (INHALATION) EVERY 4 HOURS
Status: CANCELLED | OUTPATIENT
Start: 2018-06-29

## 2018-06-28 RX ORDER — QUETIAPINE FUMARATE 100 MG/1
100 TABLET, FILM COATED ORAL NIGHTLY
Status: CANCELLED | OUTPATIENT
Start: 2018-06-28

## 2018-06-28 RX ADMIN — OXYCODONE HYDROCHLORIDE AND ACETAMINOPHEN 1 TABLET: 5; 325 TABLET ORAL at 08:06

## 2018-06-28 RX ADMIN — VANCOMYCIN HYDROCHLORIDE 1500 MG: 1 INJECTION, POWDER, LYOPHILIZED, FOR SOLUTION INTRAVENOUS at 06:06

## 2018-06-28 RX ADMIN — PIPERACILLIN AND TAZOBACTAM 4.5 G: 4; .5 INJECTION, POWDER, LYOPHILIZED, FOR SOLUTION INTRAVENOUS; PARENTERAL at 08:06

## 2018-06-28 RX ADMIN — IBUPROFEN 600 MG: 600 TABLET ORAL at 06:06

## 2018-06-28 RX ADMIN — CIPROFLOXACIN 400 MG: 2 INJECTION, SOLUTION INTRAVENOUS at 09:06

## 2018-06-28 RX ADMIN — SODIUM CHLORIDE 3129 ML: 0.9 INJECTION, SOLUTION INTRAVENOUS at 06:06

## 2018-06-28 NOTE — ED PROVIDER NOTES
Encounter Date: 6/28/2018    SCRIBE #1 NOTE: I, Hannah Garcia, am scribing for, and in the presence of,  Papo Matthews MD. I have scribed the following portions of the note - Other sections scribed: ROS and HPI.       History     Chief Complaint   Patient presents with    Emesis     N/V since today, with reported abdominal pain. Reports chills     CC: Emesis    HPI: This 47 y.o. male with  a past medical history of Ascites of liver (5/30/2017); Chronic hepatitis C without hepatic coma; Chronic liver failure without hepatic coma (5/30/2017); Diabetic foot ulcer; Disorder of kidney and ureter; DM , type 2 with peripheral vascular complications; Essential hypertension; Gallstones; Hepatic encephalopathy (5/30/2017); Hepatitis C; Hyperlipidemia; Pancreatitis; Pancytopenia; and Stage 3 chronic kidney disease and Psoriasis, who resides at a nursing home, presents to the ED complaining of an acute onset of nausea and emesis x1 episode today. He also notes fever of 103, chills, lightheadedness, pain to top of his head and mild dysuria. Denies abdominal pain or any other sx. No prior medical intervention for his current sx. Smokes half a pack of cigarette everyday.  Sx are moderate and constant.      The history is provided by the patient. No  was used.     Review of patient's allergies indicates:   Allergen Reactions    Shellfish containing products Hives     Shortness of breath     Past Medical History:   Diagnosis Date    Ascites of liver 5/30/2017    Chronic hepatitis C without hepatic coma     Chronic liver failure without hepatic coma 5/30/2017    Diabetic foot ulcer     right foot    Disorder of kidney and ureter     DM (diabetes mellitus), type 2 with peripheral vascular complications     Encounter for blood transfusion     Essential hypertension     Gallstones     Hepatic encephalopathy 5/30/2017    Hepatitis C     Hyperlipidemia     Pancreatitis     Pancytopenia     Stage 3  chronic kidney disease      Past Surgical History:   Procedure Laterality Date    BACK SURGERY       Family History   Problem Relation Age of Onset    Diabetes Mother     Hyperlipidemia Mother     Hypertension Father      Social History   Substance Use Topics    Smoking status: Current Every Day Smoker     Packs/day: 0.25     Types: Cigarettes    Smokeless tobacco: Never Used    Alcohol use No      Comment: patient started drinking when he was a teenager  and stopped in 2014 he doesen't remember what he drinked.     Review of Systems   Constitutional: Positive for chills and fever.   HENT: Negative for congestion, ear pain, rhinorrhea and sore throat.    Eyes: Negative for pain and visual disturbance.   Respiratory: Negative for cough and shortness of breath.    Cardiovascular: Negative for chest pain.   Gastrointestinal: Positive for nausea and vomiting. Negative for abdominal pain and diarrhea.   Genitourinary: Positive for dysuria.   Musculoskeletal: Positive for myalgias (chronic L foot pain). Negative for back pain and neck pain.   Skin: Negative for rash.   Neurological: Positive for light-headedness. Negative for headaches.       Physical Exam     Initial Vitals [06/28/18 1657]   BP Pulse Resp Temp SpO2   (!) 164/90 93 20 (!) 103.2 °F (39.6 °C) 100 %      MAP       --         Physical Exam  The patient was examined specifically for the following:   General:No significant distress, Good color, Warm and dry. Head and neck:Scalp atraumatic, Neck supple. Neurological:Appropriate conversation, Gross motor deficits. Eyes:Conjugate gaze, Clear corneas. ENT: No epistaxis. Cardiac: Regular rate and rhythm, Grossly normal heart tones. Pulmonary: Wheezing, Rales. Gastrointestinal: Abdominal tenderness, Abdominal distention. Musculoskeletal: Extremity deformity, Apparent pain with range of motion of the joints. Skin: Rash.   The findings on examination were normal except for the following:  Patient is warm to  touch.  The lungs are clear and free of wheezing rales or rhonchi.  Heart tones are normal.  The patient has regular rate and rhythm.  The abdomen is nontender.  There is no guarding rebound mass or distention. There is a cast on the left ankle.  The patient has an open wound in the distal right shin with 8 cm of surrounding erythema.   ED Course   Procedures  Labs Reviewed   CBC W/ AUTO DIFFERENTIAL - Abnormal; Notable for the following:        Result Value    RBC 3.95 (*)     Hemoglobin 12.0 (*)     Hematocrit 33.3 (*)     Platelets 60 (*)     Lymph # 0.2 (*)     Gran% 88.6 (*)     Lymph% 4.2 (*)     All other components within normal limits   COMPREHENSIVE METABOLIC PANEL - Abnormal; Notable for the following:     CO2 21 (*)     Glucose 196 (*)     BUN, Bld 33 (*)     Creatinine 1.7 (*)     Albumin 3.0 (*)     Total Bilirubin 1.3 (*)     Alkaline Phosphatase 177 (*)     AST 57 (*)     eGFR if  55 (*)     eGFR if non  48 (*)     All other components within normal limits   LACTIC ACID, PLASMA - Abnormal; Notable for the following:     Lactate (Lactic Acid) 2.3 (*)     All other components within normal limits   URINALYSIS - Abnormal; Notable for the following:     Protein, UA 1+ (*)     Glucose, UA 1+ (*)     Occult Blood UA 2+ (*)     All other components within normal limits   URINALYSIS MICROSCOPIC - Abnormal; Notable for the following:     RBC, UA 10 (*)     Bacteria, UA Few (*)     All other components within normal limits   POCT GLUCOSE - Abnormal; Notable for the following:     POCT Glucose 193 (*)     All other components within normal limits   CULTURE, BLOOD   CULTURE, BLOOD   CULTURE, URINE   INFLUENZA A AND B ANTIGEN   LACTIC ACID, PLASMA   POCT GLUCOSE MONITORING CONTINUOUS     EKG Readings: (Independently Interpreted)   This patient is in a normal sinus rhythm with a heart rate of 89.  The p.r. interval is normal. There is no evidence of acute myocardial infarction or  malignant arrhythmia.       Imaging Results          X-Ray Chest AP Portable (Final result)  Result time 06/28/18 18:15:36    Final result by Jewel Mercer MD (06/28/18 18:15:36)                 Impression:      1. Grossly stable chronic findings, suggesting congestion/edema, no large focal consolidation.      Electronically signed by: Jewel Mercer MD  Date:    06/28/2018  Time:    18:15             Narrative:    EXAMINATION:  XR CHEST AP PORTABLE    CLINICAL HISTORY:  Sepsis;    TECHNIQUE:  Single frontal view of the chest was performed.    COMPARISON:  04/03/2018    FINDINGS:  The cardiomediastinal silhouette is prominent, magnified by technique, similar in appearance to the previous exam..  There is no pleural effusion.  The trachea is midline.  The lungs are symmetrically expanded bilaterally with bandlike atelectasis or scarring projected over the left lower lung zone laterally.  There is patchy increased interstitial and parenchymal attenuation, similar to the previous exam..  No large focal consolidation seen.  There is no pneumothorax.  The osseous structures are remarkable for mild degenerative change of the spine..                                  Medical decision making:  This patient is an insulin-dependent diabetic presenting with a temperature of a 103.2° little elevated lactate at 2.3.  There is an area of cellulitis on the anterior right shin.  I find no other real focus of infection.  I will admit this patient for empiric IV antibiotics for bacteremia.  I will treat the cellulitis on the right leg.  I will present the case to hospital medicine.  The patient has had stable vital signs is been no tachycardia.  His flu screen is negative. This patient also has hepatitis C with cirrhosis.       We will admit this patient to hospital medicine for IV antibiotics.  This patient we will be septic.  The only focus of infection we could identify was an area of cellulitis in the right anterior shin.   The patient will treated with vancomycin and Zosyn and Cipro.  We will have him evaluated by Hospital Medicine.  Will treat him for pain.     This patient does not wish to stay in the hospital.  He wishes to leave against medical advice.  He seems oriented. He seems appropriate conversation.  He knows the name of the president, he knows the month of the year he knows the day of the week.  He is appropriate conversation he just does not want stay in the hospital.  The risks of death disability pain suffering organ damage were explained.          Scribe Attestation:   Scribe #1: I performed the above scribed service and the documentation accurately describes the services I performed. I attest to the accuracy of the note.    Attending Attestation:           Physician Attestation for Scribe:  Physician Attestation Statement for Scribe #1: I, Papo Matthews MD, reviewed documentation, as scribed by Hannah Garcia in my presence, and it is both accurate and complete.                    Clinical Impression:   The primary encounter diagnosis was Sepsis, due to unspecified organism. Diagnoses of Fever and Nausea were also pertinent to this visit.                             Papo Matthews MD  06/28/18 2103       Papo Matthews MD  06/28/18 2139

## 2018-06-29 NOTE — DISCHARGE INSTRUCTIONS
Please return immediately if you get worse or if new problems develop.  Ibuprofen, 400 mg by mouth every 6 hr as needed for pain and fever.  Lots of liquids.  Antibiotics as above.  Return if you change her mind about being admitted to the hospital.  Please see your primary care doctor, or the doctor above this week.

## 2018-06-29 NOTE — ED NOTES
Called [facility] spoke with, Sarah, and arranged wheelchair van transport. Told them to bring own wheechair. States ETA is within the hour. [phone]

## 2018-06-29 NOTE — ED NOTES
"Pt voiced desire to leave [facility]. Talked with pt about risks of leaving [facility]. Pt stated "I still want to leave. I just want to go home." Notified erp. Dr. Matthews in room talking to pt at this time. Pt states "I will walk out of here. I just want to go home." Dr. Matthews explained pt's condition and reason for admission. Pt verbalized understanding. Dr. Matthews states "If your friend can come up here and get you, then you can leave, but in the meantime we are still going to give you the antibiotics while we wait for her to get here." Pt verbalized understanding and is currently on phone to get ride.   "

## 2018-06-29 NOTE — ED NOTES
"Called into pt's room. Pt states "I need you all to arrange my ride for me. She isn't coming." Notified charge nurse of situation. States "We can call Ramonita to come get him since he is from a nursing home. We have to find a ride for him."   "

## 2018-06-29 NOTE — ED NOTES
Called [facility] and notified Mary Newman RN, about pt leaving [facility] and that he will be returning to nursing home via transport service.

## 2018-06-30 LAB — BACTERIA UR CULT: NO GROWTH

## 2018-07-02 ENCOUNTER — TELEPHONE (OUTPATIENT)
Dept: HEPATOLOGY | Facility: CLINIC | Age: 48
End: 2018-07-02

## 2018-07-02 NOTE — TELEPHONE ENCOUNTER
Attempted to contact pt to schedule RTC f/u appt. Pt no longer resides at [facility]. Mobile number listed is not a working number. No other numbers on file for pt at [place].     [facility]

## 2018-07-03 LAB
BACTERIA BLD CULT: NORMAL
BACTERIA BLD CULT: NORMAL

## 2018-09-20 ENCOUNTER — OFFICE VISIT (OUTPATIENT)
Dept: NEPHROLOGY | Facility: CLINIC | Age: 48
End: 2018-09-20
Payer: MEDICAID

## 2018-09-20 ENCOUNTER — LAB VISIT (OUTPATIENT)
Dept: LAB | Facility: HOSPITAL | Age: 48
End: 2018-09-20
Attending: INTERNAL MEDICINE
Payer: MEDICAID

## 2018-09-20 VITALS
WEIGHT: 244 LBS | DIASTOLIC BLOOD PRESSURE: 80 MMHG | OXYGEN SATURATION: 99 % | SYSTOLIC BLOOD PRESSURE: 140 MMHG | BODY MASS INDEX: 31.32 KG/M2 | HEIGHT: 74 IN | HEART RATE: 68 BPM

## 2018-09-20 DIAGNOSIS — N18.30 CHRONIC KIDNEY DISEASE, STAGE 3: ICD-10-CM

## 2018-09-20 DIAGNOSIS — Z79.4 TYPE 2 DIABETES MELLITUS WITH COMPLICATION, WITH LONG-TERM CURRENT USE OF INSULIN: ICD-10-CM

## 2018-09-20 DIAGNOSIS — E11.8 TYPE 2 DIABETES MELLITUS WITH COMPLICATION, WITH LONG-TERM CURRENT USE OF INSULIN: ICD-10-CM

## 2018-09-20 DIAGNOSIS — B18.2 HEP C W/O COMA, CHRONIC: ICD-10-CM

## 2018-09-20 DIAGNOSIS — N18.30 CHRONIC KIDNEY DISEASE, STAGE III (MODERATE): Primary | ICD-10-CM

## 2018-09-20 DIAGNOSIS — N18.30 CHRONIC KIDNEY DISEASE, STAGE III (MODERATE): ICD-10-CM

## 2018-09-20 DIAGNOSIS — B18.2 CHRONIC HEPATITIS C WITH CIRRHOSIS: ICD-10-CM

## 2018-09-20 DIAGNOSIS — E88.09 HYPOALBUMINEMIA: ICD-10-CM

## 2018-09-20 DIAGNOSIS — K74.60 CHRONIC HEPATITIS C WITH CIRRHOSIS: ICD-10-CM

## 2018-09-20 DIAGNOSIS — D69.6 THROMBOCYTOPENIA: ICD-10-CM

## 2018-09-20 LAB
25(OH)D3+25(OH)D2 SERPL-MCNC: 9 NG/ML
ALBUMIN SERPL BCP-MCNC: 2.6 G/DL
ANION GAP SERPL CALC-SCNC: 9 MMOL/L
BASOPHILS # BLD AUTO: 0.03 K/UL
BASOPHILS NFR BLD: 1 %
BUN SERPL-MCNC: 30 MG/DL
CALCIUM SERPL-MCNC: 8.6 MG/DL
CHLORIDE SERPL-SCNC: 108 MMOL/L
CO2 SERPL-SCNC: 19 MMOL/L
CREAT SERPL-MCNC: 1.6 MG/DL
DIFFERENTIAL METHOD: ABNORMAL
EOSINOPHIL # BLD AUTO: 0.2 K/UL
EOSINOPHIL NFR BLD: 4.8 %
ERYTHROCYTE [DISTWIDTH] IN BLOOD BY AUTOMATED COUNT: 13.5 %
EST. GFR  (AFRICAN AMERICAN): 59.3 ML/MIN/1.73 M^2
EST. GFR  (NON AFRICAN AMERICAN): 51.3 ML/MIN/1.73 M^2
ESTIMATED AVG GLUCOSE: 252 MG/DL
FERRITIN SERPL-MCNC: 53 NG/ML
GLUCOSE SERPL-MCNC: 303 MG/DL
HBA1C MFR BLD HPLC: 10.4 %
HCT VFR BLD AUTO: 31.1 %
HGB BLD-MCNC: 10.3 G/DL
IMM GRANULOCYTES # BLD AUTO: 0.01 K/UL
IMM GRANULOCYTES NFR BLD AUTO: 0.3 %
IRON SERPL-MCNC: 86 UG/DL
LYMPHOCYTES # BLD AUTO: 0.7 K/UL
LYMPHOCYTES NFR BLD: 22.9 %
MCH RBC QN AUTO: 29.3 PG
MCHC RBC AUTO-ENTMCNC: 33.1 G/DL
MCV RBC AUTO: 89 FL
MONOCYTES # BLD AUTO: 0.2 K/UL
MONOCYTES NFR BLD: 6.7 %
NEUTROPHILS # BLD AUTO: 2 K/UL
NEUTROPHILS NFR BLD: 64.3 %
NRBC BLD-RTO: 0 /100 WBC
PHOSPHATE SERPL-MCNC: 4.2 MG/DL
PLATELET # BLD AUTO: 61 K/UL
PMV BLD AUTO: 10.8 FL
POTASSIUM SERPL-SCNC: 4.1 MMOL/L
PTH-INTACT SERPL-MCNC: 176 PG/ML
RBC # BLD AUTO: 3.51 M/UL
SATURATED IRON: 21 %
SODIUM SERPL-SCNC: 136 MMOL/L
TOTAL IRON BINDING CAPACITY: 411 UG/DL
TRANSFERRIN SERPL-MCNC: 278 MG/DL
WBC # BLD AUTO: 3.14 K/UL

## 2018-09-20 PROCEDURE — 82728 ASSAY OF FERRITIN: CPT

## 2018-09-20 PROCEDURE — 82306 VITAMIN D 25 HYDROXY: CPT

## 2018-09-20 PROCEDURE — 99214 OFFICE O/P EST MOD 30 MIN: CPT | Mod: PBBFAC | Performed by: INTERNAL MEDICINE

## 2018-09-20 PROCEDURE — 36415 COLL VENOUS BLD VENIPUNCTURE: CPT

## 2018-09-20 PROCEDURE — 83970 ASSAY OF PARATHORMONE: CPT

## 2018-09-20 PROCEDURE — 85025 COMPLETE CBC W/AUTO DIFF WBC: CPT

## 2018-09-20 PROCEDURE — 80069 RENAL FUNCTION PANEL: CPT

## 2018-09-20 PROCEDURE — 83540 ASSAY OF IRON: CPT

## 2018-09-20 PROCEDURE — 83036 HEMOGLOBIN GLYCOSYLATED A1C: CPT

## 2018-09-20 PROCEDURE — 99999 PR PBB SHADOW E&M-EST. PATIENT-LVL IV: CPT | Mod: PBBFAC,,, | Performed by: INTERNAL MEDICINE

## 2018-09-20 PROCEDURE — 99205 OFFICE O/P NEW HI 60 MIN: CPT | Mod: S$PBB,,, | Performed by: INTERNAL MEDICINE

## 2018-09-20 NOTE — LETTER
September 20, 2018      Kerline Woodward MD  [address] [facility] [address]           [facility] - Nephrology  [address]  [place] [address]  Phone: [phone]  Fax: [phone]          Patient: Leif Nino   MR Number: [id number]   YOB: 1970   Date of Visit: 9/20/2018       Dear Dr. Kerline Woodward:    Thank you for referring Leif Nino to me for evaluation. Attached you will find relevant portions of my assessment and plan of care.    If you have questions, please do not hesitate to call me. I look forward to following Leif Nino along with you.    Sincerely,    Gabriel Burks MD    Enclosure  CC:  No Recipients    If you would like to receive this communication electronically, please contact [email or web address] or [phone] to request more information on [employer] access.    For providers and/or their staff who would like to refer a patient to [facility], please contact us through our one-stop-shop provider referral line, [facility], at [phone].    If you feel you have received this communication in error or would no longer like to receive these types of communications, please e-mail [email or web address]

## 2018-09-20 NOTE — PROGRESS NOTES
"Subjective:       Patient ID: Leif Nino is a 47 y.o. White male who presents for new evaluation of CKD.     HPI   48 y/o M with hep C cirrhosis, DM2 (>15years), HTN, pancreatitis and CKD III who presents for new evaluation of CKD. Patient lives in a nursing home. Last creatinine 1.7 (baseline). Noted multiple episodes of leaving AMA after ED visits. Patient reports recently treated with antibiotics for diabetic foot infection/cellulitis and ?"gallbladder and spleen infection"?(at [facility]). He denies any chest pain, shortness of breath or lower extremity pain on mild to moderate activity. His appetite is good and denies any nausea, vomiting, diarrhea, abdominal pain, melena or bright red bleeding per rectum. His bowel movements are regular and his weight is stable. He urinates regularly and denies any frequency, urgency or hematuria. Denies NSAIDs use.       Past Medical History:   Diagnosis Date    Ascites of liver 5/30/2017    Chronic hepatitis C without hepatic coma     Chronic liver failure without hepatic coma 5/30/2017    Diabetic foot ulcer     right foot    Disorder of kidney and ureter     DM (diabetes mellitus), type 2 with peripheral vascular complications     Encounter for blood transfusion     Essential hypertension     Gallstones     Hepatic encephalopathy 5/30/2017    Hepatitis C     Hyperlipidemia     Pancreatitis     Pancytopenia     Stage 3 chronic kidney disease        Review of Systems    Constitutional: Negative for fatigue.   Eyes: Negative for discharge.   Respiratory: Negative for cough, shortness of breath and wheezing.   Cardiovascular: Negative for chest pain and palpitations.   Gastrointestinal: Negative for nausea, vomiting, abdominal pain and diarrhea.   Genitourinary: Negative for dysuria, urgency, frequency and hematuria.   Skin: Negative for color change and rash.   Psychiatric/Behavioral: Negative for confusion.    Objective:      Physical Exam    Gen: " AAOx3, NAD  HEENT: mmm  Neck: no bruit, no JVD  CV: RRR, no m/r  Resp: CTAx2, normal effort  GI: soft, ND, NTTP, +BS  Extr: trace LE edema. Left foot cast. Right foot dressing clean  Neuro: normal reflexes, no focal deficits    Assessment:       1. Chronic kidney disease, stage III (moderate)    2. Hep C w/o coma, chronic    3. Type 2 diabetes mellitus with complication, with long-term current use of insulin        Plan:       1. CKD: stage III, recent creatinine around baseline. Likely 2/2 longstanding DM and HRS. Will get UA and upc ratio. Renal US. On lasix and aldactone- will continue for now.     Lab Results   Component Value Date    CREATININE 1.7 (H) 06/28/2018     Protein Creatinine Ratios: will check upc ratio.     No results found for: UTPCR  ·   ·   Acid-Base: will check labs.   Lab Results   Component Value Date     06/28/2018    K 4.8 06/28/2018    CO2 21 (L) 06/28/2018     2. HTN: Blood pressures stable on current regimen    3. Renal osteodystrophy: will check PTH.   Lab Results   Component Value Date    CALCIUM 9.3 06/28/2018    PHOS 4.2 07/07/2017       4. Anemia: will check CBC and iron panel.  Lab Results   Component Value Date    HGB 12.0 (L) 06/28/2018        5. DM:  Last HbA1C, will repeat   Lab Results   Component Value Date    HGBA1C 8.4 (H) 06/17/2017       Follow up in 3 months with [facility].

## 2018-11-21 ENCOUNTER — TELEPHONE (OUTPATIENT)
Dept: ENDOSCOPY | Facility: HOSPITAL | Age: 48
End: 2018-11-21

## 2019-02-18 ENCOUNTER — TELEPHONE (OUTPATIENT)
Dept: NEPHROLOGY | Facility: CLINIC | Age: 49
End: 2019-02-18

## 2019-02-18 NOTE — TELEPHONE ENCOUNTER
Called pt. [facility] #, family member stated that he no longer stay there. Called pt. emergency contact # Isael answered , gave me Leif personal cell #, did call the # 2x was unsuccessful in leaving vm due to the fact it does not have a VM box set up.  DV

## 2020-09-21 NOTE — ASSESSMENT & PLAN NOTE
- discrepancy of home meds, poor historian  - started propranolol 40 mg BID, cont to monitor     Per below, Patient scheduled for 9/24 w/Dr Mckinney
